# Patient Record
Sex: FEMALE | Race: WHITE | URBAN - METROPOLITAN AREA
[De-identification: names, ages, dates, MRNs, and addresses within clinical notes are randomized per-mention and may not be internally consistent; named-entity substitution may affect disease eponyms.]

---

## 2021-09-08 ENCOUNTER — EMERGENCY (EMERGENCY)
Facility: HOSPITAL | Age: 86
LOS: 0 days | Discharge: HOME | End: 2021-09-08
Admitting: INTERNAL MEDICINE

## 2021-09-08 ENCOUNTER — INPATIENT (INPATIENT)
Facility: HOSPITAL | Age: 86
LOS: 0 days | Discharge: HOME | End: 2021-09-09
Attending: INTERNAL MEDICINE | Admitting: HOSPITALIST
Payer: MEDICARE

## 2021-09-08 VITALS
HEART RATE: 90 BPM | TEMPERATURE: 96 F | DIASTOLIC BLOOD PRESSURE: 86 MMHG | SYSTOLIC BLOOD PRESSURE: 186 MMHG | RESPIRATION RATE: 18 BRPM | OXYGEN SATURATION: 98 % | WEIGHT: 130.95 LBS

## 2021-09-08 DIAGNOSIS — Z98.890 OTHER SPECIFIED POSTPROCEDURAL STATES: Chronic | ICD-10-CM

## 2021-09-08 DIAGNOSIS — Z85.841 PERSONAL HISTORY OF MALIGNANT NEOPLASM OF BRAIN: ICD-10-CM

## 2021-09-08 DIAGNOSIS — R20.2 PARESTHESIA OF SKIN: ICD-10-CM

## 2021-09-08 DIAGNOSIS — M54.6 PAIN IN THORACIC SPINE: ICD-10-CM

## 2021-09-08 DIAGNOSIS — R53.1 WEAKNESS: ICD-10-CM

## 2021-09-08 DIAGNOSIS — R20.0 ANESTHESIA OF SKIN: ICD-10-CM

## 2021-09-08 DIAGNOSIS — G43.909 MIGRAINE, UNSPECIFIED, NOT INTRACTABLE, WITHOUT STATUS MIGRAINOSUS: ICD-10-CM

## 2021-09-08 DIAGNOSIS — Z20.822 CONTACT WITH AND (SUSPECTED) EXPOSURE TO COVID-19: ICD-10-CM

## 2021-09-08 DIAGNOSIS — Z90.13 ACQUIRED ABSENCE OF BILATERAL BREASTS AND NIPPLES: Chronic | ICD-10-CM

## 2021-09-08 LAB
ALBUMIN SERPL ELPH-MCNC: 4.2 G/DL — SIGNIFICANT CHANGE UP (ref 3.5–5.2)
ALP SERPL-CCNC: 56 U/L — SIGNIFICANT CHANGE UP (ref 30–115)
ALT FLD-CCNC: 15 U/L — SIGNIFICANT CHANGE UP (ref 0–41)
ANION GAP SERPL CALC-SCNC: 10 MMOL/L — SIGNIFICANT CHANGE UP (ref 7–14)
APPEARANCE UR: CLEAR — SIGNIFICANT CHANGE UP
APTT BLD: 26.9 SEC — LOW (ref 27–39.2)
AST SERPL-CCNC: 36 U/L — SIGNIFICANT CHANGE UP (ref 0–41)
BACTERIA # UR AUTO: ABNORMAL
BASE EXCESS BLDV CALC-SCNC: 5.2 MMOL/L — HIGH (ref -2–3)
BASOPHILS # BLD AUTO: 0.02 K/UL — SIGNIFICANT CHANGE UP (ref 0–0.2)
BASOPHILS NFR BLD AUTO: 0.4 % — SIGNIFICANT CHANGE UP (ref 0–1)
BILIRUB SERPL-MCNC: 0.4 MG/DL — SIGNIFICANT CHANGE UP (ref 0.2–1.2)
BILIRUB UR-MCNC: NEGATIVE — SIGNIFICANT CHANGE UP
BUN SERPL-MCNC: 19 MG/DL — SIGNIFICANT CHANGE UP (ref 10–20)
CALCIUM SERPL-MCNC: 9.4 MG/DL — SIGNIFICANT CHANGE UP (ref 8.5–10.1)
CHLORIDE SERPL-SCNC: 104 MMOL/L — SIGNIFICANT CHANGE UP (ref 98–110)
CO2 SERPL-SCNC: 25 MMOL/L — SIGNIFICANT CHANGE UP (ref 17–32)
COLOR SPEC: YELLOW — SIGNIFICANT CHANGE UP
CREAT SERPL-MCNC: 0.8 MG/DL — SIGNIFICANT CHANGE UP (ref 0.7–1.5)
DIFF PNL FLD: ABNORMAL
EOSINOPHIL # BLD AUTO: 0.25 K/UL — SIGNIFICANT CHANGE UP (ref 0–0.7)
EOSINOPHIL NFR BLD AUTO: 4.6 % — SIGNIFICANT CHANGE UP (ref 0–8)
EPI CELLS # UR: ABNORMAL /HPF
GLUCOSE SERPL-MCNC: 110 MG/DL — HIGH (ref 70–99)
GLUCOSE UR QL: NEGATIVE MG/DL — SIGNIFICANT CHANGE UP
HCO3 BLDV-SCNC: 32 MMOL/L — HIGH (ref 22–29)
HCT VFR BLD CALC: 37.3 % — SIGNIFICANT CHANGE UP (ref 37–47)
HGB BLD-MCNC: 11.9 G/DL — LOW (ref 12–16)
IMM GRANULOCYTES NFR BLD AUTO: 0.2 % — SIGNIFICANT CHANGE UP (ref 0.1–0.3)
INR BLD: 1.01 RATIO — SIGNIFICANT CHANGE UP (ref 0.65–1.3)
KETONES UR-MCNC: NEGATIVE — SIGNIFICANT CHANGE UP
LACTATE BLDV-MCNC: 1.5 MMOL/L — SIGNIFICANT CHANGE UP (ref 0.5–2)
LEUKOCYTE ESTERASE UR-ACNC: ABNORMAL
LYMPHOCYTES # BLD AUTO: 0.87 K/UL — LOW (ref 1.2–3.4)
LYMPHOCYTES # BLD AUTO: 16.1 % — LOW (ref 20.5–51.1)
MCHC RBC-ENTMCNC: 28.7 PG — SIGNIFICANT CHANGE UP (ref 27–31)
MCHC RBC-ENTMCNC: 31.9 G/DL — LOW (ref 32–37)
MCV RBC AUTO: 89.9 FL — SIGNIFICANT CHANGE UP (ref 81–99)
MONOCYTES # BLD AUTO: 0.42 K/UL — SIGNIFICANT CHANGE UP (ref 0.1–0.6)
MONOCYTES NFR BLD AUTO: 7.7 % — SIGNIFICANT CHANGE UP (ref 1.7–9.3)
NEUTROPHILS # BLD AUTO: 3.85 K/UL — SIGNIFICANT CHANGE UP (ref 1.4–6.5)
NEUTROPHILS NFR BLD AUTO: 71 % — SIGNIFICANT CHANGE UP (ref 42.2–75.2)
NITRITE UR-MCNC: NEGATIVE — SIGNIFICANT CHANGE UP
NRBC # BLD: 0 /100 WBCS — SIGNIFICANT CHANGE UP (ref 0–0)
PCO2 BLDV: 52 MMHG — HIGH (ref 39–42)
PH BLDV: 7.39 — SIGNIFICANT CHANGE UP (ref 7.32–7.43)
PH UR: 6 — SIGNIFICANT CHANGE UP (ref 5–8)
PLATELET # BLD AUTO: 168 K/UL — SIGNIFICANT CHANGE UP (ref 130–400)
PO2 BLDV: 30 MMHG — SIGNIFICANT CHANGE UP
POTASSIUM SERPL-MCNC: 4.8 MMOL/L — SIGNIFICANT CHANGE UP (ref 3.5–5)
POTASSIUM SERPL-SCNC: 4.8 MMOL/L — SIGNIFICANT CHANGE UP (ref 3.5–5)
PROT SERPL-MCNC: 7.2 G/DL — SIGNIFICANT CHANGE UP (ref 6–8)
PROT UR-MCNC: NEGATIVE MG/DL — SIGNIFICANT CHANGE UP
PROTHROM AB SERPL-ACNC: 11.6 SEC — SIGNIFICANT CHANGE UP (ref 9.95–12.87)
RBC # BLD: 4.15 M/UL — LOW (ref 4.2–5.4)
RBC # FLD: 14.4 % — SIGNIFICANT CHANGE UP (ref 11.5–14.5)
RBC CASTS # UR COMP ASSIST: >50 /HPF
SAO2 % BLDV: 56.4 % — SIGNIFICANT CHANGE UP
SARS-COV-2 RNA SPEC QL NAA+PROBE: SIGNIFICANT CHANGE UP
SODIUM SERPL-SCNC: 139 MMOL/L — SIGNIFICANT CHANGE UP (ref 135–146)
SP GR SPEC: 1.01 — SIGNIFICANT CHANGE UP (ref 1.01–1.03)
TROPONIN T SERPL-MCNC: <0.01 NG/ML — SIGNIFICANT CHANGE UP
TROPONIN T SERPL-MCNC: <0.01 NG/ML — SIGNIFICANT CHANGE UP
UROBILINOGEN FLD QL: 0.2 MG/DL — SIGNIFICANT CHANGE UP (ref 0.2–0.2)
WBC # BLD: 5.42 K/UL — SIGNIFICANT CHANGE UP (ref 4.8–10.8)
WBC # FLD AUTO: 5.42 K/UL — SIGNIFICANT CHANGE UP (ref 4.8–10.8)
WBC UR QL: SIGNIFICANT CHANGE UP /HPF

## 2021-09-08 PROCEDURE — 70498 CT ANGIOGRAPHY NECK: CPT | Mod: 26,MA

## 2021-09-08 PROCEDURE — 71045 X-RAY EXAM CHEST 1 VIEW: CPT | Mod: 26

## 2021-09-08 PROCEDURE — 99221 1ST HOSP IP/OBS SF/LOW 40: CPT

## 2021-09-08 PROCEDURE — 99221 1ST HOSP IP/OBS SF/LOW 40: CPT | Mod: GC

## 2021-09-08 PROCEDURE — 0042T: CPT

## 2021-09-08 PROCEDURE — 93010 ELECTROCARDIOGRAM REPORT: CPT

## 2021-09-08 PROCEDURE — 99285 EMERGENCY DEPT VISIT HI MDM: CPT

## 2021-09-08 PROCEDURE — 99222 1ST HOSP IP/OBS MODERATE 55: CPT

## 2021-09-08 PROCEDURE — 70496 CT ANGIOGRAPHY HEAD: CPT | Mod: 26,MA

## 2021-09-08 RX ORDER — CHLORHEXIDINE GLUCONATE 213 G/1000ML
1 SOLUTION TOPICAL
Refills: 0 | Status: DISCONTINUED | OUTPATIENT
Start: 2021-09-08 | End: 2021-09-09

## 2021-09-08 RX ORDER — ATORVASTATIN CALCIUM 80 MG/1
80 TABLET, FILM COATED ORAL AT BEDTIME
Refills: 0 | Status: DISCONTINUED | OUTPATIENT
Start: 2021-09-08 | End: 2021-09-08

## 2021-09-08 RX ORDER — ASPIRIN/CALCIUM CARB/MAGNESIUM 324 MG
325 TABLET ORAL ONCE
Refills: 0 | Status: COMPLETED | OUTPATIENT
Start: 2021-09-08 | End: 2021-09-08

## 2021-09-08 RX ORDER — PANTOPRAZOLE SODIUM 20 MG/1
40 TABLET, DELAYED RELEASE ORAL
Refills: 0 | Status: DISCONTINUED | OUTPATIENT
Start: 2021-09-08 | End: 2021-09-09

## 2021-09-08 RX ORDER — ASPIRIN/CALCIUM CARB/MAGNESIUM 324 MG
81 TABLET ORAL DAILY
Refills: 0 | Status: DISCONTINUED | OUTPATIENT
Start: 2021-09-08 | End: 2021-09-09

## 2021-09-08 RX ORDER — ACETAMINOPHEN 500 MG
650 TABLET ORAL EVERY 6 HOURS
Refills: 0 | Status: DISCONTINUED | OUTPATIENT
Start: 2021-09-08 | End: 2021-09-09

## 2021-09-08 RX ORDER — INFLUENZA VIRUS VACCINE 15; 15; 15; 15 UG/.5ML; UG/.5ML; UG/.5ML; UG/.5ML
0.5 SUSPENSION INTRAMUSCULAR ONCE
Refills: 0 | Status: COMPLETED | OUTPATIENT
Start: 2021-09-08 | End: 2021-09-08

## 2021-09-08 RX ORDER — CLOPIDOGREL BISULFATE 75 MG/1
75 TABLET, FILM COATED ORAL ONCE
Refills: 0 | Status: COMPLETED | OUTPATIENT
Start: 2021-09-08 | End: 2021-09-08

## 2021-09-08 RX ORDER — ENOXAPARIN SODIUM 100 MG/ML
40 INJECTION SUBCUTANEOUS AT BEDTIME
Refills: 0 | Status: DISCONTINUED | OUTPATIENT
Start: 2021-09-08 | End: 2021-09-09

## 2021-09-08 RX ORDER — ATORVASTATIN CALCIUM 80 MG/1
40 TABLET, FILM COATED ORAL AT BEDTIME
Refills: 0 | Status: DISCONTINUED | OUTPATIENT
Start: 2021-09-08 | End: 2021-09-09

## 2021-09-08 RX ADMIN — CLOPIDOGREL BISULFATE 75 MILLIGRAM(S): 75 TABLET, FILM COATED ORAL at 14:09

## 2021-09-08 RX ADMIN — ENOXAPARIN SODIUM 40 MILLIGRAM(S): 100 INJECTION SUBCUTANEOUS at 21:14

## 2021-09-08 RX ADMIN — ATORVASTATIN CALCIUM 40 MILLIGRAM(S): 80 TABLET, FILM COATED ORAL at 21:14

## 2021-09-08 NOTE — ED PROVIDER NOTE - PROGRESS NOTE DETAILS
case discussed with neuro , tele stroke, No TPA, Wants CTA / perfusion scan case discussed with neuro, Dr Rodriguez , tele stroke, No TPA, Wants CTA / perfusion scan SR: spoke with dr. becker recommends 325 aspirin 75 plavix Q4 neuro checks.

## 2021-09-08 NOTE — CONSULT NOTE ADULT - ASSESSMENT
89 yo female found to have severe stenosis L ICA on CAT neck.            Plan:  - will discuss with vascular team 89 yo female found to have severe stenosis L ICA on CAT neck.            Plan:  - rec carotid duplex  - will follow      All above D/W Dr silva and vascular team

## 2021-09-08 NOTE — ED PROVIDER NOTE - ATTENDING CONTRIBUTION TO CARE
89 year old female with a pmh of scoliois & self caths 4 x a day  presenting here with weakness and numbness to left arm / fingers and numbnes/tingling of right fingers. Patient wokeup at 8am with these symptoms. No blurry vision dizziness neck pain cp sob n/v abdominal pain. Patient initially went to an  and was sent here for further evaluation. Stroke code activated upon evaluation.   On exam  CONSTITUTIONAL: WA / WN / NAD  HEAD: NCAT  EYES: PERRL; EOMI; anicteric.  ENT: Normal pharynx; mucous membranes pink/moist, no erythema.  NECK: Supple; no meningeal signs  CARD: RRR; nl S1/S2; no M/R/G.  RESP: Respiratory rate and effort are normal; breath sounds clear and equal bilaterally.  ABD: Soft, NT ND  MSK/EXT: No gross deformities; full range of motion.  SKIN: Warm and dry;   NEURO: AAOx3, Motor 5/5 x 4 extremities, Sensations intact  No dysmetria no dysarthria no pronator drift.   PSYCH: Memory Intact, Normal Affect

## 2021-09-08 NOTE — ED PROVIDER NOTE - OBJECTIVE STATEMENT
Patient c/o waking at 8 am with pain left upper back , radiating to left arm, C/o numb feeling to left arm  and both hands no weakness, no chest pain, no SOB, H/o craniotomy for meningeoma in the past.

## 2021-09-08 NOTE — H&P ADULT - NSHPPHYSICALEXAM_GEN_ALL_CORE
General: NAD  Neuro: alert and oriented, no focal deficits, moves all extremities spontaneously  HEENT: NCAT, EOMI, anicteric, mucosa moist  Respiratory: airway patent, respirations unlabored  CVS: regular rate and rhythm  Abdomen: soft, nontender, nondistended, BS active, no bladder distension  Extremities: no edema, sensation and movement grossly intact  MSK: + thoracic scoliosis   Skin: warm, dry, appropriate color

## 2021-09-08 NOTE — CHART NOTE - NSCHARTNOTEFT_GEN_A_CORE
Stroke Note:    Responded remotely to stroke alert called at [facility]. Telestroke video consultation was deferred as it may delay appropriate care.   Pt is a 89 yo F with PMHx of craniotomy presenting with pain in back and numbness/pain in bilateral hands. LKW 2300 on 9/7, woke up with symptoms at 0700. NIHSS 0 per ED provider. CT head shows left craniotomy/surgical changes, no prior study available for comparison. Pt is not an IV alteplase candidate as LKW >4.5 hrs ago, low suspicion for AIS with b/l symptoms and mild, nondisabling symptoms. CTA head/neck without evidence of ELVO for EVT consideration. Consider admission, MRI brain/cervical spine and ASA/statin.

## 2021-09-08 NOTE — ED PROVIDER NOTE - CLINICAL SUMMARY MEDICAL DECISION MAKING FREE TEXT BOX
89 year old female with a pmh of scoliois & self caths 4 x a day  presenting here with weakness and numbness to left arm / fingers and numbnes/tingling of right fingers. Patient wokeup at 8am with these symptoms. No blurry vision dizziness neck pain cp sob n/v abdominal pain. Patient initially went to an  and was sent here for further evaluation. Stroke code activated upon evaluation.  VS reviewed. LAbs imaging ekg obtained and reviewed. Plavix given. Patient admitted for Q4 neuro checks

## 2021-09-08 NOTE — H&P ADULT - ASSESSMENT
89 yo F with reported PMH of Meningioma s/p craniotomy, Breast Cancer s/p b/l mastectomy and reconstruction, Urinary retention with self cauterization Chronic Migraines, Scoliosis, Hiatal Hernia, Chronic Osteoarthritis presents to the ED with episode of left arm numbness and pain extending to fingers onset around 8 am.    Left arm numbness likely 2/2 radiculopathy, unlikely Ischemic Stroke  - Patient currently hemodynamically stable, symptoms resolved  - Stroke Code activated in ED, NIHSS 0, out of window for tpa  - CT Head, CTP negative for new intracranial abnormality, CTA Head/neck negative for LVO. + short segment of Left TORI 85% atherosclerotic stenosis, likely chronic with collateral circulation  - EKG with no new ischemic changes, Trop neg x1  - CTH + plates s/p meningioma (cannot obtain MRI)  - Obtain CXR, b12, folate  - c/w telemonitoring for 24 hours  - PT/OT evaluation  - c/w enteric coated ASA, Plavix per neuro patient is not allergic)  - neurology consult placed  - Tylenol PRN for osteoarthritis  - supportive care, neurochecks    Chronic Thoracis Scoliosis  suspected Restrictive Lung Disease  - Patient with hx of severe chronic scoliosis  - States she uses supplemental O2 overnight, denies CPAP/BIPAP use  - No smoking hx, denies COPD  - c/w O2 PRN  - f/u CXR  - PT eval    hx of Chronic Migraines  - currently asymptomatic  - has not used inderal and fioroset in a while  - supportive care    Osteoarthritis  - Tylenol PRN  - PT eval    Chronic Urinary Retention  - claims she self cauterizes daily  - No suprapubic tenderness or distention  - was able to void in ED   - monitor urine output    CODE: full  Diet: regular  DVT ppx: lovenox  Dispo: anticipate dc within 24 hours       89 yo F with reported PMH of Meningioma s/p craniotomy, Breast Cancer s/p b/l mastectomy and reconstruction, Urinary retention with self cauterization Chronic Migraines, Scoliosis, Hiatal Hernia, Chronic Osteoarthritis presents to the ED with episode of left arm numbness and pain extending to fingers onset around 8 am.    Left arm numbness likely 2/2 radiculopathy, unlikely Ischemic Stroke  - Patient currently hemodynamically stable, symptoms resolved  - Stroke Code activated in ED, NIHSS 0, out of window for tpa  - CT Head, CTP negative for new intracranial abnormality, CTA Head/neck negative for LVO. + short segment of Left TORI 85% atherosclerotic stenosis, likely chronic with collateral circulation  - EKG with no new ischemic changes, Trop neg x1, trend one more set  - CTH + plates s/p meningioma (cannot obtain MRI)  - Obtain CXR, b12, folate  - c/w telemonitoring for 24 hours  - PT/OT evaluation  - c/w enteric coated ASA, Plavix per neuro (patient is not allergic to aspirin)  - neurology consult placed  - Tylenol PRN for osteoarthritis  - supportive care    Chronic Thoracis Scoliosis  Suspected Restrictive Lung Disease 2/2 Scoliosis  - Patient with hx of severe chronic scoliosis  - States she uses supplemental O2 overnight, denies CPAP/BIPAP use  - No smoking hx, denies COPD  - c/w O2 PRN  - f/u CXR  - PT eval     Chronic Migraines  - currently asymptomatic  - has not used inderal and fioroset in a while  - supportive care    Osteoarthritis  - Tylenol PRN  - PT eval    Asymptomatic Bacteruria  Chronic Urinary Retention  - claims she self cauterizes daily  - No suprapubic tenderness or distention, no need for abx  - was able to void in ED   - monitor urine output    CODE: full  Diet: regular  DVT ppx: lovenox  Dispo: anticipate dc within 24 hours

## 2021-09-08 NOTE — H&P ADULT - CONVERSATION DETAILS
Goals of care discussed with patient and daughter at bedside, chronic conditions and current plan of care discussed in detail, patient stated understanding and would like to remain FULL CODE at this time.

## 2021-09-08 NOTE — H&P ADULT - HISTORY OF PRESENT ILLNESS
89 yo F with reported PMH of Meningioma s/p craniotomy, Breast Cancer s/p b/l mastectomy and reconstruction, Urinary retention with self catherization. Chronic Migraines, Scoliosis, Hiatal Hernia, Chronic Osteoarthritis presents to the ED with episode of left arm numbness and pain extending to fingers onset around 8 am. Patient states that she was in her usual state of health when she woke up this morning and felt an "uneasy" numbing sensation over the left arm and numbness on her distal fingers. Symptoms were constant and resolved upon arrival to the ED this afternoon (Lasted about 20 mins).  She denies any chest pain, recent trauma, falls, or sick contacts, or dizziness. She denies any history of syncope or similar events in the past. Upon examination, patient admits her symptoms resolves and endorses chronic back pain from scoliosis. She otherwise denies any current headaches, dizziness, chest pain, palpitations, sob, or abdominal symptoms.    ED course: /86, HR: 90, T: 96.6, SPO2 92% on room air, Stroke Code activated in ED, CTH negtive for any new intracranial pathology . NIHSS 0 on admission, out of window for tpa. CT perfusion and CTA head negative for LVO, CTA neck + 85% stenosis of short segment proximal ICA 2/2 atherosclerotic calcification. Loaded with ASA, Plavix in ED. Trop neg x1, EKG w/out new ischemic changes, no acute electrolyte abnormalities.  89 yo F with reported PMH of Meningioma s/p craniotomy, Breast Cancer s/p b/l mastectomy and reconstruction, Urinary retention with self catherization. Chronic Migraines, Scoliosis, Hiatal Hernia, Chronic Osteoarthritis presents to the ED with episode of left arm numbness and pain extending to fingers onset around 8 am. Patient states that she was in her usual state of health when she woke up this morning and felt an "uneasy" numbing sensation over the left arm and numbness on her distal fingers. Symptoms were constant and resolved upon arrival to the ED this afternoon (Lasted about 20 mins).  Of note, patient with chronic back pain due to scoliosis and sleeps on her left side on her arm. She denies any chest pain, recent trauma, falls, or sick contacts, or dizziness. She denies any history of syncope or similar events in the past. Upon examination, patient admits her symptoms resolves and endorses chronic back pain from scoliosis. She otherwise denies any current headaches, dizziness, chest pain, palpitations, sob, or abdominal symptoms.    ED course: /86, HR: 90, T: 96.6, SPO2 92% on room air, Stroke Code activated in ED, CTH negative for any new intracranial pathology . NIHSS 0 on admission, out of window for tpa. CT perfusion and CTA head negative for LVO, CTA neck + 85% stenosis of short segment proximal ICA 2/2 atherosclerotic calcification. Loaded with ASA, Plavix in ED. Trop neg x1, EKG w/out new ischemic changes, no acute electrolyte abnormalities.

## 2021-09-08 NOTE — H&P ADULT - NSHPLABSRESULTS_GEN_ALL_CORE
11.9   5.42  )-----------( 168      ( 08 Sep 2021 10:30 )             37.3           139  |  104  |  19  ----------------------------<  110<H>  4.8   |  25  |  0.8    Ca    9.4      08 Sep 2021 10:30    TPro  7.2  /  Alb  4.2  /  TBili  0.4  /  DBili  x   /  AST  36  /  ALT  15  /  AlkPhos  56  09-08              Urinalysis Basic - ( 08 Sep 2021 13:55 )    Color: Yellow / Appearance: Clear / S.010 / pH: x  Gluc: x / Ketone: Negative  / Bili: Negative / Urobili: 0.2 mg/dL   Blood: x / Protein: Negative mg/dL / Nitrite: Negative   Leuk Esterase: Trace / RBC: >50 /HPF / WBC 1-2 /HPF   Sq Epi: x / Non Sq Epi: Few /HPF / Bacteria: Moderate        PT/INR - ( 08 Sep 2021 10:30 )   PT: 11.60 sec;   INR: 1.01 ratio         PTT - ( 08 Sep 2021 10:30 )  PTT:26.9 sec    Lactate Trend      CARDIAC MARKERS ( 08 Sep 2021 10:30 )  x     / <0.01 ng/mL / x     / x     / x            CAPILLARY BLOOD GLUCOSE      POCT Blood Glucose.: 108 mg/dL (08 Sep 2021 09:59)

## 2021-09-08 NOTE — H&P ADULT - ATTENDING COMMENTS
Vital Signs Last 24 Hrs  T(C): 35.8 (08 Sep 2021 10:01), Max: 35.8 (08 Sep 2021 10:01)  T(F): 96.5 (08 Sep 2021 10:01), Max: 96.5 (08 Sep 2021 10:01)  HR: 90 (08 Sep 2021 14:44) (78 - 90)  BP: 137/84 (08 Sep 2021 14:44) (114/63 - 186/86)  BP(mean): --  RR: 18 (08 Sep 2021 14:44) (18 - 18)  SpO2: 98% (08 Sep 2021 14:44) (96% - 98%)  pe  nad  aaox3  dentures, chronic asymetry nasolabial  [id number]  ctabl  sofntnt+bs  nocce  nonfocal 5/5 intact  sensation intact  CN intact      #bilateral hand numbness   doubt TIA - likely related to position from sleeping   scoliosis - radiculopathy   symptoms resolved  no MRI - pt has metal plate in her head sp meningoma surgery  neuro eval checks q4  PT/OT eval    #carotid stenosis  pt was told she needs vascular eval - consult placed  however she would not agree to any procedure anyway  [id number] daily  statin  check LDL    full code  discussed with pt and daughter at bedside  likely dc home on 9/9 after neuro eval

## 2021-09-08 NOTE — H&P ADULT - NSHPREVIEWOFSYSTEMS_GEN_ALL_CORE
REVIEW OF SYSTEMS:    CONSTITUTIONAL: + left arm numbness,  EYES/ENT: No visual changes;  No vertigo or throat pain   NECK: No pain or stiffness  RESPIRATORY: No cough, wheezing, hemoptysis; No shortness of breath  CARDIOVASCULAR: No chest pain or palpitations  GASTROINTESTINAL: No abdominal or epigastric pain. No nausea, vomiting, or hematemesis; No diarrhea or constipation. No melena or hematochezia.  GENITOURINARY: No dysuria, frequency or hematuria  NEUROLOGICAL: Left arm numbness and tingling  MSK: + chronic back pain

## 2021-09-08 NOTE — ED PROVIDER NOTE - NSICDXPASTMEDICALHX_GEN_ALL_CORE_FT
PAST MEDICAL HISTORY:  Breast CA     H/O brain tumor     Migraine     Urinary retention with incomplete bladder emptying pt self catheterizes

## 2021-09-09 ENCOUNTER — TRANSCRIPTION ENCOUNTER (OUTPATIENT)
Age: 86
End: 2021-09-09

## 2021-09-09 VITALS
DIASTOLIC BLOOD PRESSURE: 69 MMHG | OXYGEN SATURATION: 99 % | HEART RATE: 82 BPM | TEMPERATURE: 97 F | SYSTOLIC BLOOD PRESSURE: 145 MMHG | RESPIRATION RATE: 36 BRPM

## 2021-09-09 LAB
A1C WITH ESTIMATED AVERAGE GLUCOSE RESULT: 5.8 % — HIGH (ref 4–5.6)
ALBUMIN SERPL ELPH-MCNC: 3.7 G/DL — SIGNIFICANT CHANGE UP (ref 3.5–5.2)
ALP SERPL-CCNC: 51 U/L — SIGNIFICANT CHANGE UP (ref 30–115)
ALT FLD-CCNC: 12 U/L — SIGNIFICANT CHANGE UP (ref 0–41)
ANION GAP SERPL CALC-SCNC: 11 MMOL/L — SIGNIFICANT CHANGE UP (ref 7–14)
AST SERPL-CCNC: 22 U/L — SIGNIFICANT CHANGE UP (ref 0–41)
BASOPHILS # BLD AUTO: 0.02 K/UL — SIGNIFICANT CHANGE UP (ref 0–0.2)
BASOPHILS NFR BLD AUTO: 0.5 % — SIGNIFICANT CHANGE UP (ref 0–1)
BILIRUB SERPL-MCNC: 0.4 MG/DL — SIGNIFICANT CHANGE UP (ref 0.2–1.2)
BUN SERPL-MCNC: 13 MG/DL — SIGNIFICANT CHANGE UP (ref 10–20)
CALCIUM SERPL-MCNC: 9.3 MG/DL — SIGNIFICANT CHANGE UP (ref 8.5–10.1)
CHLORIDE SERPL-SCNC: 103 MMOL/L — SIGNIFICANT CHANGE UP (ref 98–110)
CHOLEST SERPL-MCNC: 156 MG/DL — SIGNIFICANT CHANGE UP
CO2 SERPL-SCNC: 27 MMOL/L — SIGNIFICANT CHANGE UP (ref 17–32)
CREAT SERPL-MCNC: 0.7 MG/DL — SIGNIFICANT CHANGE UP (ref 0.7–1.5)
EOSINOPHIL # BLD AUTO: 0.27 K/UL — SIGNIFICANT CHANGE UP (ref 0–0.7)
EOSINOPHIL NFR BLD AUTO: 6.3 % — SIGNIFICANT CHANGE UP (ref 0–8)
ESTIMATED AVERAGE GLUCOSE: 120 MG/DL — HIGH (ref 68–114)
GLUCOSE SERPL-MCNC: 106 MG/DL — HIGH (ref 70–99)
HCT VFR BLD CALC: 33.8 % — LOW (ref 37–47)
HDLC SERPL-MCNC: 66 MG/DL — SIGNIFICANT CHANGE UP
HGB BLD-MCNC: 11 G/DL — LOW (ref 12–16)
IMM GRANULOCYTES NFR BLD AUTO: 0.2 % — SIGNIFICANT CHANGE UP (ref 0.1–0.3)
LIPID PNL WITH DIRECT LDL SERPL: 86 MG/DL — SIGNIFICANT CHANGE UP
LYMPHOCYTES # BLD AUTO: 0.95 K/UL — LOW (ref 1.2–3.4)
LYMPHOCYTES # BLD AUTO: 22 % — SIGNIFICANT CHANGE UP (ref 20.5–51.1)
MAGNESIUM SERPL-MCNC: 1.9 MG/DL — SIGNIFICANT CHANGE UP (ref 1.8–2.4)
MCHC RBC-ENTMCNC: 29.2 PG — SIGNIFICANT CHANGE UP (ref 27–31)
MCHC RBC-ENTMCNC: 32.5 G/DL — SIGNIFICANT CHANGE UP (ref 32–37)
MCV RBC AUTO: 89.7 FL — SIGNIFICANT CHANGE UP (ref 81–99)
MONOCYTES # BLD AUTO: 0.46 K/UL — SIGNIFICANT CHANGE UP (ref 0.1–0.6)
MONOCYTES NFR BLD AUTO: 10.6 % — HIGH (ref 1.7–9.3)
NEUTROPHILS # BLD AUTO: 2.61 K/UL — SIGNIFICANT CHANGE UP (ref 1.4–6.5)
NEUTROPHILS NFR BLD AUTO: 60.4 % — SIGNIFICANT CHANGE UP (ref 42.2–75.2)
NON HDL CHOLESTEROL: 90 MG/DL — SIGNIFICANT CHANGE UP
NRBC # BLD: 0 /100 WBCS — SIGNIFICANT CHANGE UP (ref 0–0)
PLATELET # BLD AUTO: 166 K/UL — SIGNIFICANT CHANGE UP (ref 130–400)
POTASSIUM SERPL-MCNC: 3.6 MMOL/L — SIGNIFICANT CHANGE UP (ref 3.5–5)
POTASSIUM SERPL-SCNC: 3.6 MMOL/L — SIGNIFICANT CHANGE UP (ref 3.5–5)
PROT SERPL-MCNC: 6.2 G/DL — SIGNIFICANT CHANGE UP (ref 6–8)
RBC # BLD: 3.77 M/UL — LOW (ref 4.2–5.4)
RBC # FLD: 14.2 % — SIGNIFICANT CHANGE UP (ref 11.5–14.5)
SODIUM SERPL-SCNC: 141 MMOL/L — SIGNIFICANT CHANGE UP (ref 135–146)
TRIGL SERPL-MCNC: 67 MG/DL — SIGNIFICANT CHANGE UP
WBC # BLD: 4.32 K/UL — LOW (ref 4.8–10.8)
WBC # FLD AUTO: 4.32 K/UL — LOW (ref 4.8–10.8)

## 2021-09-09 PROCEDURE — 70450 CT HEAD/BRAIN W/O DYE: CPT | Mod: 26

## 2021-09-09 PROCEDURE — 93880 EXTRACRANIAL BILAT STUDY: CPT | Mod: 26

## 2021-09-09 PROCEDURE — 99239 HOSP IP/OBS DSCHRG MGMT >30: CPT

## 2021-09-09 PROCEDURE — 71045 X-RAY EXAM CHEST 1 VIEW: CPT | Mod: 26

## 2021-09-09 RX ORDER — ATORVASTATIN CALCIUM 80 MG/1
1 TABLET, FILM COATED ORAL
Qty: 30 | Refills: 0
Start: 2021-09-09 | End: 2021-10-08

## 2021-09-09 RX ORDER — ASPIRIN/CALCIUM CARB/MAGNESIUM 324 MG
1 TABLET ORAL
Qty: 30 | Refills: 0
Start: 2021-09-09 | End: 2021-10-08

## 2021-09-09 RX ORDER — CLOPIDOGREL BISULFATE 75 MG/1
1 TABLET, FILM COATED ORAL
Qty: 21 | Refills: 0
Start: 2021-09-09 | End: 2021-09-29

## 2021-09-09 RX ADMIN — Medication 650 MILLIGRAM(S): at 06:51

## 2021-09-09 RX ADMIN — Medication 81 MILLIGRAM(S): at 12:31

## 2021-09-09 RX ADMIN — Medication 650 MILLIGRAM(S): at 07:20

## 2021-09-09 RX ADMIN — PANTOPRAZOLE SODIUM 40 MILLIGRAM(S): 20 TABLET, DELAYED RELEASE ORAL at 05:46

## 2021-09-09 RX ADMIN — CHLORHEXIDINE GLUCONATE 1 APPLICATION(S): 213 SOLUTION TOPICAL at 05:46

## 2021-09-09 NOTE — CONSULT NOTE ADULT - SUBJECTIVE AND OBJECTIVE BOX
Neurology Consult  note    Name  MARISA PEREZ 89 y/o F with pmhx of Meningioma s/p craniotomy (has metal plate), Breast Cancer s/p b/l mastectomy and reconstruction, Urinary retention with self catherization, Chronic Migraines, Scoliosis, Hiatal Hernia, Chronic Osteoarthritis presents to the ED with episode of left arm numbness and pain extending to fingers onset around 8 am. Pt reports when she woke up felt pain on her back, left side below the shoulder scapula then started feeling numbness sensation on her left arm all the way to her fingers. Pt reports symptoms have resolved. Pt states usually sleeps on her left side due to scoliosis. Pt denies similar numbness sensation in the past. Pt reports has hx of migraine , is well controlled. Pt denies hx of syncope. Pt denies recent fall. Pt denies fever, chills, chest pain, shortness of breath, burning with urination, diarrhea.     Vital Signs Last 24 Hrs  T(C): 36.2 (09 Sep 2021 07:25), Max: 36.2 (09 Sep 2021 07:25)  T(F): 97.1 (09 Sep 2021 07:25), Max: 97.1 (09 Sep 2021 07:25)  HR: 82 (09 Sep 2021 07:25) (77 - 90)  BP: 145/69 (09 Sep 2021 07:25) (114/63 - 149/67)  BP(mean): 99 (09 Sep 2021 07:25) (89 - 99)  RR: 36 (09 Sep 2021 07:25) (16 - 36)  SpO2: 99% (09 Sep 2021 07:25) (96% - 99%)      Neurological Exam:   Mental status: Awake, alert and oriented x3.  Recent and remote memory intact.  Naming and comprehension intact.    Attention/concentration intact.  No dysarthria, no aphasia.  Fund of knowledge appropriate.    Cranial nerves:  pupils equally round and reactive to light, visual fields full, no nystagmus, extraocular muscles intact,  face asymmetric right lip side lower ( pt states does not have her dentures) tongue was midline  Motor:  Normal bulk and tone, strength 5/5 in bilateral upper and lower extremities.   strength 5/5.  Rapid alternating movements intact and symmetric.   Sensation: Intact to light touch,  No neglect.   Coordination: No dysmetria on finger-to-nose and heel-to-shin.  No clumsiness.  Reflexes: 2+ in upper and lower extremities, downgoing toes bilaterally  Gait: Narrow and steady. No ataxia.  Romberg negative    Medications  acetaminophen   Tablet .. 650 milliGRAM(s) Oral every 6 hours PRN  aspirin enteric coated 81 milliGRAM(s) Oral daily  atorvastatin 40 milliGRAM(s) Oral at bedtime  chlorhexidine 4% Liquid 1 Application(s) Topical <User Schedule>  enoxaparin Injectable 40 milliGRAM(s) SubCutaneous at bedtime  influenza   Vaccine 0.5 milliLiter(s) IntraMuscular once  pantoprazole    Tablet 40 milliGRAM(s) Oral before breakfast      Lab                        11.0   4.32  )-----------( 166      ( 09 Sep 2021 05:35 )             33.8     09-09    141  |  103  |  13  ----------------------------<  106<H>  3.6   |  27  |  0.7    Ca    9.3      09 Sep 2021 05:35  Mg     1.9     09-09    TPro  6.2  /  Alb  3.7  /  TBili  0.4  /  DBili  x   /  AST  22  /  ALT  12  /  AlkPhos  51  09-09    CAPILLARY BLOOD GLUCOSE        LIVER FUNCTIONS - ( 09 Sep 2021 05:35 )  Alb: 3.7 g/dL / Pro: 6.2 g/dL / ALK PHOS: 51 U/L / ALT: 12 U/L / AST: 22 U/L / GGT: x           PT/INR - ( 08 Sep 2021 10:30 )   PT: 11.60 sec;   INR: 1.01 ratio         PTT - ( 08 Sep 2021 10:30 )  PTT:26.9 sec    Radiology  < from: CT Head No Cont (09.09.21 @ 09:21) >  IMPRESSION:  No significant change since recent head CT of 9/8/2021.    Similar hypodensity noted within the left frontal lobe which is not well evaluated due to streak artifact from metallic hardware involving prior left craniotomy. Finding may represent gliosis or chronic infarct related to prior postsurgical change though an evolving acute infarct cannot be excluded.    No evidence of intracranial hemorrhage, mass effect or midline shift.    --- End of Report ---      < from: CT Angio Neck w/ IV Cont (09.08.21 @ 10:46) >  IMPRESSION:    CT PERFUSION:  Nondiagnostic examination.    CTA HEAD:  No evidence of flow-limiting stenosis, occlusion or aneurysm.    Slightly beaded appearance of the distal cervical internal carotid arteries bilaterally which is nonspecific and possibly may be related to vasculitis or other chronic inflammatory etiology.    CTA NECK:  Severe (approximately 85%) short segment stenosis of the proximal left internal carotid artery due to atherosclerotic calcification.    --- End of Report ---      MONSERRAT MULLER MD; Attending Radiologist  This document has been electronically signed. Sep  8 2021 11:38AM    < end of copied text >              MONSERRAT MULLER MD; Attending Radiologist  This document has been electronically signed. Sep  9 2021 10:11AM    < end of copied text >  
HPI:  89 yo F with reported PMH of Meningioma s/p craniotomy, Breast Cancer s/p b/l mastectomy and reconstruction, Urinary retention with self catherization. Chronic Migraines, thx  Scoliosis, kyphosis  Hiatal Hernia, Chronic Osteoarthritis presents to the ED with episode of left arm numbness and pain extending to fingers onset around 8 am. Patient states that she was in her usual state of health when she woke up this morning and felt an "uneasy" numbing sensation over the left arm and numbness on her distal fingers. Symptoms were constant and resolved upon arrival to the ED this afternoon (Lasted about 20 mins).  Of note, patient with chronic back pain due to scoliosis and sleeps on her left side on her arm. She denies any chest pain, recent trauma, falls, or sick contacts, or dizziness. She denies any history of syncope or similar events in the past. Upon examination, patient admits her symptoms resolves and endorses chronic back pain from scoliosis. She otherwise denies any current headaches, dizziness, chest pain, palpitations, sob, or abdominal symptoms.    ED course: /86, HR: 90, T: 96.6, SPO2 92% on room air, Stroke Code activated in ED, CTH negative for any new intracranial pathology . NIHSS 0 on admission, out of window for tpa. CT perfusion and CTA head negative for LVO, CTA neck + 85% stenosis of short segment proximal ICA 2/2 atherosclerotic calcification. Loaded with ASA, Plavix in ED. Trop neg x1, EKG w/out new ischemic changes, no acute electrolyte abnormalities.  ptn  seen and exam  at  bed  side nad  fu  command aox3      PTN  REFERRED TO ACUTE  REHAB  FOR  EVAL AND  TX   PAST MEDICAL & SURGICAL HISTORY:  Migraine    H/O brain tumor    Breast CA    Urinary retention with incomplete bladder emptying  pt self catheterizes    H/O mastectomy, bilateral    H/O craniotomy        Hospital Course:    TODAY'S SUBJECTIVE & REVIEW OF SYMPTOMS:     Constitutional WNL   Cardio WNL   Resp WNL   GI WNL  Heme WNL  Endo WNL  Skin WNL  MSK WNL  Neuro WNL  Cognitive WNL  Psych WNL      MEDICATIONS  (STANDING):  aspirin enteric coated 81 milliGRAM(s) Oral daily  atorvastatin 40 milliGRAM(s) Oral at bedtime  chlorhexidine 4% Liquid 1 Application(s) Topical <User Schedule>  enoxaparin Injectable 40 milliGRAM(s) SubCutaneous at bedtime  influenza   Vaccine 0.5 milliLiter(s) IntraMuscular once  pantoprazole    Tablet 40 milliGRAM(s) Oral before breakfast    MEDICATIONS  (PRN):  acetaminophen   Tablet .. 650 milliGRAM(s) Oral every 6 hours PRN Temp greater or equal to 38C (100.4F), Mild Pain (1 - 3)      FAMILY HISTORY:      Allergies    No Known Allergies    Intolerances        SOCIAL HISTORY:    [  ] Etoh  [  ] Smoking  [  ] Substance abuse     Home Environment:  [  ] Home Alone  [ x ] Lives with Family  [  ] Home Health Aid    Dwelling:  [  ] Apartment  [ x ] Private House  [  ] Adult Home  [  ] Skilled Nursing Facility      [  ] Short Term  [  ] Long Term  [ x ] Stairs       Elevator [  ]    FUNCTIONAL STATUS PTA: (Check all that apply)  Ambulation: [ x  ]Independent    [  ] Dependent     [  ] Non-Ambulatory  Assistive Device: [ x ] SA Cane  [  ]  Q Cane  [ x ] Walker  [ ]  Wheelchair  ADL : [x  ] Independent  [  ]  Dependent       Vital Signs Last 24 Hrs  T(C): 36.2 (09 Sep 2021 07:25), Max: 36.2 (09 Sep 2021 07:25)  T(F): 97.1 (09 Sep 2021 07:25), Max: 97.1 (09 Sep 2021 07:25)  HR: 82 (09 Sep 2021 07:25) (77 - 90)  BP: 145/69 (09 Sep 2021 07:25) (114/63 - 186/86)  BP(mean): 99 (09 Sep 2021 07:25) (89 - 99)  RR: 36 (09 Sep 2021 07:25) (16 - 36)  SpO2: 99% (09 Sep 2021 07:25) (96% - 99%)      PHYSICAL EXAM: Alert & Oriented X3  GENERAL: NAD, well-groomed, well-developed  HEAD:  Atraumatic, Normocephalic  EYES: EOMI, PERRLA, conjunctiva and sclera clear  NECK: Supple, No JVD, Normal thyroid  CHEST/LUNG: Clear to percussion bilaterally; No rales, rhonchi, wheezing, or rubs  HEART: Regular rate and rhythm; No murmurs, rubs, or gallops  ABDOMEN: Soft, Nontender, Nondistended; Bowel sounds present  EXTREMITIES:  2+ Peripheral Pulses, No clubbing, cyanosis, or edema    NERVOUS SYSTEM:  Cranial Nerves 2-12 intact [ x ] Abnormal  [  ]  ROM: WFL all extremities [ x ]  Abnormal [  ]  Motor Strength: WFL all extremities  [x  ]  Abnormal [  ]  Sensation: intact to light touch [ x ] Abnormal [  ]  Reflexes: Symmetric [  x]  Abnormal [  ]    FUNCTIONAL STATUS:  Bed Mobility: Independent [  ]  Supervision [ x ]  Needs Assistance [  ]  N/A [  ]  Transfers: Independent [  ]  Supervision [ x ]  Needs Assistance [  ]  N/A [  ]   Ambulation: Independent [  ]  Supervision [ x ]  Needs Assistance [  ]  N/A [  ]  ADL: Independent [  ] Requires Assistance [  ] N/A [x  ]  SEE PT/OT IE NOTES    LABS:                        11.0   4.32  )-----------( 166      ( 09 Sep 2021 05:35 )             33.8         141  |  103  |  13  ----------------------------<  106<H>  3.6   |  27  |  0.7    Ca    9.3      09 Sep 2021 05:35  Mg     1.9         TPro  6.2  /  Alb  3.7  /  TBili  0.4  /  DBili  x   /  AST  22  /  ALT  12  /  AlkPhos  51      PT/INR - ( 08 Sep 2021 10:30 )   PT: 11.60 sec;   INR: 1.01 ratio         PTT - ( 08 Sep 2021 10:30 )  PTT:26.9 sec  Urinalysis Basic - ( 08 Sep 2021 13:55 )    Color: Yellow / Appearance: Clear / S.010 / pH: x  Gluc: x / Ketone: Negative  / Bili: Negative / Urobili: 0.2 mg/dL   Blood: x / Protein: Negative mg/dL / Nitrite: Negative   Leuk Esterase: Trace / RBC: >50 /HPF / WBC 1-2 /HPF   Sq Epi: x / Non Sq Epi: Few /HPF / Bacteria: Moderate        RADIOLOGY & ADDITIONAL STUDIES:< from: CT Brain Stroke Protocol (21 @ 10:14) >  IMPRESSION:  Examination is limited due to metallic streak artifact from left craniotomy hardware.    There is hypodensity involving the left frontal lobe which may represent gliosis from prior craniotomy though acute infarct cannot be excluded, not well evaluated due to streak artifact..    No mass effect, midline shift or intracranial hemorrhage.      < end of copied text >      Assesment:
HPI: 89 yo female with PMHx Meningioma s/p craniotomy and Breast CA s/p b/l mastectomy and reconstruction presents to ER with c/o pain in back under L scapula with some numbness and tingling of LUE. Pt does states she sleeps on her L side and had this feeling upon awakening. All symptoms now resolved as per pt. She denies any dizziness, visual changes, slurred speech, HA or CP.     Denies any h/o MI, CVA or TIA. Takes baby ASA for "prevention".         PAST MEDICAL & SURGICAL HISTORY:  Migraine    H/O brain tumor    Breast CA    Urinary retention with incomplete bladder emptying  pt self catheterizes    H/O mastectomy, bilateral    H/O craniotomy        MEDICATIONS  (STANDING):  aspirin enteric coated 81 milliGRAM(s) Oral daily  atorvastatin 40 milliGRAM(s) Oral at bedtime  chlorhexidine 4% Liquid 1 Application(s) Topical <User Schedule>  enoxaparin Injectable 40 milliGRAM(s) SubCutaneous at bedtime  influenza   Vaccine 0.5 milliLiter(s) IntraMuscular once    MEDICATIONS  (PRN):  acetaminophen   Tablet .. 650 milliGRAM(s) Oral every 6 hours PRN Temp greater or equal to 38C (100.4F), Mild Pain (1 - 3)      Allergies    No Known Allergies    Intolerances        SOCIAL HISTORY:    FAMILY HISTORY:          Physical Exam:  General: NAD, resting comfortably  HEENT: NC/AT, EOMI, normal hearing, no oral lesions, no LAD, neck supple  Pulmonary: normal resp effort, CTA-B  Cardiovascular: NSR, no murmurs  Abdominal: soft, ND/NT, no organomegaly  Extremities: WWP, normal strength, no clubbing/cyanosis/edema  Neuro: A/O x 3, CNs II-XII grossly intact, normal sensation, no focal deficits  Pulses: palpable distal pulses    Vital Signs Last 24 Hrs  T(C): 35.7 (08 Sep 2021 16:10), Max: 35.8 (08 Sep 2021 10:01)  T(F): 96.3 (08 Sep 2021 16:10), Max: 96.5 (08 Sep 2021 10:01)  HR: 87 (08 Sep 2021 16:10) (78 - 90)  BP: 149/67 (08 Sep 2021 16:10) (114/63 - 186/86)  BP(mean): 97 (08 Sep 2021 16:10) (97 - 97)  RR: 20 (08 Sep 2021 16:10) (18 - 20)  SpO2: 98% (08 Sep 2021 16:10) (96% - 98%)    I&O's Summary          LABS:                        11.9   5.42  )-----------( 168      ( 08 Sep 2021 10:30 )             37.3         139  |  104  |  19  ----------------------------<  110<H>  4.8   |  25  |  0.8    Ca    9.4      08 Sep 2021 10:30    TPro  7.2  /  Alb  4.2  /  TBili  0.4  /  DBili  x   /  AST  36  /  ALT  15  /  AlkPhos  56  0908    PT/INR - ( 08 Sep 2021 10:30 )   PT: 11.60 sec;   INR: 1.01 ratio         PTT - ( 08 Sep 2021 10:30 )  PTT:26.9 sec  Urinalysis Basic - ( 08 Sep 2021 13:55 )    Color: Yellow / Appearance: Clear / S.010 / pH: x  Gluc: x / Ketone: Negative  / Bili: Negative / Urobili: 0.2 mg/dL   Blood: x / Protein: Negative mg/dL / Nitrite: Negative   Leuk Esterase: Trace / RBC: >50 /HPF / WBC 1-2 /HPF   Sq Epi: x / Non Sq Epi: Few /HPF / Bacteria: Moderate      CAPILLARY BLOOD GLUCOSE      POCT Blood Glucose.: 108 mg/dL (08 Sep 2021 09:59)    LIVER FUNCTIONS - ( 08 Sep 2021 10:30 )  Alb: 4.2 g/dL / Pro: 7.2 g/dL / ALK PHOS: 56 U/L / ALT: 15 U/L / AST: 36 U/L / GGT: x                   RADIOLOGY & ADDITIONAL STUDIES:  < from: CT Angio Neck w/ IV Cont (21 @ 10:46) >  IMPRESSION:    CT PERFUSION:  Nondiagnostic examination.    CTA HEAD:  No evidence of flow-limiting stenosis, occlusion or aneurysm.    Slightly beaded appearance of the distal cervical internal carotid arteries bilaterally which is nonspecific and possibly may be related to vasculitis or other chronic inflammatory etiology.    CTA NECK:  Severe (approximately 85%) short segment stenosis of the proximal left internal carotid artery due to atherosclerotic calcification.    --- End of Report ---              MONSERRAT MULLER MD; Attending Radiologist  This document has been electronically signed. Sep  8 2021 11:38AM    < end of copied text >

## 2021-09-09 NOTE — PHYSICAL THERAPY INITIAL EVALUATION ADULT - ADDITIONAL COMMENTS
Per patient, has recently been living with her daughter ; 2 steps to enter home , 11 steps to bedroom. Ambulates with a straight cane or rolling walker as needed.

## 2021-09-09 NOTE — OCCUPATIONAL THERAPY INITIAL EVALUATION ADULT - GENERAL OBSERVATIONS, REHAB EVAL
12:55-13:15 chart reviewed, ok to treat by Occupational Therapist as confirmed by RN Odalis, Pt received seated in bedside chair +tele +luke RUE with daughter present in NAD. Pt in agreement with OT IE.

## 2021-09-09 NOTE — PROGRESS NOTE ADULT - SUBJECTIVE AND OBJECTIVE BOX
Patient is comfortable in bed, no complaints        T(F): 97.1 (09-09-21 @ 07:25), Max: 97.1 (09-09-21 @ 07:25)  HR: 82 (09-09-21 @ 07:25)  BP: 145/69 (09-09-21 @ 07:25)  RR: 20  SpO2: 99% (09-09-21 @ 07:25) (98% - 99%)    PHYSICAL EXAM:  GENERAL: NAD  HEAD:  Atraumatic, Normocephalic  EYES: EOMI, PERRLA, conjunctiva and sclera clear  NERVOUS SYSTEM:   no focal deficits   CHEST/LUNG: Clear to percussion bilaterally; No rales, rhonchi, wheezing, or rubs  HEART: Regular rate and rhythm; No murmurs, rubs, or gallops  ABDOMEN: Soft, Nontender, Nondistended; Bowel sounds present  EXTREMITIES:  2+ Peripheral Pulses, No clubbing, cyanosis, or edema    LABS  09-09    141  |  103  |  13  ----------------------------<  106<H>  3.6   |  27  |  0.7    Ca    9.3      09 Sep 2021 05:35  Mg     1.9     09-09    TPro  6.2  /  Alb  3.7  /  TBili  0.4  /  DBili  x   /  AST  22  /  ALT  12  /  AlkPhos  51  09-09                          11.0   4.32  )-----------( 166      ( 09 Sep 2021 05:35 )             33.8     PT/INR - ( 08 Sep 2021 10:30 )   PT: 11.60 sec;   INR: 1.01 ratio         PTT - ( 08 Sep 2021 10:30 )  PTT:26.9 sec    CARDIAC ENZYMES      Troponin T, Serum: <0.01 ng/mL (09-08-21 @ 19:35)  Troponin T, Serum: <0.01 ng/mL (09-08-21 @ 10:30)        RADIOLOGY  < from: CT Brain Stroke Protocol (09.08.21 @ 10:14) >  Examination is limited due to metallic streak artifact from left craniotomy hardware.    There is hypodensity involving the left frontal lobe which may represent gliosis from prior craniotomy though acute infarct cannot be excluded, not well evaluated due to streak artifact..    No mass effect, midline shift or intracranial hemorrhage.    < end of copied text >  < from: CT Head No Cont (09.09.21 @ 09:21) >    IMPRESSION:  No significant change since recent head CT of 9/8/2021.    Similar hypodensity noted within the left frontal lobe which is not well evaluated due to streak artifact from metallic hardware involving prior left craniotomy. Finding may represent gliosis or chronic infarct related to prior postsurgical change though an evolving acute infarct cannot be excluded.    No evidence of intracranial hemorrhage, mass effect or midline shift.    < end of copied text >  < from: VA Duplex Carotid, Bilat (09.09.21 @ 09:43) >    IMPRESSION:  40-59% stenosis in bilateral ICA based on velocity criteria. Due to heavy atherosclerotic plaque burden the result of CTA neck will be more accurate.  Please correlate with patient's symptoms.    < end of copied text >    MEDICATIONS  (STANDING):  aspirin enteric coated 81 milliGRAM(s) Oral daily  atorvastatin 40 milliGRAM(s) Oral at bedtime  chlorhexidine 4% Liquid 1 Application(s) Topical <User Schedule>  enoxaparin Injectable 40 milliGRAM(s) SubCutaneous at bedtime  influenza   Vaccine 0.5 milliLiter(s) IntraMuscular once  pantoprazole    Tablet 40 milliGRAM(s) Oral before breakfast    MEDICATIONS  (PRN):  acetaminophen   Tablet .. 650 milliGRAM(s) Oral every 6 hours PRN Temp greater or equal to 38C (100.4F), Mild Pain (1 - 3)

## 2021-09-09 NOTE — CONSULT NOTE ADULT - ATTENDING COMMENTS
Patient seen and examined and agree with above except as noted.  Patients history, notes, labs, imaging, vitals and meds reviewed personally.  Patients paresthesias possibly related to nerve compression and irritation however given high grade stenosis in the left carotid would image brain to ensure no symptomatic lesions      Plan as above
Patient is a 89 years old female with above mentioned comorbidities, and left arm numbness.  Per neurology evaluation, her symptoms are not stroke or TIA.  Her CTA shows severe stenosis of L ICA and her arm numbness is on the same side- which does not confirm a symptomatic correlation with the ICA. The duplex shows moderate stenosis in both ICAs, but in heavy calcium burden, CTA is more accurate in determining the degree of stenosis.    I believe she has asymptomatic carotid disease, which is moderate on the right and severe on the left.  I will see her in the office for follow up and explanation about her carotid disease.

## 2021-09-09 NOTE — PHYSICAL THERAPY INITIAL EVALUATION ADULT - LEVEL OF INDEPENDENCE: STAIR NEGOTIATION, REHAB EVAL
N/A - patent declining stair training at this time ; verbally reviewed safe stair technique including step-to-step pattern , use of railing and cane on stairs for safety

## 2021-09-09 NOTE — PHYSICAL THERAPY INITIAL EVALUATION ADULT - GENERAL OBSERVATIONS, REHAB EVAL
11:10 - 11:33. Chart reviewed. Patient available to be seen for physical therapy, confirmed with nurse. Patient encountered already out of bed in chair, +tele. Denies pain at rest and is agreeable for PT evaluation now.

## 2021-09-09 NOTE — DISCHARGE NOTE PROVIDER - CARE PROVIDERS DIRECT ADDRESSES
,DirectAddress_Unknown,[email or web address]

## 2021-09-09 NOTE — DISCHARGE NOTE NURSING/CASE MANAGEMENT/SOCIAL WORK - PATIENT PORTAL LINK FT
You can access the FollowMyHealth Patient Portal offered by [facility] by registering at the following website: [email or web address]. By joining [employer]’s FollowMyHealth portal, you will also be able to view your health information using other applications (apps) compatible with our system.

## 2021-09-09 NOTE — DISCHARGE NOTE PROVIDER - CARE PROVIDER_API CALL
Nesha Beltre)  Surgery  [address] [place] [address]  Phone: [phone]  Fax: [phone]  Follow Up Time:     Reji Avendano)  EEGEpilepsy; Neurology  [address], [address]  [place] [address]  Phone: [phone]  Fax: [phone]  Follow Up Time:

## 2021-09-09 NOTE — PHYSICAL THERAPY INITIAL EVALUATION ADULT - GAIT DEVIATIONS NOTED, PT EVAL
stooped posture , narrow ANIA , decreased heel strike / push off/decreased roberto carlos/increased time in double stance/decreased step length/decreased weight-shifting ability

## 2021-09-09 NOTE — PHYSICAL THERAPY INITIAL EVALUATION ADULT - PATIENT/FAMILY/SIGNIFICANT OTHER GOALS STATEMENT, PT EVAL
Patient would like to be able to walk with straight cane or walker so that she is able to safely return home

## 2021-09-09 NOTE — DISCHARGE NOTE PROVIDER - NSDCMRMEDTOKEN_GEN_ALL_CORE_FT
aspirin 81 mg oral delayed release tablet: 1 tab(s) orally once a day  atorvastatin 40 mg oral tablet: 1 tab(s) orally once a day (at bedtime)  Fioricet oral tablet:   Inderal 60 mg oral tablet: orally once a day  Plavix 75 mg oral tablet: 1 tab(s) orally once a day

## 2021-09-09 NOTE — DISCHARGE NOTE PROVIDER - HOSPITAL COURSE
89 yo F with reported PMH of Meningioma s/p craniotomy, Breast Cancer s/p b/l mastectomy and reconstruction, Urinary retention with self catherization. Chronic Migraines, Scoliosis, Hiatal Hernia, Chronic Osteoarthritis presents to the ED with episode of left arm numbness and pain extending to fingers onset around 8 am. Patient states that she was in her usual state of health when she woke up this morning and felt an "uneasy" numbing sensation over the left arm and numbness on her distal fingers. Symptoms were constant and resolved upon arrival to the ED this afternoon (Lasted about 20 mins).  Of note, patient with chronic back pain due to scoliosis and sleeps on her left side on her arm. She denies any chest pain, recent trauma, falls, or sick contacts, or dizziness. She denies any history of syncope or similar events in the past. Upon examination, patient admits her symptoms resolves and endorses chronic back pain from scoliosis. She otherwise denies any current headaches, dizziness, chest pain, palpitations, sob, or abdominal symptoms.    ED course: /86, HR: 90, T: 96.6, SPO2 92% on room air, Stroke Code activated in ED, CTH negative for any new intracranial pathology . NIHSS 0 on admission, out of window for tpa. CT perfusion and CTA head negative for LVO, CTA neck + 85% stenosis of short segment proximal ICA 2/2 atherosclerotic calcification. Loaded with ASA, Plavix in ED. Trop neg x1, EKG w/out new ischemic changes, no acute electrolyte abnormalities. She was evaluated on telemetry for 24 hours without any cardiac events or resumption of symptoms. Repeat CT head stable, evaluated by neurology and vascular surgery, patient will be discharged with ASA and plavix for 21 days (for high grade stenosis), with vascular surgery f/u outpatient, patient unsure at this time if she would like to proceed with any interventions if offered.

## 2021-09-09 NOTE — DISCHARGE NOTE PROVIDER - NSDCCPCAREPLAN_GEN_ALL_CORE_FT
PRINCIPAL DISCHARGE DIAGNOSIS  Diagnosis: Numbness and tingling in left arm  Assessment and Plan of Treatment: Your numbness and tingling of left arm was unlike due to an acute stroke. You were evaluated by the neurology team and you did not have evidene of an acute stroke on your head imaging. You likely had a nerve impingement when you slept overnight on your left side causing the numbness. We monitored you on the telemetry unit for 24 hours without any evidence of abnormal cardiac rhythm and your other cardiac workup was negative for any ischemia. You were found to have an 85% stenosis of your left ICA artery (short segment), it is likely chronic and due to atherosclerosis. You were evaluated by vascular surgery team and had an US of the carotid arteries done. Please follow up with the vascular surgery team regarding any interventions reccomended should you decided to go through with them. You were started on Aspirin daily, and plavix (blood thinner) which is to be taken for 21 days per neurology, along with a statin. Please take these medications as directed and f/u with your primary care doctor. Should you have any recurrent similar symptoms, dizziness, weakness, head trauma, please return to the ED.

## 2021-09-09 NOTE — CONSULT NOTE ADULT - ASSESSMENT
A 89 y/o F with pmhx of Meningioma s/p craniotomy (has metal plate), Breast Cancer s/p b/l mastectomy and reconstruction, Urinary retention with self catherization, Chronic Migraines, Scoliosis, Hiatal Hernia, Chronic Osteoarthritis presents to the ED with episode of left arm numbness and pain extending to fingers onset around 8 am. Symptoms resolved. CT head - Slightly beaded appearance of the distal cervical internal carotid arteries bilaterally which is nonspecific and possibly may be related to vasculitis or other chronic inflammatory etiology. CTA NECK: Severe (approximately 85%) short segment stenosis of the proximal left internal carotid artery .     Plan:     -repeat CT head non contrast  -continue asa and plavix for 21 days , afterwards only aspirin  -follow up with vascular for carotid stenosis   -continue statin   -if repeat image shows no changes pt can be discharged from neurology stand point, to follow up as oupt.

## 2021-09-09 NOTE — PROGRESS NOTE ADULT - ASSESSMENT
A 89 y/o F with pmhx of Meningioma s/p craniotomy (has metal plate), Breast Cancer s/p b/l mastectomy and reconstruction, Urinary retention with self cauterization, Chronic Migraines, Scoliosis, Hiatal Hernia, Chronic Osteoarthritis presents to the ED with episode of left arm numbness and pain extending to fingers onset around 8 am. Symptoms resolved.     possible tia / carotid stenosis     - repeat ct head unchanged - may DC home on aspirin, statin  and Plavix   - outpt neuro and vascular f/u   - 33min spent on DC

## 2021-09-09 NOTE — CONSULT NOTE ADULT - ASSESSMENT
IMPRESSION: Rehab of 89y/o f rehab  for  ?TIA r/o  cva    PRECAUTIONS: [  ] Cardiac  [  ] Respiratory  [  ] Seizures [  ] Contact Isolation  [  ] Droplet Isolation  [ FALL ] Other    Weight Bearing Status:     RECOMMENDATION:    Out of Bed to Chair     DVT/Decubiti Prophylaxis    REHAB PLAN:     [ x ] Bedside P/T 3-5 times a week   [  x ]   Bedside O/T  2-3 times a week             [   ] No Rehab Therapy Indicated                   [   ]  Speech Therapy   Conditioning/ROM                                    ADL  Bed Mobility                                               Conditioning/ROM  Transfers                                                     Bed Mobility  Sitting /Standing Balance                         Transfers                                        Gait Training                                               Sitting/Standing Balance  Stair Training [   ]Applicable                    Home equipment Eval                                                                        Splinting  [   ] Only      GOALS:   ADL   [  x ]   Independent                    Transfers  [ x  ] Independent                          Ambulation  [  x ] Independent     [x  ] With device                            [  x ]  CG                                                         [   ]  CG                                                                  [   ] CG                            [    ] Min A                                                   [   ] Min A                                                              [   ] Min  A          DISCHARGE PLAN:   [   ]  Good candidate for Intensive Rehabilitation/Hospital based-[facility]A [facility]                                             Will tolerate 3hrs Intensive Rehab Daily                                       [    ]  Short Term Rehab in Skilled Nursing Facility                                       [  xx  ]  Home with Outpatient or VN services                                         [    ]  Possible Candidate for Intensive Hospital based Rehab

## 2021-09-09 NOTE — DISCHARGE NOTE NURSING/CASE MANAGEMENT/SOCIAL WORK - NSPROEXTENSIONSOFSELF_GEN_A_NUR
cane no suicidal ideation/no insomnia/no depression/no visual hallucinations/no auditory hallucinations

## 2021-09-10 LAB
-  AMIKACIN: SIGNIFICANT CHANGE UP
-  AMOXICILLIN/CLAVULANIC ACID: SIGNIFICANT CHANGE UP
-  AMPICILLIN/SULBACTAM: SIGNIFICANT CHANGE UP
-  AMPICILLIN: SIGNIFICANT CHANGE UP
-  AZTREONAM: SIGNIFICANT CHANGE UP
-  CEFAZOLIN: SIGNIFICANT CHANGE UP
-  CEFEPIME: SIGNIFICANT CHANGE UP
-  CEFOXITIN: SIGNIFICANT CHANGE UP
-  CEFTRIAXONE: SIGNIFICANT CHANGE UP
-  CIPROFLOXACIN: SIGNIFICANT CHANGE UP
-  ERTAPENEM: SIGNIFICANT CHANGE UP
-  GENTAMICIN: SIGNIFICANT CHANGE UP
-  IMIPENEM: SIGNIFICANT CHANGE UP
-  LEVOFLOXACIN: SIGNIFICANT CHANGE UP
-  MEROPENEM: SIGNIFICANT CHANGE UP
-  NITROFURANTOIN: SIGNIFICANT CHANGE UP
-  PIPERACILLIN/TAZOBACTAM: SIGNIFICANT CHANGE UP
-  TIGECYCLINE: SIGNIFICANT CHANGE UP
-  TOBRAMYCIN: SIGNIFICANT CHANGE UP
-  TRIMETHOPRIM/SULFAMETHOXAZOLE: SIGNIFICANT CHANGE UP
COVID-19 SPIKE DOMAIN AB INTERP: POSITIVE
COVID-19 SPIKE DOMAIN ANTIBODY RESULT: >250 U/ML — HIGH
CULTURE RESULTS: SIGNIFICANT CHANGE UP
FOLATE SERPL-MCNC: 11.8 NG/ML — SIGNIFICANT CHANGE UP
METHOD TYPE: SIGNIFICANT CHANGE UP
ORGANISM # SPEC MICROSCOPIC CNT: SIGNIFICANT CHANGE UP
ORGANISM # SPEC MICROSCOPIC CNT: SIGNIFICANT CHANGE UP
SARS-COV-2 IGG+IGM SERPL QL IA: >250 U/ML — HIGH
SARS-COV-2 IGG+IGM SERPL QL IA: POSITIVE
SPECIMEN SOURCE: SIGNIFICANT CHANGE UP
VIT B12 SERPL-MCNC: 462 PG/ML — SIGNIFICANT CHANGE UP (ref 232–1245)

## 2021-09-23 DIAGNOSIS — Z85.3 PERSONAL HISTORY OF MALIGNANT NEOPLASM OF BREAST: ICD-10-CM

## 2021-09-23 DIAGNOSIS — R20.0 ANESTHESIA OF SKIN: ICD-10-CM

## 2021-09-23 DIAGNOSIS — G43.909 MIGRAINE, UNSPECIFIED, NOT INTRACTABLE, WITHOUT STATUS MIGRAINOSUS: ICD-10-CM

## 2021-09-23 DIAGNOSIS — I65.22 OCCLUSION AND STENOSIS OF LEFT CAROTID ARTERY: ICD-10-CM

## 2021-09-23 DIAGNOSIS — G58.9 MONONEUROPATHY, UNSPECIFIED: ICD-10-CM

## 2021-09-23 DIAGNOSIS — Z90.13 ACQUIRED ABSENCE OF BILATERAL BREASTS AND NIPPLES: ICD-10-CM

## 2021-09-23 DIAGNOSIS — R82.71 BACTERIURIA: ICD-10-CM

## 2021-09-23 DIAGNOSIS — M19.90 UNSPECIFIED OSTEOARTHRITIS, UNSPECIFIED SITE: ICD-10-CM

## 2021-09-23 DIAGNOSIS — M41.84 OTHER FORMS OF SCOLIOSIS, THORACIC REGION: ICD-10-CM

## 2021-09-23 DIAGNOSIS — R33.9 RETENTION OF URINE, UNSPECIFIED: ICD-10-CM

## 2022-01-02 ENCOUNTER — TRANSCRIPTION ENCOUNTER (OUTPATIENT)
Age: 87
End: 2022-01-02

## 2022-03-08 PROBLEM — G43.909 MIGRAINE, UNSPECIFIED, NOT INTRACTABLE, WITHOUT STATUS MIGRAINOSUS: Chronic | Status: ACTIVE | Noted: 2021-09-08

## 2022-03-23 ENCOUNTER — APPOINTMENT (OUTPATIENT)
Dept: CARDIOLOGY | Facility: CLINIC | Age: 87
End: 2022-03-23
Payer: MEDICARE

## 2022-03-23 VITALS
BODY MASS INDEX: 26.54 KG/M2 | DIASTOLIC BLOOD PRESSURE: 82 MMHG | SYSTOLIC BLOOD PRESSURE: 124 MMHG | HEART RATE: 86 BPM | WEIGHT: 123 LBS | TEMPERATURE: 98 F | HEIGHT: 57 IN

## 2022-03-23 DIAGNOSIS — I10 ESSENTIAL (PRIMARY) HYPERTENSION: ICD-10-CM

## 2022-03-23 DIAGNOSIS — I07.1 RHEUMATIC TRICUSPID INSUFFICIENCY: ICD-10-CM

## 2022-03-23 DIAGNOSIS — Z00.00 ENCOUNTER FOR GENERAL ADULT MEDICAL EXAMINATION W/OUT ABNORMAL FINDINGS: ICD-10-CM

## 2022-03-23 DIAGNOSIS — I77.9 DISORDER OF ARTERIES AND ARTERIOLES, UNSPECIFIED: ICD-10-CM

## 2022-03-23 DIAGNOSIS — E78.5 HYPERLIPIDEMIA, UNSPECIFIED: ICD-10-CM

## 2022-03-23 PROCEDURE — 99205 OFFICE O/P NEW HI 60 MIN: CPT

## 2022-03-23 PROCEDURE — 93000 ELECTROCARDIOGRAM COMPLETE: CPT

## 2022-03-23 RX ORDER — ASPIRIN 81 MG
81 TABLET,CHEWABLE ORAL DAILY
Refills: 0 | Status: ACTIVE | COMMUNITY

## 2022-03-23 RX ORDER — ATORVASTATIN CALCIUM 10 MG/1
10 TABLET, FILM COATED ORAL DAILY
Refills: 0 | Status: ACTIVE | COMMUNITY

## 2022-03-23 RX ORDER — ALBUTEROL SULFATE 90 UG/1
108 (90 BASE) AEROSOL, METERED RESPIRATORY (INHALATION) EVERY 4 HOURS
Refills: 0 | Status: ACTIVE | COMMUNITY

## 2022-03-23 RX ORDER — DEXLANSOPRAZOLE 60 MG/1
60 CAPSULE, DELAYED RELEASE ORAL DAILY
Refills: 0 | Status: ACTIVE | COMMUNITY

## 2022-03-23 RX ORDER — GABAPENTIN 100 MG/1
CAPSULE ORAL 3 TIMES DAILY
Refills: 0 | Status: ACTIVE | COMMUNITY

## 2022-03-23 RX ORDER — MULTIVIT-MIN/FA/LYCOPEN/LUTEIN .4-300-25
TABLET ORAL DAILY
Refills: 0 | Status: ACTIVE | COMMUNITY

## 2022-03-23 RX ORDER — PROPRANOLOL HCL 60 MG
60 TABLET ORAL 3 TIMES DAILY
Refills: 0 | Status: ACTIVE | COMMUNITY

## 2022-03-23 NOTE — REVIEW OF SYSTEMS
[SOB] : shortness of breath [Dyspnea on exertion] : dyspnea during exertion [Joint Pain] : joint pain [Joint Stiffness] : joint stiffness [Negative] : Neurological [Lower Ext Edema] : no extremity edema [Palpitations] : no palpitations [PND] : no PND [Syncope] : no syncope

## 2022-03-23 NOTE — ASSESSMENT
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

## 2022-03-23 NOTE — PHYSICAL EXAM
[Normal Conjunctiva] : normal conjunctiva [Normal S1, S2] : normal S1, S2 [No Rub] : no rub [No Gallop] : no gallop [Murmur] : murmur [No Respiratory Distress] : no respiratory distress  [Soft] : abdomen soft [Non Tender] : non-tender [Abnormal Gait] : abnormal gait [No Edema] : no edema [No Cyanosis] : no cyanosis [Moves all extremities] : moves all extremities [No Focal Deficits] : no focal deficits [Alert and Oriented] : alert and oriented [de-identified] : Alert and oriented elderly WF hard of hearing [de-identified] : Decreased carotid upstoke Left >right [de-identified] : Grade IV systolic murmur at RSB [de-identified] : Bilateral mastectomies [de-identified] : Severe kyphoscoliosis of the spine

## 2022-03-23 NOTE — REASON FOR VISIT
[FreeTextEntry1] : 89 year old female presents to the office for an initial cardiology evaluation. She has been seen by prior Cardiologists with medical therapy advised, apparently followed by physicians in [place].\par She had an echo doppler performed by Dr. Rodriguez which revealed severe aortic valve stenosis, and moderate to severe Tricuspid valve regurgitation, mild PI and mild MR.\par She was also diagnosed with carotid disease noted to be severe 85% on her Hospital record from an admission in 9/2022 for stroke like symptoms. She was seen by Vascular at the time of her hospitalization but declined surgery. She was placed on dual antiplatelet therapy and atorvastatin but discontinued plavix and her statin. Neurology had seen the patient and opted not to give thrombolytic therapy.

## 2022-03-23 NOTE — HISTORY OF PRESENT ILLNESS
[FreeTextEntry1] : Carotid disease noted to be 85% on CT angiogram. \par Degenerative disease of the spine\par History of a CVA\par Seen by Neurology not given thrombolytic by Neurology in 9/21 for a admission for a stroke.\par Patient is not on lipid lowering therapy.\par Scoliosis.\par Patient denies a prior history of MI, angina, CHF, arrhythmia, syncope, DM.\par PSurgHx: Meningioma s/p craniotomy, breast cancer s/p bilateral mastectomies\par Hiatal hernia

## 2022-03-31 ENCOUNTER — APPOINTMENT (OUTPATIENT)
Dept: CARDIOLOGY | Facility: CLINIC | Age: 87
End: 2022-03-31
Payer: MEDICARE

## 2022-03-31 ENCOUNTER — APPOINTMENT (OUTPATIENT)
Dept: CARDIOTHORACIC SURGERY | Facility: CLINIC | Age: 87
End: 2022-03-31

## 2022-03-31 VITALS
OXYGEN SATURATION: 94 % | WEIGHT: 124 LBS | HEIGHT: 57 IN | BODY MASS INDEX: 26.75 KG/M2 | DIASTOLIC BLOOD PRESSURE: 87 MMHG | RESPIRATION RATE: 18 BRPM | TEMPERATURE: 97.6 F | HEART RATE: 78 BPM | SYSTOLIC BLOOD PRESSURE: 124 MMHG

## 2022-03-31 DIAGNOSIS — Z87.39 PERSONAL HISTORY OF OTHER DISEASES OF THE MUSCULOSKELETAL SYSTEM AND CONNECTIVE TISSUE: ICD-10-CM

## 2022-03-31 DIAGNOSIS — I35.0 NONRHEUMATIC AORTIC (VALVE) STENOSIS: ICD-10-CM

## 2022-03-31 PROCEDURE — 99204 OFFICE O/P NEW MOD 45 MIN: CPT

## 2022-03-31 NOTE — ASSESSMENT
[FreeTextEntry1] : Ms. MARISA DYKES 89 year F arrives  today for evaluation of their Aortic Stenosis.  Echocardiogram 3/2/2022 showed Aortic Stenosis, with a Peak Gradient of 7.6, Mean Gradient of 3.4 and VENKATESH ?- however the aotic valve was poorly visualized. \par \par 5 Meter walk                                                 \par 1.      5.6                                                                      Frailty:   \par 2.       6                                                                      Right: 15.4 16.2 13.8\par 3.       5                                                                       Left:  11.7 11.3 12.4\par \par Plan:\par Repeat Echocardiogram now to evaluate if AS is severe\par \par EDGAR, Trish Perez FNP-BC, am acting as scribe for

## 2022-03-31 NOTE — HISTORY OF PRESENT ILLNESS
[FreeTextEntry1] : Ms. MARISA DYKES 89 year F arrives  today for evaluation of their Aortic Stenosis.  Patient PMH include Breast Cancer S/P double Mastectomy in 1970s, Scoliosis- cannot lay flat, Carotid Stenosis- had CVA 9/2022 Surgery was recommended by Vascular, Meningioma S/P Craniotomy, 1976, Migraines on Inderall, ?Stomach Upside Down,  2010s S/P Abdominal Surgery.  History of urinary retention- self catheterizes.  Echocardiogram 3/2/2022 showed Aortic Stenosis, with a Peak Gradient of 7.6, Mean Gradient of 3.4 and VENKATESH ?- however the aotic valve was poorly visualized.  Symptoms worsening SOB,  and fatigue.  NYHA class II. Vomits a few times per month, whichh she attributes to her hiatal hernia.  Here for discussion of potential TAVR\par \par Uses nightly Oxygen via nasal cannula over the past 2 years- does not know why or how much O2 she uses, sees Dr. Brower in [place] \[facility] NYHA class II\par Never had COVID, Was Vaccinated\par Pt lives home with her daughter, no aide\par Denies personal history of MI/Stents and denies family history of CAD\par Presents with daughter, Miguelina\par Lives in [place]\[facility] \par Their healthcare team includes the following\par PMD: Dr. Rodriguez\par Cardio:Dr. Gonzalez\par Pulmonary:  \[facility] \par

## 2022-03-31 NOTE — END OF VISIT
[FreeTextEntry3] : Seen / examined and above reviewed.\par Presents with daughter.\par \par Comorbidities as above.\par \par ECHO reports stenotic appearing AV without quantitative assessment.\par No clear symptoms of AS.\par Audible S2 on exam.  2/6 mid SM.\par \par Will obtain ECHO to clarify AS severity.\par If severe, consider further evaluation for TAVR.

## 2022-03-31 NOTE — REASON FOR VISIT
[FreeTextEntry1] : Aortic Stenosis [Structural Heart and Valve Disease] : structural heart and valve disease [Consultation] : a consultation visit [Family Member] : family member

## 2022-03-31 NOTE — HISTORY OF PRESENT ILLNESS
[FreeTextEntry1] : Ms. MARISA DYKES 89 year F arrives  today for evaluation of their Aortic Stenosis.  Patient PMH include Breast Cancer S/P double Mastectomy in 1970s, Scoliosis- cannot lay flat, Carotid Stenosis- had CVA 9/2022 Surgery was recommended by Vascular, Meningioma S/P Craniotomy, 1976, Migraines on Inderall, ?Stomach Upside Down,  2010s S/P Abdominal Surgery.  History of urinary retention- self catheterizes.  Echocardiogram 3/2/2022 showed Aortic Stenosis, with a Peak Gradient of 7.6, Mean Gradient of 3.4 and VENKATESH ?- however the aotic valve was poorly visualized.  Symptoms worsening SOB,  and fatigue.  NYHA class II. Vomits a few times per month, whichh she attributes to her hiatal hernia.  Here for discussion of potential TAVR\par \par Uses nightly Oxygen via nasal cannula over the past 2 years- does not know why or how much O2 she uses, sees Dr. Brower in [place] \[facility] NYHA class II\par Never had COVID, Was Vaccinated\par Pt lives home with her daughter, no aide\par Denies personal history of MI/Stents and denies family history of CAD\par Presents with daughter, Miguelina\par Lives in [place]\[facility] \par Their healthcare team includes the following\par PMD: Dr. Rodriguez\par Cardio:Dr. Gonzalez\par Pulmonary:  \[facility] \par

## 2022-03-31 NOTE — REVIEW OF SYSTEMS
[Fever] : no fever [Chills] : no chills [Feeling Tired] : not feeling tired [Feeling Poorly] : not feeling poorly [Heart Rate Is Slow] : the heart rate was not slow [Heart Rate Is Fast] : the heart rate was not fast [Wheezing] : no wheezing [Dysuria] : no dysuria [Cough] : no cough [Incontinence] : no incontinence [Pelvic Pain] : no pelvic pain [Confused] : no confusion [Dizziness] : no dizziness [Convulsions] : no convulsions [Fainting] : no fainting [Proptosis] : no proptosis [Hot Flashes] : no hot flashes [Muscle Weakness] : no muscle weakness [Deepening Of The Voice] : no deepening of the voice

## 2022-03-31 NOTE — PHYSICAL EXAM
[FreeTextEntry1] : Extreme Scoliosis [General Appearance - Alert] : alert [General Appearance - Well Nourished] : well nourished [Sclera] : the sclera and conjunctiva were normal [PERRL With Normal Accommodation] : pupils were equal in size, round, and reactive to light [Extraocular Movements] : extraocular movements were intact [Outer Ear] : the ears and nose were normal in appearance [Neck Appearance] : the appearance of the neck was normal [Normal Rhythm/Effort] : normal respiratory rhythm and effort [Normal Rate] : normal [Rhythm Regular] : regular [Normal S1] : normal S1 [Normal S2] : normal S2 [II] : a grade 2 [Examination Of The Chest] : the chest was normal in appearance [Bowel Sounds] : normal bowel sounds [Abdomen Soft] : soft [Abdomen Tenderness] : non-tender [No CVA Tenderness] : no ~M costovertebral angle tenderness [Abnormal Walk] : normal gait [Skin Color & Pigmentation] : normal skin color and pigmentation [Cranial Nerves] : cranial nerves 2-12 were intact [Deep Tendon Reflexes (DTR)] : deep tendon reflexes were 2+ and symmetric [Sensation] : the sensory exam was normal to light touch and pinprick [Oriented To Time, Place, And Person] : oriented to person, place, and time

## 2022-03-31 NOTE — ASSESSMENT
[FreeTextEntry1] : Ms. MARISA DYKES 89 year F arrives  today for evaluation of their Aortic Stenosis.  Echocardiogram 3/2/2022 showed Aortic Stenosis, with a Peak Gradient of 7.6, Mean Gradient of 3.4 and VENKATESH ?- however the aotic valve was poorly visualized. \par \par 5 Meter walk                                                 \par 1.      5.6                                                                      Frailty:   \par 2.       6                                                                      Right: 15.4 16.2 13.8\par 3.       5                                                                       Left:  11.7 11.3 12.4\par \par Plan:\par Echocardiogram shows a poorly visualized aortic valve, unable to completely assess gradients/VENKATESH\par Repeat Echocardiogram now to evaluate if AS is severe\par F/U after for review of Echocardiogram\par F/U with Cardiologist Dr. Gonzalez for MCOT or Holter to determine if has potential AFib\par F/U with PMD for routine medical care\par \par EDGAR, Trish Perez [facility]-BC, am acting as scribe for

## 2022-03-31 NOTE — REVIEW OF SYSTEMS
[Fever] : no fever [Chills] : no chills [Feeling Poorly] : not feeling poorly [Feeling Tired] : not feeling tired [Heart Rate Is Slow] : the heart rate was not slow [Heart Rate Is Fast] : the heart rate was not fast [Wheezing] : no wheezing [Cough] : no cough [Dysuria] : no dysuria [Incontinence] : no incontinence [Pelvic Pain] : no pelvic pain [Confused] : no confusion [Convulsions] : no convulsions [Dizziness] : no dizziness [Fainting] : no fainting [Proptosis] : no proptosis [Hot Flashes] : no hot flashes [Muscle Weakness] : no muscle weakness [Deepening Of The Voice] : no deepening of the voice [Shortness Of Breath] : shortness of breath [SOB on Exertion] : shortness of breath during exertion [Negative] : Heme/Lymph

## 2022-04-11 ENCOUNTER — APPOINTMENT (OUTPATIENT)
Dept: VASCULAR SURGERY | Facility: CLINIC | Age: 87
End: 2022-04-11
Payer: MEDICARE

## 2022-04-11 VITALS
BODY MASS INDEX: 26.21 KG/M2 | WEIGHT: 130 LBS | SYSTOLIC BLOOD PRESSURE: 130 MMHG | HEIGHT: 59 IN | DIASTOLIC BLOOD PRESSURE: 88 MMHG

## 2022-04-11 PROCEDURE — 99214 OFFICE O/P EST MOD 30 MIN: CPT

## 2022-04-11 PROCEDURE — 93880 EXTRACRANIAL BILAT STUDY: CPT

## 2022-04-11 NOTE — HISTORY OF PRESENT ILLNESS
[FreeTextEntry1] : 89 y/o female with h/o severe aortic stenosis, requiring TAVR, seen in ED in September 2021 for symptoms of TIA with c/o numbness in the left arm, that resolved spontaneously. She was found to have severe left carotid stenosis. She presents for evaluation.

## 2022-04-11 NOTE — DATA REVIEWED
[FreeTextEntry1] : I performed a carotid duplex which was medically necessary to evaluate for carotid stenosis. It showed 50% right carotid stenosis and 70% left carotid stenosis.\par

## 2022-04-11 NOTE — ASSESSMENT
[FreeTextEntry1] : 89 y/o female with severe aortic stenosis with left carotid artery with severe stenosis.\par \par I reviewed the CTA neck from September 2021 that showed severe calcific plaque in the left carotid artery.\par \par I have explained to her that due to the carotid disease she is at high risk for stroke during TAVR and requires revascularization of the left carotid artery prior to TAVR.\par \par I offered left carotid endarterectomy for treatment on 5/10/22. She can continue on Aspirin.

## 2022-04-11 NOTE — END OF VISIT
[FreeTextEntry3] : 89 F, candidate for TAVR, is here for evaluation of carotid arteries. No stroke or TIA.\par In CTA, left ICA has severe >85% short segment circumferentially calcified stenosis; right ICA has mild stenosis. Carotid \par duplex shows PSV compatible with moderate stenosis, but I would rely on CTA in her case due to heavy calcium burden.\par \par Even though is high risk due to her AS, she has intra and rajani-operative risk of stroke (during TAVR). Would recommend\par CEA prior to TAVR. She is not a good candidate for TCAR/ C[place]TON due to circumferential Calcium.

## 2022-04-11 NOTE — CONSULT LETTER
[Dear  ___] : Dear  [unfilled], [Consult Letter:] : I had the pleasure of evaluating your patient, [unfilled]. [Please see my note below.] : Please see my note below. [FreeTextEntry2] : Dear Dr. Jagjit Gonzalez,\par Dear Dr. Juan Antonio Young,

## 2022-04-13 ENCOUNTER — APPOINTMENT (OUTPATIENT)
Dept: VASCULAR SURGERY | Facility: CLINIC | Age: 87
End: 2022-04-13

## 2022-04-13 NOTE — PHYSICAL THERAPY INITIAL EVALUATION ADULT - ASR EQUIP NEEDS DISCH PT EVAL
per patient, has a rolling walker and cane

## 2022-04-19 ENCOUNTER — APPOINTMENT (OUTPATIENT)
Dept: CARDIOLOGY | Facility: CLINIC | Age: 87
End: 2022-04-19
Payer: MEDICARE

## 2022-04-19 PROCEDURE — 93306 TTE W/DOPPLER COMPLETE: CPT

## 2022-04-21 ENCOUNTER — RESULT REVIEW (OUTPATIENT)
Age: 87
End: 2022-04-21

## 2022-04-21 ENCOUNTER — OUTPATIENT (OUTPATIENT)
Dept: OUTPATIENT SERVICES | Facility: HOSPITAL | Age: 87
LOS: 1 days | Discharge: HOME | End: 2022-04-21
Payer: MEDICARE

## 2022-04-21 VITALS
HEART RATE: 88 BPM | DIASTOLIC BLOOD PRESSURE: 78 MMHG | RESPIRATION RATE: 20 BRPM | SYSTOLIC BLOOD PRESSURE: 138 MMHG | OXYGEN SATURATION: 98 % | HEIGHT: 57 IN | WEIGHT: 125 LBS | TEMPERATURE: 98 F

## 2022-04-21 DIAGNOSIS — Z98.890 OTHER SPECIFIED POSTPROCEDURAL STATES: Chronic | ICD-10-CM

## 2022-04-21 DIAGNOSIS — Z01.818 ENCOUNTER FOR OTHER PREPROCEDURAL EXAMINATION: ICD-10-CM

## 2022-04-21 DIAGNOSIS — Z90.13 ACQUIRED ABSENCE OF BILATERAL BREASTS AND NIPPLES: Chronic | ICD-10-CM

## 2022-04-21 DIAGNOSIS — I65.23 OCCLUSION AND STENOSIS OF BILATERAL CAROTID ARTERIES: ICD-10-CM

## 2022-04-21 LAB
ALBUMIN SERPL ELPH-MCNC: 4.4 G/DL — SIGNIFICANT CHANGE UP (ref 3.5–5.2)
ALP SERPL-CCNC: 80 U/L — SIGNIFICANT CHANGE UP (ref 30–115)
ALT FLD-CCNC: 26 U/L — SIGNIFICANT CHANGE UP (ref 0–41)
ANION GAP SERPL CALC-SCNC: 14 MMOL/L — SIGNIFICANT CHANGE UP (ref 7–14)
APTT BLD: 30 SEC — SIGNIFICANT CHANGE UP (ref 27–39.2)
AST SERPL-CCNC: 42 U/L — HIGH (ref 0–41)
BASOPHILS # BLD AUTO: 0.03 K/UL — SIGNIFICANT CHANGE UP (ref 0–0.2)
BASOPHILS NFR BLD AUTO: 0.6 % — SIGNIFICANT CHANGE UP (ref 0–1)
BILIRUB SERPL-MCNC: 0.4 MG/DL — SIGNIFICANT CHANGE UP (ref 0.2–1.2)
BLD GP AB SCN SERPL QL: SIGNIFICANT CHANGE UP
BUN SERPL-MCNC: 19 MG/DL — SIGNIFICANT CHANGE UP (ref 10–20)
CALCIUM SERPL-MCNC: 10.2 MG/DL — HIGH (ref 8.5–10.1)
CHLORIDE SERPL-SCNC: 102 MMOL/L — SIGNIFICANT CHANGE UP (ref 98–110)
CO2 SERPL-SCNC: 25 MMOL/L — SIGNIFICANT CHANGE UP (ref 17–32)
CREAT SERPL-MCNC: 1 MG/DL — SIGNIFICANT CHANGE UP (ref 0.7–1.5)
EGFR: 53 ML/MIN/1.73M2 — LOW
EOSINOPHIL # BLD AUTO: 0.53 K/UL — SIGNIFICANT CHANGE UP (ref 0–0.7)
EOSINOPHIL NFR BLD AUTO: 11.2 % — HIGH (ref 0–8)
GLUCOSE SERPL-MCNC: 87 MG/DL — SIGNIFICANT CHANGE UP (ref 70–99)
HCT VFR BLD CALC: 38.2 % — SIGNIFICANT CHANGE UP (ref 37–47)
HGB BLD-MCNC: 12.5 G/DL — SIGNIFICANT CHANGE UP (ref 12–16)
IMM GRANULOCYTES NFR BLD AUTO: 0.4 % — HIGH (ref 0.1–0.3)
INR BLD: 1.02 RATIO — SIGNIFICANT CHANGE UP (ref 0.65–1.3)
LYMPHOCYTES # BLD AUTO: 0.89 K/UL — LOW (ref 1.2–3.4)
LYMPHOCYTES # BLD AUTO: 18.8 % — LOW (ref 20.5–51.1)
MCHC RBC-ENTMCNC: 29.1 PG — SIGNIFICANT CHANGE UP (ref 27–31)
MCHC RBC-ENTMCNC: 32.7 G/DL — SIGNIFICANT CHANGE UP (ref 32–37)
MCV RBC AUTO: 89 FL — SIGNIFICANT CHANGE UP (ref 81–99)
MONOCYTES # BLD AUTO: 0.39 K/UL — SIGNIFICANT CHANGE UP (ref 0.1–0.6)
MONOCYTES NFR BLD AUTO: 8.2 % — SIGNIFICANT CHANGE UP (ref 1.7–9.3)
NEUTROPHILS # BLD AUTO: 2.87 K/UL — SIGNIFICANT CHANGE UP (ref 1.4–6.5)
NEUTROPHILS NFR BLD AUTO: 60.8 % — SIGNIFICANT CHANGE UP (ref 42.2–75.2)
NRBC # BLD: 0 /100 WBCS — SIGNIFICANT CHANGE UP (ref 0–0)
PLATELET # BLD AUTO: 180 K/UL — SIGNIFICANT CHANGE UP (ref 130–400)
POTASSIUM SERPL-MCNC: 4.6 MMOL/L — SIGNIFICANT CHANGE UP (ref 3.5–5)
POTASSIUM SERPL-SCNC: 4.6 MMOL/L — SIGNIFICANT CHANGE UP (ref 3.5–5)
PROT SERPL-MCNC: 7.8 G/DL — SIGNIFICANT CHANGE UP (ref 6–8)
PROTHROM AB SERPL-ACNC: 11.7 SEC — SIGNIFICANT CHANGE UP (ref 9.95–12.87)
RBC # BLD: 4.29 M/UL — SIGNIFICANT CHANGE UP (ref 4.2–5.4)
RBC # FLD: 14.7 % — HIGH (ref 11.5–14.5)
SODIUM SERPL-SCNC: 141 MMOL/L — SIGNIFICANT CHANGE UP (ref 135–146)
WBC # BLD: 4.73 K/UL — LOW (ref 4.8–10.8)
WBC # FLD AUTO: 4.73 K/UL — LOW (ref 4.8–10.8)

## 2022-04-21 PROCEDURE — 93010 ELECTROCARDIOGRAM REPORT: CPT

## 2022-04-21 PROCEDURE — 71046 X-RAY EXAM CHEST 2 VIEWS: CPT | Mod: 26

## 2022-04-21 NOTE — H&P PST ADULT - FUNCTIONAL ASSESSMENT - BASIC MOBILITY ASSESSMENT TYPE
Dr. Medina  [phone]      GENERAL SURGERY  [facility]  [address] [place] [address]  Office: [phone]     Admission

## 2022-04-21 NOTE — H&P PST ADULT - HISTORY OF PRESENT ILLNESS
89 Y/O FEMALE PRESENTS TO PAST WITH HX CAROTID STENOSIS. PT C/O BLOCKAGE CAROTID DISCOVERED S/P CVA 9/21             PT NOW FOR SCHEDULED PROCEDURE ( LEFT CEA) . PT DENIES ANY CP SOB PALP COUGH DYSURIA FEVER URI. PT ABLE TO JACOBO 1 FOS W/O SOB  pt denies any covid s/s, or tested positive in the past  pt advised self quarantine till day of procedure  Anesthesia Alert  NO--Difficult Airway  NO--History of neck surgery or radiation  NO--Limited ROM of neck  NO--History of Malignant hyperthermia  NO--Personal or family history of Pseudocholinesterase deficiency.  NO--Prior Anesthesia Complication  NO--Latex Allergy  NO--Loose teeth  NO--History of Rheumatoid Arthritis  NO--JENNIFER  NO--Bleeding risk  NO--Other_____     89 Y/O FEMALE PRESENTS TO PAST WITH HX CAROTID STENOSIS. PT C/O BLOCKAGE CAROTID DISCOVERED APPROX 3 WEEKS  S/P CVA 9/21 PT NOW FOR SCHEDULED PROCEDURE ( LEFT CEA) . PT DENIES ANY CP SOB PALP COUGH DYSURIA FEVER URI. PT ABLE TO JACOBO 1 FOS, 1 block  W/O SOB  pt denies any covid s/s, or tested positive in the past  pt advised self quarantine till day of procedure  Anesthesia Alert  NO--Difficult Airway  NO--History of neck surgery or radiation  NO--Limited ROM of neck  NO--History of Malignant hyperthermia  NO--Personal or family history of Pseudocholinesterase deficiency.  NO--Prior Anesthesia Complication  NO--Latex Allergy  NO--Loose teeth  NO--History of Rheumatoid Arthritis  NO--JENNIFER  NO--Bleeding risk  NO--Other_____

## 2022-04-21 NOTE — H&P PST ADULT - MURMUR TIMING
[facility] encounter  HEMATOLOGY/ONCOLOGY OFFICE VISIT    CHIEF COMPLAINT:   Breast Cancer (Malignant neoplasm of upper-outer quadrant of right breast in female, estrogen receptor positive )      HISTORY OF PRESENT ILLNESS:  Tania Claudio is a 61 year old female who presents for evaluation of breast cancer.    Since starting the medication, she has had some moderate hot flashes.  These are getting a little better with time.  No associated arthralgias.    PAST HISTORIES:  Past medical history, surgical history, family history, and social history were reviewed and updated.  Problem List           ICD-10-CM Noted       Oncology Problems    Malignant neoplasm of upper-outer quadrant of right breast in female, estrogen receptor positive (CMS/HCC) C50.411, Z17.0 5/8/2019    Overview Addendum 11/4/2019  1:58 PM by Srinivasa West MD     4/17/19 bilateral screening mammogram showing a clustering of calcifications right upper-outer quadrant.  4/22/19 right diagnostic mammogram. 2 grouping of calcifications in the right upper outer quadrant,  by approximately 3 cm.  5/6/19 Right stereotactic biopsy. Invasive ductal carcinoma, grade 2, 0.9 cm, Oncotype RS 20  6/4/19: R partial mastectomy, IDC, G2, 0.4cm, 0/3 SLN  8/20/2019 - 9/16/2019: 4005cGy/15fx Rt breast+ 800cGy/4fx tumor bed boost  9/19: started anastrozole                  Cancer Staging Summary for Tania Claudio     Malignant neoplasm of upper-outer quadrant of right breast in female, estrogen receptor positive (CMS/HCC)     Stage Date Classification Stage Status    5/8/19 Clinical Stage IB (cT2, cN0, cM0, G2, ER+, MT+, HER2-) Signed by Rob Puga MD on 5/8/19 6/6/19 Pathologic Stage IA (pT1a, pN0(sn), cM0, G2, ER+, MT+, HER2-) Signed by Rob Puga MD on 6/6/19                MEDICATIONS / ALLERGIES:  Medications and allergies were reviewed and updated.    Review of systems:  Constitutional: Negative for fever, chills, change in appetite or  fatigue.  Skin: Negative for rash or wounds.   HEENT: Negative for eye drainage, rhinorrhea, ear pain, sore throat or neck pain.  Respiratory: Negative for cough, wheezing or shortness of breath.    Cardiovascular: Negative for chest pain, chest pressure, palpitations or diaphoresis.   Gastrointestinal: Negative for nausea, vomiting, diarrhea, abdominal pain, black or tarry stools.  Genitourinary: Negative for dysuria, urgency, frequency, hematuria or flank pain.  Extremities: Negative for joint swelling or joint pain.  Neurologic: Negative for change in sensory or motor function.  Negative for headache, change in gait, vertigo, vision or speech.  Endocrine: Negative for heat or cold intolerance, weight loss or gain.  Hematological: Negative for bleeding, bruising or lymphadenopathy.  Psychiatric: Negative for change in affect, anxiety, depression, insomnia, mentation or sleep disturbance.    PHYSICAL EXAM:  Vital Signs:   Oncology Encounter Vitals [11/04/19 1350]   ONC OP Encounter Vitals Group      /90      Pulse 53      Resp 16      Temp 98.4 °F (36.9 °C)      Temp src Oral      SpO2 96 %      Weight 237 lb 6.4 oz (107.7 kg)      Height       Pain Score 5-6      Pain Location       Pain Education?       BSA (Calculated - m2) - [employer]       BMI (Calculated)      ECOG Performance Status: 0 - Fully active, able to carry on all predisease activities without restrictions.  General: The patient is alert, well-developed, well-nourished, no distress.  Skin: Warm, normal color, normal texture, normal turgor and without rash.  Head: Normocephalic, atraumatic.  Neck: Supple with no significant adenopathy.  Eyes: Normal conjunctivae and sclerae. Pupils equal, round, reactive to light and accommodation. Extraocular movements intact.  ENT: Mucous membranes are moist. Normal nose,mouth and throat.  Cardiovascular: Regular rate and rhythm, no murmurs, gallops or rubs.  Respiratory: Normal respiratory effort. Clear  to auscultation. No wheezes, rales, or rhonchi.  Abdomen: Abdomen is soft and non-tender with active bowel sounds. There is no detected hepatosplenomegaly, masses, or ascites.  Extremities: No clubbing, no cyanosis, no edema. Normal muscle tone and development bilaterally.  Lymph: No cervical, supraclavicular, axillary, inguinal lymphadenopathy.  Neurologic: Motor strength normal. Coordination normal. No tremor noted.  Psychiatric: Cooperative. Appropriate mood and affect. Normal judgment.    DATA REVIEWED:  Laboratory Data:  Recent Labs   Lab 11/04/19  1347   WBC 7.1   RBC 4.35   HGB 13.2   HCT 38.9   MCV 89.4      Absolute Neutrophil 3.4   Absolute Lymph 2.5   Absolute Mono 1.0*   Absolute Eos 0.2   Absolute Baso 0.0     Recent Labs   Lab 05/21/19  1014   Glucose 88   Sodium 142   Potassium 4.3   Chloride 108*   Carbon Dioxide 29   BUN 21*   Creatinine 0.81   CALCIUM 9.2   TOTAL PROTEIN 6.6   Albumin 3.6   AST/SGOT 11   ALK PHOSPHATASE 58   ALT/SGPT 29     Recent Labs   Lab 05/21/19  1014   Anion Gap 9*   GLOBULIN 3.0   TOTAL BILIRUBIN 0.3       Imaging Studies:  DEXA T-0.7    ASSESSMENT AND PLAN:  1. Breast CA: She is doing well on anastrozole.  She should continue her Ca/Vit D supplementation. We will continue to observe for toxicities.  Due for bilateral mammogram in April.  2. Ortho:  She has rotator cuff surgery planned for 12/5/19.  She should hold her anastrozole for 1 week prior and 2 weeks afterwards.    F/u 6 mos    The patient, family members indicated understanding of the diagnosis and agreed with the plan of care. The patient is encouraged to call between now and next visit with any problems, questions or concerns that arise.     systolic

## 2022-04-21 NOTE — H&P PST ADULT - NSICDXPASTMEDICALHX_GEN_ALL_CORE_FT
PAST MEDICAL HISTORY:  Breast CA     H/O brain tumor     Migraine     Urinary retention with incomplete bladder emptying pt self catheterizes     PAST MEDICAL HISTORY:  Breast CA benign    H/O brain tumor     Migraine     Urinary retention with incomplete bladder emptying pt self catheterizes  3-4 x day     PAST MEDICAL HISTORY:  Breast CA     H/O brain tumor benign 1977    Migraine     Urinary retention with incomplete bladder emptying pt self catheterizes  3-4 x day

## 2022-05-11 DIAGNOSIS — I35.0 NONRHEUMATIC AORTIC (VALVE) STENOSIS: ICD-10-CM

## 2022-05-13 PROBLEM — C50.919 MALIGNANT NEOPLASM OF UNSPECIFIED SITE OF UNSPECIFIED FEMALE BREAST: Chronic | Status: ACTIVE | Noted: 2021-09-08

## 2022-05-13 PROBLEM — R33.9 RETENTION OF URINE, UNSPECIFIED: Chronic | Status: ACTIVE | Noted: 2021-09-08

## 2022-05-13 PROBLEM — Z87.898 PERSONAL HISTORY OF OTHER SPECIFIED CONDITIONS: Chronic | Status: ACTIVE | Noted: 2021-09-08

## 2022-05-18 LAB
ALBUMIN SERPL ELPH-MCNC: 4.4 G/DL
ALP BLD-CCNC: 90 U/L
ALT SERPL-CCNC: 23 U/L
ANION GAP SERPL CALC-SCNC: 11 MMOL/L
AST SERPL-CCNC: 40 U/L
BASOPHILS # BLD AUTO: 0.02 K/UL
BASOPHILS NFR BLD AUTO: 0.4 %
BILIRUB SERPL-MCNC: 0.3 MG/DL
BUN SERPL-MCNC: 28 MG/DL
CALCIUM SERPL-MCNC: 9.8 MG/DL
CHLORIDE SERPL-SCNC: 100 MMOL/L
CO2 SERPL-SCNC: 28 MMOL/L
CREAT SERPL-MCNC: 1.1 MG/DL
EGFR: 47 ML/MIN/1.73M2
EOSINOPHIL # BLD AUTO: 0.43 K/UL
EOSINOPHIL NFR BLD AUTO: 8 %
GLUCOSE SERPL-MCNC: 123 MG/DL
HCT VFR BLD CALC: 39.7 %
HGB BLD-MCNC: 12.5 G/DL
IMM GRANULOCYTES NFR BLD AUTO: 0.2 %
INR PPP: 1.04 RATIO
LYMPHOCYTES # BLD AUTO: 1.05 K/UL
LYMPHOCYTES NFR BLD AUTO: 19.5 %
MAN DIFF?: NORMAL
MCHC RBC-ENTMCNC: 29.2 PG
MCHC RBC-ENTMCNC: 31.5 G/DL
MCV RBC AUTO: 92.8 FL
MONOCYTES # BLD AUTO: 0.54 K/UL
MONOCYTES NFR BLD AUTO: 10 %
NEUTROPHILS # BLD AUTO: 3.34 K/UL
NEUTROPHILS NFR BLD AUTO: 61.9 %
PLATELET # BLD AUTO: 190 K/UL
POTASSIUM SERPL-SCNC: 4.7 MMOL/L
PROT SERPL-MCNC: 7.5 G/DL
PT BLD: 11.9 SEC
RBC # BLD: 4.28 M/UL
RBC # FLD: 14.6 %
SODIUM SERPL-SCNC: 139 MMOL/L
WBC # FLD AUTO: 5.39 K/UL

## 2022-05-21 ENCOUNTER — LABORATORY RESULT (OUTPATIENT)
Age: 87
End: 2022-05-21

## 2022-05-24 ENCOUNTER — OUTPATIENT (OUTPATIENT)
Dept: OUTPATIENT SERVICES | Facility: HOSPITAL | Age: 87
LOS: 1 days | Discharge: HOME | End: 2022-05-24
Payer: MEDICARE

## 2022-05-24 VITALS — SYSTOLIC BLOOD PRESSURE: 151 MMHG | OXYGEN SATURATION: 98 % | DIASTOLIC BLOOD PRESSURE: 74 MMHG | HEART RATE: 73 BPM

## 2022-05-24 DIAGNOSIS — Z98.890 OTHER SPECIFIED POSTPROCEDURAL STATES: Chronic | ICD-10-CM

## 2022-05-24 DIAGNOSIS — Z90.13 ACQUIRED ABSENCE OF BILATERAL BREASTS AND NIPPLES: Chronic | ICD-10-CM

## 2022-05-24 LAB
ANION GAP SERPL CALC-SCNC: 13 MMOL/L — SIGNIFICANT CHANGE UP (ref 7–14)
BUN SERPL-MCNC: 28 MG/DL — HIGH (ref 10–20)
CALCIUM SERPL-MCNC: 10.4 MG/DL — HIGH (ref 8.5–10.1)
CHLORIDE SERPL-SCNC: 99 MMOL/L — SIGNIFICANT CHANGE UP (ref 98–110)
CO2 SERPL-SCNC: 27 MMOL/L — SIGNIFICANT CHANGE UP (ref 17–32)
CREAT SERPL-MCNC: 1.1 MG/DL — SIGNIFICANT CHANGE UP (ref 0.7–1.5)
EGFR: 47 ML/MIN/1.73M2 — LOW
GLUCOSE SERPL-MCNC: 118 MG/DL — HIGH (ref 70–99)
HCT VFR BLD CALC: 41.2 % — SIGNIFICANT CHANGE UP (ref 37–47)
HGB BLD-MCNC: 13.4 G/DL — SIGNIFICANT CHANGE UP (ref 12–16)
MCHC RBC-ENTMCNC: 29.3 PG — SIGNIFICANT CHANGE UP (ref 27–31)
MCHC RBC-ENTMCNC: 32.5 G/DL — SIGNIFICANT CHANGE UP (ref 32–37)
MCV RBC AUTO: 90.2 FL — SIGNIFICANT CHANGE UP (ref 81–99)
NRBC # BLD: 0 /100 WBCS — SIGNIFICANT CHANGE UP (ref 0–0)
PLATELET # BLD AUTO: 192 K/UL — SIGNIFICANT CHANGE UP (ref 130–400)
POTASSIUM SERPL-MCNC: 4.5 MMOL/L — SIGNIFICANT CHANGE UP (ref 3.5–5)
POTASSIUM SERPL-SCNC: 4.5 MMOL/L — SIGNIFICANT CHANGE UP (ref 3.5–5)
RBC # BLD: 4.57 M/UL — SIGNIFICANT CHANGE UP (ref 4.2–5.4)
RBC # FLD: 14.5 % — SIGNIFICANT CHANGE UP (ref 11.5–14.5)
SODIUM SERPL-SCNC: 139 MMOL/L — SIGNIFICANT CHANGE UP (ref 135–146)
WBC # BLD: 5.69 K/UL — SIGNIFICANT CHANGE UP (ref 4.8–10.8)
WBC # FLD AUTO: 5.69 K/UL — SIGNIFICANT CHANGE UP (ref 4.8–10.8)

## 2022-05-24 PROCEDURE — 93460 R&L HRT ART/VENTRICLE ANGIO: CPT | Mod: 26

## 2022-05-24 NOTE — ASU PATIENT PROFILE, ADULT - FALL HARM RISK - HARM RISK INTERVENTIONS

## 2022-05-24 NOTE — ASU PATIENT PROFILE, ADULT - NSICDXPASTMEDICALHX_GEN_ALL_CORE_FT
PAST MEDICAL HISTORY:  Breast CA     H/O brain tumor benign 1977    Migraine     Urinary retention with incomplete bladder emptying pt self catheterizes  3-4 x day

## 2022-05-24 NOTE — CHART NOTE - NSCHARTNOTEFT_GEN_A_CORE
PRE-OP DIAGNOSIS:    Severe AS    PROCEDURE:   [x] Coronary Angiogram   [x] LHC   [x] RHC   [] Intervention (see below)        PHYSICIAN: Dr. Young  INTERVENTIONAL FELLOW:    CARDIOLOGY FELLOW: Dr. Linn      PROCEDURE DESCRIPTION:     Consent:  [x] Patient   [] Family Member   []  Used     Anesthesia:   [] General   [x] Sedation   [x] Local     Access & Closure:   [] 6Fr right Radial Artery   [x] 5Fr right Femoral Artery   [x] 7Fr right Femoral Vein   [x] 4Fr left femoral Artery       IV Contrast:       30 mL        Intervention: none    Implants: none     FINDINGS:     Coronary Dominance: Right    LM: Minor disease  LAD: Minor disease  Diag: Minor disease  LCx: Minor disease  OM: Minor disease  RCA: Minor disease  rPDA: Minor disease    LVEDP:    Normal      PCWP: 10  CO/CI: 3.3/2.2  VENKATESH: 0.8cm2    ESTIMATED BLOOD LOSS: < 10 mL      CONDITION:   [x] Good   [] Fair   [] Critical     SPECIMEN REMOVED: N/A     POST-OP DIAGNOSIS:    [] Normal Coronary Angiogram   [x] Minor luminal irregularities  [] Mild Non-obstructive Coronary Artery Disease (< 50% stenosis)   [] Significant -Vessel Coronary Artery Disease     PLAN OF CARE:   [x] D/C Home Today   [] Return to In-patient bed   [] Admit for observation   [] Return for Staged Procedure   [] CT Surgery Consult   [x] Medications: continue same medical therapy  [x] IV Fluids: 75cc/hr for 5 hours  [x] Follow-up with Dr. Young as outpatient PRE-OP DIAGNOSIS:    Aortic Stenosis    PROCEDURE:   [x] Coronary Angiogram   [x] LHC   [x] RHC   [] Intervention (see below)      PHYSICIAN: Juan Antonio Young  ASSISTANT: Fellow    PROCEDURE DESCRIPTION:   Consent:  [x] Patient   [] Family Member   []  Used     Anesthesia:   [] General   [x] Sedation   [x] Local     Access & Closure:   [] 6Fr right Radial Artery   [x] 5Fr right Femoral Artery   [x] 7Fr right Femoral Vein   [x] 4Fr left femoral Artery     IV Contrast:       30 mL     Intervention: none    Implants: none     FINDINGS:   Coronary Dominance: Right    No significant CAD  Likely moderate AS    ESTIMATED BLOOD LOSS: < 10 mL      CONDITION:   [x] Good   [] Fair   [] Critical     SPECIMEN REMOVED: N/A     POST-OP DIAGNOSIS:    [] Normal Coronary Angiogram   [x] Minor luminal irregularities  [] Mild Non-obstructive Coronary Artery Disease (< 50% stenosis)   [] Significant -Vessel Coronary Artery Disease     PLAN OF CARE:   [x] D/C Home Today   [] Return to In-patient bed   [] Admit for observation   [] Return for Staged Procedure   [] CT Surgery Consult   [x] IV Fluids: 75cc/hr for 5 hours  [x] Follow-up with Dr. Young as outpatient

## 2022-05-24 NOTE — H&P CARDIOLOGY - HISTORY OF PRESENT ILLNESS
89 y/o female PMHx: carotid stenosis, severe AS, CVA 9/21 (no deficits), urinary retention (self caths), breast cancer s/p BL mastectomy and reconstruction, HLD, HTN, brain tumor s/p craniotomy (1976); PMHx: BL mastectomy 1984, craniotomy 1976 presents today for C/C d/t AS evaluation.    Pre cath note:    indication:  [ ] STEMI                [ ] NSTEMI                 [ ] Acute coronary syndrome                     [ ]Unstable Angina   [ ] high risk  [ ] intermediate risk  [ ] low risk                     [ ] Stable Angina     non-invasive testing:                          Date:                     result: [ ] high risk  [ ] intermediate risk  [ ] low risk    Anti- Anginal medications:                    [x ] not used                       [ ] used                   [ ] not used but strong indication not to use    Ejection Fraction                   [ ] <29            [ ] 30-39%   [ ] 40-49%     [x ]>50%    CHF                   [ ] active (within last 14 days on meds   [ ] Chronic (on meds but no exacerbation)    COPD                   [ ] mild (on chronic bronchodilators)  [ ] moderate (on chronic steroid therapy)      [ ] severe (indication for home O2 or PACO2 >50)    Other risk factors:                       [ ] Previous MI                     [x ] CVA/ stroke                    [ ] carotid stent/ CEA                    [ ] PVD/PAD- (arterial aneurysm, non-palpable pulses, tortuous vessel with inability to insert catheter, infra-renal dissection, renal or subclavian artery stenosis)                    [ ] diabetic                    [ ] previous CABG                    [ ] Renal Failure       Cath Bleeding Risk: 3.7%                          89 y/o female PMHx: carotid stenosis, severe AS, CVA 9/21 (no deficits), urinary retention (self caths), breast cancer s/p BL mastectomy and reconstruction, HLD, HTN, brain tumor s/p craniotomy (1976); PMHx: BL mastectomy 1984, craniotomy 1976 presents today for C/C d/t AS evaluation.    Pre cath note:    indication:  [ ] STEMI                [ ] NSTEMI                 [ ] Acute coronary syndrome                     [ ]Unstable Angina   [ ] high risk  [ ] intermediate risk  [ ] low risk                     [ ] Stable Angina     non-invasive testing:                          Date:                     result: [ ] high risk  [ ] intermediate risk  [ ] low risk    Anti- Anginal medications:                    [x ] not used                       [ ] used                   [ ] not used but strong indication not to use    Ejection Fraction                   [ ] <29            [ ] 30-39%   [ ] 40-49%     [x ]>50%    CHF                   [ ] active (within last 14 days on meds   [ ] Chronic (on meds but no exacerbation)    COPD                   [ ] mild (on chronic bronchodilators)  [ ] moderate (on chronic steroid therapy)      [ ] severe (indication for home O2 or PACO2 >50)    Other risk factors:                       [ ] Previous MI                     [x ] CVA/ stroke                    [ ] carotid stent/ CEA                    [ ] PVD/PAD- (arterial aneurysm, non-palpable pulses, tortuous vessel with inability to insert catheter, infra-renal dissection, renal or subclavian artery stenosis)                    [ ] diabetic                    [ ] previous CABG                    [ ] Renal Failure       Cath Bleeding Risk: 3.7%        NO prehydration d/t severe AS

## 2022-05-31 DIAGNOSIS — Z85.3 PERSONAL HISTORY OF MALIGNANT NEOPLASM OF BREAST: ICD-10-CM

## 2022-05-31 DIAGNOSIS — I35.0 NONRHEUMATIC AORTIC (VALVE) STENOSIS: ICD-10-CM

## 2022-05-31 DIAGNOSIS — Z79.899 OTHER LONG TERM (CURRENT) DRUG THERAPY: ICD-10-CM

## 2022-05-31 DIAGNOSIS — Z86.011 PERSONAL HISTORY OF BENIGN NEOPLASM OF THE BRAIN: ICD-10-CM

## 2022-05-31 DIAGNOSIS — R33.9 RETENTION OF URINE, UNSPECIFIED: ICD-10-CM

## 2022-05-31 DIAGNOSIS — I10 ESSENTIAL (PRIMARY) HYPERTENSION: ICD-10-CM

## 2022-05-31 DIAGNOSIS — Z90.13 ACQUIRED ABSENCE OF BILATERAL BREASTS AND NIPPLES: ICD-10-CM

## 2022-05-31 DIAGNOSIS — Z79.02 LONG TERM (CURRENT) USE OF ANTITHROMBOTICS/ANTIPLATELETS: ICD-10-CM

## 2022-05-31 DIAGNOSIS — E78.5 HYPERLIPIDEMIA, UNSPECIFIED: ICD-10-CM

## 2022-07-25 ENCOUNTER — APPOINTMENT (OUTPATIENT)
Dept: VASCULAR SURGERY | Facility: CLINIC | Age: 87
End: 2022-07-25

## 2022-07-25 VITALS
DIASTOLIC BLOOD PRESSURE: 80 MMHG | BODY MASS INDEX: 27.21 KG/M2 | HEIGHT: 59 IN | WEIGHT: 135 LBS | SYSTOLIC BLOOD PRESSURE: 130 MMHG

## 2022-07-25 DIAGNOSIS — I65.23 OCCLUSION AND STENOSIS OF BILATERAL CAROTID ARTERIES: ICD-10-CM

## 2022-07-25 PROCEDURE — 93880 EXTRACRANIAL BILAT STUDY: CPT

## 2022-07-25 PROCEDURE — 99213 OFFICE O/P EST LOW 20 MIN: CPT

## 2022-07-27 NOTE — HISTORY OF PRESENT ILLNESS
[FreeTextEntry1] : 89 y/o female with h/o severe aortic stenosis, S/p evaluation for a TAVR. She was seen in ED in September 2021 for symptoms of TIA with c/o numbness in the left arm, that resolved spontaneously. She was found to have severe left carotid stenosis. She presents for follow up

## 2022-07-27 NOTE — ASSESSMENT
[FreeTextEntry1] : 89 y/o female with aortic stenosis with left carotid artery with severe stenosis.\par \par I reviewed the CTA neck from September 2021 that showed severe calcific plaque in the left carotid artery. the patient is asymptomatic from her left ICA stenosis.\par \par \par I have discussed the risk benefit of surgery with the patient . She can continue on Aspirin. She will continue with conservative management

## 2022-07-27 NOTE — END OF VISIT
[FreeTextEntry3] : 89 F, did not require TAVR as LHC showed AS is not severe.\par In CTA, left ICA has severe >85% short segment circumferentially calcified stenosis; \par Due to her age and asymptomatic status, would recommend BMT.\par FU on PRN basis.

## 2022-07-27 NOTE — DATA REVIEWED
[FreeTextEntry1] : I performed a carotid duplex which was medically necessary to evaluate for carotid stenosis. It showed 50% right carotid stenosis and 50-69 % left carotid stenosis.\par

## 2022-09-07 ENCOUNTER — RX RENEWAL (OUTPATIENT)
Age: 87
End: 2022-09-07

## 2023-01-30 ENCOUNTER — APPOINTMENT (OUTPATIENT)
Dept: CARDIOLOGY | Facility: CLINIC | Age: 88
End: 2023-01-30
Payer: MEDICARE

## 2023-01-30 PROCEDURE — 93306 TTE W/DOPPLER COMPLETE: CPT

## 2023-03-22 ENCOUNTER — RX RENEWAL (OUTPATIENT)
Age: 88
End: 2023-03-22

## 2023-03-22 RX ORDER — ATORVASTATIN CALCIUM 10 MG/1
10 TABLET, FILM COATED ORAL
Qty: 30 | Refills: 5 | Status: ACTIVE | COMMUNITY
Start: 2022-03-23 | End: 1900-01-01

## 2023-05-04 NOTE — H&P PST ADULT - HEIGHT IN FEET
4 Skin Substitute Text: The defect edges were debeveled with a #15 scalpel blade.  Given the location of the defect, shape of the defect and the proximity to free margins a skin substitute graft was deemed most appropriate.  The graft material was trimmed to fit the size of the defect. The graft was then placed in the primary defect and oriented appropriately.

## 2023-06-29 NOTE — PHYSICAL THERAPY INITIAL EVALUATION ADULT - GAIT PATTERN USED, PT EVAL
[FreeTextEntry1] : F/u [de-identified] : 66 yo F who presents for a follow up, accompanied by daughter ( doc) and  . Has been taking amlodipine 5 mg qd, lisinopril 5 mg qd, and metoprolol 25 mg qd for her bp. States a higher dose of lisinopril causes her to cough. This regimen has been controlling her BP better. She still gets some elevated BP readings when she has symptoms of palpitations, shakes, and anxiety. She was given xanax to take PRN when these episodes happen which she reports has helped but symptoms may return after a few hours. She has an appt for a stress test next week.  3-point gait

## 2023-12-29 ENCOUNTER — INPATIENT (INPATIENT)
Facility: HOSPITAL | Age: 88
LOS: 3 days | Discharge: HOME | DRG: 177 | End: 2024-01-02
Attending: INTERNAL MEDICINE | Admitting: HOSPITALIST
Payer: MEDICARE

## 2023-12-29 VITALS
TEMPERATURE: 98 F | SYSTOLIC BLOOD PRESSURE: 120 MMHG | HEART RATE: 107 BPM | OXYGEN SATURATION: 98 % | DIASTOLIC BLOOD PRESSURE: 65 MMHG

## 2023-12-29 DIAGNOSIS — K21.9 GASTRO-ESOPHAGEAL REFLUX DISEASE WITHOUT ESOPHAGITIS: ICD-10-CM

## 2023-12-29 DIAGNOSIS — G43.909 MIGRAINE, UNSPECIFIED, NOT INTRACTABLE, WITHOUT STATUS MIGRAINOSUS: ICD-10-CM

## 2023-12-29 DIAGNOSIS — J12.82 PNEUMONIA DUE TO CORONAVIRUS DISEASE 2019: ICD-10-CM

## 2023-12-29 DIAGNOSIS — Z90.13 ACQUIRED ABSENCE OF BILATERAL BREASTS AND NIPPLES: ICD-10-CM

## 2023-12-29 DIAGNOSIS — Z66 DO NOT RESUSCITATE: ICD-10-CM

## 2023-12-29 DIAGNOSIS — G47.33 OBSTRUCTIVE SLEEP APNEA (ADULT) (PEDIATRIC): ICD-10-CM

## 2023-12-29 DIAGNOSIS — Z85.3 PERSONAL HISTORY OF MALIGNANT NEOPLASM OF BREAST: ICD-10-CM

## 2023-12-29 DIAGNOSIS — R33.9 RETENTION OF URINE, UNSPECIFIED: ICD-10-CM

## 2023-12-29 DIAGNOSIS — I35.0 NONRHEUMATIC AORTIC (VALVE) STENOSIS: ICD-10-CM

## 2023-12-29 DIAGNOSIS — I10 ESSENTIAL (PRIMARY) HYPERTENSION: ICD-10-CM

## 2023-12-29 DIAGNOSIS — R06.02 SHORTNESS OF BREATH: ICD-10-CM

## 2023-12-29 DIAGNOSIS — Z98.890 OTHER SPECIFIED POSTPROCEDURAL STATES: Chronic | ICD-10-CM

## 2023-12-29 DIAGNOSIS — J18.9 PNEUMONIA, UNSPECIFIED ORGANISM: ICD-10-CM

## 2023-12-29 DIAGNOSIS — Z86.73 PERSONAL HISTORY OF TRANSIENT ISCHEMIC ATTACK (TIA), AND CEREBRAL INFARCTION WITHOUT RESIDUAL DEFICITS: ICD-10-CM

## 2023-12-29 DIAGNOSIS — U07.1 COVID-19: ICD-10-CM

## 2023-12-29 DIAGNOSIS — Z79.82 LONG TERM (CURRENT) USE OF ASPIRIN: ICD-10-CM

## 2023-12-29 DIAGNOSIS — R94.31 ABNORMAL ELECTROCARDIOGRAM [ECG] [EKG]: ICD-10-CM

## 2023-12-29 DIAGNOSIS — E78.5 HYPERLIPIDEMIA, UNSPECIFIED: ICD-10-CM

## 2023-12-29 DIAGNOSIS — Z90.13 ACQUIRED ABSENCE OF BILATERAL BREASTS AND NIPPLES: Chronic | ICD-10-CM

## 2023-12-29 LAB
ALBUMIN SERPL ELPH-MCNC: 3.9 G/DL — SIGNIFICANT CHANGE UP (ref 3.5–5.2)
ALBUMIN SERPL ELPH-MCNC: 3.9 G/DL — SIGNIFICANT CHANGE UP (ref 3.5–5.2)
ALP SERPL-CCNC: 77 U/L — SIGNIFICANT CHANGE UP (ref 30–115)
ALP SERPL-CCNC: 77 U/L — SIGNIFICANT CHANGE UP (ref 30–115)
ALT FLD-CCNC: 16 U/L — SIGNIFICANT CHANGE UP (ref 0–41)
ALT FLD-CCNC: 16 U/L — SIGNIFICANT CHANGE UP (ref 0–41)
ANION GAP SERPL CALC-SCNC: 8 MMOL/L — SIGNIFICANT CHANGE UP (ref 7–14)
ANION GAP SERPL CALC-SCNC: 8 MMOL/L — SIGNIFICANT CHANGE UP (ref 7–14)
AST SERPL-CCNC: 27 U/L — SIGNIFICANT CHANGE UP (ref 0–41)
AST SERPL-CCNC: 27 U/L — SIGNIFICANT CHANGE UP (ref 0–41)
BASE EXCESS BLDV CALC-SCNC: 5.1 MMOL/L — HIGH (ref -2–3)
BASE EXCESS BLDV CALC-SCNC: 5.1 MMOL/L — HIGH (ref -2–3)
BASOPHILS # BLD AUTO: 0.01 K/UL — SIGNIFICANT CHANGE UP (ref 0–0.2)
BASOPHILS # BLD AUTO: 0.01 K/UL — SIGNIFICANT CHANGE UP (ref 0–0.2)
BASOPHILS NFR BLD AUTO: 0.1 % — SIGNIFICANT CHANGE UP (ref 0–1)
BASOPHILS NFR BLD AUTO: 0.1 % — SIGNIFICANT CHANGE UP (ref 0–1)
BILIRUB SERPL-MCNC: 0.4 MG/DL — SIGNIFICANT CHANGE UP (ref 0.2–1.2)
BILIRUB SERPL-MCNC: 0.4 MG/DL — SIGNIFICANT CHANGE UP (ref 0.2–1.2)
BUN SERPL-MCNC: 23 MG/DL — HIGH (ref 10–20)
BUN SERPL-MCNC: 23 MG/DL — HIGH (ref 10–20)
CALCIUM SERPL-MCNC: 9.3 MG/DL — SIGNIFICANT CHANGE UP (ref 8.4–10.4)
CALCIUM SERPL-MCNC: 9.3 MG/DL — SIGNIFICANT CHANGE UP (ref 8.4–10.4)
CHLORIDE SERPL-SCNC: 103 MMOL/L — SIGNIFICANT CHANGE UP (ref 98–110)
CHLORIDE SERPL-SCNC: 103 MMOL/L — SIGNIFICANT CHANGE UP (ref 98–110)
CO2 SERPL-SCNC: 30 MMOL/L — SIGNIFICANT CHANGE UP (ref 17–32)
CO2 SERPL-SCNC: 30 MMOL/L — SIGNIFICANT CHANGE UP (ref 17–32)
CREAT SERPL-MCNC: 0.9 MG/DL — SIGNIFICANT CHANGE UP (ref 0.7–1.5)
CREAT SERPL-MCNC: 0.9 MG/DL — SIGNIFICANT CHANGE UP (ref 0.7–1.5)
D DIMER BLD IA.RAPID-MCNC: 279 NG/ML DDU — HIGH
D DIMER BLD IA.RAPID-MCNC: 279 NG/ML DDU — HIGH
EGFR: 60 ML/MIN/1.73M2 — SIGNIFICANT CHANGE UP
EGFR: 60 ML/MIN/1.73M2 — SIGNIFICANT CHANGE UP
EOSINOPHIL # BLD AUTO: 0.05 K/UL — SIGNIFICANT CHANGE UP (ref 0–0.7)
EOSINOPHIL # BLD AUTO: 0.05 K/UL — SIGNIFICANT CHANGE UP (ref 0–0.7)
EOSINOPHIL NFR BLD AUTO: 0.7 % — SIGNIFICANT CHANGE UP (ref 0–8)
EOSINOPHIL NFR BLD AUTO: 0.7 % — SIGNIFICANT CHANGE UP (ref 0–8)
GAS PNL BLDV: SIGNIFICANT CHANGE UP
GAS PNL BLDV: SIGNIFICANT CHANGE UP
GLUCOSE SERPL-MCNC: 125 MG/DL — HIGH (ref 70–99)
GLUCOSE SERPL-MCNC: 125 MG/DL — HIGH (ref 70–99)
HCO3 BLDV-SCNC: 32 MMOL/L — HIGH (ref 22–29)
HCO3 BLDV-SCNC: 32 MMOL/L — HIGH (ref 22–29)
HCT VFR BLD CALC: 36.3 % — LOW (ref 37–47)
HCT VFR BLD CALC: 36.3 % — LOW (ref 37–47)
HGB BLD-MCNC: 11.8 G/DL — LOW (ref 12–16)
HGB BLD-MCNC: 11.8 G/DL — LOW (ref 12–16)
IMM GRANULOCYTES NFR BLD AUTO: 0.4 % — HIGH (ref 0.1–0.3)
IMM GRANULOCYTES NFR BLD AUTO: 0.4 % — HIGH (ref 0.1–0.3)
LYMPHOCYTES # BLD AUTO: 0.53 K/UL — LOW (ref 1.2–3.4)
LYMPHOCYTES # BLD AUTO: 0.53 K/UL — LOW (ref 1.2–3.4)
LYMPHOCYTES # BLD AUTO: 7.5 % — LOW (ref 20.5–51.1)
LYMPHOCYTES # BLD AUTO: 7.5 % — LOW (ref 20.5–51.1)
MCHC RBC-ENTMCNC: 29.5 PG — SIGNIFICANT CHANGE UP (ref 27–31)
MCHC RBC-ENTMCNC: 29.5 PG — SIGNIFICANT CHANGE UP (ref 27–31)
MCHC RBC-ENTMCNC: 32.5 G/DL — SIGNIFICANT CHANGE UP (ref 32–37)
MCHC RBC-ENTMCNC: 32.5 G/DL — SIGNIFICANT CHANGE UP (ref 32–37)
MCV RBC AUTO: 90.8 FL — SIGNIFICANT CHANGE UP (ref 81–99)
MCV RBC AUTO: 90.8 FL — SIGNIFICANT CHANGE UP (ref 81–99)
MONOCYTES # BLD AUTO: 0.4 K/UL — SIGNIFICANT CHANGE UP (ref 0.1–0.6)
MONOCYTES # BLD AUTO: 0.4 K/UL — SIGNIFICANT CHANGE UP (ref 0.1–0.6)
MONOCYTES NFR BLD AUTO: 5.7 % — SIGNIFICANT CHANGE UP (ref 1.7–9.3)
MONOCYTES NFR BLD AUTO: 5.7 % — SIGNIFICANT CHANGE UP (ref 1.7–9.3)
NEUTROPHILS # BLD AUTO: 6.02 K/UL — SIGNIFICANT CHANGE UP (ref 1.4–6.5)
NEUTROPHILS # BLD AUTO: 6.02 K/UL — SIGNIFICANT CHANGE UP (ref 1.4–6.5)
NEUTROPHILS NFR BLD AUTO: 85.6 % — HIGH (ref 42.2–75.2)
NEUTROPHILS NFR BLD AUTO: 85.6 % — HIGH (ref 42.2–75.2)
NRBC # BLD: 0 /100 WBCS — SIGNIFICANT CHANGE UP (ref 0–0)
NRBC # BLD: 0 /100 WBCS — SIGNIFICANT CHANGE UP (ref 0–0)
NT-PROBNP SERPL-SCNC: 1033 PG/ML — HIGH (ref 0–300)
NT-PROBNP SERPL-SCNC: 1033 PG/ML — HIGH (ref 0–300)
PCO2 BLDV: 57 MMHG — HIGH (ref 39–42)
PCO2 BLDV: 57 MMHG — HIGH (ref 39–42)
PH BLDV: 7.36 — SIGNIFICANT CHANGE UP (ref 7.32–7.43)
PH BLDV: 7.36 — SIGNIFICANT CHANGE UP (ref 7.32–7.43)
PLATELET # BLD AUTO: 157 K/UL — SIGNIFICANT CHANGE UP (ref 130–400)
PLATELET # BLD AUTO: 157 K/UL — SIGNIFICANT CHANGE UP (ref 130–400)
PMV BLD: 9.3 FL — SIGNIFICANT CHANGE UP (ref 7.4–10.4)
PMV BLD: 9.3 FL — SIGNIFICANT CHANGE UP (ref 7.4–10.4)
PO2 BLDV: 33 MMHG — SIGNIFICANT CHANGE UP (ref 25–45)
PO2 BLDV: 33 MMHG — SIGNIFICANT CHANGE UP (ref 25–45)
POTASSIUM SERPL-MCNC: 4.7 MMOL/L — SIGNIFICANT CHANGE UP (ref 3.5–5)
POTASSIUM SERPL-MCNC: 4.7 MMOL/L — SIGNIFICANT CHANGE UP (ref 3.5–5)
POTASSIUM SERPL-SCNC: 4.7 MMOL/L — SIGNIFICANT CHANGE UP (ref 3.5–5)
POTASSIUM SERPL-SCNC: 4.7 MMOL/L — SIGNIFICANT CHANGE UP (ref 3.5–5)
PROT SERPL-MCNC: 7.2 G/DL — SIGNIFICANT CHANGE UP (ref 6–8)
PROT SERPL-MCNC: 7.2 G/DL — SIGNIFICANT CHANGE UP (ref 6–8)
RBC # BLD: 4 M/UL — LOW (ref 4.2–5.4)
RBC # BLD: 4 M/UL — LOW (ref 4.2–5.4)
RBC # FLD: 14.1 % — SIGNIFICANT CHANGE UP (ref 11.5–14.5)
RBC # FLD: 14.1 % — SIGNIFICANT CHANGE UP (ref 11.5–14.5)
SAO2 % BLDV: 51.8 % — LOW (ref 67–88)
SAO2 % BLDV: 51.8 % — LOW (ref 67–88)
SODIUM SERPL-SCNC: 141 MMOL/L — SIGNIFICANT CHANGE UP (ref 135–146)
SODIUM SERPL-SCNC: 141 MMOL/L — SIGNIFICANT CHANGE UP (ref 135–146)
TROPONIN T, HIGH SENSITIVITY RESULT: 36 NG/L — HIGH (ref 6–13)
TROPONIN T, HIGH SENSITIVITY RESULT: 36 NG/L — HIGH (ref 6–13)
WBC # BLD: 7.04 K/UL — SIGNIFICANT CHANGE UP (ref 4.8–10.8)
WBC # BLD: 7.04 K/UL — SIGNIFICANT CHANGE UP (ref 4.8–10.8)
WBC # FLD AUTO: 7.04 K/UL — SIGNIFICANT CHANGE UP (ref 4.8–10.8)
WBC # FLD AUTO: 7.04 K/UL — SIGNIFICANT CHANGE UP (ref 4.8–10.8)

## 2023-12-29 PROCEDURE — 87449 NOS EACH ORGANISM AG IA: CPT

## 2023-12-29 PROCEDURE — 36415 COLL VENOUS BLD VENIPUNCTURE: CPT

## 2023-12-29 PROCEDURE — 80053 COMPREHEN METABOLIC PANEL: CPT

## 2023-12-29 PROCEDURE — 85025 COMPLETE CBC W/AUTO DIFF WBC: CPT

## 2023-12-29 PROCEDURE — 99223 1ST HOSP IP/OBS HIGH 75: CPT

## 2023-12-29 PROCEDURE — 0225U NFCT DS DNA&RNA 21 SARSCOV2: CPT

## 2023-12-29 PROCEDURE — 71046 X-RAY EXAM CHEST 2 VIEWS: CPT | Mod: 26

## 2023-12-29 PROCEDURE — 97161 PT EVAL LOW COMPLEX 20 MIN: CPT | Mod: GP

## 2023-12-29 PROCEDURE — 97116 GAIT TRAINING THERAPY: CPT | Mod: GP

## 2023-12-29 PROCEDURE — 84145 PROCALCITONIN (PCT): CPT

## 2023-12-29 PROCEDURE — 99285 EMERGENCY DEPT VISIT HI MDM: CPT

## 2023-12-29 PROCEDURE — 93005 ELECTROCARDIOGRAM TRACING: CPT

## 2023-12-29 PROCEDURE — 85027 COMPLETE CBC AUTOMATED: CPT

## 2023-12-29 PROCEDURE — 87899 AGENT NOS ASSAY W/OPTIC: CPT

## 2023-12-29 PROCEDURE — 71275 CT ANGIOGRAPHY CHEST: CPT | Mod: 26,MA

## 2023-12-29 PROCEDURE — 83735 ASSAY OF MAGNESIUM: CPT

## 2023-12-29 RX ORDER — DEXLANSOPRAZOLE 30 MG/1
1 CAPSULE, DELAYED RELEASE ORAL
Qty: 0 | Refills: 0 | DISCHARGE

## 2023-12-29 RX ORDER — ASPIRIN/CALCIUM CARB/MAGNESIUM 324 MG
81 TABLET ORAL DAILY
Refills: 0 | Status: DISCONTINUED | OUTPATIENT
Start: 2023-12-29 | End: 2024-01-02

## 2023-12-29 RX ORDER — ATORVASTATIN CALCIUM 80 MG/1
10 TABLET, FILM COATED ORAL AT BEDTIME
Refills: 0 | Status: DISCONTINUED | OUTPATIENT
Start: 2023-12-29 | End: 2024-01-02

## 2023-12-29 RX ORDER — CEFTRIAXONE 500 MG/1
1000 INJECTION, POWDER, FOR SOLUTION INTRAMUSCULAR; INTRAVENOUS EVERY 24 HOURS
Refills: 0 | Status: DISCONTINUED | OUTPATIENT
Start: 2023-12-30 | End: 2023-12-31

## 2023-12-29 RX ORDER — PANTOPRAZOLE SODIUM 20 MG/1
40 TABLET, DELAYED RELEASE ORAL
Refills: 0 | Status: DISCONTINUED | OUTPATIENT
Start: 2023-12-29 | End: 2024-01-02

## 2023-12-29 RX ORDER — ENOXAPARIN SODIUM 100 MG/ML
40 INJECTION SUBCUTANEOUS EVERY 24 HOURS
Refills: 0 | Status: DISCONTINUED | OUTPATIENT
Start: 2023-12-29 | End: 2024-01-02

## 2023-12-29 RX ORDER — PROPRANOLOL HCL 160 MG
0 CAPSULE, EXTENDED RELEASE 24HR ORAL
Qty: 0 | Refills: 0 | DISCHARGE

## 2023-12-29 RX ADMIN — ATORVASTATIN CALCIUM 10 MILLIGRAM(S): 80 TABLET, FILM COATED ORAL at 22:03

## 2023-12-29 RX ADMIN — Medication 100 MILLIGRAM(S): at 17:36

## 2023-12-29 NOTE — H&P ADULT - HISTORY OF PRESENT ILLNESS
Patient is a 89-year-old female with past medical history of severe aortic stenosis, L carotid stenosis, JENNIFER, GERD, urinary retention with self-catheterization, breast cancer status post bilateral mastectomy and reconstruction, hyperlipidemia, hypertension, brain tumor s/p craniotomy 1976, presenting to the ED complaining of shortness of breath.  Patient states this morning she was getting dressed and getting ready for breakfast when she started to feel short of breath. Patient states she never experienced episodes like this before. Pt symptoms were relieved with home 3L O2. Denies chest pain, syncope, pre-syncope, lightheadedness, dizziness, confusion, n/v/d, sick contacts    T(C): 36.6 (12-29-23 @ 11:45), Max: 36.6 (12-29-23 @ 11:45)  T(F): 97.9 (12-29-23 @ 11:45), Max: 97.9 (12-29-23 @ 11:45)  HR: 85 (12-29-23 @ 11:45) (85 - 107)  BP: 111/57 (12-29-23 @ 11:45) (111/57 - 120/65)  RR: 16 (12-29-23 @ 11:45) (16 - 16)  SpO2: 96% (12-29-23 @ 11:45) (96% - 98%)  Wt(kg): --    CT chest showing bilateral basilar and lingular patchy consolidations. Pt ordered for levaquin in the ED.

## 2023-12-29 NOTE — H&P ADULT - ATTENDING COMMENTS
89-year-old female with past medical history of severe aortic stenosis, L carotid stenosis, JENNIFER, GERD, urinary retention with self-catheterization, breast cancer status post bilateral mastectomy and reconstruction, hyperlipidemia, hypertension, brain tumor s/p craniotomy 1976, presenting to the ED complaining of shortness of breath.      #SOB 2/2 Multifocal pneumonia, possibly viral   - CT angio negative for PE but shows multifocal PNA   - Rocephin and doxycycline (QTc prolonged, avoid flouroquinolones and macrolides)   - check RVP   - check procal     #Severe AS   - outpatient TTE     #Prolonged QTc  - repeat EKG daily   - optimize electrolytes   - avoid QTc prolonging meds     #L carotid stenosis  #CVA   #Hyperlipidemia  -c/w ASA, statin     #Breast cancer s/p BL mastectomy and reconstruction    #Urinary retention   - c/w CIC     #DVT ppx  -Lovenox     DNR/DNI

## 2023-12-29 NOTE — ED ADULT NURSE NOTE - NSFALLHARMRISKINTERV_ED_ALL_ED
Assistance OOB with selected safe patient handling equipment if applicable/Assistance with ambulation/Communicate risk of Fall with Harm to all staff, patient, and family/Monitor gait and stability/Monitor for mental status changes and reorient to person, place, and time, as needed/Provide visual cue: red socks, yellow wristband, yellow gown, etc/Reinforce activity limits and safety measures with patient and family/Review medications for side effects contributing to fall risk/Use of alarms - bed, stretcher, chair and/or video monitoring/Bed in lowest position, wheels locked, appropriate side rails in place/Call bell, personal items and telephone in reach/Instruct patient to call for assistance before getting out of bed/chair/stretcher/Non-slip footwear applied when patient is off stretcher/[place] to call system/Physically safe environment - no spills, clutter or unnecessary equipment/Purposeful Proactive Rounding/Room/bathroom lighting operational, light cord in reach Assistance OOB with selected safe patient handling equipment if applicable/Assistance with ambulation/Communicate risk of Fall with Harm to all staff, patient, and family/Monitor gait and stability/Monitor for mental status changes and reorient to person, place, and time, as needed/Provide visual cue: red socks, yellow wristband, yellow gown, etc/Reinforce activity limits and safety measures with patient and family/Review medications for side effects contributing to fall risk/Use of alarms - bed, stretcher, chair and/or video monitoring/Bed in lowest position, wheels locked, appropriate side rails in place/Call bell, personal items and telephone in reach/Instruct patient to call for assistance before getting out of bed/chair/stretcher/Non-slip footwear applied when patient is off stretcher/[place] to call system/Physically safe environment - no spills, clutter or unnecessary equipment/Purposeful Proactive Rounding/Room/bathroom lighting operational, light cord in reach

## 2023-12-29 NOTE — H&P ADULT - CONVERSATION DETAILS
Spoke with pt regarding healthcare proxy information in chart. Pt states she would like to be DNR/DNI, "let me go if its my time". Pt prefers her older daughter make decisions for her if she is unable (Malou Polanco)

## 2023-12-29 NOTE — H&P ADULT - NSHPLABSRESULTS_GEN_ALL_CORE
CBC Full  -  ( 29 Dec 2023 07:45 )  WBC Count : 7.04 K/uL  RBC Count : 4.00 M/uL  Hemoglobin : 11.8 g/dL  Hematocrit : 36.3 %  Platelet Count - Automated : 157 K/uL  Mean Cell Volume : 90.8 fL  Mean Cell Hemoglobin : 29.5 pg  Mean Cell Hemoglobin Concentration : 32.5 g/dL  Auto Neutrophil # : 6.02 K/uL  Auto Lymphocyte # : 0.53 K/uL  Auto Monocyte # : 0.40 K/uL  Auto Eosinophil # : 0.05 K/uL  Auto Basophil # : 0.01 K/uL  Auto Neutrophil % : 85.6 %  Auto Lymphocyte % : 7.5 %  Auto Monocyte % : 5.7 %  Auto Eosinophil % : 0.7 %  Auto Basophil % : 0.1 %    12-29    141  |  103  |  23<H>  ----------------------------<  125<H>  4.7   |  30  |  0.9    Ca    9.3      29 Dec 2023 07:45    TPro  7.2  /  Alb  3.9  /  TBili  0.4  /  DBili  x   /  AST  27  /  ALT  16  /  AlkPhos  77  12-29    LIVER FUNCTIONS - ( 29 Dec 2023 07:45 )  Alb: 3.9 g/dL / Pro: 7.2 g/dL / ALK PHOS: 77 U/L / ALT: 16 U/L / AST: 27 U/L / GGT: x           Urinalysis Basic - ( 29 Dec 2023 07:45 )    Color: x / Appearance: x / SG: x / pH: x  Gluc: 125 mg/dL / Ketone: x  / Bili: x / Urobili: x   Blood: x / Protein: x / Nitrite: x   Leuk Esterase: x / RBC: x / WBC x   Sq Epi: x / Non Sq Epi: x / Bacteria: x

## 2023-12-29 NOTE — ED PROVIDER NOTE - PHYSICAL EXAMINATION
CONSTITUTIONAL: well-appearing, in NAD  SKIN: Warm dry, normal skin turgor  HEAD: NCAT  EYES: EOMI, PERRLA, no scleral icterus, conjunctiva pink  ENT: normal pharynx with no erythema or exudates  NECK: Supple; non tender. Full ROM.  CARD: RRR, AS heard  RESP: Wheezing on left lung fields   ABD: soft, non-tender, non-distended, no rebound or guarding.  EXT: Full ROM, no bony tenderness, no pedal edema, no calf tenderness  NEURO: normal motor. normal sensory. Normal gait.  PSYCH: Cooperative, appropriate.

## 2023-12-29 NOTE — H&P ADULT - ASSESSMENT
Patient is a 89-year-old female with past medical history of severe aortic stenosis, L carotid stenosis, JENNIFER, GERD, urinary retention with self-catheterization, breast cancer status post bilateral mastectomy and reconstruction, hyperlipidemia, hypertension, brain tumor s/p craniotomy 1976, presenting to the ED complaining of shortness of breath.    #Multifocal bacterial pneumonia  -one day of symptoms, pt currently not requiring oxygen  -levaquin  -f/u procal, legionella/strep  -AM CXR    #L carotid stenosis  #Hyperlipidemia  -c/w ASA, atorva    #?JENNIFER  -pt uses 3L NC at night  -ABG in AM if altered, consider BIPAP if needed    #DVT ppx  -Lovenox    #GI ppx  -dexlansoprazole-->protonix in house    #Diet  -dash/tlc    #Activity  -IAT    #Dispo  -24hrs Patient is a 89-year-old female with past medical history of severe aortic stenosis, L carotid stenosis, JENNIFER, GERD, urinary retention with self-catheterization, breast cancer status post bilateral mastectomy and reconstruction, hyperlipidemia, hypertension, brain tumor s/p craniotomy 1976, presenting to the ED complaining of shortness of breath.    #Multifocal bacterial pneumonia  -one day of symptoms, pt currently not requiring oxygen  -rocephin  -f/u procal, legionella/strep  -AM CXR    #L carotid stenosis  #Hyperlipidemia  -c/w ASA, atorva    #?JENNIFER  -pt uses 3L NC at night  -ABG in AM if altered, consider BIPAP if needed    #DVT ppx  -Lovenox    #GI ppx  -dexlansoprazole-->protonix in house    #Diet  -dash/tlc    #Activity  -IAT    #Dispo  -24hrs

## 2023-12-29 NOTE — ED PROVIDER NOTE - CLINICAL SUMMARY MEDICAL DECISION MAKING FREE TEXT BOX
Patient presented with worsening shortness of breath as documented. On arrival to ED, patient otherwise afebrile, hemodynamically stable, mildly hypoxic on room air but this improved with O2 nasal cannula.  Patient was also tachypneic upon standing on mild exertion, but this resolved with rest.  EKG obtained and nonspecific, but no evidence of STEMI.  Obtained labs which were grossly unremarkable including no significant leukocytosis, anemia, signs of dehydration/ALCIDES, transaminitis or significant electrolyte abnormalities.  Troponin noted to be 36 with a proBNP of around 2000.  Chest x-ray showed bilateral opacities, but no evidence of pneumothorax or definitive focal consolidations.  CTA of the chest negative for evidence of PE, but showed likely multifocal pneumonia.  Patient was treated with IV antibiotics remained stable during ED course, however given the above, patient will require admission for further management.  Patient agreeable with plan.  Hemodynamically stable at time of admission.

## 2023-12-29 NOTE — ED PROVIDER NOTE - STUDIES
EKG - see results section for interpretation/Xray Image(s) - see wet read section for interpretation

## 2023-12-29 NOTE — ED PROVIDER NOTE - DIFFERENTIAL DIAGNOSIS
Pneumonia  PE Differential Diagnosis Dyspnea, consider ACS vs PE vs dissection vs tamponade vs esophageal rupture vs pneumothorax vs pneumonia vs fluid overload

## 2023-12-29 NOTE — H&P ADULT - NSHPPHYSICALEXAM_GEN_ALL_CORE
GENERAL: AAOx3. Well-nourished, sitting up in bed asking to be discharged  HEENT: No FNDs, atraumatic, normocephalic, moist mucus membranes  LUNGS: Fine crackles at bilateral bases, posteriorly  HEART: S1/S2. No heaves or thrills  ABD: Soft, non-tender, non-distended. Bowel sounds present in all quadrants.  EXT/NEURO: 5/5 strength in all extremity joints. Sensation and ROM grossly intact.  SKIN: 1+ non-pitting edema of lower legs up to knees

## 2023-12-29 NOTE — ED PROVIDER NOTE - OBJECTIVE STATEMENT
Patient is a 89-year-old female with past medical history of severe aortic stenosis, CVA 9/21 no deficits, urinary retention with self-catheterization, breast cancer status post bilateral mastectomy and reconstruction, hyperlipidemia, hypertension, brain tumor s/p craniotomy 1976, presenting to the ED complaining of shortness of breath.  Patient states this morning she was getting dressed and getting ready for breakfast when she started to feel short of breath.  There is no associated chest pain.  Patient states she never experienced episodes like this before.  Patient states she used a breathing machine at home that helped her feel better.  Patient normally uses 3 L of oxygen at home.  Patient states she woke up with nasal congestion that could have been contributing to shortness of breath.    Denies nausea, vomiting, diarrhea, fevers, cough, chest pain, leg swelling

## 2023-12-30 LAB
HCT VFR BLD CALC: 37.9 % — SIGNIFICANT CHANGE UP (ref 37–47)
HCT VFR BLD CALC: 37.9 % — SIGNIFICANT CHANGE UP (ref 37–47)
HGB BLD-MCNC: 12.1 G/DL — SIGNIFICANT CHANGE UP (ref 12–16)
HGB BLD-MCNC: 12.1 G/DL — SIGNIFICANT CHANGE UP (ref 12–16)
MAGNESIUM SERPL-MCNC: 1.9 MG/DL — SIGNIFICANT CHANGE UP (ref 1.8–2.4)
MAGNESIUM SERPL-MCNC: 1.9 MG/DL — SIGNIFICANT CHANGE UP (ref 1.8–2.4)
MCHC RBC-ENTMCNC: 28.9 PG — SIGNIFICANT CHANGE UP (ref 27–31)
MCHC RBC-ENTMCNC: 28.9 PG — SIGNIFICANT CHANGE UP (ref 27–31)
MCHC RBC-ENTMCNC: 31.9 G/DL — LOW (ref 32–37)
MCHC RBC-ENTMCNC: 31.9 G/DL — LOW (ref 32–37)
MCV RBC AUTO: 90.5 FL — SIGNIFICANT CHANGE UP (ref 81–99)
MCV RBC AUTO: 90.5 FL — SIGNIFICANT CHANGE UP (ref 81–99)
NRBC # BLD: 0 /100 WBCS — SIGNIFICANT CHANGE UP (ref 0–0)
NRBC # BLD: 0 /100 WBCS — SIGNIFICANT CHANGE UP (ref 0–0)
PLATELET # BLD AUTO: 166 K/UL — SIGNIFICANT CHANGE UP (ref 130–400)
PLATELET # BLD AUTO: 166 K/UL — SIGNIFICANT CHANGE UP (ref 130–400)
PMV BLD: 10.1 FL — SIGNIFICANT CHANGE UP (ref 7.4–10.4)
PMV BLD: 10.1 FL — SIGNIFICANT CHANGE UP (ref 7.4–10.4)
PROCALCITONIN SERPL-MCNC: 0.02 NG/ML — SIGNIFICANT CHANGE UP (ref 0.02–0.1)
PROCALCITONIN SERPL-MCNC: 0.02 NG/ML — SIGNIFICANT CHANGE UP (ref 0.02–0.1)
PROCALCITONIN SERPL-MCNC: 0.04 NG/ML — SIGNIFICANT CHANGE UP (ref 0.02–0.1)
PROCALCITONIN SERPL-MCNC: 0.04 NG/ML — SIGNIFICANT CHANGE UP (ref 0.02–0.1)
RAPID RVP RESULT: DETECTED
RAPID RVP RESULT: DETECTED
RBC # BLD: 4.19 M/UL — LOW (ref 4.2–5.4)
RBC # BLD: 4.19 M/UL — LOW (ref 4.2–5.4)
RBC # FLD: 14.1 % — SIGNIFICANT CHANGE UP (ref 11.5–14.5)
RBC # FLD: 14.1 % — SIGNIFICANT CHANGE UP (ref 11.5–14.5)
SARS-COV-2 RNA SPEC QL NAA+PROBE: DETECTED
SARS-COV-2 RNA SPEC QL NAA+PROBE: DETECTED
WBC # BLD: 4.6 K/UL — LOW (ref 4.8–10.8)
WBC # BLD: 4.6 K/UL — LOW (ref 4.8–10.8)
WBC # FLD AUTO: 4.6 K/UL — LOW (ref 4.8–10.8)
WBC # FLD AUTO: 4.6 K/UL — LOW (ref 4.8–10.8)

## 2023-12-30 PROCEDURE — 99232 SBSQ HOSP IP/OBS MODERATE 35: CPT

## 2023-12-30 RX ORDER — INFLUENZA VIRUS VACCINE 15; 15; 15; 15 UG/.5ML; UG/.5ML; UG/.5ML; UG/.5ML
0.7 SUSPENSION INTRAMUSCULAR ONCE
Refills: 0 | Status: DISCONTINUED | OUTPATIENT
Start: 2023-12-30 | End: 2024-01-02

## 2023-12-30 RX ADMIN — ENOXAPARIN SODIUM 40 MILLIGRAM(S): 100 INJECTION SUBCUTANEOUS at 06:17

## 2023-12-30 RX ADMIN — Medication 100 MILLIGRAM(S): at 17:35

## 2023-12-30 RX ADMIN — Medication 81 MILLIGRAM(S): at 11:20

## 2023-12-30 RX ADMIN — PANTOPRAZOLE SODIUM 40 MILLIGRAM(S): 20 TABLET, DELAYED RELEASE ORAL at 06:17

## 2023-12-30 RX ADMIN — CEFTRIAXONE 100 MILLIGRAM(S): 500 INJECTION, POWDER, FOR SOLUTION INTRAMUSCULAR; INTRAVENOUS at 11:20

## 2023-12-30 RX ADMIN — Medication 100 MILLIGRAM(S): at 06:16

## 2023-12-30 RX ADMIN — Medication 1 TABLET(S): at 11:20

## 2023-12-30 RX ADMIN — ATORVASTATIN CALCIUM 10 MILLIGRAM(S): 80 TABLET, FILM COATED ORAL at 21:02

## 2023-12-30 NOTE — PATIENT PROFILE ADULT - FALL HARM RISK - HARM RISK INTERVENTIONS
Assistance with ambulation/Assistance OOB with selected safe patient handling equipment/Communicate Risk of Fall with Harm to all staff/Discuss with provider need for PT consult/Monitor gait and stability/Provide patient with walking aids - walker, cane, crutches/Reinforce activity limits and safety measures with patient and family/Tailored Fall Risk Interventions/Visual Cue: Yellow wristband and red socks/Bed in lowest position, wheels locked, appropriate side rails in place/Call bell, personal items and telephone in reach/Instruct patient to call for assistance before getting out of bed or chair/Non-slip footwear when patient is out of bed/[place] to call system/Physically safe environment - no spills, clutter or unnecessary equipment/Purposeful Proactive Rounding/Room/bathroom lighting operational, light cord in reach Assistance with ambulation/Assistance OOB with selected safe patient handling equipment/Communicate Risk of Fall with Harm to all staff/Discuss with provider need for PT consult/Monitor gait and stability/Provide patient with walking aids - walker, cane, crutches/Reinforce activity limits and safety measures with patient and family/Tailored Fall Risk Interventions/Visual Cue: Yellow wristband and red socks/Bed in lowest position, wheels locked, appropriate side rails in place/Call bell, personal items and telephone in reach/Instruct patient to call for assistance before getting out of bed or chair/Non-slip footwear when patient is out of bed/[place] to call system/Physically safe environment - no spills, clutter or unnecessary equipment/Purposeful Proactive Rounding/Room/bathroom lighting operational, light cord in reach

## 2023-12-30 NOTE — PROGRESS NOTE ADULT - ASSESSMENT
89 y/o woman with PMH of severe aortic stenosis, Left carotid stenosis, JENNIFER, GERD, urinary retention with self-catheterization, breast cancer s/p bilateral mastectomy and reconstruction, hyperlipidemia, hypertension and brain tumor s/p craniotomy 1976 presented to the ED complaining of shortness of breath.    1. Dyspnea due to Multifocal pneumonia, possibly viral   CT scan report reviewed - negative for PE but shows multifocal PNA   pt is afebrile, on RA, no leukocytosis  feeling better today  continue Rocephin and doxycycline (QTc prolonged, avoid flouroquinolones and macrolides)   procal 0.04 (more in favor of viral pneumonia)  f/u RVP, urine for Strep/legionella    2. Severe AS   - outpatient TTE     3. Prolonged QTc  - repeat EKG daily shows QTc 494  - avoid QTc prolonging meds     4. Left carotid stenosis  h/o CVA   Hyperlipidemia  -c/w ASA, statin     5. Urinary retention   - c/w CIC (pt self caths at home)    6. DVT ppx  Lovenox  PT consulted - pt from [facility] and ambulates with a cane - needs to walk at least 100ft in order to return    DNR/DNI       PROGRESS NOTE HANDOFF    Pending: f/u RVP, continued improvement in dyspnea, PT eval  pt informed of the plan of care  Disposition: [facility] 89 y/o woman with PMH of severe aortic stenosis, Left carotid stenosis, JENNIFER, GERD, urinary retention with self-catheterization, breast cancer s/p bilateral mastectomy and reconstruction, hyperlipidemia, hypertension and brain tumor s/p craniotomy 1976 presented to the ED complaining of shortness of breath.    1. Dyspnea due to Multifocal pneumonia, possibly viral   CT scan report reviewed - negative for PE but shows multifocal PNA   pt is afebrile, on RA, no leukocytosis  feeling better today  continue Rocephin and doxycycline (QTc prolonged, avoid flouroquinolones and macrolides)   procal 0.04 (more in favor of viral pneumonia)  f/u RVP, urine for Strep/legionella    2. Severe AS   - outpatient TTE     3. Prolonged QTc  - repeat EKG daily shows QTc 494  - avoid QTc prolonging meds     4. Left carotid stenosis  h/o CVA   Hyperlipidemia  -c/w ASA, statin     5. Urinary retention   - c/w CIC (pt self caths at home)    6. DVT ppx  Lovenox  PT consulted - pt from [facility] and ambulates with a cane - needs to walk at least 100ft in order to return    DNR/DNI       PROGRESS NOTE HANDOFF    Pending: f/u RVP, continued improvement in dyspnea, PT eval  pt informed of the plan of care  Disposition: [facility]

## 2023-12-30 NOTE — PROGRESS NOTE ADULT - SUBJECTIVE AND OBJECTIVE BOX
MARISA DYKES  89y Female    INTERVAL HPI/OVERNIGHT EVENTS:    pt feels better today  less SOB  still with cough with some sputum  no fever, pain, N/V  ate breakfast  walks with cane at [facility]    T(F): 97.9 (12-30-23 @ 04:27), Max: 97.9 (12-30-23 @ 04:27)  HR: 86 (12-30-23 @ 04:27) (84 - 88)  BP: 111/55 (12-30-23 @ 04:27) (111/55 - 124/59)  RR: 20 (12-30-23 @ 04:27) (18 - 20)  SpO2: 100% (12-30-23 @ 00:52) (99% - 100%) on RA      PHYSICAL EXAM:  GENERAL: NAD  HEAD:  Normocephalic  EYES:  conjunctiva and sclera clear  ENMT: Moist mucous membranes  NERVOUS SYSTEM:  Alert, awake, Good concentration  CHEST/LUNG: crackles at right base  HEART: Regular rate and rhythm;3/6 WILLIAM  ABDOMEN: Soft, Nontender, Nondistended  EXTREMITIES:   No edema  SKIN: warm, dry    Consultant(s) Notes Reviewed:  [x ] YES  [ ] NO  Care Discussed with Consultants/Other Providers [ x] YES  [ ] NO    MEDICATIONS  (STANDING):  aspirin enteric coated 81 milliGRAM(s) Oral daily  atorvastatin 10 milliGRAM(s) Oral at bedtime  cefTRIAXone   IVPB 1000 milliGRAM(s) IV Intermittent every 24 hours  doxycycline IVPB      doxycycline IVPB 100 milliGRAM(s) IV Intermittent every 12 hours  enoxaparin Injectable 40 milliGRAM(s) SubCutaneous every 24 hours  influenza  Vaccine (HIGH DOSE) 0.7 milliLiter(s) IntraMuscular once  multivitamin 1 Tablet(s) Oral daily  pantoprazole    Tablet 40 milliGRAM(s) Oral before breakfast    MEDICATIONS  (PRN):      LABS:                        12.1   4.60  )-----------( 166      ( 30 Dec 2023 07:07 )             37.9     12-29    141  |  103  |  23<H>  ----------------------------<  125<H>  4.7   |  30  |  0.9    Ca    9.3      29 Dec 2023 07:45  Mg     1.9     12-30    TPro  7.2  /  Alb  3.9  /  TBili  0.4  /  DBili  x   /  AST  27  /  ALT  16  /  AlkPhos  77  12-29          RADIOLOGY & ADDITIONAL TESTS:    Imaging or report Personally Reviewed:  [x ] YES  [ ] NO    < from: CT Angio Chest PE Protocol w/ IV Cont (12.29.23 @ 11:02) >  IMPRESSION:    No pulmonary embolus.    Bilateral lower lobe and lingular peribronchial thickening with patchy   consolidations, most consistent with multifocal pneumonia in appropriate   clinical setting.    < end of copied text >      Case discussed with RN today    Care discussed with pt

## 2023-12-30 NOTE — PATIENT PROFILE ADULT - FUNCTIONAL ASSESSMENT - BASIC MOBILITY 6.
3-calculated by average/Not able to assess (calculate score using [facility] averaging method)  3-calculated by average/Not able to assess (calculate score using [facility] averaging method)

## 2023-12-31 LAB
LEGIONELLA AG UR QL: NEGATIVE — SIGNIFICANT CHANGE UP
LEGIONELLA AG UR QL: NEGATIVE — SIGNIFICANT CHANGE UP
S PNEUM AG UR QL: NEGATIVE — SIGNIFICANT CHANGE UP
S PNEUM AG UR QL: NEGATIVE — SIGNIFICANT CHANGE UP

## 2023-12-31 PROCEDURE — 99232 SBSQ HOSP IP/OBS MODERATE 35: CPT

## 2023-12-31 RX ORDER — ACETAMINOPHEN 500 MG
650 TABLET ORAL EVERY 6 HOURS
Refills: 0 | Status: DISCONTINUED | OUTPATIENT
Start: 2023-12-31 | End: 2024-01-02

## 2023-12-31 RX ADMIN — Medication 650 MILLIGRAM(S): at 08:08

## 2023-12-31 RX ADMIN — Medication 81 MILLIGRAM(S): at 11:21

## 2023-12-31 RX ADMIN — Medication 30 MILLILITER(S): at 04:08

## 2023-12-31 RX ADMIN — Medication 1 TABLET(S): at 11:21

## 2023-12-31 RX ADMIN — Medication 100 MILLIGRAM(S): at 05:38

## 2023-12-31 RX ADMIN — ENOXAPARIN SODIUM 40 MILLIGRAM(S): 100 INJECTION SUBCUTANEOUS at 05:38

## 2023-12-31 RX ADMIN — PANTOPRAZOLE SODIUM 40 MILLIGRAM(S): 20 TABLET, DELAYED RELEASE ORAL at 05:37

## 2023-12-31 RX ADMIN — ATORVASTATIN CALCIUM 10 MILLIGRAM(S): 80 TABLET, FILM COATED ORAL at 22:26

## 2023-12-31 NOTE — PROGRESS NOTE ADULT - ASSESSMENT
89 y/o woman with PMH of severe aortic stenosis, Left carotid stenosis, JENNIFER, GERD, urinary retention with self-catheterization, breast cancer s/p bilateral mastectomy and reconstruction, hyperlipidemia, hypertension and brain tumor s/p craniotomy 1976 presented to the ED complaining of shortness of breath.    1. Dyspnea due to Multifocal pneumonia  RVP + for COVID  CT scan report reviewed - negative for PE but shows multifocal PNA   pt is afebrile, on RA, no leukocytosis  feeling better today  airborne and contact isolation  procal 0.04 - abx stopped  dimer 274  ID consulted  - consider Remdesivir (symptoms started on day of admission - family wants ID opinion first)    2. Severe AS   - outpatient TTE     3. Prolonged QTc  - repeat EKG daily shows QTc 494  - avoid QTc prolonging meds     4. Left carotid stenosis  h/o CVA   Hyperlipidemia  -c/w ASA, statin     5. Urinary retention   - c/w CIC (pt self caths at home)    6. DVT ppx  Lovenox  continue PT - pt from [facility] and ambulates with a cane - needs to walk at least 100ft in order to return    DNR/DNI       PROGRESS NOTE HANDOFF    Pending: continued improvement in dyspnea and pulse ox monitoring, ID consult, continue PT  pt and daughter Malou informed of the plan of care  Disposition: [facility] 89 y/o woman with PMH of severe aortic stenosis, Left carotid stenosis, JENNIFER, GERD, urinary retention with self-catheterization, breast cancer s/p bilateral mastectomy and reconstruction, hyperlipidemia, hypertension and brain tumor s/p craniotomy 1976 presented to the ED complaining of shortness of breath.    1. Dyspnea due to Multifocal pneumonia  RVP + for COVID  CT scan report reviewed - negative for PE but shows multifocal PNA   pt is afebrile, on RA, no leukocytosis  feeling better today  airborne and contact isolation  procal 0.04 - abx stopped  dimer 274  ID consulted  - consider Remdesivir (symptoms started on day of admission - family wants ID opinion first)    2. Severe AS   - outpatient TTE     3. Prolonged QTc  - repeat EKG daily shows QTc 494  - avoid QTc prolonging meds     4. Left carotid stenosis  h/o CVA   Hyperlipidemia  -c/w ASA, statin     5. Urinary retention   - c/w CIC (pt self caths at home)    6. DVT ppx  Lovenox  continue PT - pt from [facility] and ambulates with a cane - needs to walk at least 100ft in order to return    DNR/DNI       PROGRESS NOTE HANDOFF    Pending: continued improvement in dyspnea and pulse ox monitoring, ID consult, continue PT  pt and daughter Malou informed of the plan of care  Disposition: [facility]

## 2023-12-31 NOTE — PHYSICAL THERAPY INITIAL EVALUATION ADULT - PERTINENT HX OF CURRENT PROBLEM, REHAB EVAL
Patient is a 89-year-old female with past medical history of severe aortic stenosis, L carotid stenosis, JENNIFER, GERD, urinary retention with self-catheterization, breast cancer status post bilateral mastectomy and reconstruction, hyperlipidemia, hypertension, brain tumor s/p craniotomy 1976, presenting to the ED complaining of shortness of breath.  Patient states this morning she was getting dressed and getting ready for breakfast when she started to feel short of breath. Patient states she never experienced episodes like this before. Pt symptoms were relieved with home 3L O2.

## 2023-12-31 NOTE — PROGRESS NOTE ADULT - SUBJECTIVE AND OBJECTIVE BOX
24H events:    Patient is a 89y old Female who presents with a chief complaint of pneumonia (30 Dec 2023 12:40)  Primary diagnosis of Pneumonia      Today is 2d of hospitalization. This morning patient was seen and examined at bedside, resting comfortably in bed.    No acute or major events overnight.      Code Status:    Family communication:  Contact date:  Name of person contacted:  Relationship to patient:  Communication details:  What matters most:    PAST MEDICAL & SURGICAL HISTORY  Migraine    H/O brain tumor  benign 1977    Breast CA    Urinary retention with incomplete bladder emptying  pt self catheterizes  3-4 x day    H/O mastectomy, bilateral  1984    H/O craniotomy  1976      SOCIAL HISTORY:  Social History:      ALLERGIES:  No Known Allergies    MEDICATIONS:  STANDING MEDICATIONS  aspirin enteric coated 81 milliGRAM(s) Oral daily  atorvastatin 10 milliGRAM(s) Oral at bedtime  cefTRIAXone   IVPB 1000 milliGRAM(s) IV Intermittent every 24 hours  doxycycline IVPB      doxycycline IVPB 100 milliGRAM(s) IV Intermittent every 12 hours  enoxaparin Injectable 40 milliGRAM(s) SubCutaneous every 24 hours  influenza  Vaccine (HIGH DOSE) 0.7 milliLiter(s) IntraMuscular once  multivitamin 1 Tablet(s) Oral daily  pantoprazole    Tablet 40 milliGRAM(s) Oral before breakfast    PRN MEDICATIONS  acetaminophen     Tablet .. 650 milliGRAM(s) Oral every 6 hours PRN  aluminum hydroxide/magnesium hydroxide/simethicone Suspension 30 milliLiter(s) Oral every 4 hours PRN    VITALS:   T(F): 97.3  HR: 96  BP: 111/61  RR: 18  SpO2: --        PHYSICAL EXAM:  GENERAL:   ( x) NAD, lying in bed comfortably     (  ) obtunded     (  ) lethargic     (  ) somnolent    HEART:  Rate -->     (x) normal rate     (  ) bradycardic     (  ) tachycardic  Rhythm -->     (x) regular     (  ) regularly irregular     (  ) irregularly irregular  Murmurs -->     (x) normal s1s2     (  ) systolic murmur     (  ) diastolic murmur     (  ) continuous murmur      (  ) S3 present     (  ) S4 present    LUNGS:   ( x)Unlabored respirations     (  ) tachypnea  ( x) B/L air entry     (  ) decreased breath sounds in:  (location     )    ( x) no adventitious sound     (  ) crackles     (  ) wheezing      (  ) rhonchi      (specify location:       )  (  ) chest wall tenderness (specify location:       )    ABDOMEN:   ( x) Soft     (  ) tense   |   (  ) nondistended     (  ) distended   |   (  ) +BS     (  ) hypoactive bowel sounds     (  ) hyperactive bowel sounds  ( x) nontender     (  ) RUQ tenderness     (  ) RLQ tenderness     (  ) LLQ tenderness     (  ) epigastric tenderness     (  ) diffuse tenderness  (  ) Splenomegaly      (  ) Hepatomegaly      (  ) Jaundice     (  ) ecchymosis     EXTREMITIES:  ( x) Normal     (  ) Rash     (  ) ecchymosis     (  ) varicose veins      (  ) pitting edema     (  ) non-pitting edema   (  ) ulceration     (  ) gangrene:     (location:     )    NERVOUS SYSTEM:    ( x) A&Ox3     (  ) confused     (  ) lethargic             LABS:                        12.1   4.60  )-----------( 166      ( 30 Dec 2023 07:07 )             37.9       Mg     1.9     12-30                    RADIOLOGY:  CT Angio Chest PE Protocol w/ IV Cont (12.29.23 @ 11:02)   IMPRESSION: No pulmonary embolus.  Bilateral lower lobe and lingular peribronchial thickening with patchy consolidations, most consistent with multifocal pneumonia in appropriate clinical setting.      ASSESSMENT AND PLAN  89 y/o woman with PMH of severe aortic stenosis, Left carotid stenosis, JENNIFER, GERD, urinary retention with self-catheterization, breast cancer s/p bilateral mastectomy and reconstruction, hyperlipidemia, hypertension and brain tumor s/p craniotomy 1976 presented to the ED complaining of shortness of breath.    #Dyspnea due to Multifocal pneumonia, possibly viral   - CTA Chest 12/29: negative for PE, multifocal PNA   - Patient is afebrile, on RA, no leukocytosis  - On Ceftriaxone and Doxycycline (12/29 - )   - QTc prolonged, avoid flouroquinolones and macrolides  - Procal 0.04 - 0.02  f/u RVP, urine for Strep/legionella    2. Severe AS   - outpatient TTE     3. Prolonged QTc  - repeat EKG daily shows QTc 494  - avoid QTc prolonging meds     4. Left carotid stenosis  h/o CVA   Hyperlipidemia  -c/w ASA, statin     5. Urinary retention   - c/w CIC (pt self caths at home)    6. DVT ppx  Lovenox  PT consulted - pt from [facility] and ambulates with a cane - needs to walk at least 100ft in order to return    DNR/DNI       PROGRESS NOTE HANDOFF    Pending: f/u RVP, continued improvement in dyspnea, PT eval  pt informed of the plan of care  Disposition: [facility]      #DVT PPX:    #GI PPX:    #Diet    #Code Status:    #Activity Order    #Dispo/Needs    #Handoff             24H events:    Patient is a 89y old Female who presents with a chief complaint of pneumonia (30 Dec 2023 12:40)  Primary diagnosis of Pneumonia      Today is 2d of hospitalization. This morning patient was seen and examined at bedside, resting comfortably in bed.    No acute or major events overnight.      Code Status:    Family communication:  Contact date:  Name of person contacted:  Relationship to patient:  Communication details:  What matters most:    PAST MEDICAL & SURGICAL HISTORY  Migraine    H/O brain tumor  benign 1977    Breast CA    Urinary retention with incomplete bladder emptying  pt self catheterizes  3-4 x day    H/O mastectomy, bilateral  1984    H/O craniotomy  1976      SOCIAL HISTORY:  Social History:      ALLERGIES:  No Known Allergies    MEDICATIONS:  STANDING MEDICATIONS  aspirin enteric coated 81 milliGRAM(s) Oral daily  atorvastatin 10 milliGRAM(s) Oral at bedtime  cefTRIAXone   IVPB 1000 milliGRAM(s) IV Intermittent every 24 hours  doxycycline IVPB      doxycycline IVPB 100 milliGRAM(s) IV Intermittent every 12 hours  enoxaparin Injectable 40 milliGRAM(s) SubCutaneous every 24 hours  influenza  Vaccine (HIGH DOSE) 0.7 milliLiter(s) IntraMuscular once  multivitamin 1 Tablet(s) Oral daily  pantoprazole    Tablet 40 milliGRAM(s) Oral before breakfast    PRN MEDICATIONS  acetaminophen     Tablet .. 650 milliGRAM(s) Oral every 6 hours PRN  aluminum hydroxide/magnesium hydroxide/simethicone Suspension 30 milliLiter(s) Oral every 4 hours PRN    VITALS:   T(F): 97.3  HR: 96  BP: 111/61  RR: 18  SpO2: --        PHYSICAL EXAM:  GENERAL:   ( x) NAD, lying in bed comfortably     (  ) obtunded     (  ) lethargic     (  ) somnolent    HEART:  Rate -->     (x) normal rate     (  ) bradycardic     (  ) tachycardic  Rhythm -->     (x) regular     (  ) regularly irregular     (  ) irregularly irregular  Murmurs -->     (x) normal s1s2     (  ) systolic murmur     (  ) diastolic murmur     (  ) continuous murmur      (  ) S3 present     (  ) S4 present    LUNGS:   ( x)Unlabored respirations     (  ) tachypnea  ( x) B/L air entry     (  ) decreased breath sounds in:  (location     )    ( x) no adventitious sound     (  ) crackles     (  ) wheezing      (  ) rhonchi      (specify location:       )  (  ) chest wall tenderness (specify location:       )    ABDOMEN:   ( x) Soft     (  ) tense   |   (  ) nondistended     (  ) distended   |   (  ) +BS     (  ) hypoactive bowel sounds     (  ) hyperactive bowel sounds  ( x) nontender     (  ) RUQ tenderness     (  ) RLQ tenderness     (  ) LLQ tenderness     (  ) epigastric tenderness     (  ) diffuse tenderness  (  ) Splenomegaly      (  ) Hepatomegaly      (  ) Jaundice     (  ) ecchymosis     EXTREMITIES:  ( x) Normal     (  ) Rash     (  ) ecchymosis     (  ) varicose veins      (  ) pitting edema     (  ) non-pitting edema   (  ) ulceration     (  ) gangrene:     (location:     )    NERVOUS SYSTEM:    ( x) A&Ox3     (  ) confused     (  ) lethargic             LABS:                        12.1   4.60  )-----------( 166      ( 30 Dec 2023 07:07 )             37.9       Mg     1.9     12-30                    RADIOLOGY:  CT Angio Chest PE Protocol w/ IV Cont (12.29.23 @ 11:02)   IMPRESSION: No pulmonary embolus.  Bilateral lower lobe and lingular peribronchial thickening with patchy consolidations, most consistent with multifocal pneumonia in appropriate clinical setting.      ASSESSMENT AND PLAN  89 y/o woman with PMH of severe aortic stenosis, Left carotid stenosis, JENNIFER, GERD, urinary retention with self-catheterization, breast cancer s/p bilateral mastectomy and reconstruction, hyperlipidemia, hypertension and brain tumor s/p craniotomy 1976 presented to the ED complaining of shortness of breath.    #Dyspnea due to Multifocal pneumonia, possibly viral   - CTA Chest 12/29: negative for PE, multifocal PNA   - Patient is afebrile, on RA, no leukocytosis  - On Ceftriaxone and Doxycycline (12/29 - )   - QTc prolonged, avoid flouroquinolones and macrolides  - Procal 0.04 - 0.02  f/u RVP, urine for Strep/legionella    2. Severe AS   - outpatient TTE     3. Prolonged QTc  - repeat EKG daily shows QTc 494  - avoid QTc prolonging meds     4. Left carotid stenosis  h/o CVA   Hyperlipidemia  -c/w ASA, statin     5. Urinary retention   - c/w CIC (pt self caths at home)    6. DVT ppx  Lovenox  PT consulted - pt from [facility] and ambulates with a cane - needs to walk at least 100ft in order to return    DNR/DNI       PROGRESS NOTE HANDOFF    Pending: f/u RVP, continued improvement in dyspnea, PT eval  pt informed of the plan of care  Disposition: [facility]      #DVT PPX:    #GI PPX:    #Diet    #Code Status:    #Activity Order    #Dispo/Needs    #Handoff             24H events:    Patient is a 89y old Female who presents with a chief complaint of pneumonia (30 Dec 2023 12:40)  Primary diagnosis of Pneumonia      Today is 2d of hospitalization. This morning patient was seen and examined at bedside, resting comfortably in bed.    No acute or major events overnight.      Code Status:    Family communication:  Contact date:  Name of person contacted:  Relationship to patient:  Communication details:  What matters most:    PAST MEDICAL & SURGICAL HISTORY  Migraine    H/O brain tumor  benign 1977    Breast CA    Urinary retention with incomplete bladder emptying  pt self catheterizes  3-4 x day    H/O mastectomy, bilateral  1984    H/O craniotomy  1976      SOCIAL HISTORY:  Social History:      ALLERGIES:  No Known Allergies    MEDICATIONS:  STANDING MEDICATIONS  aspirin enteric coated 81 milliGRAM(s) Oral daily  atorvastatin 10 milliGRAM(s) Oral at bedtime  cefTRIAXone   IVPB 1000 milliGRAM(s) IV Intermittent every 24 hours  doxycycline IVPB      doxycycline IVPB 100 milliGRAM(s) IV Intermittent every 12 hours  enoxaparin Injectable 40 milliGRAM(s) SubCutaneous every 24 hours  influenza  Vaccine (HIGH DOSE) 0.7 milliLiter(s) IntraMuscular once  multivitamin 1 Tablet(s) Oral daily  pantoprazole    Tablet 40 milliGRAM(s) Oral before breakfast    PRN MEDICATIONS  acetaminophen     Tablet .. 650 milliGRAM(s) Oral every 6 hours PRN  aluminum hydroxide/magnesium hydroxide/simethicone Suspension 30 milliLiter(s) Oral every 4 hours PRN    VITALS:   T(F): 97.3  HR: 96  BP: 111/61  RR: 18  SpO2: --        PHYSICAL EXAM:  GENERAL:   ( x) NAD, lying in bed comfortably     (  ) obtunded     (  ) lethargic     (  ) somnolent    HEART:  Rate -->     (x) normal rate     (  ) bradycardic     (  ) tachycardic  Rhythm -->     (x) regular     (  ) regularly irregular     (  ) irregularly irregular  Murmurs -->     (x) normal s1s2     (  ) systolic murmur     (  ) diastolic murmur     (  ) continuous murmur      (  ) S3 present     (  ) S4 present    LUNGS:   ( x)Unlabored respirations     (  ) tachypnea  ( x) B/L air entry     (  ) decreased breath sounds in:  (location     )    ( x) no adventitious sound     (  ) crackles     (  ) wheezing      (  ) rhonchi      (specify location:       )  (  ) chest wall tenderness (specify location:       )    ABDOMEN:   ( x) Soft     (  ) tense   |   (  ) nondistended     (  ) distended   |   (  ) +BS     (  ) hypoactive bowel sounds     (  ) hyperactive bowel sounds  ( x) nontender     (  ) RUQ tenderness     (  ) RLQ tenderness     (  ) LLQ tenderness     (  ) epigastric tenderness     (  ) diffuse tenderness  (  ) Splenomegaly      (  ) Hepatomegaly      (  ) Jaundice     (  ) ecchymosis     EXTREMITIES:  ( x) Normal     (  ) Rash     (  ) ecchymosis     (  ) varicose veins      (  ) pitting edema     (  ) non-pitting edema   (  ) ulceration     (  ) gangrene:     (location:     )    NERVOUS SYSTEM:    ( x) A&Ox3     (  ) confused     (  ) lethargic             LABS:                        12.1   4.60  )-----------( 166      ( 30 Dec 2023 07:07 )             37.9       Mg     1.9     12-30                    RADIOLOGY:  CT Angio Chest PE Protocol w/ IV Cont (12.29.23 @ 11:02)   IMPRESSION: No pulmonary embolus.  Bilateral lower lobe and lingular peribronchial thickening with patchy consolidations, most consistent with multifocal pneumonia in appropriate clinical setting.      ASSESSMENT AND PLAN  89 y/o woman with PMH of severe aortic stenosis, Left carotid stenosis, JENNIFER, GERD, urinary retention with self-catheterization, breast cancer s/p bilateral mastectomy and reconstruction, hyperlipidemia, hypertension and brain tumor s/p craniotomy 1976 presented to the ED complaining of shortness of breath.    #Dyspnea due to Multifocal pneumonia, due to COVID-19  - CTA Chest 12/29: negative for PE, multifocal PNA   - Patient is afebrile, on RA, no leukocytosis  - s/p Ceftriaxone and Doxycycline (12/29 - 12/31)   - QTc prolonged, avoid flouroquinolones and macrolides  - Procal 0.04 - 0.02  - COVID-19 positive (12/29)  - No need for dexamethasone as patient on room air and not hypoxic  - Consult ID for Remdesivir    #QTc prolongation  - QTc 494  - QTc prolonged, avoid flouroquinolones and macrolides  - Repeat EKG  - avoid QTc prolonging meds     #Severe AS   #Left carotid stenosis  #h/o CVA   #Hyperlipidemia  - Continue ASA, statin     #Urinary retention   - c/w CIC (pt self caths at home)              #DVT PPX: Lovenox    #GI PPX: Pantoprazole    #Diet: DASH    #Code Status: DNR/DNI     #Activity Order: As tolerated, PT consult    #Dispo/Needs: Patient from [facility] and ambulates with a cane - needs to walk at least 100ft in order to return    #Handoff  - Follow up ID consult   - Monitor QTc             24H events:    Patient is a 89y old Female who presents with a chief complaint of pneumonia (30 Dec 2023 12:40)  Primary diagnosis of Pneumonia      Today is 2d of hospitalization. This morning patient was seen and examined at bedside, resting comfortably in bed.    No acute or major events overnight.      Code Status:    Family communication:  Contact date:  Name of person contacted:  Relationship to patient:  Communication details:  What matters most:    PAST MEDICAL & SURGICAL HISTORY  Migraine    H/O brain tumor  benign 1977    Breast CA    Urinary retention with incomplete bladder emptying  pt self catheterizes  3-4 x day    H/O mastectomy, bilateral  1984    H/O craniotomy  1976      SOCIAL HISTORY:  Social History:      ALLERGIES:  No Known Allergies    MEDICATIONS:  STANDING MEDICATIONS  aspirin enteric coated 81 milliGRAM(s) Oral daily  atorvastatin 10 milliGRAM(s) Oral at bedtime  cefTRIAXone   IVPB 1000 milliGRAM(s) IV Intermittent every 24 hours  doxycycline IVPB      doxycycline IVPB 100 milliGRAM(s) IV Intermittent every 12 hours  enoxaparin Injectable 40 milliGRAM(s) SubCutaneous every 24 hours  influenza  Vaccine (HIGH DOSE) 0.7 milliLiter(s) IntraMuscular once  multivitamin 1 Tablet(s) Oral daily  pantoprazole    Tablet 40 milliGRAM(s) Oral before breakfast    PRN MEDICATIONS  acetaminophen     Tablet .. 650 milliGRAM(s) Oral every 6 hours PRN  aluminum hydroxide/magnesium hydroxide/simethicone Suspension 30 milliLiter(s) Oral every 4 hours PRN    VITALS:   T(F): 97.3  HR: 96  BP: 111/61  RR: 18  SpO2: --        PHYSICAL EXAM:  GENERAL:   ( x) NAD, lying in bed comfortably     (  ) obtunded     (  ) lethargic     (  ) somnolent    HEART:  Rate -->     (x) normal rate     (  ) bradycardic     (  ) tachycardic  Rhythm -->     (x) regular     (  ) regularly irregular     (  ) irregularly irregular  Murmurs -->     (x) normal s1s2     (  ) systolic murmur     (  ) diastolic murmur     (  ) continuous murmur      (  ) S3 present     (  ) S4 present    LUNGS:   ( x)Unlabored respirations     (  ) tachypnea  ( x) B/L air entry     (  ) decreased breath sounds in:  (location     )    ( x) no adventitious sound     (  ) crackles     (  ) wheezing      (  ) rhonchi      (specify location:       )  (  ) chest wall tenderness (specify location:       )    ABDOMEN:   ( x) Soft     (  ) tense   |   (  ) nondistended     (  ) distended   |   (  ) +BS     (  ) hypoactive bowel sounds     (  ) hyperactive bowel sounds  ( x) nontender     (  ) RUQ tenderness     (  ) RLQ tenderness     (  ) LLQ tenderness     (  ) epigastric tenderness     (  ) diffuse tenderness  (  ) Splenomegaly      (  ) Hepatomegaly      (  ) Jaundice     (  ) ecchymosis     EXTREMITIES:  ( x) Normal     (  ) Rash     (  ) ecchymosis     (  ) varicose veins      (  ) pitting edema     (  ) non-pitting edema   (  ) ulceration     (  ) gangrene:     (location:     )    NERVOUS SYSTEM:    ( x) A&Ox3     (  ) confused     (  ) lethargic             LABS:                        12.1   4.60  )-----------( 166      ( 30 Dec 2023 07:07 )             37.9       Mg     1.9     12-30                    RADIOLOGY:  CT Angio Chest PE Protocol w/ IV Cont (12.29.23 @ 11:02)   IMPRESSION: No pulmonary embolus.  Bilateral lower lobe and lingular peribronchial thickening with patchy consolidations, most consistent with multifocal pneumonia in appropriate clinical setting.      ASSESSMENT AND PLAN  89 y/o woman with PMH of severe aortic stenosis, Left carotid stenosis, JENNIFER, GERD, urinary retention with self-catheterization, breast cancer s/p bilateral mastectomy and reconstruction, hyperlipidemia, hypertension and brain tumor s/p craniotomy 1976 presented to the ED complaining of shortness of breath.    #Dyspnea due to Multifocal pneumonia, due to COVID-19  - CTA Chest 12/29: negative for PE, multifocal PNA   - Patient is afebrile, on RA, no leukocytosis  - s/p Ceftriaxone and Doxycycline (12/29 - 12/31)   - QTc prolonged, avoid flouroquinolones and macrolides  - Procal 0.04 - 0.02  - COVID-19 positive (12/29)  - No need for dexamethasone as patient on room air and not hypoxic  - Consult ID for Remdesivir    #QTc prolongation  - QTc 494  - QTc prolonged, avoid flouroquinolones and macrolides  - Repeat EKG  - avoid QTc prolonging meds     #Severe AS   #Left carotid stenosis  #h/o CVA   #Hyperlipidemia  - Continue ASA, statin     #Urinary retention   - c/w CIC (pt self caths at home)              #DVT PPX: Lovenox    #GI PPX: Pantoprazole    #Diet: DASH    #Code Status: DNR/DNI     #Activity Order: As tolerated, PT consult    #Dispo/Needs: Patient from [facility] and ambulates with a cane - needs to walk at least 100ft in order to return    #Handoff  - Follow up ID consult   - Monitor QTc

## 2023-12-31 NOTE — PHYSICAL THERAPY INITIAL EVALUATION ADULT - GENERAL OBSERVATIONS, REHAB EVAL
Pt seen for PT IE. Pt encountered semi-man in bed in NAD and agreeable to PT IE. Pt is AA x3, demonstrates some confusion during IE. Pt tolerates bed mobility, sit to stand transfers, and ambulation with RW with supervision. Pt left as found in NAD, +Alarms.

## 2023-12-31 NOTE — PROGRESS NOTE ADULT - SUBJECTIVE AND OBJECTIVE BOX
MARISA DYKES  89y Female    INTERVAL HPI/OVERNIGHT EVENTS:    pt c/o not receiving breakfast this morning - staff informed  no fever  feels better  less cough  no SOB, pain, N/V  ambulated 50ft with PT today    T(F): 97 (12-31-23 @ 13:38), Max: 98.2 (12-30-23 @ 20:38)  HR: 100 (12-31-23 @ 13:38) (91 - 100)  BP: 117/69 (12-31-23 @ 13:38) (111/61 - 128/73)  RR: 18 (12-31-23 @ 13:38) (16 - 18)    PHYSICAL EXAM:  GENERAL: NAD  HEAD:  Normocephalic  EYES:  conjunctiva and sclera clear  ENMT: Moist mucous membranes  NERVOUS SYSTEM:  Alert, awake, Good concentration  CHEST/LUNG: crackles right lower lung field >left lower lung field  HEART: Regular rate and rhythm; 3/6 WILLIAM  ABDOMEN: Soft, Nontender, Nondistended  EXTREMITIES:   No edema  SKIN: warm, dry    Consultant(s) Notes Reviewed:  [x ] YES  [ ] NO  Care Discussed with Consultants/Other Providers [ x] YES  [ ] NO    MEDICATIONS  (STANDING):  aspirin enteric coated 81 milliGRAM(s) Oral daily  atorvastatin 10 milliGRAM(s) Oral at bedtime  enoxaparin Injectable 40 milliGRAM(s) SubCutaneous every 24 hours  influenza  Vaccine (HIGH DOSE) 0.7 milliLiter(s) IntraMuscular once  multivitamin 1 Tablet(s) Oral daily  pantoprazole    Tablet 40 milliGRAM(s) Oral before breakfast    MEDICATIONS  (PRN):  acetaminophen     Tablet .. 650 milliGRAM(s) Oral every 6 hours PRN Mild Pain (1 - 3)  aluminum hydroxide/magnesium hydroxide/simethicone Suspension 30 milliLiter(s) Oral every 4 hours PRN Dyspepsia      LABS:                        12.1   4.60  )-----------( 166      ( 30 Dec 2023 07:07 )             37.9       Mg     1.9     12-30            RADIOLOGY & ADDITIONAL TESTS:    Imaging or report Personally Reviewed:  [ x] YES  [ ] NO    < from: CT Angio Chest PE Protocol w/ IV Cont (12.29.23 @ 11:02) >    IMPRESSION:    No pulmonary embolus.    Bilateral lower lobe and lingular peribronchial thickening with patchy   consolidations, most consistent with multifocal pneumonia in appropriate   clinical setting.    < end of copied text >      Case discussed with resident and RN on rounds today    Care discussed with pt and family

## 2024-01-01 LAB
ALBUMIN SERPL ELPH-MCNC: 4 G/DL — SIGNIFICANT CHANGE UP (ref 3.5–5.2)
ALBUMIN SERPL ELPH-MCNC: 4 G/DL — SIGNIFICANT CHANGE UP (ref 3.5–5.2)
ALP SERPL-CCNC: 81 U/L — SIGNIFICANT CHANGE UP (ref 30–115)
ALP SERPL-CCNC: 81 U/L — SIGNIFICANT CHANGE UP (ref 30–115)
ALT FLD-CCNC: 18 U/L — SIGNIFICANT CHANGE UP (ref 0–41)
ALT FLD-CCNC: 18 U/L — SIGNIFICANT CHANGE UP (ref 0–41)
ANION GAP SERPL CALC-SCNC: 12 MMOL/L — SIGNIFICANT CHANGE UP (ref 7–14)
ANION GAP SERPL CALC-SCNC: 12 MMOL/L — SIGNIFICANT CHANGE UP (ref 7–14)
AST SERPL-CCNC: 34 U/L — SIGNIFICANT CHANGE UP (ref 0–41)
AST SERPL-CCNC: 34 U/L — SIGNIFICANT CHANGE UP (ref 0–41)
BASOPHILS # BLD AUTO: 0.01 K/UL — SIGNIFICANT CHANGE UP (ref 0–0.2)
BASOPHILS # BLD AUTO: 0.01 K/UL — SIGNIFICANT CHANGE UP (ref 0–0.2)
BASOPHILS NFR BLD AUTO: 0.2 % — SIGNIFICANT CHANGE UP (ref 0–1)
BASOPHILS NFR BLD AUTO: 0.2 % — SIGNIFICANT CHANGE UP (ref 0–1)
BILIRUB SERPL-MCNC: 0.9 MG/DL — SIGNIFICANT CHANGE UP (ref 0.2–1.2)
BILIRUB SERPL-MCNC: 0.9 MG/DL — SIGNIFICANT CHANGE UP (ref 0.2–1.2)
BUN SERPL-MCNC: 35 MG/DL — HIGH (ref 10–20)
BUN SERPL-MCNC: 35 MG/DL — HIGH (ref 10–20)
CALCIUM SERPL-MCNC: 9.7 MG/DL — SIGNIFICANT CHANGE UP (ref 8.4–10.5)
CALCIUM SERPL-MCNC: 9.7 MG/DL — SIGNIFICANT CHANGE UP (ref 8.4–10.5)
CHLORIDE SERPL-SCNC: 102 MMOL/L — SIGNIFICANT CHANGE UP (ref 98–110)
CHLORIDE SERPL-SCNC: 102 MMOL/L — SIGNIFICANT CHANGE UP (ref 98–110)
CO2 SERPL-SCNC: 23 MMOL/L — SIGNIFICANT CHANGE UP (ref 17–32)
CO2 SERPL-SCNC: 23 MMOL/L — SIGNIFICANT CHANGE UP (ref 17–32)
CREAT SERPL-MCNC: 1 MG/DL — SIGNIFICANT CHANGE UP (ref 0.7–1.5)
CREAT SERPL-MCNC: 1 MG/DL — SIGNIFICANT CHANGE UP (ref 0.7–1.5)
EGFR: 53 ML/MIN/1.73M2 — LOW
EGFR: 53 ML/MIN/1.73M2 — LOW
EOSINOPHIL # BLD AUTO: 0.19 K/UL — SIGNIFICANT CHANGE UP (ref 0–0.7)
EOSINOPHIL # BLD AUTO: 0.19 K/UL — SIGNIFICANT CHANGE UP (ref 0–0.7)
EOSINOPHIL NFR BLD AUTO: 3.1 % — SIGNIFICANT CHANGE UP (ref 0–8)
EOSINOPHIL NFR BLD AUTO: 3.1 % — SIGNIFICANT CHANGE UP (ref 0–8)
GLUCOSE SERPL-MCNC: 120 MG/DL — HIGH (ref 70–99)
GLUCOSE SERPL-MCNC: 120 MG/DL — HIGH (ref 70–99)
HCT VFR BLD CALC: 41 % — SIGNIFICANT CHANGE UP (ref 37–47)
HCT VFR BLD CALC: 41 % — SIGNIFICANT CHANGE UP (ref 37–47)
HGB BLD-MCNC: 13.1 G/DL — SIGNIFICANT CHANGE UP (ref 12–16)
HGB BLD-MCNC: 13.1 G/DL — SIGNIFICANT CHANGE UP (ref 12–16)
IMM GRANULOCYTES NFR BLD AUTO: 0.5 % — HIGH (ref 0.1–0.3)
IMM GRANULOCYTES NFR BLD AUTO: 0.5 % — HIGH (ref 0.1–0.3)
LYMPHOCYTES # BLD AUTO: 0.97 K/UL — LOW (ref 1.2–3.4)
LYMPHOCYTES # BLD AUTO: 0.97 K/UL — LOW (ref 1.2–3.4)
LYMPHOCYTES # BLD AUTO: 15.6 % — LOW (ref 20.5–51.1)
LYMPHOCYTES # BLD AUTO: 15.6 % — LOW (ref 20.5–51.1)
MAGNESIUM SERPL-MCNC: 2.2 MG/DL — SIGNIFICANT CHANGE UP (ref 1.8–2.4)
MAGNESIUM SERPL-MCNC: 2.2 MG/DL — SIGNIFICANT CHANGE UP (ref 1.8–2.4)
MCHC RBC-ENTMCNC: 28.7 PG — SIGNIFICANT CHANGE UP (ref 27–31)
MCHC RBC-ENTMCNC: 28.7 PG — SIGNIFICANT CHANGE UP (ref 27–31)
MCHC RBC-ENTMCNC: 32 G/DL — SIGNIFICANT CHANGE UP (ref 32–37)
MCHC RBC-ENTMCNC: 32 G/DL — SIGNIFICANT CHANGE UP (ref 32–37)
MCV RBC AUTO: 89.7 FL — SIGNIFICANT CHANGE UP (ref 81–99)
MCV RBC AUTO: 89.7 FL — SIGNIFICANT CHANGE UP (ref 81–99)
MONOCYTES # BLD AUTO: 0.64 K/UL — HIGH (ref 0.1–0.6)
MONOCYTES # BLD AUTO: 0.64 K/UL — HIGH (ref 0.1–0.6)
MONOCYTES NFR BLD AUTO: 10.3 % — HIGH (ref 1.7–9.3)
MONOCYTES NFR BLD AUTO: 10.3 % — HIGH (ref 1.7–9.3)
NEUTROPHILS # BLD AUTO: 4.38 K/UL — SIGNIFICANT CHANGE UP (ref 1.4–6.5)
NEUTROPHILS # BLD AUTO: 4.38 K/UL — SIGNIFICANT CHANGE UP (ref 1.4–6.5)
NEUTROPHILS NFR BLD AUTO: 70.3 % — SIGNIFICANT CHANGE UP (ref 42.2–75.2)
NEUTROPHILS NFR BLD AUTO: 70.3 % — SIGNIFICANT CHANGE UP (ref 42.2–75.2)
NRBC # BLD: 0 /100 WBCS — SIGNIFICANT CHANGE UP (ref 0–0)
NRBC # BLD: 0 /100 WBCS — SIGNIFICANT CHANGE UP (ref 0–0)
PLATELET # BLD AUTO: 202 K/UL — SIGNIFICANT CHANGE UP (ref 130–400)
PLATELET # BLD AUTO: 202 K/UL — SIGNIFICANT CHANGE UP (ref 130–400)
PMV BLD: 9.3 FL — SIGNIFICANT CHANGE UP (ref 7.4–10.4)
PMV BLD: 9.3 FL — SIGNIFICANT CHANGE UP (ref 7.4–10.4)
POTASSIUM SERPL-MCNC: 4.8 MMOL/L — SIGNIFICANT CHANGE UP (ref 3.5–5)
POTASSIUM SERPL-MCNC: 4.8 MMOL/L — SIGNIFICANT CHANGE UP (ref 3.5–5)
POTASSIUM SERPL-SCNC: 4.8 MMOL/L — SIGNIFICANT CHANGE UP (ref 3.5–5)
POTASSIUM SERPL-SCNC: 4.8 MMOL/L — SIGNIFICANT CHANGE UP (ref 3.5–5)
PROT SERPL-MCNC: 7.5 G/DL — SIGNIFICANT CHANGE UP (ref 6–8)
PROT SERPL-MCNC: 7.5 G/DL — SIGNIFICANT CHANGE UP (ref 6–8)
RBC # BLD: 4.57 M/UL — SIGNIFICANT CHANGE UP (ref 4.2–5.4)
RBC # BLD: 4.57 M/UL — SIGNIFICANT CHANGE UP (ref 4.2–5.4)
RBC # FLD: 14.2 % — SIGNIFICANT CHANGE UP (ref 11.5–14.5)
RBC # FLD: 14.2 % — SIGNIFICANT CHANGE UP (ref 11.5–14.5)
SODIUM SERPL-SCNC: 137 MMOL/L — SIGNIFICANT CHANGE UP (ref 135–146)
SODIUM SERPL-SCNC: 137 MMOL/L — SIGNIFICANT CHANGE UP (ref 135–146)
WBC # BLD: 6.22 K/UL — SIGNIFICANT CHANGE UP (ref 4.8–10.8)
WBC # BLD: 6.22 K/UL — SIGNIFICANT CHANGE UP (ref 4.8–10.8)
WBC # FLD AUTO: 6.22 K/UL — SIGNIFICANT CHANGE UP (ref 4.8–10.8)
WBC # FLD AUTO: 6.22 K/UL — SIGNIFICANT CHANGE UP (ref 4.8–10.8)

## 2024-01-01 PROCEDURE — 93010 ELECTROCARDIOGRAM REPORT: CPT

## 2024-01-01 PROCEDURE — 99232 SBSQ HOSP IP/OBS MODERATE 35: CPT

## 2024-01-01 RX ADMIN — ATORVASTATIN CALCIUM 10 MILLIGRAM(S): 80 TABLET, FILM COATED ORAL at 21:59

## 2024-01-01 RX ADMIN — ENOXAPARIN SODIUM 40 MILLIGRAM(S): 100 INJECTION SUBCUTANEOUS at 05:45

## 2024-01-01 RX ADMIN — Medication 1 TABLET(S): at 11:09

## 2024-01-01 RX ADMIN — PANTOPRAZOLE SODIUM 40 MILLIGRAM(S): 20 TABLET, DELAYED RELEASE ORAL at 05:44

## 2024-01-01 RX ADMIN — Medication 81 MILLIGRAM(S): at 11:08

## 2024-01-01 NOTE — PROGRESS NOTE ADULT - SUBJECTIVE AND OBJECTIVE BOX
MARISA DYKES  89y Female    INTERVAL HPI/OVERNIGHT EVENTS:    pt feels better every day  no fever, pain  less cough  no SOB    T(F): 96.6 (01-01-24 @ 04:17), Max: 98 (12-31-23 @ 19:41)  HR: 84 (01-01-24 @ 04:17) (84 - 100)  BP: 121/63 (01-01-24 @ 04:17) (117/69 - 121/63)  RR: 18 (01-01-24 @ 04:17) (16 - 18)  98% on RA    PHYSICAL EXAM:  GENERAL: NAD  HEAD:  Normocephalic  EYES:  conjunctiva and sclera clear  ENMT: Moist mucous membranes  NERVOUS SYSTEM:  Alert, awake, Good concentration  CHEST/LUNG: less crackles b/l lower lung fields  HEART: Regular rate and rhythm; 3/6 WILLIAM  ABDOMEN: Soft, Nontender, Nondistended  EXTREMITIES:   No edema  SKIN: warm, dry  severe kyphoscoliosis     Consultant(s) Notes Reviewed:  [x ] YES  [ ] NO  Care Discussed with Consultants/Other Providers [ x] YES  [ ] NO    MEDICATIONS  (STANDING):  aspirin enteric coated 81 milliGRAM(s) Oral daily  atorvastatin 10 milliGRAM(s) Oral at bedtime  enoxaparin Injectable 40 milliGRAM(s) SubCutaneous every 24 hours  influenza  Vaccine (HIGH DOSE) 0.7 milliLiter(s) IntraMuscular once  multivitamin 1 Tablet(s) Oral daily  pantoprazole    Tablet 40 milliGRAM(s) Oral before breakfast    MEDICATIONS  (PRN):  acetaminophen     Tablet .. 650 milliGRAM(s) Oral every 6 hours PRN Mild Pain (1 - 3)  aluminum hydroxide/magnesium hydroxide/simethicone Suspension 30 milliLiter(s) Oral every 4 hours PRN Dyspepsia      LABS:                        13.1   6.22  )-----------( 202      ( 01 Jan 2024 07:00 )             41.0     01-01    137  |  102  |  35<H>  ----------------------------<  120<H>  4.8   |  23  |  1.0    Ca    9.7      01 Jan 2024 07:00  Mg     2.2     01-01    TPro  7.5  /  Alb  4.0  /  TBili  0.9  /  DBili  x   /  AST  34  /  ALT  18  /  AlkPhos  81  01-01          SARS-CoV-2: Detected (29 Dec 2023 22:10)        D-Dimer Assay, Quantitative: 279 ng/mL DDU (12-29-23 @ 07:45)    Procalcitonin, Serum: 0.02 ng/mL (12-30-23 @ 07:07)  Procalcitonin, Serum: 0.04 ng/mL (12-29-23 @ 15:56)          Care discussed with pt and family

## 2024-01-01 NOTE — CONSULT NOTE ADULT - SUBJECTIVE AND OBJECTIVE BOX
MARISA DYKES  89y, Female  Allergy: No Known Allergies      CHIEF COMPLAINT: pneumonia (31 Dec 2023 13:52)      LOS  2d    HPI:  Patient is a 89-year-old female with past medical history of severe aortic stenosis, L carotid stenosis, JENNIFER, GERD, urinary retention with self-catheterization, breast cancer status post bilateral mastectomy and reconstruction, hyperlipidemia, hypertension, brain tumor s/p craniotomy 1976, presenting to the ED complaining of shortness of breath.  Patient states this morning she was getting dressed and getting ready for breakfast when she started to feel short of breath. Patient states she never experienced episodes like this before. Pt symptoms were relieved with home 3L O2. Denies chest pain, syncope, pre-syncope, lightheadedness, dizziness, confusion, n/v/d, sick contacts    T(C): 36.6 (12-29-23 @ 11:45), Max: 36.6 (12-29-23 @ 11:45)  T(F): 97.9 (12-29-23 @ 11:45), Max: 97.9 (12-29-23 @ 11:45)  HR: 85 (12-29-23 @ 11:45) (85 - 107)  BP: 111/57 (12-29-23 @ 11:45) (111/57 - 120/65)  RR: 16 (12-29-23 @ 11:45) (16 - 16)  SpO2: 96% (12-29-23 @ 11:45) (96% - 98%)  Wt(kg): --    CT chest showing bilateral basilar and lingular patchy consolidations. Pt ordered for levaquin in the ED. (29 Dec 2023 13:57)      INFECTIOUS DISEASE HISTORY:  History as above.    PAST MEDICAL & SURGICAL HISTORY:  Migraine      H/O brain tumor  benign 1977      Breast CA      Urinary retention with incomplete bladder emptying  pt self catheterizes  3-4 x day      H/O mastectomy, bilateral  1984      H/O craniotomy  1976          FAMILY HISTORY  Family history reviewed and non-contributory      SOCIAL HISTORY  Social History:  Marital Status:  (   )    (   ) Single    (   )    (  )   Lives with: (  ) alone  (  ) children   (  ) spouse   (  ) parents  (  ) other  Recent Travel: No recent travel  Occupation:    Substance Use (street drugs): ( x ) never used  (  ) other:  Tobacco Usage:  ( x  ) never smoked   (   ) former smoker   (   ) current smoker  (     ) pack year  Alcohol Usage: None  Baseline mobility: (08 Sep 2021 15:09)        ROS  General: Denies rigors, nightsweats  HEENT: Denies headache, rhinorrhea, sore throat, eye pain  CV: Denies CP, palpitations  PULM: Denies wheezing, hemoptysis  GI: Denies hematemesis, hematochezia, melena  : Denies discharge, hematuria  MSK: Denies arthralgias, myalgias  SKIN: Denies rash, lesions  NEURO: Denies paresthesias, weakness  PSYCH: Denies depression, anxiety    VITALS:  T(F): 98, Max: 98 (12-31-23 @ 19:41)  HR: 86  BP: 119/70  RR: 16Vital Signs Last 24 Hrs  T(C): 36.7 (31 Dec 2023 19:41), Max: 36.7 (31 Dec 2023 19:41)  T(F): 98 (31 Dec 2023 19:41), Max: 98 (31 Dec 2023 19:41)  HR: 86 (31 Dec 2023 19:41) (86 - 100)  BP: 119/70 (31 Dec 2023 19:41) (111/61 - 119/70)  BP(mean): --  RR: 16 (31 Dec 2023 19:41) (16 - 18)  SpO2: --        PHYSICAL EXAM:  Gen: NAD, resting in bed  HEENT: Normocephalic, atraumatic  Neck: supple, no lymphadenopathy  CV: Regular rate & regular rhythm  Lungs: decreased BS at bases, no fremitus  Abdomen: Soft, BS present  Ext: Warm, well perfused  Neuro: non focal, awake  Skin: no rash, no erythema  Lines: no phlebitis    TESTS & MEASUREMENTS:                        12.1  4.60  )-----------( 166      ( 30 Dec 2023 07:07 )             37.9      Mg     1.9     12-30                Blood Gas Venous - Lactate: 1.0 mmol/L (12-29-23 @ 07:29)      INFECTIOUS DISEASES TESTING  Legionella Antigen, Urine: Negative (12-30-23 @ 09:20)  Procalcitonin, Serum: 0.02 ng/mL (12-30-23 @ 07:07)  Procalcitonin, Serum: 0.04 ng/mL (12-29-23 @ 15:56)      RADIOLOGY & ADDITIONAL TESTS:  I have personally reviewed the last Chest xray  CXR  Xray Chest 2 Views PA/Lat:  ACC: [id number] EXAM:  XR CHEST PA LAT 2V   ORDERED BY: ADALGISA MCINTOSH    PROCEDURE DATE:  12/29/2023          INTERPRETATION:  Clinical History / Reason for exam: Shortness of breath.    Comparison : Chest radiograph 4/21/2022.    Technique/Positioning: Frontal and lateral radiographs of the chest.  Suboptimal positioning secondary to underlying scoliosis.    Findings:    Support devices: None.    Cardiac/mediastinum/hilum: Suboptimally evaluated due to positioning,  underlying scoliosis.    Lung parenchyma/Pleura: Bilateral opacities, increased. No pneumothorax.    Skeleton/soft tissues: Marked scoliosis. Degenerative changes.    Impression:    Suboptimal positioning secondary to underlying scoliosis. Bibasilar  opacities better characterized on subsequently performed CT.    --- End of Report ---          LIANNE BYNUM MD; Resident Radiologist  This document has been electronically signed.  PAPITO MOSQUERA MD; Attending Radiologist  This document has been electronically signed. Dec 29 2023 11:52AM (12-29-23 @ 08:26)      CT  CT Angio Chest PE Protocol w/ IV Cont:  ACC: [id number] EXAM:  CT ANGIO CHEST PULM ART [facility]   ORDERED BY: KIMBERLY NOVAK    PROCEDURE DATE:  12/29/2023          INTERPRETATION:  CLINICAL STATEMENT: Sudden onset shortness of breath    TECHNIQUE: Multislice helical sections were obtained fromthe thoracic  inlet to the lung bases during rapid administration of intravenous  contrast using a CTA protocol. 65 cc administered of Omnipaque 350 (35 cc  discarded). Thin sections were reconstructed through the pulmonary  vasculature. [place] coronal reformatted images were acquired, as  well as MIP reconstructed images.    COMPARISON CT: None.    OTHER STUDIES USED FOR CORRELATION: None.      FINDINGS:    PULMONARY EMBOLUS: No pulmonary embolus.    LUNGS: Trachea and central airwaysare patent. No effusion or  pneumothorax. Bilateral lower lobe and lingular peribronchial thickening  with patchy consolidations. No honeycombing.    HEART/GREAT VESSELS: No pericardial effusion. Thoracic aorta and main  pulmonary artery are normal in caliber..    MEDIASTINUM/THORACIC NODES: No enlarged mediastinal, hilar or axillary  lymph nodes..    VISUALIZED UPPER ABDOMEN: Left upper pole renal cyst. Moderate-sized  hiatal hernia.    BONES/SOFT TISSUES: Bilateral breast implants. Marked scoliosis of the  spine. Generalized osteopenia. Likely chronic deformity of the sternum.      IMPRESSION:    No pulmonary embolus.    Bilateral lower lobe and lingular peribronchial thickening with patchy  consolidations, most consistent with multifocal pneumonia in appropriate  clinical setting.    --- End of Report ---            ELLIOT LANDAU MD; Attending Radiologist  This document has been electronically signed. Dec 29 2023 11:09AM (12-29-23 @ 11:02)      CARDIOLOGY TESTING  12 Lead ECG:  Ventricular Rate 91 BPM    Atrial Rate 91 BPM    P-R Interval 134 ms    QRS Duration 108 ms    Q-T Interval 402 ms    QTC Calculation(Bazett) 494 ms    P Axis 35 degrees    R Axis 100 degrees    T Axis 49 degrees    Diagnosis Line Normal sinus rhythm  Incomplete right bundle branch block  Possible Right ventricular hypertrophy    Confirmed by ELSA HARMON MD [id number]) on 12/29/2023 10:01:01 AM (12-29-23 @ 07:56)  12 Lead ECG:  Ventricular Rate 90 BPM    Atrial Rate 90 BPM    P-R Interval 118 ms    QRS Duration 112 ms    Q-T Interval 410 ms    QTC Calculation(Bazett) 501 ms    P Axis 72 degrees    R Axis 96 degrees    T Axis 49 degrees    Diagnosis Line Normal sinus rhythm  Incomplete right bundle branch block  Possible Right ventricular hypertrophy  Abnormal ECG    Confirmed by Avel Heredia ([id number]) on 12/29/2023 9:27:54 AM (12-29-23 @ 07:52)      MEDICATIONS  aspirin enteric coated 81 Oral daily  atorvastatin 10 Oral at bedtime  enoxaparin Injectable 40 SubCutaneous every 24 hours  influenza  Vaccine (HIGH DOSE) 0.7 IntraMuscular once  multivitamin 1 Oral daily  pantoprazole    Tablet 40 Oral before breakfast      Weight  Weight (kg): 69.4 (12-29-23 @ 15:41)    ANTIBIOTICS:      ALLERGIES:  No Known Allergies      ASSESSMENT  Patient is a 89-year-old female with past medical history of severe aortic stenosis, L carotid stenosis, JENNIFER, GERD, urinary retention with self-catheterization, breast cancer status post bilateral mastectomy and reconstruction, hyperlipidemia, hypertension, brain tumor s/p craniotomy 1976, presenting to the ED complaining of shortness of breath.    IMPRESSION  #COVID-19, Mild  - CT Angio Chest PE Protocol w/ IV Cont (12.29.23 @ 11:02):No pulmonary embolus. Bilateral lower lobe and lingular peribronchial thickening with patchy consolidations, most consistent with multifocal pneumonia in appropriate  clinical setting.    #Severe AS  #Left carotid stenosis  #Urinary retention with self catherization  #Breast Cancer s/p bilateral mastectomy + recontrsuction  #Brain tumor s/p craniotomy 1976  #Abx allergy: No Known Allergies    RECOMMENDATIONS  This is a preliminary incomplete pended note, all final recommendations to follow after interview and examination of the patient.    Please call or message on [employer] Teams if with any questions.  Spectra [phone]     MARISA DYKES  89y, Female  Allergy: No Known Allergies      CHIEF COMPLAINT: pneumonia (31 Dec 2023 13:52)      LOS  2d    HPI:  Patient is a 89-year-old female with past medical history of severe aortic stenosis, L carotid stenosis, JENNIFER, GERD, urinary retention with self-catheterization, breast cancer status post bilateral mastectomy and reconstruction, hyperlipidemia, hypertension, brain tumor s/p craniotomy 1976, presenting to the ED complaining of shortness of breath.  Patient states this morning she was getting dressed and getting ready for breakfast when she started to feel short of breath. Patient states she never experienced episodes like this before. Pt symptoms were relieved with home 3L O2. Denies chest pain, syncope, pre-syncope, lightheadedness, dizziness, confusion, n/v/d, sick contacts    T(C): 36.6 (12-29-23 @ 11:45), Max: 36.6 (12-29-23 @ 11:45)  T(F): 97.9 (12-29-23 @ 11:45), Max: 97.9 (12-29-23 @ 11:45)  HR: 85 (12-29-23 @ 11:45) (85 - 107)  BP: 111/57 (12-29-23 @ 11:45) (111/57 - 120/65)  RR: 16 (12-29-23 @ 11:45) (16 - 16)  SpO2: 96% (12-29-23 @ 11:45) (96% - 98%)  Wt(kg): --    CT chest showing bilateral basilar and lingular patchy consolidations. Pt ordered for levaquin in the ED. (29 Dec 2023 13:57)      INFECTIOUS DISEASE HISTORY:  History as above.    PAST MEDICAL & SURGICAL HISTORY:  Migraine      H/O brain tumor  benign 1977      Breast CA      Urinary retention with incomplete bladder emptying  pt self catheterizes  3-4 x day      H/O mastectomy, bilateral  1984      H/O craniotomy  1976          FAMILY HISTORY  Family history reviewed and non-contributory      SOCIAL HISTORY  Social History:  Marital Status:  (   )    (   ) Single    (   )    (  )   Lives with: (  ) alone  (  ) children   (  ) spouse   (  ) parents  (  ) other  Recent Travel: No recent travel  Occupation:    Substance Use (street drugs): ( x ) never used  (  ) other:  Tobacco Usage:  ( x  ) never smoked   (   ) former smoker   (   ) current smoker  (     ) pack year  Alcohol Usage: None  Baseline mobility: (08 Sep 2021 15:09)        ROS  General: Denies rigors, nightsweats  HEENT: Denies headache, rhinorrhea, sore throat, eye pain  CV: Denies CP, palpitations  PULM: Denies wheezing, hemoptysis  GI: Denies hematemesis, hematochezia, melena  : Denies discharge, hematuria  MSK: Denies arthralgias, myalgias  SKIN: Denies rash, lesions  NEURO: Denies paresthesias, weakness  PSYCH: Denies depression, anxiety    VITALS:  T(F): 98, Max: 98 (12-31-23 @ 19:41)  HR: 86  BP: 119/70  RR: 16Vital Signs Last 24 Hrs  T(C): 36.7 (31 Dec 2023 19:41), Max: 36.7 (31 Dec 2023 19:41)  T(F): 98 (31 Dec 2023 19:41), Max: 98 (31 Dec 2023 19:41)  HR: 86 (31 Dec 2023 19:41) (86 - 100)  BP: 119/70 (31 Dec 2023 19:41) (111/61 - 119/70)  BP(mean): --  RR: 16 (31 Dec 2023 19:41) (16 - 18)  SpO2: --        PHYSICAL EXAM:  Gen: NAD, resting in bed  HEENT: Normocephalic, atraumatic  Neck: supple, no lymphadenopathy  CV: Regular rate & regular rhythm  Lungs: decreased BS at bases, no fremitus  Abdomen: Soft, BS present  Ext: Warm, well perfused  Neuro: non focal, awake  Skin: no rash, no erythema  Lines: no phlebitis    TESTS & MEASUREMENTS:                        12.1  4.60  )-----------( 166      ( 30 Dec 2023 07:07 )             37.9      Mg     1.9     12-30                Blood Gas Venous - Lactate: 1.0 mmol/L (12-29-23 @ 07:29)      INFECTIOUS DISEASES TESTING  Legionella Antigen, Urine: Negative (12-30-23 @ 09:20)  Procalcitonin, Serum: 0.02 ng/mL (12-30-23 @ 07:07)  Procalcitonin, Serum: 0.04 ng/mL (12-29-23 @ 15:56)      RADIOLOGY & ADDITIONAL TESTS:  I have personally reviewed the last Chest xray  CXR  Xray Chest 2 Views PA/Lat:  ACC: [id number] EXAM:  XR CHEST PA LAT 2V   ORDERED BY: ADALGISA MCINTOSH    PROCEDURE DATE:  12/29/2023          INTERPRETATION:  Clinical History / Reason for exam: Shortness of breath.    Comparison : Chest radiograph 4/21/2022.    Technique/Positioning: Frontal and lateral radiographs of the chest.  Suboptimal positioning secondary to underlying scoliosis.    Findings:    Support devices: None.    Cardiac/mediastinum/hilum: Suboptimally evaluated due to positioning,  underlying scoliosis.    Lung parenchyma/Pleura: Bilateral opacities, increased. No pneumothorax.    Skeleton/soft tissues: Marked scoliosis. Degenerative changes.    Impression:    Suboptimal positioning secondary to underlying scoliosis. Bibasilar  opacities better characterized on subsequently performed CT.    --- End of Report ---          LIANNE BYNUM MD; Resident Radiologist  This document has been electronically signed.  PAPITO MOSQUERA MD; Attending Radiologist  This document has been electronically signed. Dec 29 2023 11:52AM (12-29-23 @ 08:26)      CT  CT Angio Chest PE Protocol w/ IV Cont:  ACC: [id number] EXAM:  CT ANGIO CHEST PULM ART [facility]   ORDERED BY: KIMBERLY NOVAK    PROCEDURE DATE:  12/29/2023          INTERPRETATION:  CLINICAL STATEMENT: Sudden onset shortness of breath    TECHNIQUE: Multislice helical sections were obtained fromthe thoracic  inlet to the lung bases during rapid administration of intravenous  contrast using a CTA protocol. 65 cc administered of Omnipaque 350 (35 cc  discarded). Thin sections were reconstructed through the pulmonary  vasculature. [place] coronal reformatted images were acquired, as  well as MIP reconstructed images.    COMPARISON CT: None.    OTHER STUDIES USED FOR CORRELATION: None.      FINDINGS:    PULMONARY EMBOLUS: No pulmonary embolus.    LUNGS: Trachea and central airwaysare patent. No effusion or  pneumothorax. Bilateral lower lobe and lingular peribronchial thickening  with patchy consolidations. No honeycombing.    HEART/GREAT VESSELS: No pericardial effusion. Thoracic aorta and main  pulmonary artery are normal in caliber..    MEDIASTINUM/THORACIC NODES: No enlarged mediastinal, hilar or axillary  lymph nodes..    VISUALIZED UPPER ABDOMEN: Left upper pole renal cyst. Moderate-sized  hiatal hernia.    BONES/SOFT TISSUES: Bilateral breast implants. Marked scoliosis of the  spine. Generalized osteopenia. Likely chronic deformity of the sternum.      IMPRESSION:    No pulmonary embolus.    Bilateral lower lobe and lingular peribronchial thickening with patchy  consolidations, most consistent with multifocal pneumonia in appropriate  clinical setting.    --- End of Report ---            ELLIOT LANDAU MD; Attending Radiologist  This document has been electronically signed. Dec 29 2023 11:09AM (12-29-23 @ 11:02)      CARDIOLOGY TESTING  12 Lead ECG:  Ventricular Rate 91 BPM    Atrial Rate 91 BPM    P-R Interval 134 ms    QRS Duration 108 ms    Q-T Interval 402 ms    QTC Calculation(Bazett) 494 ms    P Axis 35 degrees    R Axis 100 degrees    T Axis 49 degrees    Diagnosis Line Normal sinus rhythm  Incomplete right bundle branch block  Possible Right ventricular hypertrophy    Confirmed by ELSA HARMON MD ([id number]) on 12/29/2023 10:01:01 AM (12-29-23 @ 07:56)  12 Lead ECG:  Ventricular Rate 90 BPM    Atrial Rate 90 BPM    P-R Interval 118 ms    QRS Duration 112 ms    Q-T Interval 410 ms    QTC Calculation(Bazett) 501 ms    P Axis 72 degrees    R Axis 96 degrees    T Axis 49 degrees    Diagnosis Line Normal sinus rhythm  Incomplete right bundle branch block  Possible Right ventricular hypertrophy  Abnormal ECG    Confirmed by Avel Heredia ([id number]) on 12/29/2023 9:27:54 AM (12-29-23 @ 07:52)      MEDICATIONS  aspirin enteric coated 81 Oral daily  atorvastatin 10 Oral at bedtime  enoxaparin Injectable 40 SubCutaneous every 24 hours  influenza  Vaccine (HIGH DOSE) 0.7 IntraMuscular once  multivitamin 1 Oral daily  pantoprazole    Tablet 40 Oral before breakfast      Weight  Weight (kg): 69.4 (12-29-23 @ 15:41)    ANTIBIOTICS:      ALLERGIES:  No Known Allergies      ASSESSMENT  Patient is a 89-year-old female with past medical history of severe aortic stenosis, L carotid stenosis, JENNIFER, GERD, urinary retention with self-catheterization, breast cancer status post bilateral mastectomy and reconstruction, hyperlipidemia, hypertension, brain tumor s/p craniotomy 1976, presenting to the ED complaining of shortness of breath.    IMPRESSION  #COVID-19, Mild  - CT Angio Chest PE Protocol w/ IV Cont (12.29.23 @ 11:02):No pulmonary embolus. Bilateral lower lobe and lingular peribronchial thickening with patchy consolidations, most consistent with multifocal pneumonia in appropriate  clinical setting.    #Severe AS  #Left carotid stenosis  #Urinary retention with self catherization  #Breast Cancer s/p bilateral mastectomy + recontrsuction  #Brain tumor s/p craniotomy 1976  #Abx allergy: No Known Allergies    RECOMMENDATIONS  This is a preliminary incomplete pended note, all final recommendations to follow after interview and examination of the patient.    Please call or message on [employer] Teams if with any questions.  Spectra [phone]     MARISA DYKES  89y, Female  Allergy: No Known Allergies      CHIEF COMPLAINT: pneumonia (31 Dec 2023 13:52)      LOS  2d    HPI:  Patient is a 89-year-old female with past medical history of severe aortic stenosis, L carotid stenosis, JENNIFER, GERD, urinary retention with self-catheterization, breast cancer status post bilateral mastectomy and reconstruction, hyperlipidemia, hypertension, brain tumor s/p craniotomy 1976, presenting to the ED complaining of shortness of breath.  Patient states this morning she was getting dressed and getting ready for breakfast when she started to feel short of breath. Patient states she never experienced episodes like this before. Pt symptoms were relieved with home 3L O2. Denies chest pain, syncope, pre-syncope, lightheadedness, dizziness, confusion, n/v/d, sick contacts    T(C): 36.6 (12-29-23 @ 11:45), Max: 36.6 (12-29-23 @ 11:45)  T(F): 97.9 (12-29-23 @ 11:45), Max: 97.9 (12-29-23 @ 11:45)  HR: 85 (12-29-23 @ 11:45) (85 - 107)  BP: 111/57 (12-29-23 @ 11:45) (111/57 - 120/65)  RR: 16 (12-29-23 @ 11:45) (16 - 16)  SpO2: 96% (12-29-23 @ 11:45) (96% - 98%)  Wt(kg): --    CT chest showing bilateral basilar and lingular patchy consolidations. Pt ordered for levaquin in the ED. (29 Dec 2023 13:57)      INFECTIOUS DISEASE HISTORY:  History as above.  Presents with worsening shortness of breath.   Was placed on O2, but now on room air.   She reports feeling better today.     PAST MEDICAL & SURGICAL HISTORY:  Migraine      H/O brain tumor  benign 1977      Breast CA      Urinary retention with incomplete bladder emptying  pt self catheterizes  3-4 x day      H/O mastectomy, bilateral  1984      H/O craniotomy  1976          FAMILY HISTORY  Family history reviewed and non-contributory      SOCIAL HISTORY  Social History:  Marital Status:  (   )    (   ) Single    (   )    (  )   Lives with: (  ) alone  (  ) children   (  ) spouse   (  ) parents  (  ) other  Recent Travel: No recent travel  Occupation:    Substance Use (street drugs): ( x ) never used  (  ) other:  Tobacco Usage:  ( x  ) never smoked   (   ) former smoker   (   ) current smoker  (     ) pack year  Alcohol Usage: None  Baseline mobility: (08 Sep 2021 15:09)        ROS  General: Denies rigors, nightsweats  HEENT: Denies headache, rhinorrhea, sore throat, eye pain  CV: Denies CP, palpitations  PULM: Denies wheezing, hemoptysis  GI: Denies hematemesis, hematochezia, melena  : Denies discharge, hematuria  MSK: Denies arthralgias, myalgias  SKIN: Denies rash, lesions  NEURO: Denies paresthesias, weakness  PSYCH: Denies depression, anxiety    VITALS:  T(F): 98, Max: 98 (12-31-23 @ 19:41)  HR: 86  BP: 119/70  RR: 16Vital Signs Last 24 Hrs  T(C): 36.7 (31 Dec 2023 19:41), Max: 36.7 (31 Dec 2023 19:41)  T(F): 98 (31 Dec 2023 19:41), Max: 98 (31 Dec 2023 19:41)  HR: 86 (31 Dec 2023 19:41) (86 - 100)  BP: 119/70 (31 Dec 2023 19:41) (111/61 - 119/70)  BP(mean): --  RR: 16 (31 Dec 2023 19:41) (16 - 18)  SpO2: --        PHYSICAL EXAM:  Gen: NAD, resting in bed  HEENT: Normocephalic, atraumatic  Neck: supple, no lymphadenopathy  CV: Regular rate & regular rhythm  Lungs: decreased BS at bases, no fremitus  Abdomen: Soft, BS present  Ext: Warm, well perfused  Neuro: non focal, awake  Skin: no rash, no erythema  Lines: no phlebitis    TESTS & MEASUREMENTS:                        12.1  4.60  )-----------( 166      ( 30 Dec 2023 07:07 )             37.9      Mg     1.9     12-30                Blood Gas Venous - Lactate: 1.0 mmol/L (12-29-23 @ 07:29)      INFECTIOUS DISEASES TESTING  Legionella Antigen, Urine: Negative (12-30-23 @ 09:20)  Procalcitonin, Serum: 0.02 ng/mL (12-30-23 @ 07:07)  Procalcitonin, Serum: 0.04 ng/mL (12-29-23 @ 15:56)      RADIOLOGY & ADDITIONAL TESTS:  I have personally reviewed the last Chest xray  CXR  Xray Chest 2 Views PA/Lat:  ACC: [id number] EXAM:  XR CHEST PA LAT 2V   ORDERED BY: ADALGISA MCINTOSH    PROCEDURE DATE:  12/29/2023          INTERPRETATION:  Clinical History / Reason for exam: Shortness of breath.    Comparison : Chest radiograph 4/21/2022.    Technique/Positioning: Frontal and lateral radiographs of the chest.  Suboptimal positioning secondary to underlying scoliosis.    Findings:    Support devices: None.    Cardiac/mediastinum/hilum: Suboptimally evaluated due to positioning,  underlying scoliosis.    Lung parenchyma/Pleura: Bilateral opacities, increased. No pneumothorax.    Skeleton/soft tissues: Marked scoliosis. Degenerative changes.    Impression:    Suboptimal positioning secondary to underlying scoliosis. Bibasilar  opacities better characterized on subsequently performed CT.    --- End of Report ---          LIANNE BYNUM MD; Resident Radiologist  This document has been electronically signed.  PAPITO MOSQUERA MD; Attending Radiologist  This document has been electronically signed. Dec 29 2023 11:52AM (12-29-23 @ 08:26)      CT  CT Angio Chest PE Protocol w/ IV Cont:  ACC: [id number] EXAM:  CT ANGIO CHEST PULM [facility]   ORDERED BY: KIMBERLY NOVAK    PROCEDURE DATE:  12/29/2023          INTERPRETATION:  CLINICAL STATEMENT: Sudden onset shortness of breath    TECHNIQUE: Multislice helical sections were obtained fromthe thoracic  inlet to the lung bases during rapid administration of intravenous  contrast using a CTA protocol. 65 cc administered of Omnipaque 350 (35 cc  discarded). Thin sections were reconstructed through the pulmonary  vasculature. [place] coronal reformatted images were acquired, as  well as MIP reconstructed images.    COMPARISON CT: None.    OTHER STUDIES USED FOR CORRELATION: None.      FINDINGS:    PULMONARY EMBOLUS: No pulmonary embolus.    LUNGS: Trachea and central airwaysare patent. No effusion or  pneumothorax. Bilateral lower lobe and lingular peribronchial thickening  with patchy consolidations. No honeycombing.    HEART/GREAT VESSELS: No pericardial effusion. Thoracic aorta and main  pulmonary artery are normal in caliber..    MEDIASTINUM/THORACIC NODES: No enlarged mediastinal, hilar or axillary  lymph nodes..    VISUALIZED UPPER ABDOMEN: Left upper pole renal cyst. Moderate-sized  hiatal hernia.    BONES/SOFT TISSUES: Bilateral breast implants. Marked scoliosis of the  spine. Generalized osteopenia. Likely chronic deformity of the sternum.      IMPRESSION:    No pulmonary embolus.    Bilateral lower lobe and lingular peribronchial thickening with patchy  consolidations, most consistent with multifocal pneumonia in appropriate  clinical setting.    --- End of Report ---            ELLIOT LANDAU MD; Attending Radiologist  This document has been electronically signed. Dec 29 2023 11:09AM (12-29-23 @ 11:02)      CARDIOLOGY TESTING  12 Lead ECG:  Ventricular Rate 91 BPM    Atrial Rate 91 BPM    P-R Interval 134 ms    QRS Duration 108 ms    Q-T Interval 402 ms    QTC Calculation(Bazett) 494 ms    P Axis 35 degrees    R Axis 100 degrees    T Axis 49 degrees    Diagnosis Line Normal sinus rhythm  Incomplete right bundle branch block  Possible Right ventricular hypertrophy    Confirmed by ELSA HARMON MD ([id number]) on 12/29/2023 10:01:01 AM (12-29-23 @ 07:56)  12 Lead ECG:  Ventricular Rate 90 BPM    Atrial Rate 90 BPM    P-R Interval 118 ms    QRS Duration 112 ms    Q-T Interval 410 ms    QTC Calculation(Bazett) 501 ms    P Axis 72 degrees    R Axis 96 degrees    T Axis 49 degrees    Diagnosis Line Normal sinus rhythm  Incomplete right bundle branch block  Possible Right ventricular hypertrophy  Abnormal ECG    Confirmed by Avel Heredia ([id number]) on 12/29/2023 9:27:54 AM (12-29-23 @ 07:52)      MEDICATIONS  aspirin enteric coated 81 Oral daily  atorvastatin 10 Oral at bedtime  enoxaparin Injectable 40 SubCutaneous every 24 hours  influenza  Vaccine (HIGH DOSE) 0.7 IntraMuscular once  multivitamin 1 Oral daily  pantoprazole    Tablet 40 Oral before breakfast      Weight  Weight (kg): 69.4 (12-29-23 @ 15:41)    ANTIBIOTICS:      ALLERGIES:  No Known Allergies         MARISA DYKES  89y, Female  Allergy: No Known Allergies      CHIEF COMPLAINT: pneumonia (31 Dec 2023 13:52)      LOS  2d    HPI:  Patient is a 89-year-old female with past medical history of severe aortic stenosis, L carotid stenosis, JENNIFER, GERD, urinary retention with self-catheterization, breast cancer status post bilateral mastectomy and reconstruction, hyperlipidemia, hypertension, brain tumor s/p craniotomy 1976, presenting to the ED complaining of shortness of breath.  Patient states this morning she was getting dressed and getting ready for breakfast when she started to feel short of breath. Patient states she never experienced episodes like this before. Pt symptoms were relieved with home 3L O2. Denies chest pain, syncope, pre-syncope, lightheadedness, dizziness, confusion, n/v/d, sick contacts    T(C): 36.6 (12-29-23 @ 11:45), Max: 36.6 (12-29-23 @ 11:45)  T(F): 97.9 (12-29-23 @ 11:45), Max: 97.9 (12-29-23 @ 11:45)  HR: 85 (12-29-23 @ 11:45) (85 - 107)  BP: 111/57 (12-29-23 @ 11:45) (111/57 - 120/65)  RR: 16 (12-29-23 @ 11:45) (16 - 16)  SpO2: 96% (12-29-23 @ 11:45) (96% - 98%)  Wt(kg): --    CT chest showing bilateral basilar and lingular patchy consolidations. Pt ordered for levaquin in the ED. (29 Dec 2023 13:57)      INFECTIOUS DISEASE HISTORY:  History as above.  Presents with worsening shortness of breath.   Was placed on O2, but now on room air.   She reports feeling better today.     PAST MEDICAL & SURGICAL HISTORY:  Migraine      H/O brain tumor  benign 1977      Breast CA      Urinary retention with incomplete bladder emptying  pt self catheterizes  3-4 x day      H/O mastectomy, bilateral  1984      H/O craniotomy  1976          FAMILY HISTORY  Family history reviewed and non-contributory      SOCIAL HISTORY  Social History:  Marital Status:  (   )    (   ) Single    (   )    (  )   Lives with: (  ) alone  (  ) children   (  ) spouse   (  ) parents  (  ) other  Recent Travel: No recent travel  Occupation:    Substance Use (street drugs): ( x ) never used  (  ) other:  Tobacco Usage:  ( x  ) never smoked   (   ) former smoker   (   ) current smoker  (     ) pack year  Alcohol Usage: None  Baseline mobility: (08 Sep 2021 15:09)        ROS  General: Denies rigors, nightsweats  HEENT: Denies headache, rhinorrhea, sore throat, eye pain  CV: Denies CP, palpitations  PULM: Denies wheezing, hemoptysis  GI: Denies hematemesis, hematochezia, melena  : Denies discharge, hematuria  MSK: Denies arthralgias, myalgias  SKIN: Denies rash, lesions  NEURO: Denies paresthesias, weakness  PSYCH: Denies depression, anxiety    VITALS:  T(F): 98, Max: 98 (12-31-23 @ 19:41)  HR: 86  BP: 119/70  RR: 16Vital Signs Last 24 Hrs  T(C): 36.7 (31 Dec 2023 19:41), Max: 36.7 (31 Dec 2023 19:41)  T(F): 98 (31 Dec 2023 19:41), Max: 98 (31 Dec 2023 19:41)  HR: 86 (31 Dec 2023 19:41) (86 - 100)  BP: 119/70 (31 Dec 2023 19:41) (111/61 - 119/70)  BP(mean): --  RR: 16 (31 Dec 2023 19:41) (16 - 18)  SpO2: --        PHYSICAL EXAM:  Gen: NAD, resting in bed  HEENT: Normocephalic, atraumatic  Neck: supple, no lymphadenopathy  CV: Regular rate & regular rhythm  Lungs: decreased BS at bases, no fremitus  Abdomen: Soft, BS present  Ext: Warm, well perfused  Neuro: non focal, awake  Skin: no rash, no erythema  Lines: no phlebitis    TESTS & MEASUREMENTS:                        12.1  4.60  )-----------( 166      ( 30 Dec 2023 07:07 )             37.9      Mg     1.9     12-30                Blood Gas Venous - Lactate: 1.0 mmol/L (12-29-23 @ 07:29)      INFECTIOUS DISEASES TESTING  Legionella Antigen, Urine: Negative (12-30-23 @ 09:20)  Procalcitonin, Serum: 0.02 ng/mL (12-30-23 @ 07:07)  Procalcitonin, Serum: 0.04 ng/mL (12-29-23 @ 15:56)      RADIOLOGY & ADDITIONAL TESTS:  I have personally reviewed the last Chest xray  CXR  Xray Chest 2 Views PA/Lat:  ACC: [id number] EXAM:  XR CHEST PA LAT 2V   ORDERED BY: ADALGISA MCINTOSH    PROCEDURE DATE:  12/29/2023          INTERPRETATION:  Clinical History / Reason for exam: Shortness of breath.    Comparison : Chest radiograph 4/21/2022.    Technique/Positioning: Frontal and lateral radiographs of the chest.  Suboptimal positioning secondary to underlying scoliosis.    Findings:    Support devices: None.    Cardiac/mediastinum/hilum: Suboptimally evaluated due to positioning,  underlying scoliosis.    Lung parenchyma/Pleura: Bilateral opacities, increased. No pneumothorax.    Skeleton/soft tissues: Marked scoliosis. Degenerative changes.    Impression:    Suboptimal positioning secondary to underlying scoliosis. Bibasilar  opacities better characterized on subsequently performed CT.    --- End of Report ---          LIANNE BYNUM MD; Resident Radiologist  This document has been electronically signed.  PAPITO MOSQUERA MD; Attending Radiologist  This document has been electronically signed. Dec 29 2023 11:52AM (12-29-23 @ 08:26)      CT  CT Angio Chest PE Protocol w/ IV Cont:  ACC: [id number] EXAM:  CT ANGIO CHEST PULM [facility]   ORDERED BY: KIMBERLY NOVAK    PROCEDURE DATE:  12/29/2023          INTERPRETATION:  CLINICAL STATEMENT: Sudden onset shortness of breath    TECHNIQUE: Multislice helical sections were obtained fromthe thoracic  inlet to the lung bases during rapid administration of intravenous  contrast using a CTA protocol. 65 cc administered of Omnipaque 350 (35 cc  discarded). Thin sections were reconstructed through the pulmonary  vasculature. [place] coronal reformatted images were acquired, as  well as MIP reconstructed images.    COMPARISON CT: None.    OTHER STUDIES USED FOR CORRELATION: None.      FINDINGS:    PULMONARY EMBOLUS: No pulmonary embolus.    LUNGS: Trachea and central airwaysare patent. No effusion or  pneumothorax. Bilateral lower lobe and lingular peribronchial thickening  with patchy consolidations. No honeycombing.    HEART/GREAT VESSELS: No pericardial effusion. Thoracic aorta and main  pulmonary artery are normal in caliber..    MEDIASTINUM/THORACIC NODES: No enlarged mediastinal, hilar or axillary  lymph nodes..    VISUALIZED UPPER ABDOMEN: Left upper pole renal cyst. Moderate-sized  hiatal hernia.    BONES/SOFT TISSUES: Bilateral breast implants. Marked scoliosis of the  spine. Generalized osteopenia. Likely chronic deformity of the sternum.      IMPRESSION:    No pulmonary embolus.    Bilateral lower lobe and lingular peribronchial thickening with patchy  consolidations, most consistent with multifocal pneumonia in appropriate  clinical setting.    --- End of Report ---            ELLIOT LANDAU MD; Attending Radiologist  This document has been electronically signed. Dec 29 2023 11:09AM (12-29-23 @ 11:02)      CARDIOLOGY TESTING  12 Lead ECG:  Ventricular Rate 91 BPM    Atrial Rate 91 BPM    P-R Interval 134 ms    QRS Duration 108 ms    Q-T Interval 402 ms    QTC Calculation(Bazett) 494 ms    P Axis 35 degrees    R Axis 100 degrees    T Axis 49 degrees    Diagnosis Line Normal sinus rhythm  Incomplete right bundle branch block  Possible Right ventricular hypertrophy    Confirmed by ELSA HARMON MD ([id number]) on 12/29/2023 10:01:01 AM (12-29-23 @ 07:56)  12 Lead ECG:  Ventricular Rate 90 BPM    Atrial Rate 90 BPM    P-R Interval 118 ms    QRS Duration 112 ms    Q-T Interval 410 ms    QTC Calculation(Bazett) 501 ms    P Axis 72 degrees    R Axis 96 degrees    T Axis 49 degrees    Diagnosis Line Normal sinus rhythm  Incomplete right bundle branch block  Possible Right ventricular hypertrophy  Abnormal ECG    Confirmed by Avel Heredia ([id number]) on 12/29/2023 9:27:54 AM (12-29-23 @ 07:52)      MEDICATIONS  aspirin enteric coated 81 Oral daily  atorvastatin 10 Oral at bedtime  enoxaparin Injectable 40 SubCutaneous every 24 hours  influenza  Vaccine (HIGH DOSE) 0.7 IntraMuscular once  multivitamin 1 Oral daily  pantoprazole    Tablet 40 Oral before breakfast      Weight  Weight (kg): 69.4 (12-29-23 @ 15:41)    ANTIBIOTICS:      ALLERGIES:  No Known Allergies

## 2024-01-01 NOTE — CONSULT NOTE ADULT - ASSESSMENT
ASSESSMENT  Patient is a 89-year-old female with past medical history of severe aortic stenosis, L carotid stenosis, JENNIFER, GERD, urinary retention with self-catheterization, breast cancer status post bilateral mastectomy and reconstruction, hyperlipidemia, hypertension, brain tumor s/p craniotomy 1976, presenting to the ED complaining of shortness of breath.    IMPRESSION  #COVID-19, Mild  - CT Angio Chest PE Protocol w/ IV Cont (12.29.23 @ 11:02):No pulmonary embolus. Bilateral lower lobe and lingular peribronchial thickening with patchy consolidations, most consistent with multifocal pneumonia in appropriate  clinical setting.    #Severe AS  #Left carotid stenosis  #Urinary retention with self catherization  #Breast Cancer s/p bilateral mastectomy + recontrsuction  #Brain tumor s/p craniotomy 1976  #Abx allergy: No Known Allergies    RECOMMENDATIONS  - seems to be symptomatically improving already -- as she is feeling better, wants to hold off on RDV which is fine   - supportive care   - monitor O2     Please call or message on Microsoft Teams if with any questions.  Spectra [phone]   ASSESSMENT  Patient is a 89-year-old female with past medical history of severe aortic stenosis, L carotid stenosis, JENNIFER, GERD, urinary retention with self-catheterization, breast cancer status post bilateral mastectomy and reconstruction, hyperlipidemia, hypertension, brain tumor s/p craniotomy 1976, presenting to the ED complaining of shortness of breath.    IMPRESSION  #COVID-19, Mild  - CT Angio Chest PE Protocol w/ IV Cont (12.29.23 @ 11:02):No pulmonary embolus. Bilateral lower lobe and lingular peribronchial thickening with patchy consolidations, most consistent with multifocal pneumonia in appropriate  clinical setting.    #Severe AS  #Left carotid stenosis  #Urinary retention with self catherization  #Breast Cancer s/p bilateral mastectomy + recontrsuction  #Brain tumor s/p craniotomy 1976  #Abx allergy: No Known Allergies    RECOMMENDATIONS  - seems to be symptomatically improving already -- as she is feeling better, wants to hold off on RDV which is fine   - supportive care   - monitor O2     Please call or message on Microsoft Teams if with any questions.  Spectra [phone]

## 2024-01-01 NOTE — PROGRESS NOTE ADULT - ASSESSMENT
89 y/o woman with PMH of severe aortic stenosis, Left carotid stenosis, JENNIFER, GERD, urinary retention with self-catheterization, breast cancer s/p bilateral mastectomy and reconstruction, hyperlipidemia, hypertension and brain tumor s/p craniotomy 1976 presented to the ED complaining of shortness of breath.    1. Dyspnea due to Multifocal pneumonia  RVP + for COVID  CT scan report reviewed - negative for PE but shows multifocal PNA   pt is afebrile, on RA, no leukocytosis  feeling better every day  airborne and contact isolation  procal 0.04 - abx stopped  dimer 274  ID consulted  - hold off on Remdesivir since pt is improving    2. Severe AS   - outpatient TTE     3. Prolonged QTc  - repeat EKG daily shows QTc 494  - avoid QTc prolonging meds     4. Left carotid stenosis  h/o CVA   Hyperlipidemia  -c/w ASA, statin     5. Urinary retention   - c/w CIC (pt self caths at home)    6. DVT ppx  Lovenox  continue PT - pt from [facility] and ambulates with a cane - needs to walk at least 100ft in order to return    DNR/DNI       PROGRESS NOTE HANDOFF    Pending: continue PT  pt and daughter Malou informed of the plan of care  Disposition: [facility] 89 y/o woman with PMH of severe aortic stenosis, Left carotid stenosis, JENNIFER, GERD, urinary retention with self-catheterization, breast cancer s/p bilateral mastectomy and reconstruction, hyperlipidemia, hypertension and brain tumor s/p craniotomy 1976 presented to the ED complaining of shortness of breath.    1. Dyspnea due to Multifocal pneumonia  RVP + for COVID  CT scan report reviewed - negative for PE but shows multifocal PNA   pt is afebrile, on RA, no leukocytosis  feeling better every day  airborne and contact isolation  procal 0.04 - abx stopped  dimer 274  ID consulted  - hold off on Remdesivir since pt is improving    2. Severe AS   - outpatient TTE     3. Prolonged QTc  - repeat EKG daily shows QTc 494  - avoid QTc prolonging meds     4. Left carotid stenosis  h/o CVA   Hyperlipidemia  -c/w ASA, statin     5. Urinary retention   - c/w CIC (pt self caths at home)    6. DVT ppx  Lovenox  continue PT - pt from [facility] and ambulates with a cane - needs to walk at least 100ft in order to return    DNR/DNI       PROGRESS NOTE HANDOFF    Pending: continue PT  pt and daughter Malou informed of the plan of care  Disposition: [facility]

## 2024-01-02 ENCOUNTER — TRANSCRIPTION ENCOUNTER (OUTPATIENT)
Age: 89
End: 2024-01-02

## 2024-01-02 VITALS
DIASTOLIC BLOOD PRESSURE: 59 MMHG | SYSTOLIC BLOOD PRESSURE: 103 MMHG | HEART RATE: 85 BPM | OXYGEN SATURATION: 100 % | RESPIRATION RATE: 18 BRPM | TEMPERATURE: 97 F

## 2024-01-02 LAB
ALBUMIN SERPL ELPH-MCNC: 3.7 G/DL — SIGNIFICANT CHANGE UP (ref 3.5–5.2)
ALBUMIN SERPL ELPH-MCNC: 3.7 G/DL — SIGNIFICANT CHANGE UP (ref 3.5–5.2)
ALP SERPL-CCNC: 74 U/L — SIGNIFICANT CHANGE UP (ref 30–115)
ALP SERPL-CCNC: 74 U/L — SIGNIFICANT CHANGE UP (ref 30–115)
ALT FLD-CCNC: 19 U/L — SIGNIFICANT CHANGE UP (ref 0–41)
ALT FLD-CCNC: 19 U/L — SIGNIFICANT CHANGE UP (ref 0–41)
ANION GAP SERPL CALC-SCNC: 9 MMOL/L — SIGNIFICANT CHANGE UP (ref 7–14)
ANION GAP SERPL CALC-SCNC: 9 MMOL/L — SIGNIFICANT CHANGE UP (ref 7–14)
AST SERPL-CCNC: 32 U/L — SIGNIFICANT CHANGE UP (ref 0–41)
AST SERPL-CCNC: 32 U/L — SIGNIFICANT CHANGE UP (ref 0–41)
BASOPHILS # BLD AUTO: 0.02 K/UL — SIGNIFICANT CHANGE UP (ref 0–0.2)
BASOPHILS # BLD AUTO: 0.02 K/UL — SIGNIFICANT CHANGE UP (ref 0–0.2)
BASOPHILS NFR BLD AUTO: 0.4 % — SIGNIFICANT CHANGE UP (ref 0–1)
BASOPHILS NFR BLD AUTO: 0.4 % — SIGNIFICANT CHANGE UP (ref 0–1)
BILIRUB SERPL-MCNC: 0.4 MG/DL — SIGNIFICANT CHANGE UP (ref 0.2–1.2)
BILIRUB SERPL-MCNC: 0.4 MG/DL — SIGNIFICANT CHANGE UP (ref 0.2–1.2)
BUN SERPL-MCNC: 26 MG/DL — HIGH (ref 10–20)
BUN SERPL-MCNC: 26 MG/DL — HIGH (ref 10–20)
CALCIUM SERPL-MCNC: 9.4 MG/DL — SIGNIFICANT CHANGE UP (ref 8.4–10.4)
CALCIUM SERPL-MCNC: 9.4 MG/DL — SIGNIFICANT CHANGE UP (ref 8.4–10.4)
CHLORIDE SERPL-SCNC: 103 MMOL/L — SIGNIFICANT CHANGE UP (ref 98–110)
CHLORIDE SERPL-SCNC: 103 MMOL/L — SIGNIFICANT CHANGE UP (ref 98–110)
CO2 SERPL-SCNC: 27 MMOL/L — SIGNIFICANT CHANGE UP (ref 17–32)
CO2 SERPL-SCNC: 27 MMOL/L — SIGNIFICANT CHANGE UP (ref 17–32)
CREAT SERPL-MCNC: 0.9 MG/DL — SIGNIFICANT CHANGE UP (ref 0.7–1.5)
CREAT SERPL-MCNC: 0.9 MG/DL — SIGNIFICANT CHANGE UP (ref 0.7–1.5)
EGFR: 60 ML/MIN/1.73M2 — SIGNIFICANT CHANGE UP
EGFR: 60 ML/MIN/1.73M2 — SIGNIFICANT CHANGE UP
EOSINOPHIL # BLD AUTO: 0.22 K/UL — SIGNIFICANT CHANGE UP (ref 0–0.7)
EOSINOPHIL # BLD AUTO: 0.22 K/UL — SIGNIFICANT CHANGE UP (ref 0–0.7)
EOSINOPHIL NFR BLD AUTO: 4.5 % — SIGNIFICANT CHANGE UP (ref 0–8)
EOSINOPHIL NFR BLD AUTO: 4.5 % — SIGNIFICANT CHANGE UP (ref 0–8)
GLUCOSE SERPL-MCNC: 122 MG/DL — HIGH (ref 70–99)
GLUCOSE SERPL-MCNC: 122 MG/DL — HIGH (ref 70–99)
HCT VFR BLD CALC: 37.1 % — SIGNIFICANT CHANGE UP (ref 37–47)
HCT VFR BLD CALC: 37.1 % — SIGNIFICANT CHANGE UP (ref 37–47)
HGB BLD-MCNC: 11.9 G/DL — LOW (ref 12–16)
HGB BLD-MCNC: 11.9 G/DL — LOW (ref 12–16)
IMM GRANULOCYTES NFR BLD AUTO: 0.2 % — SIGNIFICANT CHANGE UP (ref 0.1–0.3)
IMM GRANULOCYTES NFR BLD AUTO: 0.2 % — SIGNIFICANT CHANGE UP (ref 0.1–0.3)
LYMPHOCYTES # BLD AUTO: 0.96 K/UL — LOW (ref 1.2–3.4)
LYMPHOCYTES # BLD AUTO: 0.96 K/UL — LOW (ref 1.2–3.4)
LYMPHOCYTES # BLD AUTO: 19.7 % — LOW (ref 20.5–51.1)
LYMPHOCYTES # BLD AUTO: 19.7 % — LOW (ref 20.5–51.1)
MCHC RBC-ENTMCNC: 28.5 PG — SIGNIFICANT CHANGE UP (ref 27–31)
MCHC RBC-ENTMCNC: 28.5 PG — SIGNIFICANT CHANGE UP (ref 27–31)
MCHC RBC-ENTMCNC: 32.1 G/DL — SIGNIFICANT CHANGE UP (ref 32–37)
MCHC RBC-ENTMCNC: 32.1 G/DL — SIGNIFICANT CHANGE UP (ref 32–37)
MCV RBC AUTO: 89 FL — SIGNIFICANT CHANGE UP (ref 81–99)
MCV RBC AUTO: 89 FL — SIGNIFICANT CHANGE UP (ref 81–99)
MONOCYTES # BLD AUTO: 0.53 K/UL — SIGNIFICANT CHANGE UP (ref 0.1–0.6)
MONOCYTES # BLD AUTO: 0.53 K/UL — SIGNIFICANT CHANGE UP (ref 0.1–0.6)
MONOCYTES NFR BLD AUTO: 10.9 % — HIGH (ref 1.7–9.3)
MONOCYTES NFR BLD AUTO: 10.9 % — HIGH (ref 1.7–9.3)
NEUTROPHILS # BLD AUTO: 3.13 K/UL — SIGNIFICANT CHANGE UP (ref 1.4–6.5)
NEUTROPHILS # BLD AUTO: 3.13 K/UL — SIGNIFICANT CHANGE UP (ref 1.4–6.5)
NEUTROPHILS NFR BLD AUTO: 64.3 % — SIGNIFICANT CHANGE UP (ref 42.2–75.2)
NEUTROPHILS NFR BLD AUTO: 64.3 % — SIGNIFICANT CHANGE UP (ref 42.2–75.2)
NRBC # BLD: 0 /100 WBCS — SIGNIFICANT CHANGE UP (ref 0–0)
NRBC # BLD: 0 /100 WBCS — SIGNIFICANT CHANGE UP (ref 0–0)
PLATELET # BLD AUTO: 192 K/UL — SIGNIFICANT CHANGE UP (ref 130–400)
PLATELET # BLD AUTO: 192 K/UL — SIGNIFICANT CHANGE UP (ref 130–400)
PMV BLD: 9.4 FL — SIGNIFICANT CHANGE UP (ref 7.4–10.4)
PMV BLD: 9.4 FL — SIGNIFICANT CHANGE UP (ref 7.4–10.4)
POTASSIUM SERPL-MCNC: 4.5 MMOL/L — SIGNIFICANT CHANGE UP (ref 3.5–5)
POTASSIUM SERPL-MCNC: 4.5 MMOL/L — SIGNIFICANT CHANGE UP (ref 3.5–5)
POTASSIUM SERPL-SCNC: 4.5 MMOL/L — SIGNIFICANT CHANGE UP (ref 3.5–5)
POTASSIUM SERPL-SCNC: 4.5 MMOL/L — SIGNIFICANT CHANGE UP (ref 3.5–5)
PROT SERPL-MCNC: 7 G/DL — SIGNIFICANT CHANGE UP (ref 6–8)
PROT SERPL-MCNC: 7 G/DL — SIGNIFICANT CHANGE UP (ref 6–8)
RBC # BLD: 4.17 M/UL — LOW (ref 4.2–5.4)
RBC # BLD: 4.17 M/UL — LOW (ref 4.2–5.4)
RBC # FLD: 14.1 % — SIGNIFICANT CHANGE UP (ref 11.5–14.5)
RBC # FLD: 14.1 % — SIGNIFICANT CHANGE UP (ref 11.5–14.5)
SODIUM SERPL-SCNC: 139 MMOL/L — SIGNIFICANT CHANGE UP (ref 135–146)
SODIUM SERPL-SCNC: 139 MMOL/L — SIGNIFICANT CHANGE UP (ref 135–146)
WBC # BLD: 4.87 K/UL — SIGNIFICANT CHANGE UP (ref 4.8–10.8)
WBC # BLD: 4.87 K/UL — SIGNIFICANT CHANGE UP (ref 4.8–10.8)
WBC # FLD AUTO: 4.87 K/UL — SIGNIFICANT CHANGE UP (ref 4.8–10.8)
WBC # FLD AUTO: 4.87 K/UL — SIGNIFICANT CHANGE UP (ref 4.8–10.8)

## 2024-01-02 PROCEDURE — 93010 ELECTROCARDIOGRAM REPORT: CPT

## 2024-01-02 PROCEDURE — 99238 HOSP IP/OBS DSCHRG MGMT 30/<: CPT

## 2024-01-02 RX ORDER — POLYETHYLENE GLYCOL 3350 17 G/17G
17 POWDER, FOR SOLUTION ORAL
Qty: 0 | Refills: 0 | DISCHARGE
Start: 2024-01-02

## 2024-01-02 RX ORDER — ASPIRIN/CALCIUM CARB/MAGNESIUM 324 MG
325 TABLET ORAL THREE TIMES A DAY
Refills: 0 | Status: DISCONTINUED | OUTPATIENT
Start: 2024-01-02 | End: 2024-01-02

## 2024-01-02 RX ORDER — CHLORHEXIDINE GLUCONATE 213 G/1000ML
1 SOLUTION TOPICAL
Refills: 0 | Status: DISCONTINUED | OUTPATIENT
Start: 2024-01-02 | End: 2024-01-02

## 2024-01-02 RX ORDER — POLYETHYLENE GLYCOL 3350 17 G/17G
17 POWDER, FOR SOLUTION ORAL AT BEDTIME
Refills: 0 | Status: DISCONTINUED | OUTPATIENT
Start: 2024-01-02 | End: 2024-01-02

## 2024-01-02 RX ADMIN — PANTOPRAZOLE SODIUM 40 MILLIGRAM(S): 20 TABLET, DELAYED RELEASE ORAL at 08:41

## 2024-01-02 RX ADMIN — Medication 1 TABLET(S): at 12:28

## 2024-01-02 RX ADMIN — Medication 81 MILLIGRAM(S): at 12:28

## 2024-01-02 RX ADMIN — ENOXAPARIN SODIUM 40 MILLIGRAM(S): 100 INJECTION SUBCUTANEOUS at 08:41

## 2024-01-02 NOTE — DISCHARGE NOTE NURSING/CASE MANAGEMENT/SOCIAL WORK - PATIENT PORTAL LINK FT
You can access the FollowMyHealth Patient Portal offered by [facility] by registering at the following website: [email or web address]. By joining [facility]’s FollowMyHealth portal, you will also be able to view your health information using other applications (apps) compatible with our system. You can access the FollowMyHealth Patient Portal offered by [facility] by registering at the following website: [email or web address]. By joining [employer] [facility]’s FollowMyHealth portal, you will also be able to view your health information using other applications (apps) compatible with our system.

## 2024-01-02 NOTE — DISCHARGE NOTE PROVIDER - NSDCCPCAREPLAN_GEN_ALL_CORE_FT
PRINCIPAL DISCHARGE DIAGNOSIS  Diagnosis: Pneumonia  Assessment and Plan of Treatment: Coronavirus disease 2019 (COVID-19) is a respiratory illness  that can spread from person to person. The virus that causes  COVID-19 is a novel coronavirus that was first identified during  an investigation into an outbreak in [place], [place].  The virus that causes COVID-19 probably emerged from an  animal source, but is now spreading from person to person.  The virus is thought to spread mainly between people who  are in close contact with one another (within about 6 feet)  through respiratory droplets produced when an infected  person coughs or sneezes. It also may be possible that a person  can get COVID-19 by touching a surface or object that has  the virus on it and then touching their own mouth, nose, or  possibly their eyes, but this is not thought to be the main  way the virus spreads.  Please stay home and avoid contact with others for at least a week after symptoms resolve and follow government guidelines.   Patients with COVID-19 have had mild to severe respiratory  illness with symptoms of  • fever  • cough  • shortness of breath  People can help protect themselves from respiratory illness with  everyday preventive actions.    • Avoid close contact with people who are sick.  • Avoid touching your eyes, nose, and mouth with  unwashed hands.  • Wash your hands often with soap and water for at least 20   seconds. Use an alcohol-based hand  that contains at  least 60% alcohol if soap and water are not available.   Stay home when you are sick.  • Cover your cough or sneeze with a tissue, then throw the  tissue in the trash.  • Clean and disinfect frequently touched objects  and surfaces.  Call 911 and inform them you are covid positive before you decide to go to the emergency room if you have chest pain, difficulty breathing, high fevers, worsening of your symptoms, feel unwell, or have nausea and vomiting.

## 2024-01-02 NOTE — PROGRESS NOTE ADULT - ASSESSMENT
89 y/o woman with PMH of severe aortic stenosis, Left carotid stenosis, JENNIFER, GERD, urinary retention with self-catheterization, breast cancer s/p bilateral mastectomy and reconstruction, hyperlipidemia, hypertension and brain tumor s/p craniotomy 1976 presented to the ED complaining of shortness of breath.    1. Dyspnea due to Multifocal pneumonia  RVP + for COVID  CT scan report reviewed - negative for PE but shows multifocal PNA   pt is afebrile, on RA, no leukocytosis  feeling better every day  airborne and contact isolation  procal 0.04 - abx stopped  dimer 274  ID consulted  - hold off on Remdesivir since pt is improving    2. Severe AS   - outpatient TTE     3. Prolonged QTc  - repeat EKG daily shows QTc 494  - avoid QTc prolonging meds     4. Left carotid stenosis  h/o CVA   Hyperlipidemia  -c/w ASA, statin     5. Urinary retention   - c/w CIC (pt self caths at home)    6. DVT ppx  Lovenox  continue PT - pt from [facility] and ambulates with a cane - needs to walk at least 100ft in order to return    DNR/DNI       PROGRESS NOTE HANDOFF    Pending: continue PT  pt and daughter Malou informed of the plan of care  Disposition: [facility] 89 y/o woman with PMH of severe aortic stenosis, Left carotid stenosis, JENNIFER, GERD, urinary retention with self-catheterization, breast cancer s/p bilateral mastectomy and reconstruction, hyperlipidemia, hypertension and brain tumor s/p craniotomy 1976 presented to the ED complaining of shortness of breath.    1. Dyspnea due to Multifocal pneumonia  RVP + for COVID  CT scan report reviewed - negative for PE but shows multifocal PNA   pt is afebrile, on RA, no leukocytosis  feeling better every day  airborne and contact isolation  procal 0.04 - abx stopped  dimer 274  ID consulted  - hold off on Remdesivir since pt is improving    2. Severe AS   - outpatient TTE     3. Prolonged QTc  - repeat EKG daily shows QTc 494  - avoid QTc prolonging meds     4. Left carotid stenosis  h/o CVA   Hyperlipidemia  -c/w ASA, statin     5. Urinary retention   - c/w CIC (pt self caths at home)    6. DVT ppx  Lovenox  continue PT - pt from [facility] and ambulates with a cane - needs to walk at least 100ft in order to return    DNR/DNI       PROGRESS NOTE HANDOFF    Pending: continue PT  pt and daughter Malou informed of the plan of care  Disposition: [facility]

## 2024-01-02 NOTE — DISCHARGE NOTE PROVIDER - CARE PROVIDER_API CALL
Angel Luis Rodriguez  Internal Medicine  [address] [place] [address]  Phone: [phone]  Fax: [phone]  Established Patient  Follow Up Time: 1 week   Angel Luis Rodriguez  Internal Medicine  [address] [place] [address]  Phone: [phone]  Fax: [phone]  Established Patient  Follow Up Time: 1 week

## 2024-01-02 NOTE — PROGRESS NOTE ADULT - SUBJECTIVE AND OBJECTIVE BOX
MARISA DYKES  89y Female    INTERVAL HPI/OVERNIGHT EVENTS:    pt continues to feel well  no SOB  less cough  no pain, fever  ambulating with staff to the restroom    T(F): 96.5 (01-02-24 @ 04:15), Max: 96.5 (01-02-24 @ 04:15)  HR: 86 (01-02-24 @ 04:15) (86 - 92)  BP: 110/57 (01-02-24 @ 04:15) (110/57 - 120/58)  RR: 18 (01-02-24 @ 04:15) (16 - 18)    PHYSICAL EXAM:  GENERAL: NAD  HEAD:  Normocephalic  EYES:  conjunctiva and sclera clear  ENMT: Moist mucous membranes  NERVOUS SYSTEM:  Alert, awake, Good concentration  CHEST/LUNG: crackles right base  HEART: Regular rate and rhythm; 3/6 WILLIAM  ABDOMEN: Soft, Nontender, Nondistended  EXTREMITIES:   No edema  SKIN: warm, dry  severe kyphoscoliosis    Consultant(s) Notes Reviewed:  [x ] YES  [ ] NO  Care Discussed with Consultants/Other Providers [ x] YES  [ ] NO    MEDICATIONS  (STANDING):  aspirin enteric coated 81 milliGRAM(s) Oral daily  atorvastatin 10 milliGRAM(s) Oral at bedtime  enoxaparin Injectable 40 milliGRAM(s) SubCutaneous every 24 hours  influenza  Vaccine (HIGH DOSE) 0.7 milliLiter(s) IntraMuscular once  multivitamin 1 Tablet(s) Oral daily  pantoprazole    Tablet 40 milliGRAM(s) Oral before breakfast    MEDICATIONS  (PRN):  acetaminophen     Tablet .. 650 milliGRAM(s) Oral every 6 hours PRN Mild Pain (1 - 3)  aluminum hydroxide/magnesium hydroxide/simethicone Suspension 30 milliLiter(s) Oral every 4 hours PRN Dyspepsia  aspirin 325 milliGRAM(s) Oral three times a day PRN moderate pain 3-6      LABS:                        11.9   4.87  )-----------( 192      ( 02 Jan 2024 07:55 )             37.1     01-02    139  |  103  |  26<H>  ----------------------------<  122<H>  4.5   |  27  |  0.9    Ca    9.4      02 Jan 2024 07:55  Mg     2.2     01-01    TPro  7.0  /  Alb  3.7  /  TBili  0.4  /  DBili  x   /  AST  32  /  ALT  19  /  AlkPhos  74  01-02          SARS-CoV-2: Detected (29 Dec 2023 22:10)        D-Dimer Assay, Quantitative: 279 ng/mL DDU (12-29-23 @ 07:45)    Procalcitonin, Serum: 0.02 ng/mL (12-30-23 @ 07:07)  Procalcitonin, Serum: 0.04 ng/mL (12-29-23 @ 15:56)            Case discussed with residents and RN on rounds today    Care discussed with pt

## 2024-01-02 NOTE — DISCHARGE NOTE PROVIDER - NSDCMRMEDTOKEN_GEN_ALL_CORE_FT
ALPRAZolam 0.25 mg oral tablet: 1 tab(s) orally once a day (at bedtime) as needed for  insomnia  aspirin 81 mg oral delayed release tablet: 1 tab(s) orally once a day  atorvastatin 10 mg oral tablet: 1 tab(s) orally once a day (at bedtime)  dexlansoprazole 60 mg oral delayed release capsule: 1 cap(s) orally once a day  Multiple Vitamins oral tablet: 1 tab(s) orally once a day   ALPRAZolam 0.25 mg oral tablet: 1 tab(s) orally once a day (at bedtime) as needed for  insomnia  aspirin 81 mg oral delayed release tablet: 1 tab(s) orally once a day  atorvastatin 10 mg oral tablet: 1 tab(s) orally once a day (at bedtime)  dexlansoprazole 60 mg oral delayed release capsule: 1 cap(s) orally once a day  Multiple Vitamins oral tablet: 1 tab(s) orally once a day  polyethylene glycol 3350 oral powder for reconstitution: 17 gram(s) orally once a day (at bedtime)

## 2024-01-02 NOTE — DISCHARGE NOTE NURSING/CASE MANAGEMENT/SOCIAL WORK - NSDCPEFALRISK_GEN_ALL_CORE
For information on Fall & Injury Prevention, visit: [email or web address] OR  [email or web address] OR  https://www.cdc.gov/steadi/patient.html For information on Fall & Injury Prevention, visit: [email or web address] OR  [email or web address]-injury OR  https://www.cdc.gov/steadi/patient.html

## 2024-01-02 NOTE — PHARMACOTHERAPY INTERVENTION NOTE - COMMENTS
Pt on low dose aspirin 81 mg daily    wants to add aspirin 325 mg q8h for moderate pain   tylenol 325 mg is also on file for mild pain

## 2024-01-02 NOTE — DISCHARGE NOTE PROVIDER - PROVIDER TOKENS
PROVIDER:[TOKEN:[[id number]],FOLLOWUP:[1 week],ESTABLISHEDPATIENT:[T]] PROVIDER:[TOKEN:[[id number]],FOLLOWUP:[1 week],ESTABLISHEDPATIENT:[T]]

## 2024-01-02 NOTE — DISCHARGE NOTE PROVIDER - HOSPITAL COURSE
Patient is a 89-year-old female with past medical history of severe aortic stenosis, L carotid stenosis, JENNIFER, GERD, urinary retention with self-catheterization, breast cancer status post bilateral mastectomy and reconstruction, hyperlipidemia, hypertension, brain tumor s/p craniotomy 1976, presenting to the ED complaining of shortness of breath.  Patient states this morning she was getting dressed and getting ready for breakfast when she started to feel short of breath. Patient states she never experienced episodes like this before. Pt symptoms were relieved with home 3L O2. Denies chest pain, syncope, pre-syncope, lightheadedness, dizziness, confusion, n/v/d, sick contacts    T(C): 36.6 (12-29-23 @ 11:45), Max: 36.6 (12-29-23 @ 11:45)  T(F): 97.9 (12-29-23 @ 11:45), Max: 97.9 (12-29-23 @ 11:45)  HR: 85 (12-29-23 @ 11:45) (85 - 107)  BP: 111/57 (12-29-23 @ 11:45) (111/57 - 120/65)  RR: 16 (12-29-23 @ 11:45) (16 - 16)  SpO2: 96% (12-29-23 @ 11:45) (96% - 98%)  Wt(kg): --    CT chest showing bilateral basilar and lingular patchy consolidations. Pt ordered for levaquin in the ED.    Patient was found to have Covid-19 + on RVP. Antibiotics were discontinued. Patient was evaluated by ID. No remdesivir was administered as patient was clinically improving.   Patient is to follow up with her cardiologist outpatient regarding severe AS. Patient was also found to have elevated QTC during this admission. QTC was 494. Patient is advised to avoid QTc prolonging medications. She is to continue with her home medications. Patient is to continue with [facility] for urinary retention.        Patient is clinically stable to be discharged at this time. She is to follow up with her PCP and Cardiologist upon discharge.

## 2024-01-03 ENCOUNTER — TRANSCRIPTION ENCOUNTER (OUTPATIENT)
Age: 89
End: 2024-01-03

## 2024-01-05 ENCOUNTER — TRANSCRIPTION ENCOUNTER (OUTPATIENT)
Age: 89
End: 2024-01-05

## 2024-03-24 ENCOUNTER — INPATIENT (INPATIENT)
Facility: HOSPITAL | Age: 89
LOS: 0 days | Discharge: LEFT BEFORE DISCHARGE | DRG: 206 | End: 2024-03-25
Attending: STUDENT IN AN ORGANIZED HEALTH CARE EDUCATION/TRAINING PROGRAM | Admitting: STUDENT IN AN ORGANIZED HEALTH CARE EDUCATION/TRAINING PROGRAM
Payer: MEDICARE

## 2024-03-24 VITALS
TEMPERATURE: 98 F | DIASTOLIC BLOOD PRESSURE: 54 MMHG | WEIGHT: 110.01 LBS | HEART RATE: 79 BPM | RESPIRATION RATE: 24 BRPM | OXYGEN SATURATION: 100 % | SYSTOLIC BLOOD PRESSURE: 113 MMHG

## 2024-03-24 VITALS
RESPIRATION RATE: 23 BRPM | TEMPERATURE: 97 F | OXYGEN SATURATION: 100 % | HEART RATE: 74 BPM | SYSTOLIC BLOOD PRESSURE: 115 MMHG

## 2024-03-24 DIAGNOSIS — Z90.13 ACQUIRED ABSENCE OF BILATERAL BREASTS AND NIPPLES: Chronic | ICD-10-CM

## 2024-03-24 DIAGNOSIS — Z98.890 OTHER SPECIFIED POSTPROCEDURAL STATES: Chronic | ICD-10-CM

## 2024-03-24 LAB
ALBUMIN SERPL ELPH-MCNC: 3.8 G/DL — SIGNIFICANT CHANGE UP (ref 3.5–5.2)
ALP SERPL-CCNC: 74 U/L — SIGNIFICANT CHANGE UP (ref 30–115)
ALT FLD-CCNC: 14 U/L — SIGNIFICANT CHANGE UP (ref 0–41)
ANION GAP SERPL CALC-SCNC: 8 MMOL/L — SIGNIFICANT CHANGE UP (ref 7–14)
AST SERPL-CCNC: 24 U/L — SIGNIFICANT CHANGE UP (ref 0–41)
BASE EXCESS BLDV CALC-SCNC: 5.3 MMOL/L — HIGH (ref -2–3)
BASOPHILS # BLD AUTO: 0.02 K/UL — SIGNIFICANT CHANGE UP (ref 0–0.2)
BASOPHILS NFR BLD AUTO: 0.3 % — SIGNIFICANT CHANGE UP (ref 0–1)
BILIRUB SERPL-MCNC: 0.2 MG/DL — SIGNIFICANT CHANGE UP (ref 0.2–1.2)
BUN SERPL-MCNC: 17 MG/DL — SIGNIFICANT CHANGE UP (ref 10–20)
CA-I SERPL-SCNC: 1.24 MMOL/L — SIGNIFICANT CHANGE UP (ref 1.15–1.33)
CALCIUM SERPL-MCNC: 9.4 MG/DL — SIGNIFICANT CHANGE UP (ref 8.4–10.5)
CHLORIDE SERPL-SCNC: 103 MMOL/L — SIGNIFICANT CHANGE UP (ref 98–110)
CO2 SERPL-SCNC: 30 MMOL/L — SIGNIFICANT CHANGE UP (ref 17–32)
CREAT SERPL-MCNC: 0.9 MG/DL — SIGNIFICANT CHANGE UP (ref 0.7–1.5)
D DIMER BLD IA.RAPID-MCNC: 577 NG/ML DDU — HIGH
EGFR: 60 ML/MIN/1.73M2 — SIGNIFICANT CHANGE UP
EOSINOPHIL # BLD AUTO: 0.26 K/UL — SIGNIFICANT CHANGE UP (ref 0–0.7)
EOSINOPHIL NFR BLD AUTO: 4.1 % — SIGNIFICANT CHANGE UP (ref 0–8)
GAS PNL BLDV: 134 MMOL/L — LOW (ref 136–145)
GAS PNL BLDV: SIGNIFICANT CHANGE UP
GAS PNL BLDV: SIGNIFICANT CHANGE UP
GLUCOSE SERPL-MCNC: 89 MG/DL — SIGNIFICANT CHANGE UP (ref 70–99)
HCO3 BLDV-SCNC: 32 MMOL/L — HIGH (ref 22–29)
HCT VFR BLD CALC: 34 % — LOW (ref 37–47)
HCT VFR BLDA CALC: 33 % — LOW (ref 34.5–46.5)
HGB BLD CALC-MCNC: 11.1 G/DL — LOW (ref 11.7–16.1)
HGB BLD-MCNC: 10.8 G/DL — LOW (ref 12–16)
IMM GRANULOCYTES NFR BLD AUTO: 0.3 % — SIGNIFICANT CHANGE UP (ref 0.1–0.3)
LACTATE BLDV-MCNC: 1.4 MMOL/L — SIGNIFICANT CHANGE UP (ref 0.5–2)
LYMPHOCYTES # BLD AUTO: 0.9 K/UL — LOW (ref 1.2–3.4)
LYMPHOCYTES # BLD AUTO: 14.3 % — LOW (ref 20.5–51.1)
MCHC RBC-ENTMCNC: 29 PG — SIGNIFICANT CHANGE UP (ref 27–31)
MCHC RBC-ENTMCNC: 31.8 G/DL — LOW (ref 32–37)
MCV RBC AUTO: 91.4 FL — SIGNIFICANT CHANGE UP (ref 81–99)
MONOCYTES # BLD AUTO: 0.52 K/UL — SIGNIFICANT CHANGE UP (ref 0.1–0.6)
MONOCYTES NFR BLD AUTO: 8.2 % — SIGNIFICANT CHANGE UP (ref 1.7–9.3)
NEUTROPHILS # BLD AUTO: 4.59 K/UL — SIGNIFICANT CHANGE UP (ref 1.4–6.5)
NEUTROPHILS NFR BLD AUTO: 72.8 % — SIGNIFICANT CHANGE UP (ref 42.2–75.2)
NRBC # BLD: 0 /100 WBCS — SIGNIFICANT CHANGE UP (ref 0–0)
NT-PROBNP SERPL-SCNC: 943 PG/ML — HIGH (ref 0–300)
PCO2 BLDV: 55 MMHG — HIGH (ref 39–42)
PH BLDV: 7.37 — SIGNIFICANT CHANGE UP (ref 7.32–7.43)
PLATELET # BLD AUTO: 249 K/UL — SIGNIFICANT CHANGE UP (ref 130–400)
PMV BLD: 8.9 FL — SIGNIFICANT CHANGE UP (ref 7.4–10.4)
PO2 BLDV: 33 MMHG — SIGNIFICANT CHANGE UP (ref 25–45)
POTASSIUM BLDV-SCNC: 5.2 MMOL/L — HIGH (ref 3.5–5.1)
POTASSIUM SERPL-MCNC: 4.4 MMOL/L — SIGNIFICANT CHANGE UP (ref 3.5–5)
POTASSIUM SERPL-SCNC: 4.4 MMOL/L — SIGNIFICANT CHANGE UP (ref 3.5–5)
PROT SERPL-MCNC: 6.9 G/DL — SIGNIFICANT CHANGE UP (ref 6–8)
RBC # BLD: 3.72 M/UL — LOW (ref 4.2–5.4)
RBC # FLD: 14.2 % — SIGNIFICANT CHANGE UP (ref 11.5–14.5)
SAO2 % BLDV: 51.8 % — LOW (ref 67–88)
SODIUM SERPL-SCNC: 141 MMOL/L — SIGNIFICANT CHANGE UP (ref 135–146)
TROPONIN T, HIGH SENSITIVITY RESULT: 29 NG/L — HIGH (ref 6–13)
WBC # BLD: 6.31 K/UL — SIGNIFICANT CHANGE UP (ref 4.8–10.8)
WBC # FLD AUTO: 6.31 K/UL — SIGNIFICANT CHANGE UP (ref 4.8–10.8)

## 2024-03-24 PROCEDURE — 71275 CT ANGIOGRAPHY CHEST: CPT | Mod: 26,MC

## 2024-03-24 PROCEDURE — 99285 EMERGENCY DEPT VISIT HI MDM: CPT | Mod: FS

## 2024-03-24 PROCEDURE — 93010 ELECTROCARDIOGRAM REPORT: CPT

## 2024-03-24 PROCEDURE — 71045 X-RAY EXAM CHEST 1 VIEW: CPT | Mod: 26

## 2024-03-24 NOTE — ED ADULT NURSE NOTE - NSFALLRISKINTERV_ED_ALL_ED

## 2024-03-25 ENCOUNTER — TRANSCRIPTION ENCOUNTER (OUTPATIENT)
Age: 89
End: 2024-03-25

## 2024-03-25 DIAGNOSIS — R06.02 SHORTNESS OF BREATH: ICD-10-CM

## 2024-03-25 LAB
FLUAV AG NPH QL: SIGNIFICANT CHANGE UP
FLUBV AG NPH QL: SIGNIFICANT CHANGE UP
RSV RNA NPH QL NAA+NON-PROBE: SIGNIFICANT CHANGE UP
SARS-COV-2 RNA SPEC QL NAA+PROBE: SIGNIFICANT CHANGE UP

## 2024-03-25 PROCEDURE — 0241U: CPT

## 2024-03-25 RX ORDER — LOPERAMIDE HCL 2 MG
1 TABLET ORAL
Refills: 0 | DISCHARGE

## 2024-03-25 RX ORDER — ONDANSETRON 8 MG/1
4 TABLET, FILM COATED ORAL EVERY 8 HOURS
Refills: 0 | Status: DISCONTINUED | OUTPATIENT
Start: 2024-03-25 | End: 2024-03-25

## 2024-03-25 RX ORDER — LANOLIN ALCOHOL/MO/W.PET/CERES
3 CREAM (GRAM) TOPICAL AT BEDTIME
Refills: 0 | Status: DISCONTINUED | OUTPATIENT
Start: 2024-03-25 | End: 2024-03-25

## 2024-03-25 RX ORDER — ALPRAZOLAM 0.25 MG
1 TABLET ORAL
Refills: 0 | DISCHARGE

## 2024-03-25 RX ORDER — ATORVASTATIN CALCIUM 80 MG/1
10 TABLET, FILM COATED ORAL AT BEDTIME
Refills: 0 | Status: DISCONTINUED | OUTPATIENT
Start: 2024-03-25 | End: 2024-03-25

## 2024-03-25 RX ORDER — PANTOPRAZOLE SODIUM 20 MG/1
40 TABLET, DELAYED RELEASE ORAL
Refills: 0 | Status: DISCONTINUED | OUTPATIENT
Start: 2024-03-25 | End: 2024-03-25

## 2024-03-25 RX ORDER — DEXLANSOPRAZOLE 30 MG/1
1 CAPSULE, DELAYED RELEASE ORAL
Refills: 0 | DISCHARGE

## 2024-03-25 RX ORDER — ACETAMINOPHEN 500 MG
650 TABLET ORAL EVERY 6 HOURS
Refills: 0 | Status: DISCONTINUED | OUTPATIENT
Start: 2024-03-25 | End: 2024-03-25

## 2024-03-25 RX ORDER — ALPRAZOLAM 0.25 MG
0.25 TABLET ORAL AT BEDTIME
Refills: 0 | Status: DISCONTINUED | OUTPATIENT
Start: 2024-03-25 | End: 2024-03-25

## 2024-03-25 RX ORDER — HEPARIN SODIUM 5000 [USP'U]/ML
5000 INJECTION INTRAVENOUS; SUBCUTANEOUS EVERY 12 HOURS
Refills: 0 | Status: DISCONTINUED | OUTPATIENT
Start: 2024-03-25 | End: 2024-03-25

## 2024-03-25 RX ORDER — ASPIRIN/CALCIUM CARB/MAGNESIUM 324 MG
81 TABLET ORAL DAILY
Refills: 0 | Status: DISCONTINUED | OUTPATIENT
Start: 2024-03-25 | End: 2024-03-25

## 2024-03-25 RX ORDER — POLYETHYLENE GLYCOL 3350 17 G/17G
17 POWDER, FOR SOLUTION ORAL AT BEDTIME
Refills: 0 | Status: DISCONTINUED | OUTPATIENT
Start: 2024-03-25 | End: 2024-03-25

## 2024-03-25 RX ORDER — ATORVASTATIN CALCIUM 80 MG/1
1 TABLET, FILM COATED ORAL
Refills: 0 | DISCHARGE

## 2024-03-25 NOTE — CHART NOTE - NSCHARTNOTEFT_GEN_A_CORE
The patient wants to AMA, explained to her that she needs further work up for her SOB but she feels that she is fine. ED nurse manager spoke with the NH and they have a bed for her, ED will arrange for transport. The patient is aware about the consequences of leaving AMA.

## 2024-03-25 NOTE — ED PROVIDER NOTE - ATTENDING APP SHARED VISIT CONTRIBUTION OF CARE
Do You Have A Family History Of Psoriasis?: yes
How Severe Is Your Psoriasis?: moderate
Is This A New Presentation, Or A Follow-Up?: Psoriasis
Additional History: Diagnosed and treated by Dr. Turner several years ago per patient history; patient improved with triamcinolone
Patient is sent from NH for evaluation of sob. Denies f/v/abd pain.  Vitals reviewed   Lungs: CTA, no wheezing, no crackles.  A/P: dyspnea,   Labs, EKG,   CXR, reevaluation.

## 2024-03-25 NOTE — ED PROVIDER NOTE - DIFFERENTIAL DIAGNOSIS
Differential Diagnosis cardiac ischemia, cardiac arrhythmia, acute coronary syndromes, symptomatic anemia, electrolyte abnormalities including hyponatremia and hypokalemia, and pneumothorax.

## 2024-03-25 NOTE — DISCHARGE NOTE PROVIDER - CARE PROVIDER_API CALL
Angel Luis Rodriguez  Internal Medicine  [address] [place] [place] [address]  Phone: [phone]  Fax: [phone]  Follow Up Time: 1 week

## 2024-03-25 NOTE — DISCHARGE NOTE PROVIDER - NSDCCPCAREPLAN_GEN_ALL_CORE_FT
PRINCIPAL DISCHARGE DIAGNOSIS  Diagnosis: SOB (shortness of breath)  Assessment and Plan of Treatment: Admitted for SOB, CT angio negative, [facility] but wants to AMA because she feels she is okay and doesn't want to be admitted

## 2024-03-25 NOTE — DISCHARGE NOTE PROVIDER - HOSPITAL COURSE
89-year-old female history of aortic stenosis, HLD, HTN, left carotid stenosis presenting to ED for evaluation of SOB.  Patient presenting from [facility] states that she was feeling short of breath earlier today while talking to the staff at . The patient is not cooperative during hx taking and isn't answering my question, she wants to be discharged at this time, will need transport to the NH. The patient mentioned that she is doing fin now, no SOB and on RA, work up was negative to r/o PE. She mentioned that she uses nebulizer at home which was prescribed to her by PCP in [place]. As per patient she doesn't take any medication and is AAO x 3.      # CABRERA likely due to Atelectasis (related to age)?  # SIRS not preset on admission  - f/u RVP panel negative  - CT angio: Limited evaluation based on technique. No acute inflammatory changes. Hiatal hernia. Lungs, Pleura, and Airways: Central airways are patent. No mass. Mild left basal consolidative opacities likely atelectatic. No pneumothorax or pleural effusion.  - D-Dimer 577, trops 29, f/u repeat trop's  - nebs Q6  - albuterol PRN  - monitor off antibiotics   - incentive spirometer for now  - RVP negative    # hx of aortic stenosis  # Elevated BNP   - BNP of 943  - mild b/l LE edema  - recommend ECHO OP and a cardio f/u    # Anemia  - Hg of 10, baseline 11  - monitor for now    # Anxiety/insomnia  - Alpra .25 mg at bedtime

## 2024-03-25 NOTE — H&P ADULT - ASSESSMENT
89-year-old female history of aortic stenosis, HLD, HTN, left carotid stenosis presenting to ED for evaluation of SOB.  Patient presenting from [facility] states that she was feeling short of breath earlier today while talking to the staff at .  Denies fevers, chills, N, V, CP,, cough, abdominal pain, back pain      # Acute hypoxic respiratory failure  # CABRERA likely due to Atelectasis?  # SIRS not preset on admission  - f/u RVP panel  - CT angio: Limited evaluation based on technique. No acute inflammatory changes. Hiatal hernia. Lungs, Pleura, and Airways: Central airways are patent. No mass. Mild left basal consolidative opacities likely atelectatic. No pneumothorax or pleural effusion.  - D-Dimer 577, trops 29  - nebs Q4   - incentive spirimeter      #DVT PPx- heparin  #GI PPx- Protonix  #Diet- DASH  #CHG  #Activity- Ambulate with assistance  #Dispo- Med  # Code: DNR/DNI prior [facility] 89-year-old female history of aortic stenosis, HLD, HTN, left carotid stenosis presenting to ED for evaluation of SOB.  Patient presenting from [facility] states that she was feeling short of breath earlier today while talking to the staff at . The patient is not cooperative during hx taking and isn't answering my question, she wants to be discharged at this time, will need transport to the NH. The patient mentioned that she is doing fin now, no SOB and on RA, work up was negative to r/o PE.   89-year-old female history of aortic stenosis, HLD, HTN, left carotid stenosis presenting to ED for evaluation of SOB.  Patient presenting from [facility] states that she was feeling short of breath earlier today while talking to the staff at . The patient is not cooperative during hx taking and isn't answering my question, she wants to be discharged at this time, will need transport to the NH. The patient mentioned that she is doing fin now, no SOB and on RA, work up was negative to r/o PE. She mentioned that she uses nebulizer at home which was prescribed to her by PCP in [place]. As per patient she doesn't take any medication and is AAO x 3.      # CABRERA likely due to Atelectasis (related to age)?  # SIRS not preset on admission  - f/u RVP panel  - CT angio: Limited evaluation based on technique. No acute inflammatory changes. Hiatal hernia. Lungs, Pleura, and Airways: Central airways are patent. No mass. Mild left basal consolidative opacities likely atelectatic. No pneumothorax or pleural effusion.  - D-Dimer 577, trops 29, f/u repeat trops  - nebs Q6  - albuterol PRN  - monitor off antibiotics   - incentive spirometer for now  - RVP negative    # hx of aortic stenosis  # Elevated BNP   - BNP of 943  - mild b/l LE edema  - recommend ECHO OP and a cardio f/u    # Anemia  - Hg of 10, baseline 11  - monitor for now      #DVT PPx- heparin  #GI PPx- Protonix  #Diet- DASH  #CHG  #Activity- Ambulate with assistance  #Dispo- Anticipate for tomorrow  # Code: DNR/DNI prior [facility] 89-year-old female history of aortic stenosis, HLD, HTN, left carotid stenosis presenting to ED for evaluation of SOB.  Patient presenting from [facility] states that she was feeling short of breath earlier today while talking to the staff at . The patient is not cooperative during hx taking and isn't answering my question, she wants to be discharged at this time, will need transport to the NH. The patient mentioned that she is doing fin now, no SOB and on RA, work up was negative to r/o PE. She mentioned that she uses nebulizer at home which was prescribed to her by PCP in [place]. As per patient she doesn't take any medication and is AAO x 3.      # CABRERA likely due to Atelectasis (related to age)?  # SIRS not preset on admission  - f/u RVP panel negative  - CT angio: Limited evaluation based on technique. No acute inflammatory changes. Hiatal hernia. Lungs, Pleura, and Airways: Central airways are patent. No mass. Mild left basal consolidative opacities likely atelectatic. No pneumothorax or pleural effusion.  - D-Dimer 577, trops 29, f/u repeat trop's  - nebs Q6  - albuterol PRN  - monitor off antibiotics   - incentive spirometer for now  - RVP negative    # hx of aortic stenosis  # Elevated BNP   - BNP of 943  - mild b/l LE edema  - recommend ECHO OP and a cardio f/u    # Anemia  - Hg of 10, baseline 11  - monitor for now    # Anxiety/insomnia  - Alpra .25 mg at bedtime    #DVT PPx- heparin  #GI PPx- Protonix  #Diet- DASH  #CHG  #Activity- Ambulate with assistance  #Dispo- Anticipate for today  # Code: DNR/DNI prior [facility]

## 2024-03-25 NOTE — ED PROVIDER NOTE - OBJECTIVE STATEMENT
89-year-old female history of aortic stenosis, HLD, HTN, left carotid stenosis presenting to ED for evaluation of SOB.  Patient presenting from [facility] states that she was feeling short of breath earlier today while talking to the staff at .  Denies fevers, chills, N, V, CP,, cough, abdominal pain, back pain

## 2024-03-25 NOTE — ED ADULT NURSE REASSESSMENT NOTE - NS ED NURSE REASSESS COMMENT FT1
Spoke with the patient who absolutely doesn't want to stay in the hospital, wants to sign AMA because she is feeling better. Covering MD is ok for pt to go back to [facility]. I spoke with charge RN Cori from [facility] and they will accept the patient. I also called patient's daughter HCP Malou who is ok for her mother to go back to NH.

## 2024-03-25 NOTE — ED ADULT NURSE REASSESSMENT NOTE - NS ED NURSE REASSESS COMMENT FT1
Patient requested to leave hospital. Patient wants to leave AMA. MD and charge nurse made aware. Charge Nurse Silvia and MD made aware. Covering MD is ok for pt to go back to [facility]. Charge nurse spoke with charge HARPREET Persaud from [facility] and they will accept the patient. Charge also called patient's daughter HCP Malou who is ok for her mother to go back to NH Patient requested to leave hospital. Patient wants to leave AMA. MD and charge nurse made aware. Charge Nurse Silvia and MD Bustamante made aware. Covering MD is ok for pt to go back to [facility]. Charge nurse spoke with charge HARPREET Persaud from [facility] and they will accept the patient. Charge also called patient's daughter HCP Malou who is ok for her mother to go back to NH

## 2024-03-25 NOTE — H&P ADULT - HISTORY OF PRESENT ILLNESS
89-year-old female history of aortic stenosis, HLD, HTN, left carotid stenosis presenting to ED for evaluation of SOB.  Patient presenting from [facility] states that she was feeling short of breath earlier today while talking to the staff at .  Denies fevers, chills, N, V, CP,, cough, abdominal pain, back pain        Vital Signs Last 24 Hrs  T(C): 35.9 (24 Mar 2024 23:34), Max: 36.4 (24 Mar 2024 19:23)  T(F): 96.7 (24 Mar 2024 23:34), Max: 97.5 (24 Mar 2024 19:23)  HR: 74 (24 Mar 2024 23:34) (74 - 79)  BP: 115/- (24 Mar 2024 23:34) (113/54 - 115/-)  BP(mean): 84 (24 Mar 2024 23:34) (84 - 84)  RR: 23 (24 Mar 2024 23:34) (23 - 24)  SpO2: 100% (24 Mar 2024 23:34) (100% - 100%)  O2 Parameters below as of 24 Mar 2024 23:34  Patient On (Oxygen Delivery Method): nasal cannula  O2 Flow (L/min): 3       89-year-old female history of aortic stenosis, HLD, HTN, left carotid stenosis presenting to ED for evaluation of SOB.  Patient presenting from [facility] states that she was feeling short of breath earlier today while talking to the staff at . The patient is not cooperative during hx taking and isn't answering my question, she wants to be discharged at this time, will need transport to the NH. The patient mentioned that she is doing fin now, no SOB and on RA, work up was negative to r/o PE. The patient mentioned that she is doing fin now, no SOB and on RA, work up was negative to r/o PE. She mentioned that she uses nebulizer at home which was prescribed to her by PCP in [place]. As per patient she doesn't take any medication and is AAO x 3.   Denies fevers, chills, N, V, CP,, cough, abdominal pain, back pain        Vital Signs Last 24 Hrs  T(C): 35.9 (24 Mar 2024 23:34), Max: 36.4 (24 Mar 2024 19:23)  T(F): 96.7 (24 Mar 2024 23:34), Max: 97.5 (24 Mar 2024 19:23)  HR: 74 (24 Mar 2024 23:34) (74 - 79)  BP: 115/- (24 Mar 2024 23:34) (113/54 - 115/-)  BP(mean): 84 (24 Mar 2024 23:34) (84 - 84)  RR: 23 (24 Mar 2024 23:34) (23 - 24)  SpO2: 100% (24 Mar 2024 23:34) (100% - 100%)  O2 Parameters below as of 24 Mar 2024 23:34  Patient On (Oxygen Delivery Method): nasal cannula  O2 Flow (L/min): 3       89-year-old female history of aortic stenosis, HLD, HTN, left carotid stenosis presenting to ED for evaluation of SOB.  Patient presenting from [facility] states that she was feeling short of breath earlier today while talking to the staff at . The patient is not cooperative during hx taking and isn't answering my question, she wants to be discharged at this time, will need transport to the NH. The patient mentioned that she is doing fin now, no SOB and on RA, work up was negative to r/o PE. The patient mentioned that she is doing fine now, no SOB and on RA, work up was negative to r/o PE. She mentioned that she uses nebulizer at home which was prescribed to her by PCP in [place]. As per patient she doesn't take any medication and is AAO x 3.   Denies fevers, chills, N, V, CP,, cough, abdominal pain, back pain        Vital Signs Last 24 Hrs  T(C): 35.9 (24 Mar 2024 23:34), Max: 36.4 (24 Mar 2024 19:23)  T(F): 96.7 (24 Mar 2024 23:34), Max: 97.5 (24 Mar 2024 19:23)  HR: 74 (24 Mar 2024 23:34) (74 - 79)  BP: 115/- (24 Mar 2024 23:34) (113/54 - 115/-)  BP(mean): 84 (24 Mar 2024 23:34) (84 - 84)  RR: 23 (24 Mar 2024 23:34) (23 - 24)  SpO2: 100% (24 Mar 2024 23:34) (100% - 100%)  O2 Parameters below as of 24 Mar 2024 23:34  Patient On (Oxygen Delivery Method): nasal cannula  O2 Flow (L/min): 3

## 2024-03-25 NOTE — DISCHARGE NOTE PROVIDER - NSDCMRMEDTOKEN_GEN_ALL_CORE_FT
ALPRAZolam 0.25 mg oral tablet: 1 tab(s) orally once a day (at bedtime) as needed for  insomnia  aspirin 81 mg oral delayed release tablet: 1 tab(s) orally once a day  atorvastatin 10 mg oral tablet: 1 tab(s) orally once a day (at bedtime)  dexlansoprazole 60 mg oral delayed release capsule: 1 cap(s) orally once a day  loperamide 2 mg oral capsule: 1 cap(s) orally 4 times a day as needed for  diarrhea  Migraine relief tablet:   Multiple Vitamins oral tablet: 1 tab(s) orally once a day  polyethylene glycol 3350 oral powder for reconstitution: 17 gram(s) orally once a day (at bedtime)

## 2024-03-25 NOTE — ED PROVIDER NOTE - PHYSICAL EXAMINATION
VITAL SIGNS: I have reviewed nursing notes and confirm.  CONSTITUTIONAL: in no acute distress.  SKIN: Skin exam is warm and dry, no acute rash.  HEAD: Normocephalic; atraumatic.  EYES: PERRL, EOM intact; conjunctiva and sclera clear.  ENT: No nasal discharge; airway clear.   NECK: Supple; non tender.  CARD: S1, S2 normal; no murmurs, gallops, or rubs. Regular rate and rhythm.  RESP: No wheezes, rales or rhonchi. Speaking in full sentences.   ABD: soft; non-distended; non-tender; No rebound or guarding.   EXT: Normal ROM. No clubbing, cyanosis or edema.  NEURO: Alert, oriented. Grossly unremarkable. No focal deficits.   PSYCH: Cooperative, appropriate.

## 2024-03-25 NOTE — DISCHARGE NOTE PROVIDER - CARE PROVIDERS DIRECT ADDRESSES
[email or web address]

## 2024-03-25 NOTE — ED PROVIDER NOTE - EKG/XRAY ADDITIONAL INFORMATION
EKG shows sinus rhythm, bifascicular block, no STEMI.   Chest X-rays reviewed and interpreted by me Dr. Laws and shows no actue findings. No Pneumothorax, no free air, no effusions, and these findings discussed with patient.

## 2024-03-25 NOTE — H&P ADULT - NSHPLABSRESULTS_GEN_ALL_CORE
10.8   6.31  )-----------( 249      ( 24 Mar 2024 20:19 )             34.0     03-24    141  |  103  |  17  ----------------------------<  89  4.4   |  30  |  0.9    Ca    9.4      24 Mar 2024 20:19    TPro  6.9  /  Alb  3.8  /  TBili  0.2  /  DBili  x   /  AST  24  /  ALT  14  /  AlkPhos  74  03-24          Urinalysis Basic - ( 24 Mar 2024 20:19 )    Color: x / Appearance: x / SG: x / pH: x  Gluc: 89 mg/dL / Ketone: x  / Bili: x / Urobili: x   Blood: x / Protein: x / Nitrite: x   Leuk Esterase: x / RBC: x / WBC x   Sq Epi: x / Non Sq Epi: x / Bacteria: x        Lactate Trend        CAPILLARY BLOOD GLUCOSE

## 2024-04-08 DIAGNOSIS — Z79.82 LONG TERM (CURRENT) USE OF ASPIRIN: ICD-10-CM

## 2024-04-08 DIAGNOSIS — Z53.29 PROCEDURE AND TREATMENT NOT CARRIED OUT BECAUSE OF PATIENT'S DECISION FOR OTHER REASONS: ICD-10-CM

## 2024-04-08 DIAGNOSIS — J98.11 ATELECTASIS: ICD-10-CM

## 2024-04-08 DIAGNOSIS — Z85.3 PERSONAL HISTORY OF MALIGNANT NEOPLASM OF BREAST: ICD-10-CM

## 2024-04-08 DIAGNOSIS — Z90.13 ACQUIRED ABSENCE OF BILATERAL BREASTS AND NIPPLES: ICD-10-CM

## 2024-04-08 DIAGNOSIS — R65.10 SYSTEMIC INFLAMMATORY RESPONSE SYNDROME (SIRS) OF NON-INFECTIOUS ORIGIN WITHOUT ACUTE ORGAN DYSFUNCTION: ICD-10-CM

## 2024-04-08 DIAGNOSIS — Z66 DO NOT RESUSCITATE: ICD-10-CM

## 2024-04-08 DIAGNOSIS — Z86.011 PERSONAL HISTORY OF BENIGN NEOPLASM OF THE BRAIN: ICD-10-CM

## 2024-04-08 DIAGNOSIS — E78.5 HYPERLIPIDEMIA, UNSPECIFIED: ICD-10-CM

## 2024-04-08 DIAGNOSIS — I35.0 NONRHEUMATIC AORTIC (VALVE) STENOSIS: ICD-10-CM

## 2024-04-08 DIAGNOSIS — F41.9 ANXIETY DISORDER, UNSPECIFIED: ICD-10-CM

## 2024-04-08 DIAGNOSIS — Z20.822 CONTACT WITH AND (SUSPECTED) EXPOSURE TO COVID-19: ICD-10-CM

## 2024-04-08 DIAGNOSIS — D64.9 ANEMIA, UNSPECIFIED: ICD-10-CM

## 2024-04-08 DIAGNOSIS — G47.00 INSOMNIA, UNSPECIFIED: ICD-10-CM

## 2024-04-08 DIAGNOSIS — I10 ESSENTIAL (PRIMARY) HYPERTENSION: ICD-10-CM

## 2024-04-23 ENCOUNTER — EMERGENCY (EMERGENCY)
Facility: HOSPITAL | Age: 89
LOS: 0 days | Discharge: HOME | End: 2024-04-24
Attending: EMERGENCY MEDICINE
Payer: MEDICARE

## 2024-04-23 VITALS
WEIGHT: 123.9 LBS | SYSTOLIC BLOOD PRESSURE: 134 MMHG | RESPIRATION RATE: 17 BRPM | TEMPERATURE: 98 F | DIASTOLIC BLOOD PRESSURE: 86 MMHG | OXYGEN SATURATION: 98 % | HEART RATE: 87 BPM

## 2024-04-23 DIAGNOSIS — Z90.13 ACQUIRED ABSENCE OF BILATERAL BREASTS AND NIPPLES: Chronic | ICD-10-CM

## 2024-04-23 DIAGNOSIS — F41.9 ANXIETY DISORDER, UNSPECIFIED: ICD-10-CM

## 2024-04-23 DIAGNOSIS — N39.0 URINARY TRACT INFECTION, SITE NOT SPECIFIED: ICD-10-CM

## 2024-04-23 DIAGNOSIS — G47.33 OBSTRUCTIVE SLEEP APNEA (ADULT) (PEDIATRIC): ICD-10-CM

## 2024-04-23 DIAGNOSIS — Z98.890 OTHER SPECIFIED POSTPROCEDURAL STATES: Chronic | ICD-10-CM

## 2024-04-23 DIAGNOSIS — R10.30 LOWER ABDOMINAL PAIN, UNSPECIFIED: ICD-10-CM

## 2024-04-23 DIAGNOSIS — R91.8 OTHER NONSPECIFIC ABNORMAL FINDING OF LUNG FIELD: ICD-10-CM

## 2024-04-23 DIAGNOSIS — K21.9 GASTRO-ESOPHAGEAL REFLUX DISEASE WITHOUT ESOPHAGITIS: ICD-10-CM

## 2024-04-23 DIAGNOSIS — E78.5 HYPERLIPIDEMIA, UNSPECIFIED: ICD-10-CM

## 2024-04-23 DIAGNOSIS — R09.89 OTHER SPECIFIED SYMPTOMS AND SIGNS INVOLVING THE CIRCULATORY AND RESPIRATORY SYSTEMS: ICD-10-CM

## 2024-04-23 LAB
ALBUMIN SERPL ELPH-MCNC: 3.9 G/DL — SIGNIFICANT CHANGE UP (ref 3.5–5.2)
ALP SERPL-CCNC: 110 U/L — SIGNIFICANT CHANGE UP (ref 30–115)
ALT FLD-CCNC: 13 U/L — SIGNIFICANT CHANGE UP (ref 0–41)
ANION GAP SERPL CALC-SCNC: 12 MMOL/L — SIGNIFICANT CHANGE UP (ref 7–14)
APPEARANCE UR: ABNORMAL
AST SERPL-CCNC: 25 U/L — SIGNIFICANT CHANGE UP (ref 0–41)
BACTERIA # UR AUTO: ABNORMAL /HPF
BASOPHILS # BLD AUTO: 0.04 K/UL — SIGNIFICANT CHANGE UP (ref 0–0.2)
BASOPHILS NFR BLD AUTO: 0.7 % — SIGNIFICANT CHANGE UP (ref 0–1)
BILIRUB DIRECT SERPL-MCNC: 0.2 MG/DL — SIGNIFICANT CHANGE UP (ref 0–0.3)
BILIRUB INDIRECT FLD-MCNC: 0.2 MG/DL — SIGNIFICANT CHANGE UP (ref 0.2–1.2)
BILIRUB SERPL-MCNC: 0.4 MG/DL — SIGNIFICANT CHANGE UP (ref 0.2–1.2)
BILIRUB UR-MCNC: NEGATIVE — SIGNIFICANT CHANGE UP
BUN SERPL-MCNC: 20 MG/DL — SIGNIFICANT CHANGE UP (ref 10–20)
CALCIUM SERPL-MCNC: 9.6 MG/DL — SIGNIFICANT CHANGE UP (ref 8.4–10.4)
CAST: 1 /LPF — SIGNIFICANT CHANGE UP (ref 0–4)
CHLORIDE SERPL-SCNC: 102 MMOL/L — SIGNIFICANT CHANGE UP (ref 98–110)
CO2 SERPL-SCNC: 25 MMOL/L — SIGNIFICANT CHANGE UP (ref 17–32)
COLOR SPEC: YELLOW — SIGNIFICANT CHANGE UP
CREAT SERPL-MCNC: 0.8 MG/DL — SIGNIFICANT CHANGE UP (ref 0.7–1.5)
DIFF PNL FLD: NEGATIVE — SIGNIFICANT CHANGE UP
EGFR: 69 ML/MIN/1.73M2 — SIGNIFICANT CHANGE UP
EOSINOPHIL # BLD AUTO: 0.35 K/UL — SIGNIFICANT CHANGE UP (ref 0–0.7)
EOSINOPHIL NFR BLD AUTO: 6.1 % — SIGNIFICANT CHANGE UP (ref 0–8)
GLUCOSE SERPL-MCNC: 111 MG/DL — HIGH (ref 70–99)
GLUCOSE UR QL: NEGATIVE MG/DL — SIGNIFICANT CHANGE UP
HCT VFR BLD CALC: 34.8 % — LOW (ref 37–47)
HGB BLD-MCNC: 11.5 G/DL — LOW (ref 12–16)
IMM GRANULOCYTES NFR BLD AUTO: 0.3 % — SIGNIFICANT CHANGE UP (ref 0.1–0.3)
KETONES UR-MCNC: NEGATIVE MG/DL — SIGNIFICANT CHANGE UP
LACTATE SERPL-SCNC: 0.8 MMOL/L — SIGNIFICANT CHANGE UP (ref 0.7–2)
LEUKOCYTE ESTERASE UR-ACNC: ABNORMAL
LIDOCAIN IGE QN: 20 U/L — SIGNIFICANT CHANGE UP (ref 7–60)
LYMPHOCYTES # BLD AUTO: 1 K/UL — LOW (ref 1.2–3.4)
LYMPHOCYTES # BLD AUTO: 17.4 % — LOW (ref 20.5–51.1)
MCHC RBC-ENTMCNC: 29.6 PG — SIGNIFICANT CHANGE UP (ref 27–31)
MCHC RBC-ENTMCNC: 33 G/DL — SIGNIFICANT CHANGE UP (ref 32–37)
MCV RBC AUTO: 89.7 FL — SIGNIFICANT CHANGE UP (ref 81–99)
MONOCYTES # BLD AUTO: 0.48 K/UL — SIGNIFICANT CHANGE UP (ref 0.1–0.6)
MONOCYTES NFR BLD AUTO: 8.3 % — SIGNIFICANT CHANGE UP (ref 1.7–9.3)
NEUTROPHILS # BLD AUTO: 3.86 K/UL — SIGNIFICANT CHANGE UP (ref 1.4–6.5)
NEUTROPHILS NFR BLD AUTO: 67.2 % — SIGNIFICANT CHANGE UP (ref 42.2–75.2)
NITRITE UR-MCNC: NEGATIVE — SIGNIFICANT CHANGE UP
NRBC # BLD: 0 /100 WBCS — SIGNIFICANT CHANGE UP (ref 0–0)
PH UR: 6.5 — SIGNIFICANT CHANGE UP (ref 5–8)
PLATELET # BLD AUTO: 169 K/UL — SIGNIFICANT CHANGE UP (ref 130–400)
PMV BLD: 9.1 FL — SIGNIFICANT CHANGE UP (ref 7.4–10.4)
POTASSIUM SERPL-MCNC: 4 MMOL/L — SIGNIFICANT CHANGE UP (ref 3.5–5)
POTASSIUM SERPL-SCNC: 4 MMOL/L — SIGNIFICANT CHANGE UP (ref 3.5–5)
PROT SERPL-MCNC: 7 G/DL — SIGNIFICANT CHANGE UP (ref 6–8)
PROT UR-MCNC: SIGNIFICANT CHANGE UP MG/DL
RBC # BLD: 3.88 M/UL — LOW (ref 4.2–5.4)
RBC # FLD: 15.5 % — HIGH (ref 11.5–14.5)
RBC CASTS # UR COMP ASSIST: 1 /HPF — SIGNIFICANT CHANGE UP (ref 0–4)
SODIUM SERPL-SCNC: 139 MMOL/L — SIGNIFICANT CHANGE UP (ref 135–146)
SP GR SPEC: 1.01 — SIGNIFICANT CHANGE UP (ref 1–1.03)
SQUAMOUS # UR AUTO: 1 /HPF — SIGNIFICANT CHANGE UP (ref 0–5)
TROPONIN T, HIGH SENSITIVITY RESULT: 34 NG/L — HIGH (ref 6–13)
UROBILINOGEN FLD QL: 1 MG/DL — SIGNIFICANT CHANGE UP (ref 0.2–1)
WBC # BLD: 5.75 K/UL — SIGNIFICANT CHANGE UP (ref 4.8–10.8)
WBC # FLD AUTO: 5.75 K/UL — SIGNIFICANT CHANGE UP (ref 4.8–10.8)
WBC UR QL: 55 /HPF — HIGH (ref 0–5)

## 2024-04-23 PROCEDURE — 84484 ASSAY OF TROPONIN QUANT: CPT

## 2024-04-23 PROCEDURE — 87086 URINE CULTURE/COLONY COUNT: CPT

## 2024-04-23 PROCEDURE — 93010 ELECTROCARDIOGRAM REPORT: CPT

## 2024-04-23 PROCEDURE — 81001 URINALYSIS AUTO W/SCOPE: CPT

## 2024-04-23 PROCEDURE — 80076 HEPATIC FUNCTION PANEL: CPT

## 2024-04-23 PROCEDURE — 36415 COLL VENOUS BLD VENIPUNCTURE: CPT

## 2024-04-23 PROCEDURE — 99285 EMERGENCY DEPT VISIT HI MDM: CPT | Mod: 25

## 2024-04-23 PROCEDURE — 87186 SC STD MICRODIL/AGAR DIL: CPT

## 2024-04-23 PROCEDURE — 80048 BASIC METABOLIC PNL TOTAL CA: CPT

## 2024-04-23 PROCEDURE — 74177 CT ABD & PELVIS W/CONTRAST: CPT | Mod: 26,MC

## 2024-04-23 PROCEDURE — 93005 ELECTROCARDIOGRAM TRACING: CPT

## 2024-04-23 PROCEDURE — 71045 X-RAY EXAM CHEST 1 VIEW: CPT

## 2024-04-23 PROCEDURE — 83690 ASSAY OF LIPASE: CPT

## 2024-04-23 PROCEDURE — 74177 CT ABD & PELVIS W/CONTRAST: CPT | Mod: MC

## 2024-04-23 PROCEDURE — 83605 ASSAY OF LACTIC ACID: CPT

## 2024-04-23 PROCEDURE — 99285 EMERGENCY DEPT VISIT HI MDM: CPT | Mod: FS

## 2024-04-23 PROCEDURE — 85025 COMPLETE CBC W/AUTO DIFF WBC: CPT

## 2024-04-23 PROCEDURE — 96374 THER/PROPH/DIAG INJ IV PUSH: CPT | Mod: XU

## 2024-04-23 RX ORDER — SODIUM CHLORIDE 9 MG/ML
1000 INJECTION INTRAMUSCULAR; INTRAVENOUS; SUBCUTANEOUS ONCE
Refills: 0 | Status: COMPLETED | OUTPATIENT
Start: 2024-04-23 | End: 2024-04-23

## 2024-04-23 RX ORDER — CEFTRIAXONE 500 MG/1
1000 INJECTION, POWDER, FOR SOLUTION INTRAMUSCULAR; INTRAVENOUS ONCE
Refills: 0 | Status: DISCONTINUED | OUTPATIENT
Start: 2024-04-23 | End: 2024-04-24

## 2024-04-23 RX ORDER — FAMOTIDINE 10 MG/ML
20 INJECTION INTRAVENOUS ONCE
Refills: 0 | Status: COMPLETED | OUTPATIENT
Start: 2024-04-23 | End: 2024-04-23

## 2024-04-23 RX ADMIN — SODIUM CHLORIDE 1000 MILLILITER(S): 9 INJECTION INTRAMUSCULAR; INTRAVENOUS; SUBCUTANEOUS at 22:31

## 2024-04-23 RX ADMIN — FAMOTIDINE 20 MILLIGRAM(S): 10 INJECTION INTRAVENOUS at 22:31

## 2024-04-23 RX ADMIN — Medication 30 MILLILITER(S): at 22:30

## 2024-04-23 NOTE — ED ADULT NURSE NOTE - OBJECTIVE STATEMENT
BIBA from [facility] for abdominal pain that started 2am this morning

## 2024-04-23 NOTE — ED ADULT NURSE NOTE - NSFALLHARMRISKINTERV_ED_ALL_ED

## 2024-04-24 PROCEDURE — 71045 X-RAY EXAM CHEST 1 VIEW: CPT | Mod: 26

## 2024-04-24 RX ORDER — CEFPODOXIME PROXETIL 100 MG
1 TABLET ORAL
Qty: 20 | Refills: 0
Start: 2024-04-24 | End: 2024-05-03

## 2024-04-24 NOTE — ED PROVIDER NOTE - PATIENT PORTAL LINK FT
You can access the FollowMyHealth Patient Portal offered by [facility] by registering at the following website: [email or web address]. By joining [employer]’s FollowMyHealth portal, you will also be able to view your health information using other applications (apps) compatible with our system.

## 2024-04-24 NOTE — ED PROVIDER NOTE - NS ED ATTENDING STATEMENT MOD
I have seen and examined this patient and fully participated in the care of this patient as the teaching attending.  The service was shared with the JELANI.  I reviewed and verified the documentation.

## 2024-04-24 NOTE — ED PROVIDER NOTE - OBJECTIVE STATEMENT
89-year-old female with past medical history of HLD, JENNIFER, GERD, left carotid stenosis, aortic stenosis, anemia, and anxiety presents to the ED for evaluation of lower abdominal pain that started this morning.  Pain is cramp-like, constant, nonradiating.  She has not taken any medication to improve her symptoms.  She denies other complaints. Pt denies fever, chills, nausea, vomiting, diarrhea, headache, dizziness, weakness, chest pain, SOB, back pain, LOC, trauma, urinary symptoms, cough, calf pain/swelling, recent travel, recent surgery.

## 2024-04-24 NOTE — ED PROVIDER NOTE - CARE PROVIDERS DIRECT ADDRESSES
[email or web address]

## 2024-04-24 NOTE — ED PROVIDER NOTE - SHIFT CHANGE DETAILS
89-year-old female presents to the ED for abdominal pain.  Poor historian.  Obtain labs along with UA and CT imaging.  UA revealed possible UTI.  Case signed out pending CT imaging and final disposition.

## 2024-04-24 NOTE — ED PROVIDER NOTE - CARE PROVIDER_API CALL
Angel Luis Rdoriguez  Internal Medicine  [address] [place] [address]  Phone: [phone]  Fax: [phone]  Follow Up Time: 4-6 Days

## 2024-07-31 NOTE — ED ADULT NURSE NOTE - NS PRO PASSIVE SMOKE EXP
Discontinue Regimen: Minocycline 50mg
Modify Regimen: Spironolactone 50mg to 100mg
Continue Regimen: Veltin 1.2 %-0.025 % topical gel
Otc Regimen: Sunscreen, Moisturizer
Initiate Treatment: Winlevi
Detail Level: Zone
No
Plan: Discussed pt is having 2 periods in one month as a side effect. Discussed using birth control with the spironolactone to control periods

## 2024-08-02 NOTE — DISCUSSION/SUMMARY
[FreeTextEntry1] : Start atorvastatin 10 mg QHS. Patient was placed on 40 mg but did not take it either due to compliance issues or intolerance. Will start with lowest dosage.\par Vascular evaluation with Kim Portillo who the patient apparently had seen in the Hospital.\par Structural heart disease evaluation with Dr. Archie Young due to her valve disease\par Start a low fat, low cholesterol diet\par Continue inderal and ASA which the patient has been taking.\par Patient was instructed to target her T. Cholesterol to less than 200 mg/dl and LDL cholesterol to less than 70 mg/dl.\par Patient was advised to repeat a BMP, CBC , fasting lipid profile and hepatic panel.\par Her echo doppler was obtained from Dr. Rodriguez and will try to obtain her CTA from [facility].\par RV in 3 weeks\par  None

## 2024-08-28 NOTE — ED PROVIDER NOTE - NSICDXPASTSURGICALHX_GEN_ALL_CORE_FT
Time-based billing (NON-critical care) PAST SURGICAL HISTORY:  H/O craniotomy     H/O mastectomy, bilateral

## 2024-09-06 ENCOUNTER — EMERGENCY (EMERGENCY)
Facility: HOSPITAL | Age: 89
LOS: 0 days | Discharge: HOME | End: 2024-09-06
Attending: EMERGENCY MEDICINE
Payer: MEDICARE

## 2024-09-06 VITALS
OXYGEN SATURATION: 98 % | SYSTOLIC BLOOD PRESSURE: 125 MMHG | WEIGHT: 125 LBS | RESPIRATION RATE: 18 BRPM | HEART RATE: 84 BPM | TEMPERATURE: 98 F | DIASTOLIC BLOOD PRESSURE: 68 MMHG

## 2024-09-06 DIAGNOSIS — N39.0 URINARY TRACT INFECTION, SITE NOT SPECIFIED: ICD-10-CM

## 2024-09-06 DIAGNOSIS — G47.33 OBSTRUCTIVE SLEEP APNEA (ADULT) (PEDIATRIC): ICD-10-CM

## 2024-09-06 DIAGNOSIS — K21.9 GASTRO-ESOPHAGEAL REFLUX DISEASE WITHOUT ESOPHAGITIS: ICD-10-CM

## 2024-09-06 DIAGNOSIS — Z98.890 OTHER SPECIFIED POSTPROCEDURAL STATES: Chronic | ICD-10-CM

## 2024-09-06 DIAGNOSIS — Z90.13 ACQUIRED ABSENCE OF BILATERAL BREASTS AND NIPPLES: Chronic | ICD-10-CM

## 2024-09-06 DIAGNOSIS — I65.22 OCCLUSION AND STENOSIS OF LEFT CAROTID ARTERY: ICD-10-CM

## 2024-09-06 DIAGNOSIS — E78.5 HYPERLIPIDEMIA, UNSPECIFIED: ICD-10-CM

## 2024-09-06 DIAGNOSIS — R33.9 RETENTION OF URINE, UNSPECIFIED: ICD-10-CM

## 2024-09-06 DIAGNOSIS — F41.9 ANXIETY DISORDER, UNSPECIFIED: ICD-10-CM

## 2024-09-06 LAB
ALBUMIN SERPL ELPH-MCNC: 4.1 G/DL — SIGNIFICANT CHANGE UP (ref 3.5–5.2)
ALP SERPL-CCNC: 73 U/L — SIGNIFICANT CHANGE UP (ref 30–115)
ALT FLD-CCNC: 17 U/L — SIGNIFICANT CHANGE UP (ref 0–41)
ANION GAP SERPL CALC-SCNC: 10 MMOL/L — SIGNIFICANT CHANGE UP (ref 7–14)
APPEARANCE UR: CLEAR — SIGNIFICANT CHANGE UP
AST SERPL-CCNC: 30 U/L — SIGNIFICANT CHANGE UP (ref 0–41)
BASOPHILS # BLD AUTO: 0.02 K/UL — SIGNIFICANT CHANGE UP (ref 0–0.2)
BASOPHILS NFR BLD AUTO: 0.3 % — SIGNIFICANT CHANGE UP (ref 0–1)
BILIRUB SERPL-MCNC: 0.3 MG/DL — SIGNIFICANT CHANGE UP (ref 0.2–1.2)
BILIRUB UR-MCNC: NEGATIVE — SIGNIFICANT CHANGE UP
BUN SERPL-MCNC: 20 MG/DL — SIGNIFICANT CHANGE UP (ref 10–20)
CALCIUM SERPL-MCNC: 9.4 MG/DL — SIGNIFICANT CHANGE UP (ref 8.4–10.5)
CHLORIDE SERPL-SCNC: 100 MMOL/L — SIGNIFICANT CHANGE UP (ref 98–110)
CO2 SERPL-SCNC: 27 MMOL/L — SIGNIFICANT CHANGE UP (ref 17–32)
COLOR SPEC: YELLOW — SIGNIFICANT CHANGE UP
CREAT SERPL-MCNC: 0.7 MG/DL — SIGNIFICANT CHANGE UP (ref 0.7–1.5)
DIFF PNL FLD: ABNORMAL
EGFR: 81 ML/MIN/1.73M2 — SIGNIFICANT CHANGE UP
EOSINOPHIL # BLD AUTO: 0.09 K/UL — SIGNIFICANT CHANGE UP (ref 0–0.7)
EOSINOPHIL NFR BLD AUTO: 1.4 % — SIGNIFICANT CHANGE UP (ref 0–8)
GLUCOSE SERPL-MCNC: 62 MG/DL — LOW (ref 70–99)
GLUCOSE UR QL: NEGATIVE MG/DL — SIGNIFICANT CHANGE UP
HCT VFR BLD CALC: 31.4 % — LOW (ref 37–47)
HGB BLD-MCNC: 9.8 G/DL — LOW (ref 12–16)
IMM GRANULOCYTES NFR BLD AUTO: 0.2 % — SIGNIFICANT CHANGE UP (ref 0.1–0.3)
KETONES UR-MCNC: NEGATIVE MG/DL — SIGNIFICANT CHANGE UP
LEUKOCYTE ESTERASE UR-ACNC: ABNORMAL
LYMPHOCYTES # BLD AUTO: 0.69 K/UL — LOW (ref 1.2–3.4)
LYMPHOCYTES # BLD AUTO: 11 % — LOW (ref 20.5–51.1)
MCHC RBC-ENTMCNC: 29.3 PG — SIGNIFICANT CHANGE UP (ref 27–31)
MCHC RBC-ENTMCNC: 31.2 G/DL — LOW (ref 32–37)
MCV RBC AUTO: 94 FL — SIGNIFICANT CHANGE UP (ref 81–99)
MONOCYTES # BLD AUTO: 0.52 K/UL — SIGNIFICANT CHANGE UP (ref 0.1–0.6)
MONOCYTES NFR BLD AUTO: 8.3 % — SIGNIFICANT CHANGE UP (ref 1.7–9.3)
NEUTROPHILS # BLD AUTO: 4.94 K/UL — SIGNIFICANT CHANGE UP (ref 1.4–6.5)
NEUTROPHILS NFR BLD AUTO: 78.8 % — HIGH (ref 42.2–75.2)
NITRITE UR-MCNC: NEGATIVE — SIGNIFICANT CHANGE UP
NRBC # BLD: 0 /100 WBCS — SIGNIFICANT CHANGE UP (ref 0–0)
PH UR: 6.5 — SIGNIFICANT CHANGE UP (ref 5–8)
PLATELET # BLD AUTO: 217 K/UL — SIGNIFICANT CHANGE UP (ref 130–400)
PMV BLD: 9.7 FL — SIGNIFICANT CHANGE UP (ref 7.4–10.4)
POTASSIUM SERPL-MCNC: 4.9 MMOL/L — SIGNIFICANT CHANGE UP (ref 3.5–5)
POTASSIUM SERPL-SCNC: 4.9 MMOL/L — SIGNIFICANT CHANGE UP (ref 3.5–5)
PROT SERPL-MCNC: 6.9 G/DL — SIGNIFICANT CHANGE UP (ref 6–8)
PROT UR-MCNC: 30 MG/DL
RBC # BLD: 3.34 M/UL — LOW (ref 4.2–5.4)
RBC # FLD: 15.2 % — HIGH (ref 11.5–14.5)
SODIUM SERPL-SCNC: 137 MMOL/L — SIGNIFICANT CHANGE UP (ref 135–146)
SP GR SPEC: 1 — SIGNIFICANT CHANGE UP (ref 1–1.03)
UROBILINOGEN FLD QL: 1 MG/DL — SIGNIFICANT CHANGE UP (ref 0.2–1)
WBC # BLD: 6.27 K/UL — SIGNIFICANT CHANGE UP (ref 4.8–10.8)
WBC # FLD AUTO: 6.27 K/UL — SIGNIFICANT CHANGE UP (ref 4.8–10.8)

## 2024-09-06 PROCEDURE — 99283 EMERGENCY DEPT VISIT LOW MDM: CPT | Mod: 25

## 2024-09-06 PROCEDURE — 99284 EMERGENCY DEPT VISIT MOD MDM: CPT | Mod: FS

## 2024-09-06 PROCEDURE — 36000 PLACE NEEDLE IN VEIN: CPT

## 2024-09-06 PROCEDURE — 36415 COLL VENOUS BLD VENIPUNCTURE: CPT

## 2024-09-06 PROCEDURE — 87086 URINE CULTURE/COLONY COUNT: CPT

## 2024-09-06 PROCEDURE — 87186 SC STD MICRODIL/AGAR DIL: CPT

## 2024-09-06 PROCEDURE — 87077 CULTURE AEROBIC IDENTIFY: CPT

## 2024-09-06 PROCEDURE — 85025 COMPLETE CBC W/AUTO DIFF WBC: CPT

## 2024-09-06 PROCEDURE — 80053 COMPREHEN METABOLIC PANEL: CPT

## 2024-09-06 PROCEDURE — 81001 URINALYSIS AUTO W/SCOPE: CPT

## 2024-09-06 RX ORDER — CEFPODOXIME PROXETIL 100 MG/5ML
1 GRANULE, FOR SUSPENSION ORAL
Qty: 14 | Refills: 0
Start: 2024-09-06 | End: 2024-09-12

## 2024-09-06 NOTE — ED PROVIDER NOTE - PATIENT PORTAL LINK FT
You can access the FollowMyHealth Patient Portal offered by [facility] by registering at the following website: [email or web address]. By joining [employer]’s FollowMyHealth portal, you will also be able to view your health information using other applications (apps) compatible with our system.

## 2024-09-06 NOTE — ED ADULT NURSE NOTE - OBJECTIVE STATEMENT
Self caths from [facility]. Hx dementia. Lately pt has been unsuccessful with her own caths and has had limited urine output. Sent to r/o possible UTI.

## 2024-09-06 NOTE — ED PROVIDER NOTE - PHYSICAL EXAMINATION
CONST: Well appearing in NAD  EYES: EOMI, Sclera and conjunctiva clear.   ENT: No nasal discharge  NECK: Non-tender  CARD: Normal S1 S2; Normal rate and rhythm  RESP: Equal BS B/L, No wheezes, rhonchi or rales. No distress  GI: Soft, non-distended, mild TTP suprapubic area  MS: Normal ROM in all extremities.   SKIN: Warm, dry, Good turgor  NEURO: A&Ox3, No focal deficits. Strength 5/5

## 2024-09-06 NOTE — ED PROVIDER NOTE - ATTENDING APP SHARED VISIT CONTRIBUTION OF CARE
89-year-old female, past medical history of hyperlipidemia, JENNIFER, GERD, left carotid stenosis, aortic stenosis, anemia, anxiety, urinary retention–patient self catheterizes herself, comes in from [facility] for constantly feeling the urge to urinate but when she catheterizes herself minimal urine comes out.  No fever/chills, chest pain/shortness of breath, abdominal pain, back pain, vaginal discharge.  On exam, pt in NAD, AAOx3, head NC/AT, CN II-XII intact, lungs CTA B/L, CV S1S2 regular, abdomen soft/NT/ND/(+)BS, ext (-) edema, motor 5/5x4, sensation intact.  Labs, UA done and reviewed.  Findings are consistent with UTI.  Will DC patient with antibiotics and PMD follow-up.

## 2024-09-06 NOTE — ED ADULT TRIAGE NOTE - CHIEF COMPLAINT QUOTE
Self caths from [facility]. Hx dementia. Lately pt has been unsuccessful with her own caths and has had limited urine output. Sent to r/o possible UTI.

## 2024-09-06 NOTE — ED PROVIDER NOTE - NSFOLLOWUPINSTRUCTIONS_ED_ALL_ED_FT
FOLLOW UP WITH YOUR PRIMARY DOCTOR  RETURN TO ED FOR NEW OR WORSENING SYMPTOMS    Urinary Tract Infection    A urinary tract infection (UTI) is an infection of any part of the urinary tract, which includes the kidneys, ureters, bladder, and urethra. Risk factors include ignoring your need to urinate, wiping back to front if female, being an uncircumcised male, and having diabetes or a weak immune system. Symptoms include frequent urination, pain or burning with urination, foul smelling urine, cloudy urine, pain in the lower abdomen, blood in the urine, and fever. If you were prescribed an antibiotic medicine, take it as told by your health care provider. Do not stop taking the antibiotic even if you start to feel better.    SEEK IMMEDIATE MEDICAL CARE IF YOU HAVE ANY OF THE FOLLOWING SYMPTOMS: severe back or abdominal pain, fever, inability to keep fluids or medicine down, dizziness/lightheadedness, or a change in mental status.

## 2024-09-06 NOTE — ED PROVIDER NOTE - CLINICAL SUMMARY MEDICAL DECISION MAKING FREE TEXT BOX
89-year-old female, past medical history of hyperlipidemia, JENNIFER, GERD, left carotid stenosis, aortic stenosis, anemia, anxiety, urinary retention–patient self catheterizes herself, comes in from [facility] for constantly feeling the urge to urinate but when she catheterizes herself minimal urine comes out.  No fever/chills, chest pain/shortness of breath, abdominal pain, back pain, vaginal discharge.  On exam, pt in NAD, AAOx3, head NC/AT, CN II-XII intact, lungs CTA B/L, CV S1S2 regular, abdomen soft/NT/ND/(+)BS, ext (-) edema, motor 5/5x4, sensation intact.  Labs, UA done and reviewed.  Findings are consistent with UTI.  Will DC patient with antibiotics and PMD follow-up.

## 2024-09-06 NOTE — ED PROVIDER NOTE - OBJECTIVE STATEMENT
patient is a 89yo female PMH hld, JENNIFER, GERD, left carotid stenosis, aortic stenosis, anemia, anxiety, urinary retention coming to ED from [facility] for decreased urine output with catheterization. pt typically self catheterizes, nursing home staff has noted decreased urine output, pt reports seeing red-tinged urine today. has had increased urinary urge/ frequency. denies abdominal pain, fever, dysuria, n,v,d,c,chest pain, SOB, fall/ trauma

## 2024-09-06 NOTE — ED ADULT NURSE NOTE - CHIEF COMPLAINT QUOTE
Self caths from [facility]. Hx dementia. Lately pt has been unsuccessful with her own caths and has had limited urine output. Sent to r/o possible UTI.

## 2024-09-11 ENCOUNTER — INPATIENT (INPATIENT)
Facility: HOSPITAL | Age: 89
LOS: 18 days | Discharge: HOME | DRG: 306 | End: 2024-09-30
Attending: INTERNAL MEDICINE | Admitting: STUDENT IN AN ORGANIZED HEALTH CARE EDUCATION/TRAINING PROGRAM
Payer: MEDICARE

## 2024-09-11 VITALS
TEMPERATURE: 98 F | HEART RATE: 88 BPM | WEIGHT: 145.06 LBS | OXYGEN SATURATION: 99 % | DIASTOLIC BLOOD PRESSURE: 68 MMHG | SYSTOLIC BLOOD PRESSURE: 100 MMHG | RESPIRATION RATE: 17 BRPM

## 2024-09-11 DIAGNOSIS — Z98.890 OTHER SPECIFIED POSTPROCEDURAL STATES: Chronic | ICD-10-CM

## 2024-09-11 DIAGNOSIS — Z90.13 ACQUIRED ABSENCE OF BILATERAL BREASTS AND NIPPLES: Chronic | ICD-10-CM

## 2024-09-11 LAB
ALBUMIN SERPL ELPH-MCNC: 4 G/DL — SIGNIFICANT CHANGE UP (ref 3.5–5.2)
ALP SERPL-CCNC: 73 U/L — SIGNIFICANT CHANGE UP (ref 30–115)
ALT FLD-CCNC: 17 U/L — SIGNIFICANT CHANGE UP (ref 0–41)
ANION GAP SERPL CALC-SCNC: 10 MMOL/L — SIGNIFICANT CHANGE UP (ref 7–14)
AST SERPL-CCNC: 26 U/L — SIGNIFICANT CHANGE UP (ref 0–41)
BASOPHILS # BLD AUTO: 0.02 K/UL — SIGNIFICANT CHANGE UP (ref 0–0.2)
BASOPHILS NFR BLD AUTO: 0.4 % — SIGNIFICANT CHANGE UP (ref 0–1)
BILIRUB SERPL-MCNC: 0.3 MG/DL — SIGNIFICANT CHANGE UP (ref 0.2–1.2)
BUN SERPL-MCNC: 27 MG/DL — HIGH (ref 10–20)
CALCIUM SERPL-MCNC: 9.6 MG/DL — SIGNIFICANT CHANGE UP (ref 8.4–10.4)
CHLORIDE SERPL-SCNC: 101 MMOL/L — SIGNIFICANT CHANGE UP (ref 98–110)
CO2 SERPL-SCNC: 28 MMOL/L — SIGNIFICANT CHANGE UP (ref 17–32)
CREAT SERPL-MCNC: 0.9 MG/DL — SIGNIFICANT CHANGE UP (ref 0.7–1.5)
EGFR: 60 ML/MIN/1.73M2 — SIGNIFICANT CHANGE UP
EOSINOPHIL # BLD AUTO: 0.11 K/UL — SIGNIFICANT CHANGE UP (ref 0–0.7)
EOSINOPHIL NFR BLD AUTO: 2.1 % — SIGNIFICANT CHANGE UP (ref 0–8)
FLUAV AG NPH QL: SIGNIFICANT CHANGE UP
FLUBV AG NPH QL: SIGNIFICANT CHANGE UP
GAS PNL BLDV: SIGNIFICANT CHANGE UP
GLUCOSE SERPL-MCNC: 101 MG/DL — HIGH (ref 70–99)
HCT VFR BLD CALC: 29 % — LOW (ref 37–47)
HGB BLD-MCNC: 9.1 G/DL — LOW (ref 12–16)
IMM GRANULOCYTES NFR BLD AUTO: 0.2 % — SIGNIFICANT CHANGE UP (ref 0.1–0.3)
LACTATE SERPL-SCNC: 2.1 MMOL/L — HIGH (ref 0.7–2)
LYMPHOCYTES # BLD AUTO: 0.76 K/UL — LOW (ref 1.2–3.4)
LYMPHOCYTES # BLD AUTO: 14.8 % — LOW (ref 20.5–51.1)
MCHC RBC-ENTMCNC: 30 PG — SIGNIFICANT CHANGE UP (ref 27–31)
MCHC RBC-ENTMCNC: 31.4 G/DL — LOW (ref 32–37)
MCV RBC AUTO: 95.7 FL — SIGNIFICANT CHANGE UP (ref 81–99)
MONOCYTES # BLD AUTO: 0.42 K/UL — SIGNIFICANT CHANGE UP (ref 0.1–0.6)
MONOCYTES NFR BLD AUTO: 8.2 % — SIGNIFICANT CHANGE UP (ref 1.7–9.3)
NEUTROPHILS # BLD AUTO: 3.83 K/UL — SIGNIFICANT CHANGE UP (ref 1.4–6.5)
NEUTROPHILS NFR BLD AUTO: 74.3 % — SIGNIFICANT CHANGE UP (ref 42.2–75.2)
NRBC # BLD: 0 /100 WBCS — SIGNIFICANT CHANGE UP (ref 0–0)
NT-PROBNP SERPL-SCNC: 1541 PG/ML — HIGH (ref 0–300)
PLATELET # BLD AUTO: 225 K/UL — SIGNIFICANT CHANGE UP (ref 130–400)
PMV BLD: 10 FL — SIGNIFICANT CHANGE UP (ref 7.4–10.4)
POTASSIUM SERPL-MCNC: 4.6 MMOL/L — SIGNIFICANT CHANGE UP (ref 3.5–5)
POTASSIUM SERPL-SCNC: 4.6 MMOL/L — SIGNIFICANT CHANGE UP (ref 3.5–5)
PROT SERPL-MCNC: 6.9 G/DL — SIGNIFICANT CHANGE UP (ref 6–8)
RBC # BLD: 3.03 M/UL — LOW (ref 4.2–5.4)
RBC # FLD: 15.9 % — HIGH (ref 11.5–14.5)
RSV RNA NPH QL NAA+NON-PROBE: SIGNIFICANT CHANGE UP
SARS-COV-2 RNA SPEC QL NAA+PROBE: SIGNIFICANT CHANGE UP
SODIUM SERPL-SCNC: 139 MMOL/L — SIGNIFICANT CHANGE UP (ref 135–146)
TROPONIN T, HIGH SENSITIVITY RESULT: 42 NG/L — HIGH (ref 6–13)
WBC # BLD: 5.15 K/UL — SIGNIFICANT CHANGE UP (ref 4.8–10.8)
WBC # FLD AUTO: 5.15 K/UL — SIGNIFICANT CHANGE UP (ref 4.8–10.8)

## 2024-09-11 PROCEDURE — 99285 EMERGENCY DEPT VISIT HI MDM: CPT | Mod: GC

## 2024-09-11 PROCEDURE — 71275 CT ANGIOGRAPHY CHEST: CPT | Mod: 26,MC

## 2024-09-11 PROCEDURE — 71045 X-RAY EXAM CHEST 1 VIEW: CPT | Mod: 26

## 2024-09-11 NOTE — ED ADULT TRIAGE NOTE - CHIEF COMPLAINT QUOTE
Pt BIBA from assisted living. complaining of SOB x 1 day. hx of COPD. on 02 as needed. denies chest pain

## 2024-09-11 NOTE — ED PROVIDER NOTE - ATTENDING APP SHARED VISIT CONTRIBUTION OF CARE
89 yr old f w/ a pmh significant for hld, tata, gerd L carotid stenosis, breast cancer s/p b/l mastectomy and reconstruction, urinary retention with self catherization who presents with sob. Pt states that this evening she had a brief episode where she felt sob. Pt denies any chest pain, nausea, vomiting, fevers, chills or any other medical complaints.     VITAL SIGNS: I have reviewed nursing notes and confirm.  CONSTITUTIONAL: non-toxic, well appearing  SKIN: no rash, no petechiae.  EYES: EOMI, pink conjunctiva, anicteric  ENT: tongue midline, no exudates, MMM  NECK: Supple; no meningismus, no JVD  CARD: RRR, no murmurs, equal radial pulses bilaterally 2+  RESP: CTAB, no respiratory distress  ABD: Soft, non-tender, non-distended, no peritoneal signs, no HSM, no CVA tenderness    89 yr old f that presents with sob. labs, ekg, imaging. consider admission.
Detail Level: Detailed
Size Of Lesion: 2mm

## 2024-09-11 NOTE — ED PROVIDER NOTE - PHYSICAL EXAMINATION
CONSTITUTIONAL: well-appearing, well nourished, non-toxic, NAD  HEAD: NCAT  EYES: EOMI, no scleral icterus  ENT: Moist mucous membranes, normal pharynx with no erythema or exudates  NECK: Full ROM.   CARD: RRR  RESP: posterior rhonchi b/l  ABD: soft, non-tender  EXT: Full ROM, no bony tenderness, b/l non-pitting pedal edema, no calf tenderness  NEURO: normal motor. normal sensory.   PSYCH: Cooperative, appropriate.

## 2024-09-11 NOTE — ED PROVIDER NOTE - OBJECTIVE STATEMENT
89-year-old female with history of JENNIFER on 3L NC O2 in the mornings and evenings, GERD, aortic stenosis, left carotid stenosis, anemia, anxiety, urinary retention (straight catheterizes herself for urination), breast cancer s/p mastectomy and reconstruction and brain cancer s/p craniotomy presents from [facility] for shortness of breath x 1 day.  Patient was sitting down watching TV when she started feeling short of breath.  Patient is normally not on O2 during the day and was started on NC after being evaluated at the facility.  Patient initially felt better, then felt worse and was asked if she wanted to come in to the ED for evaluation and she said yes.  Patient has been comfortable on 3L on presentation and has no complaints.  Patient denies any recent fevers, chills, sweats, chest pain, new cough, congestion, sore throat, runny nose, or calf pain.

## 2024-09-11 NOTE — ED PROVIDER NOTE - CARE PLAN
Principal Discharge DX:	Shortness of breath  Secondary Diagnosis:	Acute CHF  Secondary Diagnosis:	Elevated troponin   1

## 2024-09-11 NOTE — ED PROVIDER NOTE - ATTENDING CONTRIBUTION TO CARE
89 yr old f w/ a pmh significant for hld, tata, gerd L carotid stenosis, breast cancer s/p b/l mastectomy and reconstruction, urinary retention with self catherization who presents with sob. Pt states that this evening she had a brief episode where she felt sob. Pt denies any chest pain, nausea, vomiting, fevers, chills or any other medical complaints.     VITAL SIGNS: I have reviewed nursing notes and confirm.  CONSTITUTIONAL: non-toxic, well appearing  SKIN: no rash, no petechiae.  EYES: EOMI, pink conjunctiva, anicteric  ENT: tongue midline, no exudates, MMM  NECK: Supple; no meningismus, no JVD  CARD: RRR, no murmurs, equal radial pulses bilaterally 2+  RESP: CTAB, no respiratory distress  ABD: Soft, non-tender, non-distended, no peritoneal signs, no HSM, no CVA tenderness    89 yr old f that presents with sob. labs, ekg, imaging. consider admission.

## 2024-09-12 DIAGNOSIS — R06.02 SHORTNESS OF BREATH: ICD-10-CM

## 2024-09-12 LAB
ALBUMIN SERPL ELPH-MCNC: 3.5 G/DL — SIGNIFICANT CHANGE UP (ref 3.5–5.2)
ALP SERPL-CCNC: 67 U/L — SIGNIFICANT CHANGE UP (ref 30–115)
ALT FLD-CCNC: 16 U/L — SIGNIFICANT CHANGE UP (ref 0–41)
ANION GAP SERPL CALC-SCNC: 10 MMOL/L — SIGNIFICANT CHANGE UP (ref 7–14)
AST SERPL-CCNC: 26 U/L — SIGNIFICANT CHANGE UP (ref 0–41)
BASOPHILS # BLD AUTO: 0.02 K/UL — SIGNIFICANT CHANGE UP (ref 0–0.2)
BASOPHILS NFR BLD AUTO: 0.4 % — SIGNIFICANT CHANGE UP (ref 0–1)
BILIRUB SERPL-MCNC: 0.3 MG/DL — SIGNIFICANT CHANGE UP (ref 0.2–1.2)
BUN SERPL-MCNC: 22 MG/DL — HIGH (ref 10–20)
CALCIUM SERPL-MCNC: 9.4 MG/DL — SIGNIFICANT CHANGE UP (ref 8.4–10.5)
CHLORIDE SERPL-SCNC: 102 MMOL/L — SIGNIFICANT CHANGE UP (ref 98–110)
CO2 SERPL-SCNC: 28 MMOL/L — SIGNIFICANT CHANGE UP (ref 17–32)
CREAT SERPL-MCNC: 0.9 MG/DL — SIGNIFICANT CHANGE UP (ref 0.7–1.5)
EGFR: 60 ML/MIN/1.73M2 — SIGNIFICANT CHANGE UP
EOSINOPHIL # BLD AUTO: 0.1 K/UL — SIGNIFICANT CHANGE UP (ref 0–0.7)
EOSINOPHIL NFR BLD AUTO: 1.9 % — SIGNIFICANT CHANGE UP (ref 0–8)
GLUCOSE SERPL-MCNC: 105 MG/DL — HIGH (ref 70–99)
HCT VFR BLD CALC: 27.5 % — LOW (ref 37–47)
HGB BLD-MCNC: 8.9 G/DL — LOW (ref 12–16)
IMM GRANULOCYTES NFR BLD AUTO: 0.4 % — HIGH (ref 0.1–0.3)
LACTATE SERPL-SCNC: 0.9 MMOL/L — SIGNIFICANT CHANGE UP (ref 0.7–2)
LACTATE SERPL-SCNC: 1.6 MMOL/L — SIGNIFICANT CHANGE UP (ref 0.7–2)
LYMPHOCYTES # BLD AUTO: 0.63 K/UL — LOW (ref 1.2–3.4)
LYMPHOCYTES # BLD AUTO: 11.7 % — LOW (ref 20.5–51.1)
MAGNESIUM SERPL-MCNC: 1.8 MG/DL — SIGNIFICANT CHANGE UP (ref 1.8–2.4)
MCHC RBC-ENTMCNC: 30.1 PG — SIGNIFICANT CHANGE UP (ref 27–31)
MCHC RBC-ENTMCNC: 32.4 G/DL — SIGNIFICANT CHANGE UP (ref 32–37)
MCV RBC AUTO: 92.9 FL — SIGNIFICANT CHANGE UP (ref 81–99)
MONOCYTES # BLD AUTO: 0.39 K/UL — SIGNIFICANT CHANGE UP (ref 0.1–0.6)
MONOCYTES NFR BLD AUTO: 7.3 % — SIGNIFICANT CHANGE UP (ref 1.7–9.3)
NEUTROPHILS # BLD AUTO: 4.21 K/UL — SIGNIFICANT CHANGE UP (ref 1.4–6.5)
NEUTROPHILS NFR BLD AUTO: 78.3 % — HIGH (ref 42.2–75.2)
NRBC # BLD: 0 /100 WBCS — SIGNIFICANT CHANGE UP (ref 0–0)
PLATELET # BLD AUTO: 208 K/UL — SIGNIFICANT CHANGE UP (ref 130–400)
PMV BLD: 9.3 FL — SIGNIFICANT CHANGE UP (ref 7.4–10.4)
POTASSIUM SERPL-MCNC: 4.4 MMOL/L — SIGNIFICANT CHANGE UP (ref 3.5–5)
POTASSIUM SERPL-SCNC: 4.4 MMOL/L — SIGNIFICANT CHANGE UP (ref 3.5–5)
PROT SERPL-MCNC: 6.2 G/DL — SIGNIFICANT CHANGE UP (ref 6–8)
RBC # BLD: 2.96 M/UL — LOW (ref 4.2–5.4)
RBC # FLD: 15.4 % — HIGH (ref 11.5–14.5)
SODIUM SERPL-SCNC: 140 MMOL/L — SIGNIFICANT CHANGE UP (ref 135–146)
TROPONIN T, HIGH SENSITIVITY RESULT: 37 NG/L — HIGH (ref 6–13)
TROPONIN T, HIGH SENSITIVITY RESULT: 38 NG/L — HIGH (ref 6–13)
WBC # BLD: 5.37 K/UL — SIGNIFICANT CHANGE UP (ref 4.8–10.8)
WBC # FLD AUTO: 5.37 K/UL — SIGNIFICANT CHANGE UP (ref 4.8–10.8)

## 2024-09-12 PROCEDURE — 94640 AIRWAY INHALATION TREATMENT: CPT

## 2024-09-12 PROCEDURE — 81001 URINALYSIS AUTO W/SCOPE: CPT

## 2024-09-12 PROCEDURE — 83735 ASSAY OF MAGNESIUM: CPT

## 2024-09-12 PROCEDURE — 97116 GAIT TRAINING THERAPY: CPT | Mod: GP

## 2024-09-12 PROCEDURE — 86901 BLOOD TYPING SEROLOGIC RH(D): CPT

## 2024-09-12 PROCEDURE — 80053 COMPREHEN METABOLIC PANEL: CPT

## 2024-09-12 PROCEDURE — 93306 TTE W/DOPPLER COMPLETE: CPT

## 2024-09-12 PROCEDURE — 86480 TB TEST CELL IMMUN MEASURE: CPT

## 2024-09-12 PROCEDURE — 71045 X-RAY EXAM CHEST 1 VIEW: CPT | Mod: 26

## 2024-09-12 PROCEDURE — 85027 COMPLETE CBC AUTOMATED: CPT

## 2024-09-12 PROCEDURE — 83605 ASSAY OF LACTIC ACID: CPT

## 2024-09-12 PROCEDURE — 86900 BLOOD TYPING SEROLOGIC ABO: CPT

## 2024-09-12 PROCEDURE — 93005 ELECTROCARDIOGRAM TRACING: CPT

## 2024-09-12 PROCEDURE — 36415 COLL VENOUS BLD VENIPUNCTURE: CPT

## 2024-09-12 PROCEDURE — 86850 RBC ANTIBODY SCREEN: CPT

## 2024-09-12 PROCEDURE — 99222 1ST HOSP IP/OBS MODERATE 55: CPT

## 2024-09-12 PROCEDURE — 82962 GLUCOSE BLOOD TEST: CPT

## 2024-09-12 PROCEDURE — 97162 PT EVAL MOD COMPLEX 30 MIN: CPT | Mod: GP

## 2024-09-12 PROCEDURE — 71045 X-RAY EXAM CHEST 1 VIEW: CPT

## 2024-09-12 PROCEDURE — 85025 COMPLETE CBC W/AUTO DIFF WBC: CPT

## 2024-09-12 PROCEDURE — 80048 BASIC METABOLIC PNL TOTAL CA: CPT

## 2024-09-12 PROCEDURE — 84100 ASSAY OF PHOSPHORUS: CPT

## 2024-09-12 PROCEDURE — 84484 ASSAY OF TROPONIN QUANT: CPT

## 2024-09-12 PROCEDURE — 84145 PROCALCITONIN (PCT): CPT

## 2024-09-12 PROCEDURE — 97110 THERAPEUTIC EXERCISES: CPT | Mod: GP

## 2024-09-12 PROCEDURE — 83880 ASSAY OF NATRIURETIC PEPTIDE: CPT

## 2024-09-12 PROCEDURE — 82550 ASSAY OF CK (CPK): CPT

## 2024-09-12 PROCEDURE — 82553 CREATINE MB FRACTION: CPT

## 2024-09-12 RX ORDER — ATORVASTATIN CALCIUM 10 MG/1
10 TABLET, FILM COATED ORAL AT BEDTIME
Refills: 0 | Status: DISCONTINUED | OUTPATIENT
Start: 2024-09-12 | End: 2024-09-30

## 2024-09-12 RX ORDER — IPRATROPIUM BROMIDE AND ALBUTEROL SULFATE .5; 3 MG/3ML; MG/3ML
3 SOLUTION RESPIRATORY (INHALATION) EVERY 6 HOURS
Refills: 0 | Status: DISCONTINUED | OUTPATIENT
Start: 2024-09-12 | End: 2024-09-30

## 2024-09-12 RX ORDER — CHLORHEXIDINE GLUCONATE ORAL RINSE 1.2 MG/ML
1 SOLUTION DENTAL DAILY
Refills: 0 | Status: DISCONTINUED | OUTPATIENT
Start: 2024-09-12 | End: 2024-09-23

## 2024-09-12 RX ORDER — ALPRAZOLAM 0.5 MG/1
0.25 TABLET ORAL AT BEDTIME
Refills: 0 | Status: DISCONTINUED | OUTPATIENT
Start: 2024-09-12 | End: 2024-09-19

## 2024-09-12 RX ORDER — ASPIRIN 325 MG
81 TABLET ORAL DAILY
Refills: 0 | Status: DISCONTINUED | OUTPATIENT
Start: 2024-09-12 | End: 2024-09-30

## 2024-09-12 RX ORDER — INFLUENZA VIRUS VACCINE 15; 15; 15; 15 UG/.5ML; UG/.5ML; UG/.5ML; UG/.5ML
0.5 SUSPENSION INTRAMUSCULAR ONCE
Refills: 0 | Status: DISCONTINUED | OUTPATIENT
Start: 2024-09-12 | End: 2024-09-30

## 2024-09-12 RX ORDER — PANTOPRAZOLE SODIUM 40 MG/1
40 TABLET, DELAYED RELEASE ORAL
Refills: 0 | Status: DISCONTINUED | OUTPATIENT
Start: 2024-09-12 | End: 2024-09-30

## 2024-09-12 RX ADMIN — Medication 5000 UNIT(S): at 06:58

## 2024-09-12 RX ADMIN — PANTOPRAZOLE SODIUM 40 MILLIGRAM(S): 40 TABLET, DELAYED RELEASE ORAL at 06:58

## 2024-09-12 RX ADMIN — IPRATROPIUM BROMIDE AND ALBUTEROL SULFATE 3 MILLILITER(S): .5; 3 SOLUTION RESPIRATORY (INHALATION) at 08:42

## 2024-09-12 RX ADMIN — Medication 81 MILLIGRAM(S): at 18:47

## 2024-09-12 RX ADMIN — ATORVASTATIN CALCIUM 10 MILLIGRAM(S): 10 TABLET, FILM COATED ORAL at 21:21

## 2024-09-12 RX ADMIN — IPRATROPIUM BROMIDE AND ALBUTEROL SULFATE 3 MILLILITER(S): .5; 3 SOLUTION RESPIRATORY (INHALATION) at 20:15

## 2024-09-12 RX ADMIN — CHLORHEXIDINE GLUCONATE ORAL RINSE 1 APPLICATION(S): 1.2 SOLUTION DENTAL at 13:21

## 2024-09-12 RX ADMIN — Medication 5000 UNIT(S): at 18:47

## 2024-09-12 NOTE — H&P ADULT - HISTORY OF PRESENT ILLNESS
89-year-old female with history of JENNIFER on 3L NC O2 in the mornings and evenings, GERD, aortic stenosis, left carotid stenosis, anemia, anxiety, urinary retention (straight catheterizes herself for urination), breast cancer s/p mastectomy and reconstruction and brain cancer s/p craniotomy presents from [facility] for shortness of breath x 1 day.  Patient was sitting down watching TV when she started feeling short of breath.  Patient is normally not on O2 during the day and was started on NC after being evaluated at the facility sent to Ed .   Denies any recent fevers, chills, sweats, chest pain, new cough, congestion, sore throat, runny nose, or calf pain.    In Ed afebrile , sating well on 3 L of NC   on labs Hb 9.1 , Trop 42 trended down to 38   lactate 2.1 , proBNP 1541 RVP negative      CTA chest r/o PE       admitted for further workup

## 2024-09-12 NOTE — PHYSICAL THERAPY INITIAL EVALUATION ADULT - PHYSICAL ASSIST/NONPHYSICAL ASSIST, REHAB EVAL
Inpatient Rehabilitation Plan of Care Note    Plan of Care  Care Plan Reviewed - No updates at this time.    Psychosocial    Performed Intervention(s)  Support/peer groups.  Verbalizes needs and concerns.  Medication.      Sphincter Control    Performed Intervention(s)  Offer restroom during hourly rounding.  Encourage fluid intake.  Monitor Is and Os.  Timed voids.  Encourage appropriate diet.      Safety    Performed Intervention(s)  Items w/i reach.  Safety rounds.  Bed and chair alarm. Blue armband  Falls precautions.    Signed by: Annalise Hu RN     verbal cues/1 person assist

## 2024-09-12 NOTE — PHYSICAL THERAPY INITIAL EVALUATION ADULT - PERTINENT HX OF CURRENT PROBLEM, REHAB EVAL
89-year-old female with history of JENNIFER on 3L NC O2 in the mornings and evenings, GERD, aortic stenosis, left carotid stenosis, anemia, anxiety, urinary retention (straight catheterizes herself for urination), breast cancer s/p mastectomy and reconstruction and brain cancer s/p craniotomy presents from [facility] for shortness of breath x 1 day

## 2024-09-12 NOTE — PATIENT PROFILE ADULT - FUNCTIONAL ASSESSMENT - BASIC MOBILITY 6.
3-calculated by average/Not able to assess (calculate score using [facility] averaging method)

## 2024-09-12 NOTE — H&P ADULT - ATTENDING COMMENTS
89-year-old female with history of JENNIFER on 3L NC O2 in the mornings and evenings, GERD, aortic stenosis, left carotid stenosis, anemia, anxiety, urinary retention (straight catheterizes herself for urination), breast cancer s/p mastectomy and reconstruction and brain cancer s/p craniotomy presents from [facility] for shortness of breath x 1 day.     #Dyspnea, suspect anxiety related vs due to Aortic stenosis, ro CHF  Uses oxygen intermittently at NH  CTA chest PE protocol 09/11 reviewed, no evidence of PE, extensive scoliotic/kyphotic changes  Currently endorses dyspnea resolved  On 3L NC saturating 100%  Pro BNP elevated  - fu Echocardiogram  - Monitor off antibiotics  - Montior respiratory status 24h, also w ambulation  - DC planning 24 back to [facility] if stable    # Anemia  - Hg of 9.1 , baseline 11  - monitor for now  -active TnS    # Anxiety/insomnia  - Alpra .25 mg at bedtime    DVT PPX, heparin    #Progress Note Handoff  Pending (specify): Echo  Family discussion: estee pt regarding plan of care  Disposition: GMF, from [facility]

## 2024-09-12 NOTE — H&P ADULT - ASSESSMENT
89-year-old female with history of JENNIFER on 3L NC O2 in the mornings and evenings, GERD, aortic stenosis, left carotid stenosis, anemia, anxiety, urinary retention (straight catheterizes herself for urination), breast cancer s/p mastectomy and reconstruction and brain cancer s/p craniotomy presents from [facility] for shortness of breath x 1 day.  Patient was sitting down watching TV when she started feeling short of breath.  Patient is normally not on O2 during the day and was started on NC after being evaluated at the facility sent to Ed .   Denies any recent fevers, chills, sweats, chest pain, new cough, congestion, sore throat, runny nose, or calf pain.        # AHRF   -unclear if itys from new hear failure ?  -In Ed afebrile , sating well on 3 L of NC   -lactate 2.1  -RVP negative    - CT angio: no PE , limited study   - Trop 42 trended down to 38   -proBNP 1541  PLAN  -f/u Echo   -f/u EKG   -f/u Cxray   - nebs Q6  - albuterol PRN  - monitor off antibiotics   - incentive spirometer for now      # hx of aortic stenosis  # Elevated BNP   -proBNP 1541  - mild b/l LE edema  - recommend ECHO OP and a cardio f/u    # Anemia  - Hg of 9.1 , baseline 11  - monitor for now  -active TnS    # Anxiety/insomnia  - Alpra .25 mg at bedtime    #DVT PPx- heparin  #GI PPx- Protonix  #Diet- DASH  #CHG  #Activity- Ambulate with assistance  # Code: DNR/DNI prior [facility]         89-year-old female with history of JENNIFER on 3L NC O2 in the mornings and evenings, GERD, aortic stenosis, left carotid stenosis, anemia, anxiety, urinary retention (straight catheterizes herself for urination), breast cancer s/p mastectomy and reconstruction and brain cancer s/p craniotomy presents from [facility] for shortness of breath x 1 day.  Patient was sitting down watching TV when she started feeling short of breath.  Patient is normally not on O2 during the day and was started on NC after being evaluated at the facility sent to Ed .   Denies any recent fevers, chills, sweats, chest pain, new cough, congestion, sore throat, runny nose, or calf pain.        # AHRF   -unclear if itys from new hear failure ? vs atelectasis   -In Ed afebrile , sating well on 3 L of NC   -lactate 2.1  -RVP negative    - CT angio: no PE , limited study   - Trop 42 trended down to 38   -proBNP 1541  PLAN  -f/u Echo   -f/u EKG   -f/u Cxray   - nebs Q6  - albuterol PRN  - monitor off antibiotics   - incentive spirometer for now      # hx of aortic stenosis  - f/u echo      # Anemia  - Hg of 9.1 , baseline 11  - monitor for now  -active TnS    # Anxiety/insomnia  - Alpra .25 mg at bedtime    #DVT PPx- heparin  #GI PPx- Protonix  #Diet- DASH  #CHG  #Activity- Ambulate with assistance  # Code: DNR/DNI prior [facility]         89-year-old female with history of JENNIFER on 3L NC O2 in the mornings and evenings, GERD, aortic stenosis, left carotid stenosis, anemia, anxiety, urinary retention (straight catheterizes herself for urination), breast cancer s/p mastectomy and reconstruction and brain cancer s/p craniotomy presents from [facility] for shortness of breath x 1 day.  Patient was sitting down watching TV when she started feeling short of breath.  Patient is normally not on O2 during the day and was started on NC after being evaluated at the facility sent to Ed .   Denies any recent fevers, chills, sweats, chest pain, new cough, congestion, sore throat, runny nose, or calf pain.        # AHRF   -unclear if its from new hear failure ? vs atelectasis   -b/l LE edema , non pitting and chronic   -In Ed afebrile , sating well on 3 L of NC   -lactate 2.1  -RVP negative    - CT angio: no PE , limited study   - Trop 42 trended down to 38   -proBNP 1541  PLAN  -f/u Echo   -f/u EKG   -f/u Cxray   - nebs Q6  - albuterol PRN  - monitor off antibiotics   - incentive spirometer for now      # hx of aortic stenosis  - f/u echo      # Anemia  - Hg of 9.1 , baseline 11  - monitor for now  -active TnS    # Anxiety/insomnia  - Alpra .25 mg at bedtime    #DVT PPx- heparin  #GI PPx- Protonix  #Diet- DASH  #CHG  #Activity- Ambulate with assistance             89-year-old female with history of JENNIEFR on 3L NC O2 in the mornings and evenings, GERD, aortic stenosis, left carotid stenosis, anemia, anxiety, urinary retention (straight catheterizes herself for urination), breast cancer s/p mastectomy and reconstruction and brain cancer s/p craniotomy presents from [facility] for shortness of breath x 1 day.  Patient was sitting down watching TV when she started feeling short of breath.  Patient is normally not on O2 during the day and was started on NC after being evaluated at the facility sent to Ed .   Denies any recent fevers, chills, sweats, chest pain, new cough, congestion, sore throat, runny nose, or calf pain.        # AHRF   -unclear if its from new hear failure ? vs atelectasis   -b/l LE edema , non pitting and chronic   -In Ed afebrile , sating well on 3 L of NC   -lactate 2.1  -RVP negative    - CT angio: no PE , limited study   - Trop 42 trended down to 38   -proBNP 1541  PLAN  -f/u Echo   -f/u EKG   -f/u Cxray   - nebs Q6  - albuterol PRN  - monitor off antibiotics   - incentive spirometer for now      # hx of aortic stenosis  - f/u echo      # Anemia  - Hg of 9.1 , baseline 11  - monitor for now  -active TnS    # Anxiety/insomnia  - Alpra .25 mg at bedtime    #DVT PPx- heparin  #GI PPx- Protonix  #Diet- DASH  #CHG  #Activity- Ambulate with assistance      awaiting med rec from nursing home to confirm med rec

## 2024-09-12 NOTE — H&P ADULT - NSHPPHYSICALEXAM_GEN_ALL_CORE
CONSTITUTIONAL: well-appearing, well nourished, non-toxic, NAD  	HEAD: NCAT  	EYES: EOMI, no scleral icterus  	ENT: Moist mucous membranes, normal pharynx with no erythema or exudates  	NECK: Full ROM.   	CARD: RRR  	RESP: posterior rhonchi b/l  	ABD: soft, non-tender  	EXT: Full ROM, no bony tenderness, b/l non-pitting pedal edema, no calf tenderness  	NEURO: normal motor. normal sensory.   PSYCH: Cooperative, appropriate. CONSTITUTIONAL:very chorionically ill appearing on NC   CARD: RRR  RESP:  rhonchi b/l  ABD: soft, non-tender  EXT:  b/l non-pitting pedal dante

## 2024-09-12 NOTE — PHYSICAL THERAPY INITIAL EVALUATION ADULT - GAIT DEVIATIONS NOTED, PT EVAL
Spo2 94% on 3lpm pre ambulation and 92% post ambulation on 3 lpm/decreased step length/decreased stride length

## 2024-09-12 NOTE — PHYSICAL THERAPY INITIAL EVALUATION ADULT - GENERAL OBSERVATIONS, REHAB EVAL
PT IE [id number]. Chart reviewed. Pt encountered semi-reclined in bed. In NAD. + IV lock, + O2 via n/c 3 lpm

## 2024-09-12 NOTE — PATIENT PROFILE ADULT - FALL HARM RISK - HARM RISK INTERVENTIONS

## 2024-09-13 LAB
ALBUMIN SERPL ELPH-MCNC: 3.8 G/DL — SIGNIFICANT CHANGE UP (ref 3.5–5.2)
ALP SERPL-CCNC: 70 U/L — SIGNIFICANT CHANGE UP (ref 30–115)
ALT FLD-CCNC: 17 U/L — SIGNIFICANT CHANGE UP (ref 0–41)
ANION GAP SERPL CALC-SCNC: 10 MMOL/L — SIGNIFICANT CHANGE UP (ref 7–14)
AST SERPL-CCNC: 26 U/L — SIGNIFICANT CHANGE UP (ref 0–41)
BASOPHILS # BLD AUTO: 0.02 K/UL — SIGNIFICANT CHANGE UP (ref 0–0.2)
BASOPHILS NFR BLD AUTO: 0.3 % — SIGNIFICANT CHANGE UP (ref 0–1)
BILIRUB SERPL-MCNC: 0.5 MG/DL — SIGNIFICANT CHANGE UP (ref 0.2–1.2)
BUN SERPL-MCNC: 19 MG/DL — SIGNIFICANT CHANGE UP (ref 10–20)
CALCIUM SERPL-MCNC: 9.4 MG/DL — SIGNIFICANT CHANGE UP (ref 8.4–10.4)
CHLORIDE SERPL-SCNC: 100 MMOL/L — SIGNIFICANT CHANGE UP (ref 98–110)
CO2 SERPL-SCNC: 31 MMOL/L — SIGNIFICANT CHANGE UP (ref 17–32)
CREAT SERPL-MCNC: 0.8 MG/DL — SIGNIFICANT CHANGE UP (ref 0.7–1.5)
EGFR: 69 ML/MIN/1.73M2 — SIGNIFICANT CHANGE UP
EOSINOPHIL # BLD AUTO: 0.12 K/UL — SIGNIFICANT CHANGE UP (ref 0–0.7)
EOSINOPHIL NFR BLD AUTO: 1.8 % — SIGNIFICANT CHANGE UP (ref 0–8)
GLUCOSE SERPL-MCNC: 118 MG/DL — HIGH (ref 70–99)
HCT VFR BLD CALC: 30.3 % — LOW (ref 37–47)
HGB BLD-MCNC: 9.7 G/DL — LOW (ref 12–16)
IMM GRANULOCYTES NFR BLD AUTO: 0.4 % — HIGH (ref 0.1–0.3)
LYMPHOCYTES # BLD AUTO: 0.53 K/UL — LOW (ref 1.2–3.4)
LYMPHOCYTES # BLD AUTO: 7.9 % — LOW (ref 20.5–51.1)
MAGNESIUM SERPL-MCNC: 1.9 MG/DL — SIGNIFICANT CHANGE UP (ref 1.8–2.4)
MCHC RBC-ENTMCNC: 29.9 PG — SIGNIFICANT CHANGE UP (ref 27–31)
MCHC RBC-ENTMCNC: 32 G/DL — SIGNIFICANT CHANGE UP (ref 32–37)
MCV RBC AUTO: 93.5 FL — SIGNIFICANT CHANGE UP (ref 81–99)
MONOCYTES # BLD AUTO: 0.51 K/UL — SIGNIFICANT CHANGE UP (ref 0.1–0.6)
MONOCYTES NFR BLD AUTO: 7.6 % — SIGNIFICANT CHANGE UP (ref 1.7–9.3)
NEUTROPHILS # BLD AUTO: 5.51 K/UL — SIGNIFICANT CHANGE UP (ref 1.4–6.5)
NEUTROPHILS NFR BLD AUTO: 82 % — HIGH (ref 42.2–75.2)
NRBC # BLD: 0 /100 WBCS — SIGNIFICANT CHANGE UP (ref 0–0)
PHOSPHATE SERPL-MCNC: 4 MG/DL — SIGNIFICANT CHANGE UP (ref 2.1–4.9)
PLATELET # BLD AUTO: 221 K/UL — SIGNIFICANT CHANGE UP (ref 130–400)
PMV BLD: 9.6 FL — SIGNIFICANT CHANGE UP (ref 7.4–10.4)
POTASSIUM SERPL-MCNC: 4.3 MMOL/L — SIGNIFICANT CHANGE UP (ref 3.5–5)
POTASSIUM SERPL-SCNC: 4.3 MMOL/L — SIGNIFICANT CHANGE UP (ref 3.5–5)
PROT SERPL-MCNC: 6.5 G/DL — SIGNIFICANT CHANGE UP (ref 6–8)
RBC # BLD: 3.24 M/UL — LOW (ref 4.2–5.4)
RBC # FLD: 15.5 % — HIGH (ref 11.5–14.5)
SODIUM SERPL-SCNC: 141 MMOL/L — SIGNIFICANT CHANGE UP (ref 135–146)
WBC # BLD: 6.72 K/UL — SIGNIFICANT CHANGE UP (ref 4.8–10.8)
WBC # FLD AUTO: 6.72 K/UL — SIGNIFICANT CHANGE UP (ref 4.8–10.8)

## 2024-09-13 PROCEDURE — 99233 SBSQ HOSP IP/OBS HIGH 50: CPT

## 2024-09-13 RX ADMIN — ATORVASTATIN CALCIUM 10 MILLIGRAM(S): 10 TABLET, FILM COATED ORAL at 21:04

## 2024-09-13 RX ADMIN — IPRATROPIUM BROMIDE AND ALBUTEROL SULFATE 3 MILLILITER(S): .5; 3 SOLUTION RESPIRATORY (INHALATION) at 13:16

## 2024-09-13 RX ADMIN — IPRATROPIUM BROMIDE AND ALBUTEROL SULFATE 3 MILLILITER(S): .5; 3 SOLUTION RESPIRATORY (INHALATION) at 20:36

## 2024-09-13 RX ADMIN — Medication 5000 UNIT(S): at 17:31

## 2024-09-13 RX ADMIN — Medication 81 MILLIGRAM(S): at 12:19

## 2024-09-13 RX ADMIN — ALPRAZOLAM 0.25 MILLIGRAM(S): 0.5 TABLET ORAL at 21:04

## 2024-09-13 RX ADMIN — IPRATROPIUM BROMIDE AND ALBUTEROL SULFATE 3 MILLILITER(S): .5; 3 SOLUTION RESPIRATORY (INHALATION) at 08:31

## 2024-09-13 RX ADMIN — Medication 5000 UNIT(S): at 06:04

## 2024-09-13 RX ADMIN — CHLORHEXIDINE GLUCONATE ORAL RINSE 1 APPLICATION(S): 1.2 SOLUTION DENTAL at 12:22

## 2024-09-13 RX ADMIN — PANTOPRAZOLE SODIUM 40 MILLIGRAM(S): 40 TABLET, DELAYED RELEASE ORAL at 06:04

## 2024-09-13 NOTE — PROGRESS NOTE ADULT - ASSESSMENT
89-year-old female with history of JENNIFER on 3L NC O2 in the mornings and evenings, GERD, aortic stenosis, left carotid stenosis, anemia, anxiety, urinary retention (straight catheterizes herself for urination), breast cancer s/p mastectomy and reconstruction and brain cancer s/p craniotomy presents from [facility] for shortness of breath x 1 day.     #Dyspnea, suspect anxiety related vs due to Aortic stenosis, ro CHF  Uses oxygen intermittently at NH  CTA chest PE protocol 09/11 reviewed, no evidence of PE, extensive scoliotic/kyphotic changes  Currently endorses dyspnea resolved  On 3L NC saturating 100%  Pro BNP elevated  - fu Echocardiogram  - Monitor off antibiotics  - Montior respiratory status 24h, also w ambulation  - PT     # Anemia  - Hg of 9.1 , baseline 11  - monitor for now  -active TnS    # Anxiety/insomnia  - Alpra .25 mg at bedtime    DVT PPX, heparin    #Progress Note Handoff  Pending (specify): Echo  Disposition: GM, from [facility] .

## 2024-09-13 NOTE — PROGRESS NOTE ADULT - SUBJECTIVE AND OBJECTIVE BOX
24H events:    Patient is a 89y old Female who presents with a chief complaint of SOB (13 Sep 2024 14:00)    Primary diagnosis of Shortness of breath    Today is hospital day 1d. This morning patient was seen and examined at bedside, resting comfortably in bed.    No acute or major events overnight.    The patient states that she feels okay now. She has no CP, SOB abd pain or pain otherwise at present.    Code Status:  DNR DNI Trial NIV      PAST MEDICAL & SURGICAL HISTORY  Migraine    H/O brain tumor  benign 1977    Breast CA    Urinary retention with incomplete bladder emptying  pt self catheterizes  3-4 x day    H/O mastectomy, bilateral  1984    H/O craniotomy  1976      SOCIAL HISTORY:  Social History:      ALLERGIES:  No Known Allergies    MEDICATIONS:  STANDING MEDICATIONS  albuterol/ipratropium for Nebulization 3 milliLiter(s) Nebulizer every 6 hours  aspirin enteric coated 81 milliGRAM(s) Oral daily  atorvastatin 10 milliGRAM(s) Oral at bedtime  chlorhexidine 2% Cloths 1 Application(s) Topical daily  heparin   Injectable 5000 Unit(s) SubCutaneous every 12 hours  influenza  Vaccine (HIGH DOSE) 0.5 milliLiter(s) IntraMuscular once  pantoprazole    Tablet 40 milliGRAM(s) Oral before breakfast  polyethylene glycol 3350 17 Gram(s) Oral at bedtime    PRN MEDICATIONS  ALPRAZolam 0.25 milliGRAM(s) Oral at bedtime PRN    VITALS:   T(F): 97.9  HR: 82  BP: 110/70  RR: 18  SpO2: 97%    PHYSICAL EXAM:  GENERAL:   ( X ) NAD, lying in bed comfortably     (  ) obtunded     (  ) lethargic     (  ) somnolent    HEAD:   ( X ) Atraumatic     (  ) hematoma     (  ) laceration (specify location:       )     NECK:  ( X ) Supple     (  ) neck stiffness     (  ) nuchal rigidity     (  )  no JVD     (  ) JVD present ( -- cm)    HEART:  Rate -->     ( X ) normal rate     (  ) bradycardic     (  ) tachycardic  Rhythm -->     ( X ) regular     (  ) regularly irregular     (  ) irregularly irregular  Murmurs -->     (  ) normal s1s2     ( X ) systolic murmur     (  ) diastolic murmur     (  ) continuous murmur      (  ) S3 present     (  ) S4 present    LUNGS:   ( X ) Unlabored respirations     (  ) tachypnea  ( X ) B/L air entry     (  ) decreased breath sounds in:  (location     )    ( X ) no adventitious sound     (  ) crackles     (  ) wheezing      (  ) rhonchi      (specify location:       )  (  ) chest wall tenderness (specify location:       )    ABDOMEN:   ( X ) Soft     (  ) tense   |   ( X ) nondistended     (  ) distended   |   (  ) +BS     (  ) hypoactive bowel sounds     (  ) hyperactive bowel sounds  ( X ) nontender     (  ) RUQ tenderness     (  ) RLQ tenderness     (  ) LLQ tenderness     (  ) epigastric tenderness     (  ) diffuse tenderness  (  ) Splenomegaly      (  ) Hepatomegaly      (  ) Jaundice     (  ) ecchymosis     EXTREMITIES:  ( X ) Normal     (  ) Rash     (  ) ecchymosis     (  ) varicose veins      (  ) pitting edema     (  ) non-pitting edema   (  ) ulceration     (  ) gangrene:     (location:     )    NERVOUS SYSTEM:    ( X ) A&Ox3     (  ) confused     (  ) lethargic  CN II-XII:     ( X ) Intact     (  ) deficits found     (Specify:     )   Upper extremities:     ( X ) no sensorimotor deficits     (  ) weakness     (  ) loss of proprioception/vibration     (  ) loss of touch/temperature (specify:    )  Lower extremities:     ( X ) no sensorimotor deficits     (  ) weakness     (  ) loss of proprioception/vibration     (  ) loss of touch/temperature (specify:    )      LABS:                        9.7    6.72  )-----------( 221      ( 13 Sep 2024 07:25 )             30.3     09-13    141  |  100  |  19  ----------------------------<  118<H>  4.3   |  31  |  0.8    Ca    9.4      13 Sep 2024 07:25  Phos  4.0     09-13  Mg     1.9     09-13    TPro  6.5  /  Alb  3.8  /  TBili  0.5  /  DBili  x   /  AST  26  /  ALT  17  /  AlkPhos  70  09-13    Lactate, Blood: 0.9 mmol/L (09-12-24 @ 17:43)   24H events:    Patient is a 89y old Female who presents with a chief complaint of SOB (13 Sep 2024 14:00)    Primary diagnosis of Shortness of breath    Today is hospital day 1d. This morning patient was seen and examined at bedside, resting comfortably in bed.    No acute or major events overnight.    The patient states that she feels okay now. She has no CP, SOB abd pain or pain otherwise at present.    Code Status:  DNR DNI Trial NIV    Family communication:  Called Malou and gave medical updates and plan. She was thankful and agreeable.    PAST MEDICAL & SURGICAL HISTORY  Migraine    H/O brain tumor  benign 1977    Breast CA    Urinary retention with incomplete bladder emptying  pt self catheterizes  3-4 x day    H/O mastectomy, bilateral  1984    H/O craniotomy  1976      SOCIAL HISTORY:  Social History:      ALLERGIES:  No Known Allergies    MEDICATIONS:  STANDING MEDICATIONS  albuterol/ipratropium for Nebulization 3 milliLiter(s) Nebulizer every 6 hours  aspirin enteric coated 81 milliGRAM(s) Oral daily  atorvastatin 10 milliGRAM(s) Oral at bedtime  chlorhexidine 2% Cloths 1 Application(s) Topical daily  heparin   Injectable 5000 Unit(s) SubCutaneous every 12 hours  influenza  Vaccine (HIGH DOSE) 0.5 milliLiter(s) IntraMuscular once  pantoprazole    Tablet 40 milliGRAM(s) Oral before breakfast  polyethylene glycol 3350 17 Gram(s) Oral at bedtime    PRN MEDICATIONS  ALPRAZolam 0.25 milliGRAM(s) Oral at bedtime PRN    VITALS:   T(F): 97.9  HR: 82  BP: 110/70  RR: 18  SpO2: 97%    PHYSICAL EXAM:  GENERAL:   ( X ) NAD, lying in bed comfortably     (  ) obtunded     (  ) lethargic     (  ) somnolent    HEAD:   ( X ) Atraumatic     (  ) hematoma     (  ) laceration (specify location:       )     NECK:  ( X ) Supple     (  ) neck stiffness     (  ) nuchal rigidity     (  )  no JVD     (  ) JVD present ( -- cm)    HEART:  Rate -->     ( X ) normal rate     (  ) bradycardic     (  ) tachycardic  Rhythm -->     ( X ) regular     (  ) regularly irregular     (  ) irregularly irregular  Murmurs -->     (  ) normal s1s2     ( X ) systolic murmur     (  ) diastolic murmur     (  ) continuous murmur      (  ) S3 present     (  ) S4 present    LUNGS:   ( X ) Unlabored respirations     (  ) tachypnea  ( X ) B/L air entry     (  ) decreased breath sounds in:  (location     )    ( X ) no adventitious sound     (  ) crackles     (  ) wheezing      (  ) rhonchi      (specify location:       )  (  ) chest wall tenderness (specify location:       )    ABDOMEN:   ( X ) Soft     (  ) tense   |   ( X ) nondistended     (  ) distended   |   (  ) +BS     (  ) hypoactive bowel sounds     (  ) hyperactive bowel sounds  ( X ) nontender     (  ) RUQ tenderness     (  ) RLQ tenderness     (  ) LLQ tenderness     (  ) epigastric tenderness     (  ) diffuse tenderness  (  ) Splenomegaly      (  ) Hepatomegaly      (  ) Jaundice     (  ) ecchymosis     EXTREMITIES:  ( X ) Normal     (  ) Rash     (  ) ecchymosis     (  ) varicose veins      (  ) pitting edema     (  ) non-pitting edema   (  ) ulceration     (  ) gangrene:     (location:     )    NERVOUS SYSTEM:    ( X ) A&Ox3     (  ) confused     (  ) lethargic  CN II-XII:     ( X ) Intact     (  ) deficits found     (Specify:     )   Upper extremities:     ( X ) no sensorimotor deficits     (  ) weakness     (  ) loss of proprioception/vibration     (  ) loss of touch/temperature (specify:    )  Lower extremities:     ( X ) no sensorimotor deficits     (  ) weakness     (  ) loss of proprioception/vibration     (  ) loss of touch/temperature (specify:    )      LABS:                        9.7    6.72  )-----------( 221      ( 13 Sep 2024 07:25 )             30.3     09-13    141  |  100  |  19  ----------------------------<  118<H>  4.3   |  31  |  0.8    Ca    9.4      13 Sep 2024 07:25  Phos  4.0     09-13  Mg     1.9     09-13    TPro  6.5  /  Alb  3.8  /  TBili  0.5  /  DBili  x   /  AST  26  /  ALT  17  /  AlkPhos  70  09-13    Lactate, Blood: 0.9 mmol/L (09-12-24 @ 17:43)

## 2024-09-13 NOTE — PROGRESS NOTE ADULT - ASSESSMENT
89-year-old female from [facility] with history of JENNIFER on 3L NC O2 in the mornings and evenings, GERD, aortic stenosis, left carotid stenosis, anemia, anxiety, urinary retention (straight catheterizes herself for urination), breast cancer s/p mastectomy and reconstruction and brain cancer s/p craniotomy presents from [facility] for shortness of breath x 1 day.     #Dyspnea, suspect anxiety related vs due to Aortic stenosis, ro CHF  Uses oxygen intermittently at NH  CTA chest PE protocol 09/11 reviewed, no evidence of PE, extensive scoliotic/kyphotic changes  Currently endorses dyspnea resolved  On 3L NC saturating 100% (also uses O2 at home per note).  Pro BNP elevated  - fu Echocardiogram  - Monitor off antibiotics  - Montior respiratory status 24h, also w ambulation  - walked 5 ft with rw with PT on 9/12.  - Obtain orthostatics    # Anemia  - Hg of 9.1 , baseline 11  - monitor for now  -active TnS    # Anxiety/insomnia  - Alpra .25 mg at bedtime    DVT PPX, heparin    #Progress Note Handoff  Pending (specify): Echo  Family discussion: estee pt regarding plan of care  Disposition: [facility], from [facility] .  Code status: DNR/DNI: Trial NIV

## 2024-09-13 NOTE — PROGRESS NOTE ADULT - SUBJECTIVE AND OBJECTIVE BOX
Patient is a 89y old  Female who presents with a chief complaint of SOB (09-12-24)      Pt seen and examined at bedside. No CP or SOB.          PAST MEDICAL & SURGICAL HISTORY:  Migraine    H/O brain tumor  benign 1977    Breast CA    Urinary retention with incomplete bladder emptying  pt self catheterizes  3-4 x day    H/O mastectomy, bilateral  1984    H/O craniotomy  1976        VITAL SIGNS (Last 24 hrs):  T(C): 36.6 (09-13-24 @ 13:59), Max: 36.8 (09-12-24 @ 19:58)  HR: 82 (09-13-24 @ 13:59) (76 - 91)  BP: 110/70 (09-13-24 @ 13:59) (107/65 - 136/76)  RR: 18 (09-13-24 @ 13:59) (18 - 18)  SpO2: 97% (09-13-24 @ 13:59) (97% - 100%)  Wt(kg): --  Daily     Daily     I&O's Summary    12 Sep 2024 07:01  -  13 Sep 2024 07:00  --------------------------------------------------------  IN: 0 mL / OUT: 2150 mL / NET: -2150 mL        PHYSICAL EXAM:  GENERAL: NAD, elderly   HEAD:  Atraumatic, Normocephalic  EYES: EOMI, PERRLA, conjunctiva and sclera clear  NECK: Supple, No JVD  CHEST/LUNG: Clear to auscultation bilaterally; No wheeze  HEART: Regular rate and rhythm; + murmurs, rubs, or gallops  ABDOMEN: Soft, Nontender, Nondistended; Bowel sounds present  EXTREMITIES:  2+ Peripheral Pulses, No clubbing, cyanosis, or edema  PSYCH: AAOx3  NEUROLOGY: non-focal  SKIN: No rashes or lesions    Labs Reviewed  Spoke to patient in regards to abnormal labs.    CBC Full  -  ( 13 Sep 2024 07:25 )  WBC Count : 6.72 K/uL  Hemoglobin : 9.7 g/dL  Hematocrit : 30.3 %  Platelet Count - Automated : 221 K/uL  Mean Cell Volume : 93.5 fL  Mean Cell Hemoglobin : 29.9 pg  Mean Cell Hemoglobin Concentration : 32.0 g/dL  Auto Neutrophil # : 5.51 K/uL  Auto Lymphocyte # : 0.53 K/uL  Auto Monocyte # : 0.51 K/uL  Auto Eosinophil # : 0.12 K/uL  Auto Basophil # : 0.02 K/uL  Auto Neutrophil % : 82.0 %  Auto Lymphocyte % : 7.9 %  Auto Monocyte % : 7.6 %  Auto Eosinophil % : 1.8 %  Auto Basophil % : 0.3 %    BMP:    09-13 @ 07:25    Blood Urea Nitrogen - 19  Calcium - 9.4  Carbond Dioxide - 31  Chloride - 100  Creatinine - 0.8  Glucose - 118  Potassium - 4.3  Sodium - 141      Hemoglobin A1c -     Urine Culture:        COVID Labs  CRP:      D-Dimer:            Imaging reviewed independently and reviewed read    < from: CT Angio Chest PE Protocol w/ IV Cont (09.11.24 @ 23:31) >  IMPRESSION:  No evidence of acute pulmonary embolus. Limited study for other   pathologies due to extensive scoliotic and kyphotic changes. No definite   acute thoracic pathology.    < end of copied text >      MEDICATIONS  (STANDING):  albuterol/ipratropium for Nebulization 3 milliLiter(s) Nebulizer every 6 hours  aspirin enteric coated 81 milliGRAM(s) Oral daily  atorvastatin 10 milliGRAM(s) Oral at bedtime  chlorhexidine 2% Cloths 1 Application(s) Topical daily  heparin   Injectable 5000 Unit(s) SubCutaneous every 12 hours  influenza  Vaccine (HIGH DOSE) 0.5 milliLiter(s) IntraMuscular once  pantoprazole    Tablet 40 milliGRAM(s) Oral before breakfast  polyethylene glycol 3350 17 Gram(s) Oral at bedtime    MEDICATIONS  (PRN):  ALPRAZolam 0.25 milliGRAM(s) Oral at bedtime PRN for insomnia

## 2024-09-13 NOTE — PROGRESS NOTE ADULT - CONVERSATION DETAILS
Goals of Care Conversation done with pt. Discussed FULL CODE VS DNR/DNI. CPR and intubation discussed with Pt. Pt wishes to be DNR/DNI.  Pt AAOX3. All questions answered. ANTONIA signed and placed in chart

## 2024-09-14 PROCEDURE — 99232 SBSQ HOSP IP/OBS MODERATE 35: CPT

## 2024-09-14 RX ADMIN — Medication 81 MILLIGRAM(S): at 12:25

## 2024-09-14 RX ADMIN — PANTOPRAZOLE SODIUM 40 MILLIGRAM(S): 40 TABLET, DELAYED RELEASE ORAL at 06:06

## 2024-09-14 RX ADMIN — IPRATROPIUM BROMIDE AND ALBUTEROL SULFATE 3 MILLILITER(S): .5; 3 SOLUTION RESPIRATORY (INHALATION) at 14:30

## 2024-09-14 RX ADMIN — IPRATROPIUM BROMIDE AND ALBUTEROL SULFATE 3 MILLILITER(S): .5; 3 SOLUTION RESPIRATORY (INHALATION) at 07:38

## 2024-09-14 RX ADMIN — Medication 5000 UNIT(S): at 06:07

## 2024-09-14 RX ADMIN — CHLORHEXIDINE GLUCONATE ORAL RINSE 1 APPLICATION(S): 1.2 SOLUTION DENTAL at 12:28

## 2024-09-14 RX ADMIN — IPRATROPIUM BROMIDE AND ALBUTEROL SULFATE 3 MILLILITER(S): .5; 3 SOLUTION RESPIRATORY (INHALATION) at 20:27

## 2024-09-14 RX ADMIN — Medication 5000 UNIT(S): at 17:18

## 2024-09-14 RX ADMIN — ATORVASTATIN CALCIUM 10 MILLIGRAM(S): 10 TABLET, FILM COATED ORAL at 21:23

## 2024-09-14 NOTE — PROGRESS NOTE ADULT - SUBJECTIVE AND OBJECTIVE BOX
Progress Note:  Provider Speciality                            Hospitalist      MARISA DYKES [id number] 89y Female     CHIEF PRESENTING COMPLAINT:  Patient is a 89y old  Female who presents with a chief complaint of SOB (13 Sep 2024 15:13)        SUBJECTIVE:  Patient was seen and examined at bedside. Reports improvement in  presenting complaint.   No significant overnight events reported.     HISTORY OF PRESENTING ILLNESS:  HPI:  89-year-old female with history of JENNIFER on 3L NC O2 in the mornings and evenings, GERD, aortic stenosis, left carotid stenosis, anemia, anxiety, urinary retention (straight catheterizes herself for urination), breast cancer s/p mastectomy and reconstruction and brain cancer s/p craniotomy presents from [facility] for shortness of breath x 1 day.  Patient was sitting down watching TV when she started feeling short of breath.  Patient is normally not on O2 during the day and was started on NC after being evaluated at the facility sent to [facility] .   Denies any recent fevers, chills, sweats, chest pain, new cough, congestion, sore throat, runny nose, or calf pain.    In Ed afebrile , sating well on 3 L of NC   on labs Hb 9.1 , Trop 42 trended down to 38   lactate 2.1 , proBNP 1541 RVP negative      CTA chest r/o PE       admitted for further workup  (12 Sep 2024 05:35)        REVIEW OF SYSTEMS:  Patient denies  headache, fever, chills. Denies chest pain, shortness of breath, palpitation. Denies nausea, vomiting, abdominal pain or diarrhoea, Denies dysuria.   At least 10 systems were reviewed in ROS. All systems reviewed  are within normal limits except for the complaints as described in Subjective.    PAST MEDICAL & SURGICAL HISTORY:  PAST MEDICAL & SURGICAL HISTORY:  Migraine      H/O brain tumor  benign 1977      Breast CA      Urinary retention with incomplete bladder emptying  pt self catheterizes  3-4 x day      H/O mastectomy, bilateral  1984      H/O craniotomy  1976              VITAL SIGNS:  Vital Signs Last 24 Hrs  T(C): 37 (14 Sep 2024 13:45), Max: 37 (14 Sep 2024 13:45)  T(F): 98.6 (14 Sep 2024 13:45), Max: 98.6 (14 Sep 2024 13:45)  HR: 88 (14 Sep 2024 13:45) (79 - 98)  BP: 101/57 (14 Sep 2024 13:45) (101/57 - 113/73)  BP(mean): 72 (14 Sep 2024 13:45) (72 - 75)  RR: 18 (14 Sep 2024 13:45) (17 - 18)  SpO2: 96% (14 Sep 2024 13:45) (96% - 99%)    Parameters below as of 14 Sep 2024 13:45  Patient On (Oxygen Delivery Method): room air              PHYSICAL EXAMINATION:  Not in acute distress  General: No icterus  HEENT:   no JVD.  Heart: S1+S2 audible  Lungs: bilateral  moderate air entry, no wheezing, no crepitations.  Abdomen: Soft, non-tender, non-distended , no  rigidity or guarding.  CNS: Awake alert, CN  grossly intact.  Extremities:  No edema            CONSULTS:  Consultant(s) Notes Reviewed by me.   Care Discussed with Consultants/Other Providers where required.    All the images and labs were reviewed today        MEDICATIONS:  MEDICATIONS  (STANDING):  albuterol/ipratropium for Nebulization 3 milliLiter(s) Nebulizer every 6 hours  aspirin enteric coated 81 milliGRAM(s) Oral daily  atorvastatin 10 milliGRAM(s) Oral at bedtime  chlorhexidine 2% Cloths 1 Application(s) Topical daily  heparin   Injectable 5000 Unit(s) SubCutaneous every 12 hours  influenza  Vaccine (HIGH DOSE) 0.5 milliLiter(s) IntraMuscular once  pantoprazole    Tablet 40 milliGRAM(s) Oral before breakfast  polyethylene glycol 3350 17 Gram(s) Oral at bedtime    MEDICATIONS  (PRN):  ALPRAZolam 0.25 milliGRAM(s) Oral at bedtime PRN for insomnia

## 2024-09-14 NOTE — PROGRESS NOTE ADULT - ASSESSMENT
89-year-old female with history of JENNIFER on 3L NC O2 in the mornings and evenings, GERD, aortic stenosis, left carotid stenosis, anemia, anxiety, urinary retention (straight catheterizes herself for urination), breast cancer s/p mastectomy and reconstruction and brain cancer s/p craniotomy presents from [facility] for shortness of breath x 1 day.     Acute hypoxia , unclear etiology- improved  suspect anxiety related vs due to Aortic stenosis, ro CHF  Uses oxygen intermittently at NH  Doing well on RA today  CTA chest PE protocol 09/11 reviewed, no evidence of PE, extensive scoliotic/kyphotic changes  Pro BNP at 1541  Echocardiogram pending  No apparent infectious etiology  PT - recommends SNF  Hb at baseline. Chronic anemia   Anxiety/insomnia- Alpra .25 mg at bedtime  DVT PPX-heparin    #Progress Note Handoff  Pending : Echo  Disposition: Anticipated for  [facility] tomorrow after TTE .

## 2024-09-15 LAB
ANION GAP SERPL CALC-SCNC: 7 MMOL/L — SIGNIFICANT CHANGE UP (ref 7–14)
BUN SERPL-MCNC: 18 MG/DL — SIGNIFICANT CHANGE UP (ref 10–20)
CALCIUM SERPL-MCNC: 9.2 MG/DL — SIGNIFICANT CHANGE UP (ref 8.4–10.5)
CHLORIDE SERPL-SCNC: 102 MMOL/L — SIGNIFICANT CHANGE UP (ref 98–110)
CO2 SERPL-SCNC: 31 MMOL/L — SIGNIFICANT CHANGE UP (ref 17–32)
CREAT SERPL-MCNC: 0.8 MG/DL — SIGNIFICANT CHANGE UP (ref 0.7–1.5)
EGFR: 69 ML/MIN/1.73M2 — SIGNIFICANT CHANGE UP
GLUCOSE SERPL-MCNC: 116 MG/DL — HIGH (ref 70–99)
HCT VFR BLD CALC: 29.4 % — LOW (ref 37–47)
HGB BLD-MCNC: 9.4 G/DL — LOW (ref 12–16)
MCHC RBC-ENTMCNC: 29.9 PG — SIGNIFICANT CHANGE UP (ref 27–31)
MCHC RBC-ENTMCNC: 32 G/DL — SIGNIFICANT CHANGE UP (ref 32–37)
MCV RBC AUTO: 93.6 FL — SIGNIFICANT CHANGE UP (ref 81–99)
NRBC # BLD: 0 /100 WBCS — SIGNIFICANT CHANGE UP (ref 0–0)
PLATELET # BLD AUTO: 210 K/UL — SIGNIFICANT CHANGE UP (ref 130–400)
PMV BLD: 9.7 FL — SIGNIFICANT CHANGE UP (ref 7.4–10.4)
POTASSIUM SERPL-MCNC: 4.4 MMOL/L — SIGNIFICANT CHANGE UP (ref 3.5–5)
POTASSIUM SERPL-SCNC: 4.4 MMOL/L — SIGNIFICANT CHANGE UP (ref 3.5–5)
RBC # BLD: 3.14 M/UL — LOW (ref 4.2–5.4)
RBC # FLD: 15.3 % — HIGH (ref 11.5–14.5)
SODIUM SERPL-SCNC: 140 MMOL/L — SIGNIFICANT CHANGE UP (ref 135–146)
WBC # BLD: 5.49 K/UL — SIGNIFICANT CHANGE UP (ref 4.8–10.8)
WBC # FLD AUTO: 5.49 K/UL — SIGNIFICANT CHANGE UP (ref 4.8–10.8)

## 2024-09-15 PROCEDURE — 99232 SBSQ HOSP IP/OBS MODERATE 35: CPT

## 2024-09-15 PROCEDURE — 93306 TTE W/DOPPLER COMPLETE: CPT | Mod: 26

## 2024-09-15 RX ADMIN — Medication 5000 UNIT(S): at 05:26

## 2024-09-15 RX ADMIN — ATORVASTATIN CALCIUM 10 MILLIGRAM(S): 10 TABLET, FILM COATED ORAL at 21:04

## 2024-09-15 RX ADMIN — IPRATROPIUM BROMIDE AND ALBUTEROL SULFATE 3 MILLILITER(S): .5; 3 SOLUTION RESPIRATORY (INHALATION) at 13:35

## 2024-09-15 RX ADMIN — Medication 17 GRAM(S): at 21:04

## 2024-09-15 RX ADMIN — Medication 5000 UNIT(S): at 17:28

## 2024-09-15 RX ADMIN — ALPRAZOLAM 0.25 MILLIGRAM(S): 0.5 TABLET ORAL at 21:05

## 2024-09-15 RX ADMIN — CHLORHEXIDINE GLUCONATE ORAL RINSE 1 APPLICATION(S): 1.2 SOLUTION DENTAL at 11:52

## 2024-09-15 RX ADMIN — PANTOPRAZOLE SODIUM 40 MILLIGRAM(S): 40 TABLET, DELAYED RELEASE ORAL at 05:27

## 2024-09-15 RX ADMIN — Medication 81 MILLIGRAM(S): at 11:51

## 2024-09-15 NOTE — PROGRESS NOTE ADULT - SUBJECTIVE AND OBJECTIVE BOX
Progress Note:  Provider Speciality                            Hospitalist      MARISA DYKES [id number] 89y Female     CHIEF PRESENTING COMPLAINT:  Patient is a 89y old  Female who presents with a chief complaint of SOB (13 Sep 2024 15:13)        SUBJECTIVE:  Patient was seen and examined at bedside. Reports improvement in  presenting complaint.   No significant overnight events reported.     HISTORY OF PRESENTING ILLNESS:  HPI:  89-year-old female with history of JENNIFER on 3L NC O2 in the mornings and evenings, GERD, aortic stenosis, left carotid stenosis, anemia, anxiety, urinary retention (straight catheterizes herself for urination), breast cancer s/p mastectomy and reconstruction and brain cancer s/p craniotomy presents from [facility] for shortness of breath x 1 day.  Patient was sitting down watching TV when she started feeling short of breath.  Patient is normally not on O2 during the day and was started on NC after being evaluated at the facility sent to Ed .   Denies any recent fevers, chills, sweats, chest pain, new cough, congestion, sore throat, runny nose, or calf pain.    In Ed afebrile , sating well on 3 L of NC   on labs Hb 9.1 , Trop 42 trended down to 38   lactate 2.1 , proBNP 1541 RVP negative      CTA chest r/o PE       admitted for further workup  (12 Sep 2024 05:35)        REVIEW OF SYSTEMS:  Patient denies  headache, fever, chills. Denies chest pain, shortness of breath, palpitation. Denies nausea, vomiting, abdominal pain or diarrhoea, Denies dysuria.   At least 10 systems were reviewed in ROS. All systems reviewed  are within normal limits except for the complaints as described in Subjective.    PAST MEDICAL & SURGICAL HISTORY:  PAST MEDICAL & SURGICAL HISTORY:  Migraine      H/O brain tumor  benign 1977      Breast CA      Urinary retention with incomplete bladder emptying  pt self catheterizes  3-4 x day      H/O mastectomy, bilateral  1984      H/O craniotomy  1976              VITAL SIGNS:  Vital Signs Last 24 Hrs  T(C): 36 (15 Sep 2024 05:17), Max: 37.2 (14 Sep 2024 20:20)  T(F): 96.8 (15 Sep 2024 05:17), Max: 98.9 (14 Sep 2024 20:20)  HR: 82 (15 Sep 2024 05:17) (82 - 96)  BP: 107/48 (15 Sep 2024 05:17) (90/53 - 107/48)  BP(mean): 72 (14 Sep 2024 13:45) (72 - 72)  RR: 18 (15 Sep 2024 05:17) (17 - 18)  SpO2: 97% (14 Sep 2024 20:20) (96% - 97%)    Parameters below as of 14 Sep 2024 13:45  Patient On (Oxygen Delivery Method): room air              PHYSICAL EXAMINATION:  Not in acute distress  General: No icterus  HEENT:   no JVD.  Heart: S1+S2 audible  Lungs: bilateral  moderate air entry, no wheezing, no crepitations.  Abdomen: Soft, non-tender, non-distended , no  rigidity or guarding.  CNS: Awake alert, CN  grossly intact.  Extremities:  No edema            CONSULTS:  Consultant(s) Notes Reviewed by me.   Care Discussed with Consultants/Other Providers where required.    All the images and labs were reviewed today        MEDICATIONS:  MEDICATIONS  (STANDING):  albuterol/ipratropium for Nebulization 3 milliLiter(s) Nebulizer every 6 hours  aspirin enteric coated 81 milliGRAM(s) Oral daily  atorvastatin 10 milliGRAM(s) Oral at bedtime  chlorhexidine 2% Cloths 1 Application(s) Topical daily  heparin   Injectable 5000 Unit(s) SubCutaneous every 12 hours  influenza  Vaccine (HIGH DOSE) 0.5 milliLiter(s) IntraMuscular once  pantoprazole    Tablet 40 milliGRAM(s) Oral before breakfast  polyethylene glycol 3350 17 Gram(s) Oral at bedtime    MEDICATIONS  (PRN):  ALPRAZolam 0.25 milliGRAM(s) Oral at bedtime PRN for insomnia

## 2024-09-15 NOTE — PROGRESS NOTE ADULT - ASSESSMENT
89-year-old female with history of JENNIFER on 3L NC O2 in the mornings and evenings, GERD, aortic stenosis, left carotid stenosis, anemia, anxiety, urinary retention (straight catheterizes herself for urination), breast cancer s/p mastectomy and reconstruction and brain cancer s/p craniotomy presents from [facility] for shortness of breath x 1 day.     Acute hypoxia , unclear etiology- improved  suspect anxiety related vs due to Aortic stenosis, ro CHF  Uses oxygen intermittently at NH  Doing well on RA today  CTA chest PE protocol 09/11 reviewed, no evidence of PE, extensive scoliotic/kyphotic changes  Pro BNP at 1541  Echocardiogram  09/15- Ef 55%. Moderate AS  No apparent infectious etiology  PT - recommends SNF  Hb at baseline. Chronic anemia   Anxiety/insomnia- Alpra .25 mg at bedtime  DVT PPX-heparin    #Progress Note Handoff  Pending : Echo  Disposition: Anticipated for  [facility] tomorrow

## 2024-09-16 ENCOUNTER — TRANSCRIPTION ENCOUNTER (OUTPATIENT)
Age: 89
End: 2024-09-16

## 2024-09-16 LAB
ANION GAP SERPL CALC-SCNC: 9 MMOL/L — SIGNIFICANT CHANGE UP (ref 7–14)
BUN SERPL-MCNC: 21 MG/DL — HIGH (ref 10–20)
CALCIUM SERPL-MCNC: 9.3 MG/DL — SIGNIFICANT CHANGE UP (ref 8.4–10.4)
CHLORIDE SERPL-SCNC: 102 MMOL/L — SIGNIFICANT CHANGE UP (ref 98–110)
CO2 SERPL-SCNC: 30 MMOL/L — SIGNIFICANT CHANGE UP (ref 17–32)
CREAT SERPL-MCNC: 0.9 MG/DL — SIGNIFICANT CHANGE UP (ref 0.7–1.5)
EGFR: 60 ML/MIN/1.73M2 — SIGNIFICANT CHANGE UP
GLUCOSE SERPL-MCNC: 142 MG/DL — HIGH (ref 70–99)
HCT VFR BLD CALC: 31.2 % — LOW (ref 37–47)
HGB BLD-MCNC: 9.5 G/DL — LOW (ref 12–16)
MCHC RBC-ENTMCNC: 29.6 PG — SIGNIFICANT CHANGE UP (ref 27–31)
MCHC RBC-ENTMCNC: 30.4 G/DL — LOW (ref 32–37)
MCV RBC AUTO: 97.2 FL — SIGNIFICANT CHANGE UP (ref 81–99)
NRBC # BLD: 0 /100 WBCS — SIGNIFICANT CHANGE UP (ref 0–0)
PLATELET # BLD AUTO: 178 K/UL — SIGNIFICANT CHANGE UP (ref 130–400)
PMV BLD: 9.6 FL — SIGNIFICANT CHANGE UP (ref 7.4–10.4)
POTASSIUM SERPL-MCNC: 4 MMOL/L — SIGNIFICANT CHANGE UP (ref 3.5–5)
POTASSIUM SERPL-SCNC: 4 MMOL/L — SIGNIFICANT CHANGE UP (ref 3.5–5)
RBC # BLD: 3.21 M/UL — LOW (ref 4.2–5.4)
RBC # FLD: 15.1 % — HIGH (ref 11.5–14.5)
SODIUM SERPL-SCNC: 141 MMOL/L — SIGNIFICANT CHANGE UP (ref 135–146)
WBC # BLD: 5.02 K/UL — SIGNIFICANT CHANGE UP (ref 4.8–10.8)
WBC # FLD AUTO: 5.02 K/UL — SIGNIFICANT CHANGE UP (ref 4.8–10.8)

## 2024-09-16 PROCEDURE — 99232 SBSQ HOSP IP/OBS MODERATE 35: CPT

## 2024-09-16 RX ADMIN — PANTOPRAZOLE SODIUM 40 MILLIGRAM(S): 40 TABLET, DELAYED RELEASE ORAL at 05:30

## 2024-09-16 RX ADMIN — Medication 17 GRAM(S): at 21:55

## 2024-09-16 RX ADMIN — Medication 5000 UNIT(S): at 05:30

## 2024-09-16 RX ADMIN — IPRATROPIUM BROMIDE AND ALBUTEROL SULFATE 3 MILLILITER(S): .5; 3 SOLUTION RESPIRATORY (INHALATION) at 08:13

## 2024-09-16 RX ADMIN — Medication 81 MILLIGRAM(S): at 11:14

## 2024-09-16 RX ADMIN — ALPRAZOLAM 0.25 MILLIGRAM(S): 0.5 TABLET ORAL at 21:50

## 2024-09-16 RX ADMIN — Medication 5000 UNIT(S): at 18:01

## 2024-09-16 RX ADMIN — CHLORHEXIDINE GLUCONATE ORAL RINSE 1 APPLICATION(S): 1.2 SOLUTION DENTAL at 11:16

## 2024-09-16 RX ADMIN — ATORVASTATIN CALCIUM 10 MILLIGRAM(S): 10 TABLET, FILM COATED ORAL at 21:50

## 2024-09-16 NOTE — DISCHARGE NOTE PROVIDER - CARE PROVIDER_API CALL
Angel Luis Rodriguez  Internal Medicine  [address] [place] [address]  Phone: [phone]  Fax: [phone]  Follow Up Time: 1 week   Angel Luis Rodriguez  Internal Medicine  [address] [place] [address]  Phone: [phone]  Fax: [phone]  Follow Up Time: 1 week    Taras Gutierrez  Urology  [address] [place] [address]  Phone: [phone]  Fax: [phone]  Follow Up Time: 2 weeks

## 2024-09-16 NOTE — DISCHARGE NOTE PROVIDER - PROVIDER TOKENS
PROVIDER:[TOKEN:[[id number]],FOLLOWUP:[1 week]] PROVIDER:[TOKEN:[[id number]],FOLLOWUP:[1 week]],PROVIDER:[TOKEN:[[id number]],FOLLOWUP:[2 weeks]]

## 2024-09-16 NOTE — DISCHARGE NOTE PROVIDER - NSDCMRMEDTOKEN_GEN_ALL_CORE_FT
ALPRAZolam 0.25 mg oral tablet: 1 tab(s) orally once a day (at bedtime) as needed for  insomnia  aspirin 81 mg oral delayed release tablet: 1 tab(s) orally once a day  atorvastatin 10 mg oral tablet: 1 tab(s) orally once a day (at bedtime)  dexlansoprazole 60 mg oral delayed release capsule: 1 cap(s) orally once a day  loperamide 2 mg oral capsule: 1 cap(s) orally 4 times a day as needed for  diarrhea  Migraine relief tablet:   Multiple Vitamins oral tablet: 1 tab(s) orally once a day  polyethylene glycol 3350 oral powder for reconstitution: 17 gram(s) orally once a day (at bedtime)   ALPRAZolam 0.25 mg oral tablet: 1 tab(s) orally once a day (at bedtime) as needed for  insomnia  aspirin 81 mg oral delayed release tablet: 1 tab(s) orally once a day  atorvastatin 10 mg oral tablet: 1 tab(s) orally once a day (at bedtime)  dexlansoprazole 60 mg oral delayed release capsule: 1 cap(s) orally once a day  loperamide 2 mg oral capsule: 1 cap(s) orally 4 times a day as needed for  diarrhea  Migraine relief tablet:   Multiple Vitamins oral tablet: 1 tab(s) orally once a day  polyethylene glycol 3350 oral powder for reconstitution: 17 gram(s) orally once a day (at bedtime)  senna leaf extract oral tablet: 2 tab(s) orally once a day (at bedtime)   ALPRAZolam 0.25 mg oral tablet: 1 tab(s) orally once a day (at bedtime) as needed for  insomnia MDD: 1 tab  aspirin 81 mg oral delayed release tablet: 1 tab(s) orally once a day  atorvastatin 10 mg oral tablet: 1 tab(s) orally once a day (at bedtime)  cefpodoxime 200 mg oral tablet: 1 tab(s) orally 2 times a day Please complete your antibiotic course immediately after discharge from hospital. 2 pills a day for 3 days starting October 1st.  dexlansoprazole 60 mg oral delayed release capsule: 1 cap(s) orally once a day  loperamide 2 mg oral capsule: 1 cap(s) orally 4 times a day as needed for  diarrhea  Migraine relief tablet: Take one tablet as needed for migraine  Multiple Vitamins oral tablet: 1 tab(s) orally once a day  polyethylene glycol 3350 oral powder for reconstitution: 17 gram(s) orally once a day (at bedtime)  senna leaf extract oral tablet: 2 tab(s) orally once a day (at bedtime)   ALPRAZolam 0.25 mg oral tablet: 1 tab(s) orally once a day (at bedtime) as needed for  insomnia MDD: 1 tab  aspirin 81 mg oral delayed release tablet: 1 tab(s) orally once a day  atorvastatin 10 mg oral tablet: 1 tab(s) orally once a day (at bedtime)  cefpodoxime 200 mg oral tablet: 1 tab(s) orally 2 times a day Please complete your antibiotic course immediately after discharge from hospital. 2 pills a day for 3 days starting October 1st.  dexlansoprazole 60 mg oral delayed release capsule: 1 cap(s) orally once a day  ipratropium-albuterol 0.5 mg-2.5 mg/3 mL inhalation solution: 3 milliliter(s) inhaled every 6 hours as needed for  shortness of breath and/or wheezing  Multiple Vitamins oral tablet: 1 tab(s) orally once a day  senna leaf extract oral tablet: 2 tab(s) orally once a day (at bedtime)

## 2024-09-16 NOTE — PROGRESS NOTE ADULT - ASSESSMENT
89-year-old female with history of JENNIFER on 3L NC O2 in the mornings and evenings, GERD, aortic stenosis, left carotid stenosis, anemia, anxiety, urinary retention (straight catheterizes herself for urination), breast cancer s/p mastectomy and reconstruction and brain cancer s/p craniotomy presents from [facility] for shortness of breath x 1 day.     Acute hypoxia , unclear etiology- improved  suspect anxiety related vs due to Aortic stenosis, ro CHF  Uses oxygen intermittently at NH  Doing well on RA today  CTA chest PE protocol 09/11 reviewed, no evidence of PE, extensive scoliotic/kyphotic changes  Pro BNP at 1541  Echocardiogram  09/15- Ef 55%. Moderate AS  No apparent infectious etiology  PT - recommends SNF  Hb at baseline. Chronic anemia   Anxiety/insomnia- Alpra .25 mg at bedtime  DVT PPX-heparin    #Progress Note Handoff  Disposition: stable [facility]   [facility]

## 2024-09-16 NOTE — DISCHARGE NOTE PROVIDER - HOSPITAL COURSE
89-year-old female with history of JENNIFER on 3L NC O2 in the mornings and evenings, GERD, aortic stenosis, left carotid stenosis, anemia, anxiety, urinary retention (straight catheterizes herself for urination), breast cancer s/p mastectomy and reconstruction and brain cancer s/p craniotomy presents from [facility] for shortness of breath x 1 day.  Patient was sitting down watching TV when she started feeling short of breath.  Patient is normally not on O2 during the day and was started on NC after being evaluated at the facility sent to Ed .   Denies any recent fevers, chills, sweats, chest pain, new cough, congestion, sore throat, runny nose, or calf pain.    In Ed afebrile , sating well on 3 L of NC   on labs Hb 9.1 , Trop 42 trended down to 38   lactate 2.1 , proBNP 1541 RVP negative     Acute hypoxia , unclear etiology- improved  suspect anxiety related vs due to Aortic stenosis, ro CHF  Uses oxygen intermittently at NH  Doing well on RA today  CTA chest PE protocol 09/11 reviewed, no evidence of PE, extensive scoliotic/kyphotic changes  Pro BNP at 1541  Echocardiogram  09/15- Ef 55%. Moderate AS  No apparent infectious etiology  PT - recommends SNF  Hb at baseline. Chronic anemia   Anxiety/insomnia- Alpra .25 mg at bedtime    The TTE showed Spectral Doppler shows:  -  Left ventricular ejection fraction, by visual estimation, is 50 to 55%.  - Pseudonormal pattern of left ventricular myocardial filling (Grade II diastolic dysfunction).  - Thickened calcified av with significant restriction, with at least moderate aortic stenosis with peak AV rafael: 3.07 m/s with peak/mean gradient of 38/23 mmHg but may not be getting peak rafael and seems physically worse gradient.    She was assessed this morning, mentioned that her SOB had improved.  Stable for discharge, discussed during morning rounds     89-year-old female with history of JENNIFER on 3L NC O2 in the mornings and evenings, GERD, aortic stenosis, left carotid stenosis, anemia, anxiety, urinary retention (straight catheterizes herself for urination), breast cancer s/p mastectomy and reconstruction and brain cancer s/p craniotomy presents from [facility] for shortness of breath x 1 day.  Patient was sitting down watching TV when she started feeling short of breath.  Patient is normally not on O2 during the day and was started on NC after being evaluated at the facility sent to Ed .   Denies any recent fevers, chills, sweats, chest pain, new cough, congestion, sore throat, runny nose, or calf pain.    In Ed afebrile , sating well on 3 L of NC   on labs Hb 9.1 , Trop 42 trended down to 38   lactate 2.1 , proBNP 1541 RVP negative     Acute hypoxia , unclear etiology- improved  suspect anxiety related vs due to Aortic stenosis, ro CHF  Uses oxygen intermittently at NH  Doing well on RA today  CTA chest PE protocol 09/11 reviewed, no evidence of PE, extensive scoliotic/kyphotic changes  Pro BNP at 1541  Echocardiogram  09/15- Ef 55%. Moderate AS  No apparent infectious etiology  PT - recommends SNF  Hb at baseline. Chronic anemia   Anxiety/insomnia- Alpra .25 mg at bedtime    The TTE showed Spectral Doppler shows:  -  Left ventricular ejection fraction, by visual estimation, is 50 to 55%.  - Pseudonormal pattern of left ventricular myocardial filling (Grade II diastolic dysfunction).  - Thickened calcified av with significant restriction, with at least moderate aortic stenosis with peak AV rafael: 3.07 m/s with peak/mean gradient of 38/23 mmHg but may not be getting peak rafael and seems physically worse gradient.    For her urinary retention, the patient had been self catheterizing in her assisted living facility. Encouraged to do spontaneous voiding  Will be instructed by nursing staff about appropriate technique     She was assessed this morning, mentioned that her SOB had improved for the past three days.  She was able to exercise adequately with physical therapy  Stable for discharge, discussed during morning rounds with the attending     HPI:  89-year-old female with history of JENNIFER on 3L NC O2 in the mornings and evenings, GERD, aortic stenosis, left carotid stenosis, anemia, anxiety, urinary retention (straight catheterizes herself for urination), breast cancer s/p mastectomy and reconstruction and brain cancer s/p craniotomy presents from [facility] for shortness of breath x 1 day.  Patient was sitting down watching TV when she started feeling short of breath.  Patient is normally not on O2 during the day and was started on NC after being evaluated at the facility sent to Ed . Denies any recent fevers, chills, sweats, chest pain, new cough, congestion, sore throat, runny nose, or calf pain.    ED course:   Afebrile , sating well on 3 L of NC   Labs with Hb 9.1 , Trop 42 trended down to 38, lactate 2.1 , proBNP 1541 RVP negative     Hospital course:   # Acute hypoxia , unclear etiology- improved   - suspect anxiety related vs due to Aortic stenosis, ro CHF   - Uses oxygen intermittently at NH   - Doing well on RA today   - CTA chest PE protocol 09/11 reviewed, no evidence of PE, extensive scoliotic/kyphotic changes  - Pro BNP at 1541  - Echocardiogram  09/15- Ef 55%. Moderate AS. GIIDD.  - cont aprazolam 0.25mg QHS for anxiety/insomnia     # urinary retention   - patient has been doing self-catheterization in her assisted living facility   - Pt does self cath but because of cognitive decline self-cath will be an issue as she was found to be significantly retaining up to 900 ml.  - Lama was placed inpatient and pt will be discharged with lama   - She will need outpt Urology f/u     At time of discharge, pt is HDS, no acute concerns or complaints. SOB has resolved. Patient will be discharged with lama for urinary retention and will need outpt urology follow-up. Patient discharge diagnosis, follow up, and medication reconciliation discussed with attending provider, Dr. Olson, and discharge approved for 9/23/24.      Hospital course: This is a 89-year-old female with a complex medical history including JENNIFER, GERD, aortic stenosis, left carotid stenosis, anemia, anxiety, urinary retention, history of breast cancer and brain cancer, who presented from her nursing home with acute hypoxia.  Her workup, including a CT angiogram of the chest, was negative for pulmonary embolism but showed extensive scoliotic/kyphotic changes.  Her hypoxia improved with conservative management and the etiology is suspected to be related to anxiety and/or volume overload from her chronic diastolic heart failure.  Her echocardiogram showed an ejection fraction of 55% with moderate aortic stenosis.  She was found to be in urinary retention and a Lama catheter was placed.  Her chronic anemia remained stable at baseline.  She was treated with alprazolam for anxiety and insomnia. She is DNR/DNI.       HPI:  89-year-old female with history of JENNIFER on 3L NC O2 in the mornings and evenings, GERD, aortic stenosis, left carotid stenosis, anemia, anxiety, urinary retention (straight catheterizes herself for urination), breast cancer s/p mastectomy and reconstruction and brain cancer s/p craniotomy presents from [facility] for shortness of breath x 1 day.  Patient was sitting down watching TV when she started feeling short of breath.  Patient is normally not on O2 during the day and was started on NC after being evaluated at the facility sent to Ed . Denies any recent fevers, chills, sweats, chest pain, new cough, congestion, sore throat, runny nose, or calf pain.    ED course:   Afebrile , sating well on 3 L of NC   Labs with Hb 9.1 , Trop 42 trended down to 38, lactate 2.1 , proBNP 1541 RVP negative     Hospital course:   # Acute hypoxia , unclear etiology- improved   - suspect anxiety related vs due to Aortic stenosis, ro CHF   - Uses oxygen intermittently at NH   - Doing well on RA today   - CTA chest PE protocol 09/11 reviewed, no evidence of PE, extensive scoliotic/kyphotic changes  - Pro BNP at 1541  - Echocardiogram  09/15- Ef 55%. Moderate AS. GIIDD.  - cont aprazolam 0.25mg QHS for anxiety/insomnia     # urinary retention   - patient has been doing self-catheterization in her assisted living facility   - Pt does self cath but because of cognitive decline self-cath will be an issue as she was found to be significantly retaining up to 900 ml.  - Lama was placed inpatient and pt will be discharged with lama   - She will need outpt Urology f/u     At time of discharge, pt is HDS, no acute concerns or complaints. SOB has resolved. Patient will be discharged with lama for urinary retention and will need outpt urology follow-up.

## 2024-09-16 NOTE — PROGRESS NOTE ADULT - SUBJECTIVE AND OBJECTIVE BOX
Progress Note:  Provider Speciality                            Hospitalist      MARISA DYKES MRN-[id number] 89y Female     CHIEF PRESENTING COMPLAINT:  Patient is a 89y old  Female who presents with a chief complaint of SOB (13 Sep 2024 15:13)        SUBJECTIVE:  Patient was seen and examined at bedside. Reports improvement in  presenting complaint.   No significant overnight events reported.     HISTORY OF PRESENTING ILLNESS:  HPI:  89-year-old female with history of JENNIFER on 3L NC O2 in the mornings and evenings, GERD, aortic stenosis, left carotid stenosis, anemia, anxiety, urinary retention (straight catheterizes herself for urination), breast cancer s/p mastectomy and reconstruction and brain cancer s/p craniotomy presents from [facility] for shortness of breath x 1 day.  Patient was sitting down watching TV when she started feeling short of breath.  Patient is normally not on O2 during the day and was started on NC after being evaluated at the facility sent to Ed .   Denies any recent fevers, chills, sweats, chest pain, new cough, congestion, sore throat, runny nose, or calf pain.    In Ed afebrile , sating well on 3 L of NC   on labs Hb 9.1 , Trop 42 trended down to 38   lactate 2.1 , proBNP 1541 RVP negative      CTA chest r/o PE       admitted for further workup  (12 Sep 2024 05:35)        REVIEW OF SYSTEMS:  Patient denies  headache, fever, chills. Denies chest pain, shortness of breath, palpitation. Denies nausea, vomiting, abdominal pain or diarrhoea, Denies dysuria.   At least 10 systems were reviewed in ROS. All systems reviewed  are within normal limits except for the complaints as described in Subjective.    PAST MEDICAL & SURGICAL HISTORY:  PAST MEDICAL & SURGICAL HISTORY:  Migraine      H/O brain tumor  benign 1977      Breast CA      Urinary retention with incomplete bladder emptying  pt self catheterizes  3-4 x day      H/O mastectomy, bilateral  1984      H/O craniotomy  1976              VITAL SIGNS:  Vital Signs Last 24 Hrs  T(C): 37.1 (16 Sep 2024 04:26), Max: 37.1 (16 Sep 2024 04:26)  T(F): 98.8 (16 Sep 2024 04:26), Max: 98.8 (16 Sep 2024 04:26)  HR: 109 (16 Sep 2024 04:26) (87 - 109)  BP: 118/70 (16 Sep 2024 04:26) (109/60 - 123/74)  BP(mean): --  RR: 18 (16 Sep 2024 04:26) (18 - 19)  SpO2: 98% (16 Sep 2024 04:26) (98% - 98%)    Parameters below as of 16 Sep 2024 09:09  Patient On (Oxygen Delivery Method): nasal cannula  O2 Flow (L/min): 3          PHYSICAL EXAMINATION:  Not in acute distress  General: No icterus  HEENT:   no JVD.  Heart: S1+S2 audible  Lungs: bilateral  moderate air entry, no wheezing, no crepitations.  Abdomen: Soft, non-tender, non-distended , no  rigidity or guarding.  CNS: Awake alert, CN  grossly intact.  Extremities:  No edema            CONSULTS:  Consultant(s) Notes Reviewed by me.   Care Discussed with Consultants/Other Providers where required.    All the images and labs were reviewed today        MEDICATIONS:  MEDICATIONS  (STANDING):  albuterol/ipratropium for Nebulization 3 milliLiter(s) Nebulizer every 6 hours  aspirin enteric coated 81 milliGRAM(s) Oral daily  atorvastatin 10 milliGRAM(s) Oral at bedtime  chlorhexidine 2% Cloths 1 Application(s) Topical daily  heparin   Injectable 5000 Unit(s) SubCutaneous every 12 hours  influenza  Vaccine (HIGH DOSE) 0.5 milliLiter(s) IntraMuscular once  pantoprazole    Tablet 40 milliGRAM(s) Oral before breakfast  polyethylene glycol 3350 17 Gram(s) Oral at bedtime    MEDICATIONS  (PRN):  ALPRAZolam 0.25 milliGRAM(s) Oral at bedtime PRN for insomnia

## 2024-09-16 NOTE — DISCHARGE NOTE PROVIDER - CARE PROVIDERS DIRECT ADDRESSES
,DirectAddress_Unknown ,DirectAddress_Unknown,[email or web address]

## 2024-09-16 NOTE — CDI QUERY NOTE - NSCDIOTHERTXTBX_GEN_ALL_CORE_HH
Clinical documentation and/or evidence in the medical record indicates that this patient has CHF.  In order to ensure accurate coding and accuracy of the clinical record, the documentation in this patient’s medical record requires additional clarification.      Please include more specific documentation as to the type of CHF in your progress note and/or discharge summary.      Specify Acuity:  •	Acute   •	Chronic  •	Acute on Chronic    Specify Type:  •	Systolic CHF or HFrEF   •	Diastolic CHF or HFpEF   •	CHF w/ Recovered Ejection Fraction   •	Combined Systolic & Diastolic CHF  •	Other (specify)    Supporting documentation and/or clinical evidence:     9/12 H&P adult: ...  AHRF -unclear if its from new hear failure ? vs atelectasis -b/l LE edema , non pitting and chronic -In Ed afebrile , sating well on 3 L of NC     9/16 DC note: ...89-year-old female with history of JENNIFER on 3L NC O2 in the mornings and evenings, GERD, aortic stenosis, left carotid stenosis, anemia, anxiety, urinary retention (straight catheterizes herself for urination), breast cancer s/p mastectomy and reconstruction and brain cancer s/p craniotomy presents from [facility] for shortness of breath x 1 day.  Patient was sitting down watching TV when she started feeling short of breathAcute hypoxia , unclear etiology- improved  suspect anxiety related vs due to Aortic stenosis, ro CHF; ... Diagnosis: Acute CHF    Diagnostics: from: TTE Echo Complete w/o Contrast w/ Doppler (09.15.24 @ 09:44) >   Left ventricular ejection fraction, by visual estimation, is 50 to   55%.   Pro BNP at 1541     < from: Xray Chest 1 View- PORTABLE-Routine (09.12.24 @ 06:52) >Pulmonary vascular congestion.    Treatment: Oxygen      For reference, please see the [facility] Provider CHF Clinical Paper located on the [facility] Intranet - Clinical documentation improvement resources.        Reference Chart for Heart Failure:  SYSTOLIC:  Heart failure with reduced ejection fraction (HFrEF)  -	Evidence of reduced extent of contraction during systole resulting in systolic dysfunction.  -	Low ejection fraction (<40%).	DIASTOLIC:    DIASTOLIC:  Heart failure with preserved ejection fraction (HFpEF)  -	Evidence of EF>40 with echo evidence of diastolic dysfunction.   -	Evidence of normal (greater than or equal to 50) or elevated ejection fraction.

## 2024-09-16 NOTE — DISCHARGE NOTE PROVIDER - NSDCCPCAREPLAN_GEN_ALL_CORE_FT
PRINCIPAL DISCHARGE DIAGNOSIS  Diagnosis: Shortness of breath  Assessment and Plan of Treatment: You presented to the emergency department with shortness of breath. You were given oxgen in the nasal cannula and you oxygen saturation was good. The text for upper respiratory virus and the flu was negative and the CT scan that was done on admission did not show any blood clots in the lungs. Your heart sonogram showed aortic stenosis and this could explain part of your shortness of breath. Please follow up with your primary care physician. If you have any new symptoms or increase in shortness of breath please seek care immediately or call [phone]      SECONDARY DISCHARGE DIAGNOSES  Diagnosis: Acute CHF  Assessment and Plan of Treatment:     Diagnosis: Elevated troponin  Assessment and Plan of Treatment:      PRINCIPAL DISCHARGE DIAGNOSIS  Diagnosis: Shortness of breath  Assessment and Plan of Treatment: You presented to the emergency department with shortness of breath. You were given oxygen supplementation via nasal cannula and your oxygen saturation improved. You use oxygen as needed at your nursing facility and are recommended to continue doing this. The test for upper respiratory virus and the flu was negative and the CT scan that was done on admission did not show any blood clots in the lungs. Your heart sonogram showed aortic stenosis and this could explain part of your shortness of breath. Please take all your medications exactly as prescirbed and follow up with your primary care physician.   SEEK IMMEDIATE MEDICAL CARE IF YOU HAVE ANY OF THE FOLLOWING SYMPTOMS: worsening shortness of breath, chest pain, back pain, abdominal pain, fever, coughing up blood, lightheadedness/dizziness.        SECONDARY DISCHARGE DIAGNOSES  Diagnosis: Acute CHF  Assessment and Plan of Treatment:     Diagnosis: Elevated troponin  Assessment and Plan of Treatment:     Diagnosis: Urinary retention  Assessment and Plan of Treatment: You have been doing straight-catheterizatio at your nursing facility for urinary retention. You were straight-cathing while admitted but were found to be retaining 900mL of urine so had a lama placed. You will be discharged with this lama. You will need to follow up with urology as an outpatient for further recommendations.     PRINCIPAL DISCHARGE DIAGNOSIS  Diagnosis: Shortness of breath  Assessment and Plan of Treatment: You presented to the emergency department with shortness of breath. You were given oxygen supplementation via nasal cannula and your oxygen saturation improved. You use oxygen as needed at your nursing facility and are recommended to continue doing this. The test for upper respiratory virus and the flu was negative and the CT scan that was done on admission did not show any blood clots in the lungs. Your heart sonogram showed aortic stenosis and this could explain part of your shortness of breath. Please take all your medications exactly as prescirbed and follow up with your primary care physician.   SEEK IMMEDIATE MEDICAL CARE IF YOU HAVE ANY OF THE FOLLOWING SYMPTOMS: worsening shortness of breath, chest pain, back pain, abdominal pain, fever, coughing up blood, lightheadedness/dizziness.        SECONDARY DISCHARGE DIAGNOSES  Diagnosis: Urinary retention  Assessment and Plan of Treatment: You have been doing straight-catheterizatio at your nursing facility for urinary retention. You were straight-cathing while admitted but were found to be retaining 900mL of urine so had a lama placed. You will be discharged with this lama. You will need to follow up with urology as an outpatient for further recommendations.

## 2024-09-16 NOTE — DISCHARGE NOTE PROVIDER - NSDCFUADDAPPT_GEN_ALL_CORE_FT
Please follow up with urology as an outpatient for management of your lama and urinary retention  Appointments to be made:  -Internal medicine: Post hospitalization follow up.  -Urology: Please follow up with urology as an outpatient for management of your lama and urinary retention

## 2024-09-17 LAB
ALBUMIN SERPL ELPH-MCNC: 3.8 G/DL — SIGNIFICANT CHANGE UP (ref 3.5–5.2)
ALP SERPL-CCNC: 75 U/L — SIGNIFICANT CHANGE UP (ref 30–115)
ALT FLD-CCNC: 16 U/L — SIGNIFICANT CHANGE UP (ref 0–41)
ANION GAP SERPL CALC-SCNC: 9 MMOL/L — SIGNIFICANT CHANGE UP (ref 7–14)
AST SERPL-CCNC: 26 U/L — SIGNIFICANT CHANGE UP (ref 0–41)
BASOPHILS # BLD AUTO: 0.02 K/UL — SIGNIFICANT CHANGE UP (ref 0–0.2)
BASOPHILS NFR BLD AUTO: 0.4 % — SIGNIFICANT CHANGE UP (ref 0–1)
BILIRUB SERPL-MCNC: 0.3 MG/DL — SIGNIFICANT CHANGE UP (ref 0.2–1.2)
BUN SERPL-MCNC: 20 MG/DL — SIGNIFICANT CHANGE UP (ref 10–20)
CALCIUM SERPL-MCNC: 9.6 MG/DL — SIGNIFICANT CHANGE UP (ref 8.4–10.5)
CHLORIDE SERPL-SCNC: 99 MMOL/L — SIGNIFICANT CHANGE UP (ref 98–110)
CO2 SERPL-SCNC: 31 MMOL/L — SIGNIFICANT CHANGE UP (ref 17–32)
CREAT SERPL-MCNC: 0.8 MG/DL — SIGNIFICANT CHANGE UP (ref 0.7–1.5)
EGFR: 69 ML/MIN/1.73M2 — SIGNIFICANT CHANGE UP
EOSINOPHIL # BLD AUTO: 0.15 K/UL — SIGNIFICANT CHANGE UP (ref 0–0.7)
EOSINOPHIL NFR BLD AUTO: 3 % — SIGNIFICANT CHANGE UP (ref 0–8)
GLUCOSE SERPL-MCNC: 124 MG/DL — HIGH (ref 70–99)
HCT VFR BLD CALC: 32 % — LOW (ref 37–47)
HGB BLD-MCNC: 9.9 G/DL — LOW (ref 12–16)
IMM GRANULOCYTES NFR BLD AUTO: 0.4 % — HIGH (ref 0.1–0.3)
LYMPHOCYTES # BLD AUTO: 0.77 K/UL — LOW (ref 1.2–3.4)
LYMPHOCYTES # BLD AUTO: 15.6 % — LOW (ref 20.5–51.1)
MAGNESIUM SERPL-MCNC: 2 MG/DL — SIGNIFICANT CHANGE UP (ref 1.8–2.4)
MCHC RBC-ENTMCNC: 29.5 PG — SIGNIFICANT CHANGE UP (ref 27–31)
MCHC RBC-ENTMCNC: 30.9 G/DL — LOW (ref 32–37)
MCV RBC AUTO: 95.2 FL — SIGNIFICANT CHANGE UP (ref 81–99)
MONOCYTES # BLD AUTO: 0.42 K/UL — SIGNIFICANT CHANGE UP (ref 0.1–0.6)
MONOCYTES NFR BLD AUTO: 8.5 % — SIGNIFICANT CHANGE UP (ref 1.7–9.3)
NEUTROPHILS # BLD AUTO: 3.55 K/UL — SIGNIFICANT CHANGE UP (ref 1.4–6.5)
NEUTROPHILS NFR BLD AUTO: 72.1 % — SIGNIFICANT CHANGE UP (ref 42.2–75.2)
NRBC # BLD: 0 /100 WBCS — SIGNIFICANT CHANGE UP (ref 0–0)
PLATELET # BLD AUTO: 204 K/UL — SIGNIFICANT CHANGE UP (ref 130–400)
PMV BLD: 9.7 FL — SIGNIFICANT CHANGE UP (ref 7.4–10.4)
POTASSIUM SERPL-MCNC: 4.8 MMOL/L — SIGNIFICANT CHANGE UP (ref 3.5–5)
POTASSIUM SERPL-SCNC: 4.8 MMOL/L — SIGNIFICANT CHANGE UP (ref 3.5–5)
PROT SERPL-MCNC: 6.7 G/DL — SIGNIFICANT CHANGE UP (ref 6–8)
RBC # BLD: 3.36 M/UL — LOW (ref 4.2–5.4)
RBC # FLD: 14.8 % — HIGH (ref 11.5–14.5)
SODIUM SERPL-SCNC: 139 MMOL/L — SIGNIFICANT CHANGE UP (ref 135–146)
WBC # BLD: 4.93 K/UL — SIGNIFICANT CHANGE UP (ref 4.8–10.8)
WBC # FLD AUTO: 4.93 K/UL — SIGNIFICANT CHANGE UP (ref 4.8–10.8)

## 2024-09-17 PROCEDURE — 99232 SBSQ HOSP IP/OBS MODERATE 35: CPT

## 2024-09-17 RX ADMIN — ATORVASTATIN CALCIUM 10 MILLIGRAM(S): 10 TABLET, FILM COATED ORAL at 21:08

## 2024-09-17 RX ADMIN — Medication 5000 UNIT(S): at 18:11

## 2024-09-17 RX ADMIN — PANTOPRAZOLE SODIUM 40 MILLIGRAM(S): 40 TABLET, DELAYED RELEASE ORAL at 06:28

## 2024-09-17 RX ADMIN — CHLORHEXIDINE GLUCONATE ORAL RINSE 1 APPLICATION(S): 1.2 SOLUTION DENTAL at 11:29

## 2024-09-17 RX ADMIN — Medication 81 MILLIGRAM(S): at 11:28

## 2024-09-17 RX ADMIN — Medication 5000 UNIT(S): at 06:28

## 2024-09-17 NOTE — PROGRESS NOTE ADULT - SUBJECTIVE AND OBJECTIVE BOX
Progress Note:  Provider Speciality                            Hospitalist      MARISA DYKES MRN-[id number] 89y Female     CHIEF PRESENTING COMPLAINT:  Patient is a 89y old  Female who presents with a chief complaint of SOB (13 Sep 2024 15:13)        SUBJECTIVE:  Patient was seen and examined at bedside. Reports improvement in  presenting complaint.   No significant overnight events reported.     HISTORY OF PRESENTING ILLNESS:  HPI:  89-year-old female with history of JENNIFER on 3L NC O2 in the mornings and evenings, GERD, aortic stenosis, left carotid stenosis, anemia, anxiety, urinary retention (straight catheterizes herself for urination), breast cancer s/p mastectomy and reconstruction and brain cancer s/p craniotomy presents from [facility] for shortness of breath x 1 day.  Patient was sitting down watching TV when she started feeling short of breath.  Patient is normally not on O2 during the day and was started on NC after being evaluated at the facility sent to Ed .   Denies any recent fevers, chills, sweats, chest pain, new cough, congestion, sore throat, runny nose, or calf pain.    In Ed afebrile , sating well on 3 L of NC   on labs Hb 9.1 , Trop 42 trended down to 38   lactate 2.1 , proBNP 1541 RVP negative      CTA chest r/o PE       admitted for further workup  (12 Sep 2024 05:35)        REVIEW OF SYSTEMS:  Patient denies  headache, fever, chills. Denies chest pain, shortness of breath, palpitation. Denies nausea, vomiting, abdominal pain or diarrhoea, Denies dysuria.   At least 10 systems were reviewed in ROS. All systems reviewed  are within normal limits except for the complaints as described in Subjective.    PAST MEDICAL & SURGICAL HISTORY:  PAST MEDICAL & SURGICAL HISTORY:  Migraine      H/O brain tumor  benign 1977      Breast CA      Urinary retention with incomplete bladder emptying  pt self catheterizes  3-4 x day      H/O mastectomy, bilateral  1984      H/O craniotomy  1976              VITAL SIGNS:  Vital Signs Last 24 Hrs  T(C): 36.5 (16 Sep 2024 18:46), Max: 36.6 (16 Sep 2024 14:05)  T(F): 97.7 (16 Sep 2024 18:46), Max: 97.9 (16 Sep 2024 14:05)  HR: 92 (16 Sep 2024 18:46) (92 - 92)  BP: 114/66 (16 Sep 2024 18:46) (90/55 - 118/69)  BP(mean): 86 (16 Sep 2024 15:48) (86 - 86)  RR: 18 (16 Sep 2024 18:46) (17 - 18)  SpO2: 100% (16 Sep 2024 18:46) (97% - 100%)    Parameters below as of 16 Sep 2024 18:46  Patient On (Oxygen Delivery Method): nasal cannula            PHYSICAL EXAMINATION:  Not in acute distress  General: No icterus  HEENT:   no JVD.  Heart: S1+S2 audible  Lungs: bilateral  moderate air entry, no wheezing, no crepitations.  Abdomen: Soft, non-tender, non-distended , no  rigidity or guarding.  CNS: Awake alert, CN  grossly intact.  Extremities:  No edema            CONSULTS:  Consultant(s) Notes Reviewed by me.   Care Discussed with Consultants/Other Providers where required.    All the images and labs were reviewed today        MEDICATIONS:  MEDICATIONS  (STANDING):  albuterol/ipratropium for Nebulization 3 milliLiter(s) Nebulizer every 6 hours  aspirin enteric coated 81 milliGRAM(s) Oral daily  atorvastatin 10 milliGRAM(s) Oral at bedtime  chlorhexidine 2% Cloths 1 Application(s) Topical daily  heparin   Injectable 5000 Unit(s) SubCutaneous every 12 hours  influenza  Vaccine (HIGH DOSE) 0.5 milliLiter(s) IntraMuscular once  pantoprazole    Tablet 40 milliGRAM(s) Oral before breakfast  polyethylene glycol 3350 17 Gram(s) Oral at bedtime    MEDICATIONS  (PRN):  ALPRAZolam 0.25 milliGRAM(s) Oral at bedtime PRN for insomnia

## 2024-09-17 NOTE — PROGRESS NOTE ADULT - ASSESSMENT
89-year-old female with history of JENNIFER on 3L NC O2 in the mornings and evenings, GERD, aortic stenosis, left carotid stenosis, anemia, anxiety, urinary retention (straight catheterizes herself for urination), breast cancer s/p mastectomy and reconstruction and brain cancer s/p craniotomy presents from [facility] for shortness of breath x 1 day.     Acute hypoxia , unclear etiology- improved  suspect anxiety related vs volume overload due to Aortic stenosis,   No  CHF exacerbation . Chronic diastolic CHF  Uses oxygen intermittently at NH  Doing well on RA today  CTA chest PE protocol 09/11 reviewed, no evidence of PE, extensive scoliotic/kyphotic changes  Pro BNP at 1541  Echocardiogram  09/15- Ef 55%. Moderate AS  No apparent infectious etiology  PT - recommends SNF  Hb at baseline. Chronic anemia   Anxiety/insomnia- Alpra .25 mg at bedtime  Continue self-cath on discharge  DVT PPX-heparin    #Progress Note Handoff  Disposition: stable for discharge to   [facility] today

## 2024-09-18 LAB
ANION GAP SERPL CALC-SCNC: 10 MMOL/L — SIGNIFICANT CHANGE UP (ref 7–14)
BASOPHILS # BLD AUTO: 0.02 K/UL — SIGNIFICANT CHANGE UP (ref 0–0.2)
BASOPHILS NFR BLD AUTO: 0.4 % — SIGNIFICANT CHANGE UP (ref 0–1)
BUN SERPL-MCNC: 25 MG/DL — HIGH (ref 10–20)
CALCIUM SERPL-MCNC: 9.4 MG/DL — SIGNIFICANT CHANGE UP (ref 8.4–10.5)
CHLORIDE SERPL-SCNC: 99 MMOL/L — SIGNIFICANT CHANGE UP (ref 98–110)
CO2 SERPL-SCNC: 29 MMOL/L — SIGNIFICANT CHANGE UP (ref 17–32)
CREAT SERPL-MCNC: 0.8 MG/DL — SIGNIFICANT CHANGE UP (ref 0.7–1.5)
EGFR: 69 ML/MIN/1.73M2 — SIGNIFICANT CHANGE UP
EOSINOPHIL # BLD AUTO: 0.14 K/UL — SIGNIFICANT CHANGE UP (ref 0–0.7)
EOSINOPHIL NFR BLD AUTO: 3.1 % — SIGNIFICANT CHANGE UP (ref 0–8)
GLUCOSE SERPL-MCNC: 113 MG/DL — HIGH (ref 70–99)
HCT VFR BLD CALC: 31.1 % — LOW (ref 37–47)
HGB BLD-MCNC: 9.8 G/DL — LOW (ref 12–16)
IMM GRANULOCYTES NFR BLD AUTO: 0.4 % — HIGH (ref 0.1–0.3)
LYMPHOCYTES # BLD AUTO: 0.75 K/UL — LOW (ref 1.2–3.4)
LYMPHOCYTES # BLD AUTO: 16.7 % — LOW (ref 20.5–51.1)
MCHC RBC-ENTMCNC: 30 PG — SIGNIFICANT CHANGE UP (ref 27–31)
MCHC RBC-ENTMCNC: 31.5 G/DL — LOW (ref 32–37)
MCV RBC AUTO: 95.1 FL — SIGNIFICANT CHANGE UP (ref 81–99)
MONOCYTES # BLD AUTO: 0.34 K/UL — SIGNIFICANT CHANGE UP (ref 0.1–0.6)
MONOCYTES NFR BLD AUTO: 7.6 % — SIGNIFICANT CHANGE UP (ref 1.7–9.3)
NEUTROPHILS # BLD AUTO: 3.21 K/UL — SIGNIFICANT CHANGE UP (ref 1.4–6.5)
NEUTROPHILS NFR BLD AUTO: 71.8 % — SIGNIFICANT CHANGE UP (ref 42.2–75.2)
NRBC # BLD: 0 /100 WBCS — SIGNIFICANT CHANGE UP (ref 0–0)
PLATELET # BLD AUTO: 195 K/UL — SIGNIFICANT CHANGE UP (ref 130–400)
PMV BLD: 9.7 FL — SIGNIFICANT CHANGE UP (ref 7.4–10.4)
POTASSIUM SERPL-MCNC: 4.4 MMOL/L — SIGNIFICANT CHANGE UP (ref 3.5–5)
POTASSIUM SERPL-SCNC: 4.4 MMOL/L — SIGNIFICANT CHANGE UP (ref 3.5–5)
RBC # BLD: 3.27 M/UL — LOW (ref 4.2–5.4)
RBC # FLD: 14.7 % — HIGH (ref 11.5–14.5)
SODIUM SERPL-SCNC: 138 MMOL/L — SIGNIFICANT CHANGE UP (ref 135–146)
WBC # BLD: 4.48 K/UL — LOW (ref 4.8–10.8)
WBC # FLD AUTO: 4.48 K/UL — LOW (ref 4.8–10.8)

## 2024-09-18 PROCEDURE — 99232 SBSQ HOSP IP/OBS MODERATE 35: CPT

## 2024-09-18 RX ORDER — SENNOSIDES 8.6 MG
2 TABLET ORAL AT BEDTIME
Refills: 0 | Status: DISCONTINUED | OUTPATIENT
Start: 2024-09-18 | End: 2024-09-30

## 2024-09-18 RX ORDER — BISMUTH SUBSALICYLATE 262 MG
15 TABLET,CHEWABLE ORAL ONCE
Refills: 0 | Status: COMPLETED | OUTPATIENT
Start: 2024-09-18 | End: 2024-09-18

## 2024-09-18 RX ADMIN — CHLORHEXIDINE GLUCONATE ORAL RINSE 1 APPLICATION(S): 1.2 SOLUTION DENTAL at 11:29

## 2024-09-18 RX ADMIN — Medication 5000 UNIT(S): at 17:20

## 2024-09-18 RX ADMIN — IPRATROPIUM BROMIDE AND ALBUTEROL SULFATE 3 MILLILITER(S): .5; 3 SOLUTION RESPIRATORY (INHALATION) at 13:23

## 2024-09-18 RX ADMIN — Medication 2 TABLET(S): at 22:24

## 2024-09-18 RX ADMIN — Medication 15 MILLILITER(S): at 19:57

## 2024-09-18 RX ADMIN — IPRATROPIUM BROMIDE AND ALBUTEROL SULFATE 3 MILLILITER(S): .5; 3 SOLUTION RESPIRATORY (INHALATION) at 08:40

## 2024-09-18 RX ADMIN — Medication 81 MILLIGRAM(S): at 11:30

## 2024-09-18 RX ADMIN — PANTOPRAZOLE SODIUM 40 MILLIGRAM(S): 40 TABLET, DELAYED RELEASE ORAL at 06:05

## 2024-09-18 RX ADMIN — ATORVASTATIN CALCIUM 10 MILLIGRAM(S): 10 TABLET, FILM COATED ORAL at 22:24

## 2024-09-18 RX ADMIN — Medication 17 GRAM(S): at 22:24

## 2024-09-18 RX ADMIN — Medication 5000 UNIT(S): at 06:05

## 2024-09-18 NOTE — PROGRESS NOTE ADULT - SUBJECTIVE AND OBJECTIVE BOX
24H events:    Patient is a 89y old Female who presents with a chief complaint of SOB (18 Sep 2024 13:55)    Primary diagnosis of Shortness of breath    Today is hospital day 6d. This morning patient was seen and examined at bedside, resting comfortably in bed.    Patient has been retaining overnight, cath bringing back around 900cc  Hemodynamically stable, tolerating oral diet, constipation for past 4 days    Code Status: Full code    Family communication:  Contact date: 09/18/24  Name of person contacted: Malou  Relationship to patient: daughter  Communication details: Urinary retention  What matters most: urinary retention    PAST MEDICAL & SURGICAL HISTORY  Migraine    H/O brain tumor  benign 1977    Breast CA    Urinary retention with incomplete bladder emptying  pt self catheterizes  3-4 x day    H/O mastectomy, bilateral 1984    H/O craniotomy 1976    ALLERGIES:  No Known Allergies    MEDICATIONS:  STANDING MEDICATIONS  albuterol/ipratropium for Nebulization 3 milliLiter(s) Nebulizer every 6 hours  aspirin enteric coated 81 milliGRAM(s) Oral daily  atorvastatin 10 milliGRAM(s) Oral at bedtime  chlorhexidine 2% Cloths 1 Application(s) Topical daily  heparin   Injectable 5000 Unit(s) SubCutaneous every 12 hours  influenza  Vaccine (HIGH DOSE) 0.5 milliLiter(s) IntraMuscular once  pantoprazole    Tablet 40 milliGRAM(s) Oral before breakfast  polyethylene glycol 3350 17 Gram(s) Oral at bedtime  senna 2 Tablet(s) Oral at bedtime    PRN MEDICATIONS  ALPRAZolam 0.25 milliGRAM(s) Oral at bedtime PRN    VITALS:   T(F): 97.3  HR: 93  BP: 112/69  RR: 18  SpO2: 95%    PHYSICAL EXAM:  GENERAL: NAD, lying in bed comfortably  NECK: Supple, no neck stiffness/nuchal rigidity, no JVD    HEART: Regular rate and rhythm, normal S1/S2  LUNGS: No acute respiratory distress, clear b/l breath sounds, no wheezing, rales, rhonchi  ABDOMEN:  soft, non-tender, non-distented  EXTREMITIES: no rashes, extremities warm/dry  NERVOUS SYSTEM:  A&Ox3, CNII-XII intact, follows commands, answers questions appropriately   SKIN: No rashes or lesions       [facility] score:    LABS:                        9.8    4.48  )-----------( 195      ( 18 Sep 2024 07:20 )             31.1     09-18    138  |  99  |  25[H]  ----------------------------<  113[H]  4.4   |  29  |  0.8    Ca    9.4      18 Sep 2024 07:20  Mg     2.0     09-17    TPro  6.7  /  Alb  3.8  /  TBili  0.3  /  DBili  x   /  AST  26  /  ALT  16  /  AlkPhos  75  09-17    Urinalysis Basic - ( 18 Sep 2024 07:20 )    Color: x / Appearance: x / SG: x / pH: x  Gluc: 113 mg/dL / Ketone: x  / Bili: x / Urobili: x   Blood: x / Protein: x / Nitrite: x   Leuk Esterase: x / RBC: x / WBC x   Sq Epi: x / Non Sq Epi: x / Bacteria: x      RADIOLOGY:  ACC: [id number] EXAM:  XR CHEST PORTABLE ROUTINE 1V   ORDERED BY: ENE JAIMES     PROCEDURE DATE:  09/12/2024      INTERPRETATION:  Clinical History / Reason for exam: Dyspnea    Comparison : Chest radiograph 4/23/2024 and 9/11/2024.    Technique/Positioning: Frontal.    Findings:    Support devices: None.    Cardiac/mediastinum/hilum: Indeterminate    Lung parenchyma/Pleura: Pulmonary vascular congestion. No pleural effusion parenchymal opacity or air leak    Skeleton/soft tissues: Scoliosis    Impression:    Pulmonary vascular congestion. No pleural effusion parenchymal opacity or air leak      --- End of Report ---    SANDI JEFF MD; Attending Radiologist  This document has been electronically signed. Sep [id number]  2:55PM

## 2024-09-18 NOTE — PROGRESS NOTE ADULT - SUBJECTIVE AND OBJECTIVE BOX
Progress Note:  Provider Speciality                            Hospitalist      MARISA DYKES MRN-[id number] 89y Female     CHIEF PRESENTING COMPLAINT:  Patient is a 89y old  Female who presents with a chief complaint of SOB (13 Sep 2024 15:13)        SUBJECTIVE:  Patient was seen and examined at bedside. Reports improvement in  presenting complaint.   No significant overnight events reported.     HISTORY OF PRESENTING ILLNESS:  HPI:  89-year-old female with history of JENNIFER on 3L NC O2 in the mornings and evenings, GERD, aortic stenosis, left carotid stenosis, anemia, anxiety, urinary retention (straight catheterizes herself for urination), breast cancer s/p mastectomy and reconstruction and brain cancer s/p craniotomy presents from [facility] for shortness of breath x 1 day.  Patient was sitting down watching TV when she started feeling short of breath.  Patient is normally not on O2 during the day and was started on NC after being evaluated at the facility sent to Ed .   Denies any recent fevers, chills, sweats, chest pain, new cough, congestion, sore throat, runny nose, or calf pain.    In Ed afebrile , sating well on 3 L of NC   on labs Hb 9.1 , Trop 42 trended down to 38   lactate 2.1 , proBNP 1541 RVP negative      CTA chest r/o PE       admitted for further workup  (12 Sep 2024 05:35)        REVIEW OF SYSTEMS:  Patient denies  headache, fever, chills. Denies chest pain, shortness of breath, palpitation. Denies nausea, vomiting, abdominal pain or diarrhoea, Denies dysuria.   At least 10 systems were reviewed in ROS. All systems reviewed  are within normal limits except for the complaints as described in Subjective.    PAST MEDICAL & SURGICAL HISTORY:  PAST MEDICAL & SURGICAL HISTORY:  Migraine      H/O brain tumor  benign 1977      Breast CA      Urinary retention with incomplete bladder emptying  pt self catheterizes  3-4 x day      H/O mastectomy, bilateral  1984      H/O craniotomy  1976              VITAL SIGNS:  Vital Signs Last 24 Hrs  T(C): 36.3 (18 Sep 2024 05:00), Max: 36.4 (17 Sep 2024 20:56)  T(F): 97.3 (18 Sep 2024 05:00), Max: 97.5 (17 Sep 2024 20:56)  HR: 93 (18 Sep 2024 07:13) (80 - 95)  BP: 112/69 (18 Sep 2024 05:00) (104/65 - 112/69)  BP(mean): --  RR: 18 (18 Sep 2024 07:13) (18 - 18)  SpO2: 95% (18 Sep 2024 07:13) (95% - 100%)    Parameters below as of 18 Sep 2024 07:13  Patient On (Oxygen Delivery Method): room air          PHYSICAL EXAMINATION:  Not in acute distress  General: No icterus  HEENT:   no JVD.  Heart: S1+S2 audible  Lungs: bilateral  moderate air entry, no wheezing, no crepitations.  Abdomen: Soft, non-tender, non-distended , no  rigidity or guarding.  CNS: Awake alert, CN  grossly intact.  Extremities:  No edema            CONSULTS:  Consultant(s) Notes Reviewed by me.   Care Discussed with Consultants/Other Providers where required.    All the images and labs were reviewed today        MEDICATIONS:  MEDICATIONS  (STANDING):  albuterol/ipratropium for Nebulization 3 milliLiter(s) Nebulizer every 6 hours  aspirin enteric coated 81 milliGRAM(s) Oral daily  atorvastatin 10 milliGRAM(s) Oral at bedtime  chlorhexidine 2% Cloths 1 Application(s) Topical daily  heparin   Injectable 5000 Unit(s) SubCutaneous every 12 hours  influenza  Vaccine (HIGH DOSE) 0.5 milliLiter(s) IntraMuscular once  pantoprazole    Tablet 40 milliGRAM(s) Oral before breakfast  polyethylene glycol 3350 17 Gram(s) Oral at bedtime    MEDICATIONS  (PRN):  ALPRAZolam 0.25 milliGRAM(s) Oral at bedtime PRN for insomnia

## 2024-09-18 NOTE — PROVIDER CONTACT NOTE (OTHER) - RECOMMENDATIONS
MD to be aware, as per policy straight cath x3 then indwelling lama to be placed. MD to discuss and educate family.

## 2024-09-18 NOTE — PROVIDER CONTACT NOTE (OTHER) - ASSESSMENT
Pt has hx of urinary retention and being straight cathed q6h as ordered. Pt ambulated to bathroom given ample time to urinate, and encouraged to urinate on toilet. Pt unable to.

## 2024-09-18 NOTE — PROGRESS NOTE ADULT - ASSESSMENT
89-year-old female with history of JENNIFER on 3L NC O2 in the mornings and evenings, GERD, aortic stenosis, left carotid stenosis, anemia, anxiety, urinary retention (straight catheterizes herself for urination), breast cancer s/p mastectomy and reconstruction and brain cancer s/p craniotomy presents from [facility] for shortness of breath x 1 day.     Acute hypoxia , unclear etiology- improved  suspect anxiety related vs volume overload due to Aortic stenosis,   No  CHF exacerbation . Chronic diastolic CHF with some volume overload  Uses oxygen intermittently at NH  Doing well on RA today  CTA chest PE protocol 09/11 reviewed, no evidence of PE, extensive scoliotic/kyphotic changes  Pro BNP at 1541  Echocardiogram  09/15- Ef 55%. Moderate AS  No apparent infectious etiology  PT - recommends SNF  Hb at baseline. Chronic anemia   Anxiety/insomnia- Alpra .25 mg at bedtime  DVT PPX-heparin    #Progress Note Handoff  Disposition: Stable for discharge but   Pt does self cath but because of cognitive decline self-cath will be an issue as she was found to be significantly retaining upto 900 ml . She may have to have Rios on discharge which  might be a barrier to her current placement back to  [facility]

## 2024-09-18 NOTE — PROGRESS NOTE ADULT - ASSESSMENT
89-year-old female with history of JENNIFER on 3L NC O2 in the mornings and evenings, GERD, aortic stenosis, left carotid stenosis, anemia, anxiety, urinary retention (straight catheterizes herself for urination), breast cancer s/p mastectomy and reconstruction and brain cancer s/p craniotomy presents from [facility] for shortness of breath x 1 day.     # Acute hypoxia , unclear etiology- improved  - CTA chest PE protocol 09/11 reviewed, no evidence of PE, extensive scoliotic/kyphotic changes  - No apparent infectious etiology  - suspect anxiety related vs volume overload due to Aortic stenosis,   - Doing well on RA today    # Chronic diastolic CHF   - some volume overload  - Pro BNP at 1541   - Echocardiogram  09/15- Ef 55%. Moderate AS  - Not in CHF exacerbation .   - Uses oxygen intermittently at NH    #urinary retention  - patient self cath at home  - has been unable to void spontaneously while inpatient   - might need Rios catheter at discharge    #Chronic anemia  Hb stable at baseline.     #ADL  PT - recommends SNF     Anxiety/insomnia- Alpra .25 mg at bedtime    DVT PPX-heparin  GI prophylaxis: not indicated    #Progress Note Handoff  Disposition: Stable for discharge but   Pt does self cath but because of cognitive decline self-cath will be an issue as she was found to be significantly retaining upto 900 ml .   She may have to have Rios on discharge which  might be a barrier to her current placement back to  [facility]

## 2024-09-19 LAB
ANION GAP SERPL CALC-SCNC: 11 MMOL/L — SIGNIFICANT CHANGE UP (ref 7–14)
BASOPHILS # BLD AUTO: 0.02 K/UL — SIGNIFICANT CHANGE UP (ref 0–0.2)
BASOPHILS NFR BLD AUTO: 0.4 % — SIGNIFICANT CHANGE UP (ref 0–1)
BUN SERPL-MCNC: 29 MG/DL — HIGH (ref 10–20)
CALCIUM SERPL-MCNC: 9.3 MG/DL — SIGNIFICANT CHANGE UP (ref 8.4–10.4)
CHLORIDE SERPL-SCNC: 100 MMOL/L — SIGNIFICANT CHANGE UP (ref 98–110)
CO2 SERPL-SCNC: 27 MMOL/L — SIGNIFICANT CHANGE UP (ref 17–32)
CREAT SERPL-MCNC: 1 MG/DL — SIGNIFICANT CHANGE UP (ref 0.7–1.5)
EGFR: 53 ML/MIN/1.73M2 — LOW
EOSINOPHIL # BLD AUTO: 0.09 K/UL — SIGNIFICANT CHANGE UP (ref 0–0.7)
EOSINOPHIL NFR BLD AUTO: 1.8 % — SIGNIFICANT CHANGE UP (ref 0–8)
GLUCOSE SERPL-MCNC: 120 MG/DL — HIGH (ref 70–99)
HCT VFR BLD CALC: 31 % — LOW (ref 37–47)
HGB BLD-MCNC: 9.9 G/DL — LOW (ref 12–16)
IMM GRANULOCYTES NFR BLD AUTO: 0.8 % — HIGH (ref 0.1–0.3)
LYMPHOCYTES # BLD AUTO: 0.76 K/UL — LOW (ref 1.2–3.4)
LYMPHOCYTES # BLD AUTO: 15.4 % — LOW (ref 20.5–51.1)
MCHC RBC-ENTMCNC: 29.9 PG — SIGNIFICANT CHANGE UP (ref 27–31)
MCHC RBC-ENTMCNC: 31.9 G/DL — LOW (ref 32–37)
MCV RBC AUTO: 93.7 FL — SIGNIFICANT CHANGE UP (ref 81–99)
MONOCYTES # BLD AUTO: 0.35 K/UL — SIGNIFICANT CHANGE UP (ref 0.1–0.6)
MONOCYTES NFR BLD AUTO: 7.1 % — SIGNIFICANT CHANGE UP (ref 1.7–9.3)
NEUTROPHILS # BLD AUTO: 3.68 K/UL — SIGNIFICANT CHANGE UP (ref 1.4–6.5)
NEUTROPHILS NFR BLD AUTO: 74.5 % — SIGNIFICANT CHANGE UP (ref 42.2–75.2)
NRBC # BLD: 0 /100 WBCS — SIGNIFICANT CHANGE UP (ref 0–0)
PLATELET # BLD AUTO: 203 K/UL — SIGNIFICANT CHANGE UP (ref 130–400)
PMV BLD: 9.7 FL — SIGNIFICANT CHANGE UP (ref 7.4–10.4)
POTASSIUM SERPL-MCNC: 4.5 MMOL/L — SIGNIFICANT CHANGE UP (ref 3.5–5)
POTASSIUM SERPL-SCNC: 4.5 MMOL/L — SIGNIFICANT CHANGE UP (ref 3.5–5)
RBC # BLD: 3.31 M/UL — LOW (ref 4.2–5.4)
RBC # FLD: 14.7 % — HIGH (ref 11.5–14.5)
SODIUM SERPL-SCNC: 138 MMOL/L — SIGNIFICANT CHANGE UP (ref 135–146)
WBC # BLD: 4.94 K/UL — SIGNIFICANT CHANGE UP (ref 4.8–10.8)
WBC # FLD AUTO: 4.94 K/UL — SIGNIFICANT CHANGE UP (ref 4.8–10.8)

## 2024-09-19 PROCEDURE — 99231 SBSQ HOSP IP/OBS SF/LOW 25: CPT

## 2024-09-19 RX ORDER — ALPRAZOLAM 0.5 MG/1
0.25 TABLET ORAL AT BEDTIME
Refills: 0 | Status: DISCONTINUED | OUTPATIENT
Start: 2024-09-19 | End: 2024-09-26

## 2024-09-19 RX ADMIN — PANTOPRAZOLE SODIUM 40 MILLIGRAM(S): 40 TABLET, DELAYED RELEASE ORAL at 06:04

## 2024-09-19 RX ADMIN — IPRATROPIUM BROMIDE AND ALBUTEROL SULFATE 3 MILLILITER(S): .5; 3 SOLUTION RESPIRATORY (INHALATION) at 20:47

## 2024-09-19 RX ADMIN — Medication 81 MILLIGRAM(S): at 11:37

## 2024-09-19 RX ADMIN — Medication 5000 UNIT(S): at 06:05

## 2024-09-19 RX ADMIN — Medication 2 TABLET(S): at 21:42

## 2024-09-19 RX ADMIN — Medication 17 GRAM(S): at 21:42

## 2024-09-19 RX ADMIN — Medication 5000 UNIT(S): at 18:38

## 2024-09-19 RX ADMIN — CHLORHEXIDINE GLUCONATE ORAL RINSE 1 APPLICATION(S): 1.2 SOLUTION DENTAL at 11:37

## 2024-09-19 RX ADMIN — ATORVASTATIN CALCIUM 10 MILLIGRAM(S): 10 TABLET, FILM COATED ORAL at 21:42

## 2024-09-19 NOTE — PROGRESS NOTE ADULT - SUBJECTIVE AND OBJECTIVE BOX
Patient seen and examined. Events over the last 24 hours noted.   Pt denies complaints    T(F): 98.1 (09-19-24 @ 12:23), Max: 98.6 (09-18-24 @ 21:30)  HR: 83 (09-19-24 @ 12:23)  BP: 101/45 (09-19-24 @ 12:23)  RR: 18 (09-19-24 @ 12:23)  SpO2: 95% (09-19-24 @ 12:23) (94% - 98%)    PHYSICAL EXAM:  GEN: No acute distress  HEENT: normocephalic, atraumatic, anicteric  LUNGS: b/l breath sounds present, clear to auscultation bilaterally   HEART: S1/S2 present. RRR  ABD: Soft, non-tender, non-distended. Bowel sounds present  EXT: warm   NEURO: awake, no new focal deficits    LABS  09-19    138  |  100  |  29[H]  ----------------------------<  120[H]  4.5   |  27  |  1.0    Ca    9.3      19 Sep 2024 08:01                            9.9    4.94  )-----------( 203      ( 19 Sep 2024 08:01 )             31.0            MEDICATIONS  (STANDING):  albuterol/ipratropium for Nebulization 3 milliLiter(s) Nebulizer every 6 hours  aspirin enteric coated 81 milliGRAM(s) Oral daily  atorvastatin 10 milliGRAM(s) Oral at bedtime  chlorhexidine 2% Cloths 1 Application(s) Topical daily  heparin   Injectable 5000 Unit(s) SubCutaneous every 12 hours  influenza  Vaccine (HIGH DOSE) 0.5 milliLiter(s) IntraMuscular once  pantoprazole    Tablet 40 milliGRAM(s) Oral before breakfast  polyethylene glycol 3350 17 Gram(s) Oral at bedtime  senna 2 Tablet(s) Oral at bedtime    MEDICATIONS  (PRN):  ALPRAZolam 0.25 milliGRAM(s) Oral at bedtime PRN for insomnia

## 2024-09-19 NOTE — PROGRESS NOTE ADULT - SUBJECTIVE AND OBJECTIVE BOX
24H events:    Patient is a 89y old Female who presents with a chief complaint of SOB (19 Sep 2024 13:21)    Primary diagnosis of Shortness of breath        Today is 7d of hospitalization. This morning patient was seen and examined at bedside, resting comfortably in bed.    No acute or major events overnight.    Code Status: Full code    Family communication:  Contact date:  Name of person contacted:  Relationship to patient:  Communication details:  What matters most:    PAST MEDICAL & SURGICAL HISTORY  Migraine    H/O brain tumor  benign 1977    Breast CA    Urinary retention with incomplete bladder emptying  pt self catheterizes  3-4 x day    H/O mastectomy, bilateral  1984    H/O craniotomy  1976      SOCIAL HISTORY:  Social History:      ALLERGIES:  No Known Allergies    MEDICATIONS:  STANDING MEDICATIONS  albuterol/ipratropium for Nebulization 3 milliLiter(s) Nebulizer every 6 hours  aspirin enteric coated 81 milliGRAM(s) Oral daily  atorvastatin 10 milliGRAM(s) Oral at bedtime  chlorhexidine 2% Cloths 1 Application(s) Topical daily  heparin   Injectable 5000 Unit(s) SubCutaneous every 12 hours  influenza  Vaccine (HIGH DOSE) 0.5 milliLiter(s) IntraMuscular once  pantoprazole    Tablet 40 milliGRAM(s) Oral before breakfast  polyethylene glycol 3350 17 Gram(s) Oral at bedtime  senna 2 Tablet(s) Oral at bedtime    PRN MEDICATIONS  ALPRAZolam 0.25 milliGRAM(s) Oral at bedtime PRN    VITALS:   T(F): 98.1  HR: 83  BP: 101/45  RR: 18  SpO2: 95%    PHYSICAL EXAM:  GENERAL: NAD, lying in bed comfortably  NECK: Supple, no neck stiffness/nuchal rigidity, no JVD    HEART: Regular rate and rhythm, S1, S2 present with crescendo-decrescendo systolic murmur in right upper sternal border consistent with history of aortic stenosis  LUNGS: No acute respiratory distress, clear b/l breath sounds, no wheezing, rales, rhonchi  ABDOMEN:  soft, non-tender, non-distented  EXTREMITIES: no rashes, extremities warm/dry  NERVOUS SYSTEM:  A&Ox3, CNII-XII intact, follows commands, answers questions appropriately   SKIN: No rashes or lesions     LABS:                        9.9    4.94  )-----------( 203      ( 19 Sep 2024 08:01 )             31.0     09-19    138  |  100  |  29[H]  ----------------------------<  120[H]  4.5   |  27  |  1.0    Ca    9.3      19 Sep 2024 08:01        Urinalysis Basic - ( 19 Sep 2024 08:01 )    Color: x / Appearance: x / SG: x / pH: x  Gluc: 120 mg/dL / Ketone: x  / Bili: x / Urobili: x   Blood: x / Protein: x / Nitrite: x   Leuk Esterase: x / RBC: x / WBC x   Sq Epi: x / Non Sq Epi: x / Bacteria: x                RADIOLOGY:  ACC: [id number] EXAM:  XR CHEST PORTABLE ROUTINE 1V   ORDERED BY: ENE JAIMES     PROCEDURE DATE:  09/12/2024      INTERPRETATION:  Clinical History / Reason for exam: Dyspnea    Comparison : Chest radiograph 4/23/2024 and 9/11/2024.    Technique/Positioning: Frontal.    Findings:    Support devices: None.    Cardiac/mediastinum/hilum: Indeterminate    Lung parenchyma/Pleura: Pulmonary vascular congestion. No pleural effusion parenchymal opacity or air leak    Skeleton/soft tissues: Scoliosis    Impression:    Pulmonary vascular congestion. No pleural effusion parenchymal opacity or air leak      --- End of Report ---    SANDI JEFF MD; Attending Radiologist  This document has been electronically signed. Sep [id number]  2:55PM

## 2024-09-19 NOTE — PROGRESS NOTE ADULT - ASSESSMENT
89-year-old female with history of JENNIFER on 3L NC O2 in the mornings and evenings, GERD, aortic stenosis, left carotid stenosis, anemia, anxiety, urinary retention (straight catheterizes herself for urination), breast cancer s/p mastectomy and reconstruction and brain cancer s/p craniotomy presents from [facility] for shortness of breath x 1 day.     Acute hypoxia , unclear etiology- improved  suspect anxiety related vs volume overload due to Aortic stenosis,   No  CHF exacerbation . Chronic diastolic CHF with some volume overload  Uses oxygen intermittently at NH  Doing well on RA today  CTA chest PE protocol 09/11 reviewed, no evidence of PE, extensive scoliotic/kyphotic changes  Pro BNP at 1541  Echocardiogram  09/15- Ef 55%. Moderate AS  No apparent infectious etiology  Urinary retention -   Pt does self cath but because of cognitive decline self-cath will be an issue as she was found to be significantly retaining upto 900 ml .Iros was placed she will need outpt Urology f/u   PT - recommends SNF  Hb at baseline. Chronic anemia   Anxiety/insomnia- Alpra .25 mg at bedtime  DVT PPX-heparin    #Progress Note Handoff  Disposition: Stable for discharge, awaiting bed placement

## 2024-09-19 NOTE — PROGRESS NOTE ADULT - ASSESSMENT
89-year-old female with history of JENNIFER on 3L NC O2 in the mornings and evenings, GERD, aortic stenosis, left carotid stenosis, anemia, anxiety, urinary retention (straight catheterizes herself for urination), breast cancer s/p mastectomy and reconstruction and brain cancer s/p craniotomy presents from [facility] for shortness of breath x 1 day.     # Acute hypoxia , unclear etiology- improved  - CTA chest PE protocol 09/11 reviewed, no evidence of PE, extensive scoliotic/kyphotic changes  - No apparent infectious etiology  - suspect anxiety related vs volume overload due to Aortic stenosis,   - Doing well on RA today    # Chronic diastolic CHF   - some volume overload  - Pro BNP at 1541   - Echocardiogram  09/15- Ef 55%. Moderate AS  - Not in CHF exacerbation .   - Uses oxygen intermittently at NH    #urinary retention  - patient self cath at home  - has been unable to void spontaneously while inpatient, retaining 900ml   - will need Rios catheter at discharge    #Chronic anemia  Hb stable at baseline.     #ADL  PT - recommends SNF     Anxiety/insomnia- Alpra .25 mg at bedtime    DVT PPX-heparin  GI prophylaxis: not indicated    #Progress Note Handoff  Disposition: Stable for discharge but  Pt does self cath but because of cognitive decline self-cath will be an issue as she was found to be significantly retaining upto 900 ml .   She will need Rios on discharge which  might be a barrier to her current placement back to [facility]

## 2024-09-20 LAB
ALBUMIN SERPL ELPH-MCNC: 3.7 G/DL — SIGNIFICANT CHANGE UP (ref 3.5–5.2)
ALP SERPL-CCNC: 70 U/L — SIGNIFICANT CHANGE UP (ref 30–115)
ALT FLD-CCNC: 19 U/L — SIGNIFICANT CHANGE UP (ref 0–41)
ANION GAP SERPL CALC-SCNC: 9 MMOL/L — SIGNIFICANT CHANGE UP (ref 7–14)
AST SERPL-CCNC: 30 U/L — SIGNIFICANT CHANGE UP (ref 0–41)
BASOPHILS # BLD AUTO: 0.03 K/UL — SIGNIFICANT CHANGE UP (ref 0–0.2)
BASOPHILS NFR BLD AUTO: 0.6 % — SIGNIFICANT CHANGE UP (ref 0–1)
BILIRUB SERPL-MCNC: 0.4 MG/DL — SIGNIFICANT CHANGE UP (ref 0.2–1.2)
BUN SERPL-MCNC: 27 MG/DL — HIGH (ref 10–20)
CALCIUM SERPL-MCNC: 9.5 MG/DL — SIGNIFICANT CHANGE UP (ref 8.4–10.4)
CHLORIDE SERPL-SCNC: 101 MMOL/L — SIGNIFICANT CHANGE UP (ref 98–110)
CO2 SERPL-SCNC: 28 MMOL/L — SIGNIFICANT CHANGE UP (ref 17–32)
CREAT SERPL-MCNC: 0.8 MG/DL — SIGNIFICANT CHANGE UP (ref 0.7–1.5)
EGFR: 69 ML/MIN/1.73M2 — SIGNIFICANT CHANGE UP
EOSINOPHIL # BLD AUTO: 0.13 K/UL — SIGNIFICANT CHANGE UP (ref 0–0.7)
EOSINOPHIL NFR BLD AUTO: 2.6 % — SIGNIFICANT CHANGE UP (ref 0–8)
GLUCOSE SERPL-MCNC: 129 MG/DL — HIGH (ref 70–99)
HCT VFR BLD CALC: 31.2 % — LOW (ref 37–47)
HGB BLD-MCNC: 9.9 G/DL — LOW (ref 12–16)
IMM GRANULOCYTES NFR BLD AUTO: 0.2 % — SIGNIFICANT CHANGE UP (ref 0.1–0.3)
LYMPHOCYTES # BLD AUTO: 0.73 K/UL — LOW (ref 1.2–3.4)
LYMPHOCYTES # BLD AUTO: 14.5 % — LOW (ref 20.5–51.1)
MAGNESIUM SERPL-MCNC: 2.1 MG/DL — SIGNIFICANT CHANGE UP (ref 1.8–2.4)
MCHC RBC-ENTMCNC: 29.7 PG — SIGNIFICANT CHANGE UP (ref 27–31)
MCHC RBC-ENTMCNC: 31.7 G/DL — LOW (ref 32–37)
MCV RBC AUTO: 93.7 FL — SIGNIFICANT CHANGE UP (ref 81–99)
MONOCYTES # BLD AUTO: 0.39 K/UL — SIGNIFICANT CHANGE UP (ref 0.1–0.6)
MONOCYTES NFR BLD AUTO: 7.7 % — SIGNIFICANT CHANGE UP (ref 1.7–9.3)
NEUTROPHILS # BLD AUTO: 3.75 K/UL — SIGNIFICANT CHANGE UP (ref 1.4–6.5)
NEUTROPHILS NFR BLD AUTO: 74.4 % — SIGNIFICANT CHANGE UP (ref 42.2–75.2)
NRBC # BLD: 0 /100 WBCS — SIGNIFICANT CHANGE UP (ref 0–0)
PLATELET # BLD AUTO: 202 K/UL — SIGNIFICANT CHANGE UP (ref 130–400)
PMV BLD: 9.3 FL — SIGNIFICANT CHANGE UP (ref 7.4–10.4)
POTASSIUM SERPL-MCNC: 4.5 MMOL/L — SIGNIFICANT CHANGE UP (ref 3.5–5)
POTASSIUM SERPL-SCNC: 4.5 MMOL/L — SIGNIFICANT CHANGE UP (ref 3.5–5)
PROT SERPL-MCNC: 6.6 G/DL — SIGNIFICANT CHANGE UP (ref 6–8)
RBC # BLD: 3.33 M/UL — LOW (ref 4.2–5.4)
RBC # FLD: 14.8 % — HIGH (ref 11.5–14.5)
SODIUM SERPL-SCNC: 138 MMOL/L — SIGNIFICANT CHANGE UP (ref 135–146)
WBC # BLD: 5.04 K/UL — SIGNIFICANT CHANGE UP (ref 4.8–10.8)
WBC # FLD AUTO: 5.04 K/UL — SIGNIFICANT CHANGE UP (ref 4.8–10.8)

## 2024-09-20 PROCEDURE — 99231 SBSQ HOSP IP/OBS SF/LOW 25: CPT

## 2024-09-20 RX ADMIN — ATORVASTATIN CALCIUM 10 MILLIGRAM(S): 10 TABLET, FILM COATED ORAL at 21:33

## 2024-09-20 RX ADMIN — Medication 17 GRAM(S): at 21:33

## 2024-09-20 RX ADMIN — Medication 2 TABLET(S): at 21:33

## 2024-09-20 RX ADMIN — CHLORHEXIDINE GLUCONATE ORAL RINSE 1 APPLICATION(S): 1.2 SOLUTION DENTAL at 11:20

## 2024-09-20 RX ADMIN — PANTOPRAZOLE SODIUM 40 MILLIGRAM(S): 40 TABLET, DELAYED RELEASE ORAL at 05:27

## 2024-09-20 RX ADMIN — Medication 81 MILLIGRAM(S): at 11:22

## 2024-09-20 RX ADMIN — Medication 5000 UNIT(S): at 05:28

## 2024-09-20 RX ADMIN — Medication 5000 UNIT(S): at 17:44

## 2024-09-20 NOTE — PROGRESS NOTE ADULT - SUBJECTIVE AND OBJECTIVE BOX
Patient seen and examined. Events over the last 24 hours noted.   Pt resting in bed comfortably, denies complaints    T(F): 98.3 (09-20-24 @ 13:32), Max: 98.4 (09-20-24 @ 05:12)  HR: 84 (09-20-24 @ 13:32)  BP: 104/68 (09-20-24 @ 13:32)  RR: 18 (09-20-24 @ 13:32)  SpO2: 97% (09-20-24 @ 13:32) (97% - 100%)    PHYSICAL EXAM:  GEN: No acute distress  HEENT: normocephalic, atraumatic, anicteric  LUNGS: b/l breath sounds present, clear to auscultation bilaterally   HEART: S1/S2 present. RRR  ABD: Soft, non-tender, non-distended. Bowel sounds present  EXT: warm   NEURO: awake, no new focal deficits    LABS  09-20    138  |  101  |  27[H]  ----------------------------<  129[H]  4.5   |  28  |  0.8    Ca    9.5      20 Sep 2024 07:27  Mg     2.1     09-20    TPro  6.6  /  Alb  3.7  /  TBili  0.4  /  DBili  x   /  AST  30  /  ALT  19  /  AlkPhos  70  09-20                          9.9    5.04  )-----------( 202      ( 20 Sep 2024 07:27 )             31.2            MEDICATIONS  (STANDING):  albuterol/ipratropium for Nebulization 3 milliLiter(s) Nebulizer every 6 hours  aspirin enteric coated 81 milliGRAM(s) Oral daily  atorvastatin 10 milliGRAM(s) Oral at bedtime  chlorhexidine 2% Cloths 1 Application(s) Topical daily  heparin   Injectable 5000 Unit(s) SubCutaneous every 12 hours  influenza  Vaccine (HIGH DOSE) 0.5 milliLiter(s) IntraMuscular once  pantoprazole    Tablet 40 milliGRAM(s) Oral before breakfast  polyethylene glycol 3350 17 Gram(s) Oral at bedtime  senna 2 Tablet(s) Oral at bedtime    MEDICATIONS  (PRN):  ALPRAZolam 0.25 milliGRAM(s) Oral at bedtime PRN for insomnia

## 2024-09-20 NOTE — PROGRESS NOTE ADULT - ASSESSMENT
89-year-old female with history of JENNIFER on 3L NC O2 in the mornings and evenings, GERD, aortic stenosis, left carotid stenosis, anemia, anxiety, urinary retention (straight catheterizes herself for urination), breast cancer s/p mastectomy and reconstruction and brain cancer s/p craniotomy presents from [facility] for shortness of breath x 1 day.     Acute hypoxia , unclear etiology- improved  suspect anxiety related vs volume overload due to Aortic stenosis,   No  CHF exacerbation . Chronic diastolic CHF with some volume overload  Uses oxygen intermittently at NH  Doing well on RA today  CTA chest PE protocol 09/11 reviewed, no evidence of PE, extensive scoliotic/kyphotic changes  Pro BNP at 1541  Echocardiogram  09/15- Ef 55%. Moderate AS  No apparent infectious etiology  Urinary retention -   Pt does self cath but because of cognitive decline self-cath will be an issue as she was found to be significantly retaining upto 900 ml .Lama was placed she will need outpt Urology f/u   PT - recommends SNF  Hb at baseline. Chronic anemia   Anxiety/insomnia- Alpra .25 mg at bedtime  DVT PPX-heparin    #Progress Note Handoff  Disposition: Stable for discharge, awaiting bed placement. [facility] (place where she came from) denied her due to lama.

## 2024-09-20 NOTE — PROGRESS NOTE ADULT - SUBJECTIVE AND OBJECTIVE BOX
24H events:    Patient is a 89y old Female who presents with a chief complaint of SOB (19 Sep 2024 13:32)    Primary diagnosis of Shortness of breath        Today is 8d of hospitalization. This morning patient was seen and examined at bedside, resting comfortably in bed.    No acute or major events overnight.    Code Status:    Family communication:  Contact date:  Name of person contacted:  Relationship to patient:  Communication details:  What matters most:    PAST MEDICAL & SURGICAL HISTORY  Migraine    H/O brain tumor  benign 1977    Breast CA    Urinary retention with incomplete bladder emptying  pt self catheterizes  3-4 x day    H/O mastectomy, bilateral  1984    H/O craniotomy  1976      SOCIAL HISTORY:  Social History:      ALLERGIES:  No Known Allergies    MEDICATIONS:  STANDING MEDICATIONS  albuterol/ipratropium for Nebulization 3 milliLiter(s) Nebulizer every 6 hours  aspirin enteric coated 81 milliGRAM(s) Oral daily  atorvastatin 10 milliGRAM(s) Oral at bedtime  chlorhexidine 2% Cloths 1 Application(s) Topical daily  heparin   Injectable 5000 Unit(s) SubCutaneous every 12 hours  influenza  Vaccine (HIGH DOSE) 0.5 milliLiter(s) IntraMuscular once  pantoprazole    Tablet 40 milliGRAM(s) Oral before breakfast  polyethylene glycol 3350 17 Gram(s) Oral at bedtime  senna 2 Tablet(s) Oral at bedtime    PRN MEDICATIONS  ALPRAZolam 0.25 milliGRAM(s) Oral at bedtime PRN    VITALS:   T(F): 98.4  HR: 96  BP: 114/71  RR: 18  SpO2: 100%    PHYSICAL EXAM:  GENERAL: NAD, lying in bed comfortably  NECK: Supple, no neck stiffness/nuchal rigidity, no JVD    HEART: Regular rate and rhythm, S1, S2 present with crescendo-decrescendo systolic murmur in right upper sternal border consistent with history of aortic stenosis  LUNGS: No acute respiratory distress, clear b/l breath sounds, no wheezing, rales, rhonchi  ABDOMEN:  soft, non-tender, non-distented  EXTREMITIES: no rashes, extremities warm/dry  NERVOUS SYSTEM:  A&Ox3, CNII-XII intact, follows commands, answers questions appropriately   SKIN: No rashes or lesions    LABS:                        9.9    5.04  )-----------( 202      ( 20 Sep 2024 07:27 )             31.2     09-20    138  |  101  |  27[H]  ----------------------------<  129[H]  4.5   |  28  |  0.8    Ca    9.5      20 Sep 2024 07:27  Mg     2.1     09-20    TPro  6.6  /  Alb  3.7  /  TBili  0.4  /  DBili  x   /  AST  30  /  ALT  19  /  AlkPhos  70  09-20      Urinalysis Basic - ( 20 Sep 2024 07:27 )    Color: x / Appearance: x / SG: x / pH: x  Gluc: 129 mg/dL / Ketone: x  / Bili: x / Urobili: x   Blood: x / Protein: x / Nitrite: x   Leuk Esterase: x / RBC: x / WBC x   Sq Epi: x / Non Sq Epi: x / Bacteria: x

## 2024-09-20 NOTE — PROGRESS NOTE ADULT - ASSESSMENT
89-year-old female with history of JENNIFER on 3L NC O2 in the mornings and evenings, GERD, aortic stenosis, left carotid stenosis, anemia, anxiety, urinary retention (straight catheterizes herself for urination), breast cancer s/p mastectomy and reconstruction and brain cancer s/p craniotomy presents from [facility] for shortness of breath x 1 day.     #Acute hypoxia , unclear etiology- improved  - CTA chest PE protocol 09/11 reviewed, no evidence of PE, extensive scoliotic/kyphotic changes  - No apparent infectious etiology  - suspect anxiety related vs volume overload due to Aortic stenosis,   - Doing well on RA today    #Chronic diastolic CHF   - some volume overload  - Pro BNP at 1541   - Echocardiogram  09/15- Ef 55%. Moderate AS  - Not in CHF exacerbation .   - Uses oxygen intermittently at NH    #Urinary retention  - patient self cath at home  - has been unable to void spontaneously while inpatient, retaining ~900ml   - will need Rios catheter at discharge    #Chronic anemia  Hb stable at baseline.     #ADL  PT - recommends SNF    Anxiety/insomnia- Alpra .25 mg at bedtime    DVT PPX-heparin  GI prophylaxis: not indicated    #Progress Note Handoff  Disposition: Stable for discharge but  Pt does self cath but because of cognitive decline self-cath will be an issue as she was found to be significantly retaining upto 900 ml .   She will need Rios on discharge which  might be a barrier to her current placement back to [facility]

## 2024-09-21 LAB
ALBUMIN SERPL ELPH-MCNC: 4.1 G/DL — SIGNIFICANT CHANGE UP (ref 3.5–5.2)
ALP SERPL-CCNC: 74 U/L — SIGNIFICANT CHANGE UP (ref 30–115)
ALT FLD-CCNC: 21 U/L — SIGNIFICANT CHANGE UP (ref 0–41)
ANION GAP SERPL CALC-SCNC: 11 MMOL/L — SIGNIFICANT CHANGE UP (ref 7–14)
AST SERPL-CCNC: 31 U/L — SIGNIFICANT CHANGE UP (ref 0–41)
BASOPHILS # BLD AUTO: 0.03 K/UL — SIGNIFICANT CHANGE UP (ref 0–0.2)
BASOPHILS NFR BLD AUTO: 0.6 % — SIGNIFICANT CHANGE UP (ref 0–1)
BILIRUB SERPL-MCNC: 0.5 MG/DL — SIGNIFICANT CHANGE UP (ref 0.2–1.2)
BUN SERPL-MCNC: 26 MG/DL — HIGH (ref 10–20)
CALCIUM SERPL-MCNC: 9.7 MG/DL — SIGNIFICANT CHANGE UP (ref 8.4–10.5)
CHLORIDE SERPL-SCNC: 99 MMOL/L — SIGNIFICANT CHANGE UP (ref 98–110)
CO2 SERPL-SCNC: 28 MMOL/L — SIGNIFICANT CHANGE UP (ref 17–32)
CREAT SERPL-MCNC: 0.8 MG/DL — SIGNIFICANT CHANGE UP (ref 0.7–1.5)
EGFR: 69 ML/MIN/1.73M2 — SIGNIFICANT CHANGE UP
EOSINOPHIL # BLD AUTO: 0.12 K/UL — SIGNIFICANT CHANGE UP (ref 0–0.7)
EOSINOPHIL NFR BLD AUTO: 2.5 % — SIGNIFICANT CHANGE UP (ref 0–8)
GLUCOSE SERPL-MCNC: 168 MG/DL — HIGH (ref 70–99)
HCT VFR BLD CALC: 35.3 % — LOW (ref 37–47)
HGB BLD-MCNC: 11.2 G/DL — LOW (ref 12–16)
IMM GRANULOCYTES NFR BLD AUTO: 0.4 % — HIGH (ref 0.1–0.3)
LYMPHOCYTES # BLD AUTO: 0.65 K/UL — LOW (ref 1.2–3.4)
LYMPHOCYTES # BLD AUTO: 13.5 % — LOW (ref 20.5–51.1)
MAGNESIUM SERPL-MCNC: 2 MG/DL — SIGNIFICANT CHANGE UP (ref 1.8–2.4)
MCHC RBC-ENTMCNC: 30.1 PG — SIGNIFICANT CHANGE UP (ref 27–31)
MCHC RBC-ENTMCNC: 31.7 G/DL — LOW (ref 32–37)
MCV RBC AUTO: 94.9 FL — SIGNIFICANT CHANGE UP (ref 81–99)
MONOCYTES # BLD AUTO: 0.33 K/UL — SIGNIFICANT CHANGE UP (ref 0.1–0.6)
MONOCYTES NFR BLD AUTO: 6.8 % — SIGNIFICANT CHANGE UP (ref 1.7–9.3)
NEUTROPHILS # BLD AUTO: 3.67 K/UL — SIGNIFICANT CHANGE UP (ref 1.4–6.5)
NEUTROPHILS NFR BLD AUTO: 76.2 % — HIGH (ref 42.2–75.2)
NRBC # BLD: 0 /100 WBCS — SIGNIFICANT CHANGE UP (ref 0–0)
PLATELET # BLD AUTO: 191 K/UL — SIGNIFICANT CHANGE UP (ref 130–400)
PMV BLD: 9.3 FL — SIGNIFICANT CHANGE UP (ref 7.4–10.4)
POTASSIUM SERPL-MCNC: 4.5 MMOL/L — SIGNIFICANT CHANGE UP (ref 3.5–5)
POTASSIUM SERPL-SCNC: 4.5 MMOL/L — SIGNIFICANT CHANGE UP (ref 3.5–5)
PROT SERPL-MCNC: 7.1 G/DL — SIGNIFICANT CHANGE UP (ref 6–8)
RBC # BLD: 3.72 M/UL — LOW (ref 4.2–5.4)
RBC # FLD: 14.6 % — HIGH (ref 11.5–14.5)
SODIUM SERPL-SCNC: 138 MMOL/L — SIGNIFICANT CHANGE UP (ref 135–146)
WBC # BLD: 4.82 K/UL — SIGNIFICANT CHANGE UP (ref 4.8–10.8)
WBC # FLD AUTO: 4.82 K/UL — SIGNIFICANT CHANGE UP (ref 4.8–10.8)

## 2024-09-21 PROCEDURE — 99231 SBSQ HOSP IP/OBS SF/LOW 25: CPT

## 2024-09-21 RX ORDER — ACETAMINOPHEN 325 MG
650 TABLET ORAL ONCE
Refills: 0 | Status: COMPLETED | OUTPATIENT
Start: 2024-09-21 | End: 2024-09-21

## 2024-09-21 RX ORDER — OXYCODONE HYDROCHLORIDE 30 MG/1
5 TABLET, FILM COATED, EXTENDED RELEASE ORAL ONCE
Refills: 0 | Status: DISCONTINUED | OUTPATIENT
Start: 2024-09-21 | End: 2024-09-21

## 2024-09-21 RX ORDER — CHLORHEXIDINE GLUCONATE ORAL RINSE 1.2 MG/ML
1 SOLUTION DENTAL DAILY
Refills: 0 | Status: DISCONTINUED | OUTPATIENT
Start: 2024-09-21 | End: 2024-09-30

## 2024-09-21 RX ADMIN — OXYCODONE HYDROCHLORIDE 5 MILLIGRAM(S): 30 TABLET, FILM COATED, EXTENDED RELEASE ORAL at 16:35

## 2024-09-21 RX ADMIN — CHLORHEXIDINE GLUCONATE ORAL RINSE 1 APPLICATION(S): 1.2 SOLUTION DENTAL at 11:04

## 2024-09-21 RX ADMIN — Medication 5000 UNIT(S): at 06:00

## 2024-09-21 RX ADMIN — Medication 2 TABLET(S): at 21:39

## 2024-09-21 RX ADMIN — Medication 17 GRAM(S): at 21:39

## 2024-09-21 RX ADMIN — ATORVASTATIN CALCIUM 10 MILLIGRAM(S): 10 TABLET, FILM COATED ORAL at 21:39

## 2024-09-21 RX ADMIN — PANTOPRAZOLE SODIUM 40 MILLIGRAM(S): 40 TABLET, DELAYED RELEASE ORAL at 06:00

## 2024-09-21 RX ADMIN — Medication 81 MILLIGRAM(S): at 11:03

## 2024-09-21 RX ADMIN — OXYCODONE HYDROCHLORIDE 5 MILLIGRAM(S): 30 TABLET, FILM COATED, EXTENDED RELEASE ORAL at 15:45

## 2024-09-21 RX ADMIN — Medication 5000 UNIT(S): at 18:21

## 2024-09-21 RX ADMIN — IPRATROPIUM BROMIDE AND ALBUTEROL SULFATE 3 MILLILITER(S): .5; 3 SOLUTION RESPIRATORY (INHALATION) at 20:38

## 2024-09-21 NOTE — PROGRESS NOTE ADULT - SUBJECTIVE AND OBJECTIVE BOX
Patient seen and examined. Events over the last 24 hours noted.   Pt upset about her breakfast, otherwise no complaints    T(F): 97.7 (09-21-24 @ 12:02), Max: 98.4 (09-20-24 @ 21:06)  HR: 84 (09-21-24 @ 12:02)  BP: 93/60 (09-21-24 @ 12:02)  RR: 17 (09-21-24 @ 12:02)  SpO2: 100% (09-21-24 @ 12:02) (95% - 100%)    PHYSICAL EXAM:  GEN: No acute distress  HEENT: normocephalic, atraumatic, anicteric  LUNGS: b/l breath sounds present, clear to auscultation bilaterally   HEART: S1/S2 present. RRR  ABD: Soft, non-tender, non-distended. Bowel sounds present  EXT: warm   NEURO: awake, no new focal deficits    LABS  09-21    138  |  99  |  26[H]  ----------------------------<  168[H]  4.5   |  28  |  0.8    Ca    9.7      21 Sep 2024 09:02  Mg     2.0     09-21    TPro  7.1  /  Alb  4.1  /  TBili  0.5  /  DBili  x   /  AST  31  /  ALT  21  /  AlkPhos  74  09-21                          11.2   4.82  )-----------( 191      ( 21 Sep 2024 09:02 )             35.3            MEDICATIONS  (STANDING):  albuterol/ipratropium for Nebulization 3 milliLiter(s) Nebulizer every 6 hours  aspirin enteric coated 81 milliGRAM(s) Oral daily  atorvastatin 10 milliGRAM(s) Oral at bedtime  chlorhexidine 2% Cloths 1 Application(s) Topical daily  heparin   Injectable 5000 Unit(s) SubCutaneous every 12 hours  influenza  Vaccine (HIGH DOSE) 0.5 milliLiter(s) IntraMuscular once  pantoprazole    Tablet 40 milliGRAM(s) Oral before breakfast  polyethylene glycol 3350 17 Gram(s) Oral at bedtime  senna 2 Tablet(s) Oral at bedtime    MEDICATIONS  (PRN):  ALPRAZolam 0.25 milliGRAM(s) Oral at bedtime PRN for insomnia

## 2024-09-21 NOTE — PROGRESS NOTE ADULT - ASSESSMENT
89-year-old female with history of JENNIFER on 3L NC O2 in the mornings and evenings, GERD, aortic stenosis, left carotid stenosis, anemia, anxiety, urinary retention (straight catheterizes herself for urination), breast cancer s/p mastectomy and reconstruction and brain cancer s/p craniotomy presents from [facility] for shortness of breath x 1 day.     Acute hypoxia , unclear etiology- improved  suspect anxiety related vs volume overload due to Aortic stenosis,   No  CHF exacerbation . Chronic diastolic CHF with some volume overload  Uses oxygen intermittently at NH  Doing well on RA today  CTA chest PE protocol 09/11 reviewed, no evidence of PE, extensive scoliotic/kyphotic changes  Pro BNP at 1541  Echocardiogram  09/15- Ef 55%. Moderate AS  No apparent infectious etiology  Urinary retention -   Pt does self cath but because of cognitive decline self-cath will be an issue as she was found to be significantly retaining upto 900 ml .Lama was placed she will need outpt Urology f/u   PT - recommends SNF  Hb at baseline. Chronic anemia   Anxiety/insomnia- Alpra .25 mg at bedtime  DVT PPX-heparin    #Progress Note Handoff  Disposition: Stable for discharge, awaiting bed placement. [facility] (place where she came from) denied her due to lama. Family is contacting two other AL

## 2024-09-22 LAB
ALBUMIN SERPL ELPH-MCNC: 3.8 G/DL — SIGNIFICANT CHANGE UP (ref 3.5–5.2)
ALP SERPL-CCNC: 77 U/L — SIGNIFICANT CHANGE UP (ref 30–115)
ALT FLD-CCNC: 20 U/L — SIGNIFICANT CHANGE UP (ref 0–41)
ANION GAP SERPL CALC-SCNC: 12 MMOL/L — SIGNIFICANT CHANGE UP (ref 7–14)
AST SERPL-CCNC: 31 U/L — SIGNIFICANT CHANGE UP (ref 0–41)
BASOPHILS # BLD AUTO: 0.01 K/UL — SIGNIFICANT CHANGE UP (ref 0–0.2)
BASOPHILS NFR BLD AUTO: 0.2 % — SIGNIFICANT CHANGE UP (ref 0–1)
BILIRUB SERPL-MCNC: 0.4 MG/DL — SIGNIFICANT CHANGE UP (ref 0.2–1.2)
BUN SERPL-MCNC: 28 MG/DL — HIGH (ref 10–20)
CALCIUM SERPL-MCNC: 9.9 MG/DL — SIGNIFICANT CHANGE UP (ref 8.4–10.5)
CHLORIDE SERPL-SCNC: 100 MMOL/L — SIGNIFICANT CHANGE UP (ref 98–110)
CO2 SERPL-SCNC: 28 MMOL/L — SIGNIFICANT CHANGE UP (ref 17–32)
CREAT SERPL-MCNC: 0.9 MG/DL — SIGNIFICANT CHANGE UP (ref 0.7–1.5)
EGFR: 60 ML/MIN/1.73M2 — SIGNIFICANT CHANGE UP
EOSINOPHIL # BLD AUTO: 0.11 K/UL — SIGNIFICANT CHANGE UP (ref 0–0.7)
EOSINOPHIL NFR BLD AUTO: 1.8 % — SIGNIFICANT CHANGE UP (ref 0–8)
GLUCOSE SERPL-MCNC: 121 MG/DL — HIGH (ref 70–99)
HCT VFR BLD CALC: 35.5 % — LOW (ref 37–47)
HGB BLD-MCNC: 11 G/DL — LOW (ref 12–16)
IMM GRANULOCYTES NFR BLD AUTO: 0.3 % — SIGNIFICANT CHANGE UP (ref 0.1–0.3)
LYMPHOCYTES # BLD AUTO: 0.78 K/UL — LOW (ref 1.2–3.4)
LYMPHOCYTES # BLD AUTO: 12.7 % — LOW (ref 20.5–51.1)
MAGNESIUM SERPL-MCNC: 2.1 MG/DL — SIGNIFICANT CHANGE UP (ref 1.8–2.4)
MCHC RBC-ENTMCNC: 30.1 PG — SIGNIFICANT CHANGE UP (ref 27–31)
MCHC RBC-ENTMCNC: 31 G/DL — LOW (ref 32–37)
MCV RBC AUTO: 97 FL — SIGNIFICANT CHANGE UP (ref 81–99)
MONOCYTES # BLD AUTO: 0.49 K/UL — SIGNIFICANT CHANGE UP (ref 0.1–0.6)
MONOCYTES NFR BLD AUTO: 8 % — SIGNIFICANT CHANGE UP (ref 1.7–9.3)
NEUTROPHILS # BLD AUTO: 4.74 K/UL — SIGNIFICANT CHANGE UP (ref 1.4–6.5)
NEUTROPHILS NFR BLD AUTO: 77 % — HIGH (ref 42.2–75.2)
NRBC # BLD: 0 /100 WBCS — SIGNIFICANT CHANGE UP (ref 0–0)
PLATELET # BLD AUTO: 197 K/UL — SIGNIFICANT CHANGE UP (ref 130–400)
PMV BLD: 9.9 FL — SIGNIFICANT CHANGE UP (ref 7.4–10.4)
POTASSIUM SERPL-MCNC: 4.8 MMOL/L — SIGNIFICANT CHANGE UP (ref 3.5–5)
POTASSIUM SERPL-SCNC: 4.8 MMOL/L — SIGNIFICANT CHANGE UP (ref 3.5–5)
PROT SERPL-MCNC: 7.1 G/DL — SIGNIFICANT CHANGE UP (ref 6–8)
RBC # BLD: 3.66 M/UL — LOW (ref 4.2–5.4)
RBC # FLD: 14.6 % — HIGH (ref 11.5–14.5)
SODIUM SERPL-SCNC: 140 MMOL/L — SIGNIFICANT CHANGE UP (ref 135–146)
WBC # BLD: 6.15 K/UL — SIGNIFICANT CHANGE UP (ref 4.8–10.8)
WBC # FLD AUTO: 6.15 K/UL — SIGNIFICANT CHANGE UP (ref 4.8–10.8)

## 2024-09-22 PROCEDURE — 99231 SBSQ HOSP IP/OBS SF/LOW 25: CPT

## 2024-09-22 RX ORDER — LACTULOSE 10 G/15ML
10 SOLUTION ORAL; RECTAL ONCE
Refills: 0 | Status: COMPLETED | OUTPATIENT
Start: 2024-09-22 | End: 2024-09-22

## 2024-09-22 RX ADMIN — LACTULOSE 10 GRAM(S): 10 SOLUTION ORAL; RECTAL at 17:53

## 2024-09-22 RX ADMIN — CHLORHEXIDINE GLUCONATE ORAL RINSE 1 APPLICATION(S): 1.2 SOLUTION DENTAL at 11:48

## 2024-09-22 RX ADMIN — Medication 81 MILLIGRAM(S): at 11:35

## 2024-09-22 RX ADMIN — Medication 5000 UNIT(S): at 17:53

## 2024-09-22 RX ADMIN — Medication 17 GRAM(S): at 21:27

## 2024-09-22 RX ADMIN — PANTOPRAZOLE SODIUM 40 MILLIGRAM(S): 40 TABLET, DELAYED RELEASE ORAL at 08:14

## 2024-09-22 RX ADMIN — Medication 2 TABLET(S): at 21:28

## 2024-09-22 RX ADMIN — ATORVASTATIN CALCIUM 10 MILLIGRAM(S): 10 TABLET, FILM COATED ORAL at 21:28

## 2024-09-22 RX ADMIN — Medication 5000 UNIT(S): at 05:25

## 2024-09-22 NOTE — PROGRESS NOTE ADULT - SUBJECTIVE AND OBJECTIVE BOX
Patient seen and examined. Events over the last 24 hours noted.   Pt denies complaints, resting comfortably     T(F): 98 (09-22-24 @ 12:06), Max: 98.3 (09-22-24 @ 05:01)  HR: 75 (09-22-24 @ 12:06)  BP: 97/55 (09-22-24 @ 12:06)  RR: 18 (09-22-24 @ 12:06)  SpO2: 100% (09-22-24 @ 12:06) (98% - 100%)    PHYSICAL EXAM:  GEN: No acute distress  HEENT: normocephalic, atraumatic, anicteric  LUNGS: b/l breath sounds present, clear to auscultation bilaterally   HEART: S1/S2 present. RRR  ABD: Soft, non-tender, non-distended. Bowel sounds present  EXT: warm   NEURO: awake, no new focal deficits    LABS  09-22    140  |  100  |  28[H]  ----------------------------<  121[H]  4.8   |  28  |  0.9    Ca    9.9      22 Sep 2024 08:10  Mg     2.1     09-22    TPro  7.1  /  Alb  3.8  /  TBili  0.4  /  DBili  x   /  AST  31  /  ALT  20  /  AlkPhos  77  09-22                          11.0   6.15  )-----------( 197      ( 22 Sep 2024 08:10 )             35.5            MEDICATIONS  (STANDING):  albuterol/ipratropium for Nebulization 3 milliLiter(s) Nebulizer every 6 hours  aspirin enteric coated 81 milliGRAM(s) Oral daily  atorvastatin 10 milliGRAM(s) Oral at bedtime  chlorhexidine 2% Cloths 1 Application(s) Topical daily  chlorhexidine 2% Cloths 1 Application(s) Topical daily  heparin   Injectable 5000 Unit(s) SubCutaneous every 12 hours  influenza  Vaccine (HIGH DOSE) 0.5 milliLiter(s) IntraMuscular once  pantoprazole    Tablet 40 milliGRAM(s) Oral before breakfast  polyethylene glycol 3350 17 Gram(s) Oral at bedtime  senna 2 Tablet(s) Oral at bedtime    MEDICATIONS  (PRN):  ALPRAZolam 0.25 milliGRAM(s) Oral at bedtime PRN for insomnia

## 2024-09-23 LAB
ALBUMIN SERPL ELPH-MCNC: 4 G/DL — SIGNIFICANT CHANGE UP (ref 3.5–5.2)
ALP SERPL-CCNC: 78 U/L — SIGNIFICANT CHANGE UP (ref 30–115)
ALT FLD-CCNC: 20 U/L — SIGNIFICANT CHANGE UP (ref 0–41)
ANION GAP SERPL CALC-SCNC: 5 MMOL/L — LOW (ref 7–14)
AST SERPL-CCNC: 26 U/L — SIGNIFICANT CHANGE UP (ref 0–41)
BASOPHILS # BLD AUTO: 0.02 K/UL — SIGNIFICANT CHANGE UP (ref 0–0.2)
BASOPHILS NFR BLD AUTO: 0.4 % — SIGNIFICANT CHANGE UP (ref 0–1)
BILIRUB SERPL-MCNC: 0.3 MG/DL — SIGNIFICANT CHANGE UP (ref 0.2–1.2)
BUN SERPL-MCNC: 32 MG/DL — HIGH (ref 10–20)
CALCIUM SERPL-MCNC: 9.9 MG/DL — SIGNIFICANT CHANGE UP (ref 8.4–10.5)
CHLORIDE SERPL-SCNC: 102 MMOL/L — SIGNIFICANT CHANGE UP (ref 98–110)
CO2 SERPL-SCNC: 33 MMOL/L — HIGH (ref 17–32)
CREAT SERPL-MCNC: 0.9 MG/DL — SIGNIFICANT CHANGE UP (ref 0.7–1.5)
EGFR: 60 ML/MIN/1.73M2 — SIGNIFICANT CHANGE UP
EOSINOPHIL # BLD AUTO: 0.13 K/UL — SIGNIFICANT CHANGE UP (ref 0–0.7)
EOSINOPHIL NFR BLD AUTO: 2.6 % — SIGNIFICANT CHANGE UP (ref 0–8)
GLUCOSE SERPL-MCNC: 119 MG/DL — HIGH (ref 70–99)
HCT VFR BLD CALC: 34.2 % — LOW (ref 37–47)
HGB BLD-MCNC: 10.7 G/DL — LOW (ref 12–16)
IMM GRANULOCYTES NFR BLD AUTO: 0.4 % — HIGH (ref 0.1–0.3)
LYMPHOCYTES # BLD AUTO: 0.94 K/UL — LOW (ref 1.2–3.4)
LYMPHOCYTES # BLD AUTO: 18.5 % — LOW (ref 20.5–51.1)
MAGNESIUM SERPL-MCNC: 2.1 MG/DL — SIGNIFICANT CHANGE UP (ref 1.8–2.4)
MCHC RBC-ENTMCNC: 29.9 PG — SIGNIFICANT CHANGE UP (ref 27–31)
MCHC RBC-ENTMCNC: 31.3 G/DL — LOW (ref 32–37)
MCV RBC AUTO: 95.5 FL — SIGNIFICANT CHANGE UP (ref 81–99)
MONOCYTES # BLD AUTO: 0.47 K/UL — SIGNIFICANT CHANGE UP (ref 0.1–0.6)
MONOCYTES NFR BLD AUTO: 9.3 % — SIGNIFICANT CHANGE UP (ref 1.7–9.3)
NEUTROPHILS # BLD AUTO: 3.5 K/UL — SIGNIFICANT CHANGE UP (ref 1.4–6.5)
NEUTROPHILS NFR BLD AUTO: 68.8 % — SIGNIFICANT CHANGE UP (ref 42.2–75.2)
NRBC # BLD: 0 /100 WBCS — SIGNIFICANT CHANGE UP (ref 0–0)
PLATELET # BLD AUTO: 208 K/UL — SIGNIFICANT CHANGE UP (ref 130–400)
PMV BLD: 9.7 FL — SIGNIFICANT CHANGE UP (ref 7.4–10.4)
POTASSIUM SERPL-MCNC: 4.7 MMOL/L — SIGNIFICANT CHANGE UP (ref 3.5–5)
POTASSIUM SERPL-SCNC: 4.7 MMOL/L — SIGNIFICANT CHANGE UP (ref 3.5–5)
PROT SERPL-MCNC: 6.9 G/DL — SIGNIFICANT CHANGE UP (ref 6–8)
RBC # BLD: 3.58 M/UL — LOW (ref 4.2–5.4)
RBC # FLD: 14.4 % — SIGNIFICANT CHANGE UP (ref 11.5–14.5)
SODIUM SERPL-SCNC: 140 MMOL/L — SIGNIFICANT CHANGE UP (ref 135–146)
WBC # BLD: 5.08 K/UL — SIGNIFICANT CHANGE UP (ref 4.8–10.8)
WBC # FLD AUTO: 5.08 K/UL — SIGNIFICANT CHANGE UP (ref 4.8–10.8)

## 2024-09-23 PROCEDURE — 99231 SBSQ HOSP IP/OBS SF/LOW 25: CPT

## 2024-09-23 RX ORDER — SENNOSIDES 8.6 MG
2 TABLET ORAL
Qty: 0 | Refills: 0 | DISCHARGE
Start: 2024-09-23

## 2024-09-23 RX ADMIN — PANTOPRAZOLE SODIUM 40 MILLIGRAM(S): 40 TABLET, DELAYED RELEASE ORAL at 08:45

## 2024-09-23 RX ADMIN — CHLORHEXIDINE GLUCONATE ORAL RINSE 1 APPLICATION(S): 1.2 SOLUTION DENTAL at 11:03

## 2024-09-23 RX ADMIN — ATORVASTATIN CALCIUM 10 MILLIGRAM(S): 10 TABLET, FILM COATED ORAL at 21:53

## 2024-09-23 RX ADMIN — Medication 2 TABLET(S): at 21:52

## 2024-09-23 RX ADMIN — Medication 81 MILLIGRAM(S): at 11:03

## 2024-09-23 RX ADMIN — IPRATROPIUM BROMIDE AND ALBUTEROL SULFATE 3 MILLILITER(S): .5; 3 SOLUTION RESPIRATORY (INHALATION) at 08:43

## 2024-09-23 RX ADMIN — Medication 17 GRAM(S): at 21:52

## 2024-09-23 RX ADMIN — Medication 5000 UNIT(S): at 17:37

## 2024-09-23 RX ADMIN — IPRATROPIUM BROMIDE AND ALBUTEROL SULFATE 3 MILLILITER(S): .5; 3 SOLUTION RESPIRATORY (INHALATION) at 21:30

## 2024-09-23 NOTE — PROGRESS NOTE ADULT - SUBJECTIVE AND OBJECTIVE BOX
Patient seen and examined. Events over the last 24 hours noted.   Pt denies complaints. Awaiting discharge to assisted living.     T(F): 98.4 (09-23-24 @ 12:33), Max: 98.5 (09-22-24 @ 18:57)  HR: 79 (09-23-24 @ 12:33)  BP: 94/60 (09-23-24 @ 12:33)  RR: 18 (09-23-24 @ 12:33)  SpO2: 94% (09-23-24 @ 12:33) (94% - 100%)    PHYSICAL EXAM:  GEN: No acute distress  HEENT: normocephalic, atraumatic, anicteric  LUNGS: b/l breath sounds present, clear to auscultation bilaterally   HEART: S1/S2 present. RRR  ABD: Soft, non-tender, non-distended. Bowel sounds present  EXT: warm   NEURO: awake, no new focal deficits    LABS  09-23    140  |  102  |  32[H]  ----------------------------<  119[H]  4.7   |  33[H]  |  0.9    Ca    9.9      23 Sep 2024 07:39  Mg     2.1     09-23    TPro  6.9  /  Alb  4.0  /  TBili  0.3  /  DBili  x   /  AST  26  /  ALT  20  /  AlkPhos  78  09-23                          10.7   5.08  )-----------( 208      ( 23 Sep 2024 07:39 )             34.2            MEDICATIONS  (STANDING):  albuterol/ipratropium for Nebulization 3 milliLiter(s) Nebulizer every 6 hours  aspirin enteric coated 81 milliGRAM(s) Oral daily  atorvastatin 10 milliGRAM(s) Oral at bedtime  chlorhexidine 2% Cloths 1 Application(s) Topical daily  heparin   Injectable 5000 Unit(s) SubCutaneous every 12 hours  influenza  Vaccine (HIGH DOSE) 0.5 milliLiter(s) IntraMuscular once  pantoprazole    Tablet 40 milliGRAM(s) Oral before breakfast  polyethylene glycol 3350 17 Gram(s) Oral at bedtime  senna 2 Tablet(s) Oral at bedtime    MEDICATIONS  (PRN):  ALPRAZolam 0.25 milliGRAM(s) Oral at bedtime PRN for insomnia

## 2024-09-23 NOTE — PROGRESS NOTE ADULT - ASSESSMENT
89-year-old female with history of JENNIFER on 3L NC O2 in the mornings and evenings, GERD, aortic stenosis, left carotid stenosis, anemia, anxiety, urinary retention (straight catheterizes herself for urination), breast cancer s/p mastectomy and reconstruction and brain cancer s/p craniotomy presents from [facility] for shortness of breath x 1 day.     Acute hypoxia , unclear etiology- improved  suspect anxiety related vs volume overload due to Aortic stenosis,   No  CHF exacerbation . Chronic diastolic CHF with some volume overload  Uses oxygen intermittently at NH  CTA chest PE protocol 09/11 reviewed, no evidence of PE, extensive scoliotic/kyphotic changes  Pro BNP at 1541  Echocardiogram  09/15- Ef 55%. Moderate AS  No apparent infectious etiology  Urinary retention -   Pt does self cath but because of cognitive decline self-cath will be an issue as she was found to be significantly retaining upto 900 ml .Lama was placed she will need outpt Urology f/u   PT - recommends SNF  Hb at baseline. Chronic anemia   Anxiety/insomnia- Alpra .25 mg at bedtime  DVT PPX-heparin    #Progress Note Handoff  Disposition: Stable for discharge, awaiting bed placement. [facility] (place where she came from) denied her due to lama. Pt is to possibly discharge to [facility] tomorrow, d/w case mgmt.

## 2024-09-24 PROCEDURE — 99231 SBSQ HOSP IP/OBS SF/LOW 25: CPT

## 2024-09-24 RX ADMIN — Medication 5000 UNIT(S): at 06:52

## 2024-09-24 RX ADMIN — PANTOPRAZOLE SODIUM 40 MILLIGRAM(S): 40 TABLET, DELAYED RELEASE ORAL at 06:13

## 2024-09-24 RX ADMIN — Medication 81 MILLIGRAM(S): at 14:48

## 2024-09-24 RX ADMIN — CHLORHEXIDINE GLUCONATE ORAL RINSE 1 APPLICATION(S): 1.2 SOLUTION DENTAL at 14:47

## 2024-09-24 RX ADMIN — ATORVASTATIN CALCIUM 10 MILLIGRAM(S): 10 TABLET, FILM COATED ORAL at 21:39

## 2024-09-24 RX ADMIN — IPRATROPIUM BROMIDE AND ALBUTEROL SULFATE 3 MILLILITER(S): .5; 3 SOLUTION RESPIRATORY (INHALATION) at 20:06

## 2024-09-24 RX ADMIN — Medication 2 TABLET(S): at 21:39

## 2024-09-24 RX ADMIN — IPRATROPIUM BROMIDE AND ALBUTEROL SULFATE 3 MILLILITER(S): .5; 3 SOLUTION RESPIRATORY (INHALATION) at 08:07

## 2024-09-24 RX ADMIN — Medication 17 GRAM(S): at 22:00

## 2024-09-24 RX ADMIN — Medication 5000 UNIT(S): at 17:29

## 2024-09-24 RX ADMIN — ALPRAZOLAM 0.25 MILLIGRAM(S): 0.5 TABLET ORAL at 21:39

## 2024-09-24 NOTE — PROGRESS NOTE ADULT - ASSESSMENT
89-year-old female with history of JENNIFER on 3L NC O2 in the mornings and evenings, GERD, aortic stenosis, left carotid stenosis, anemia, anxiety, urinary retention (straight catheterizes herself for urination), breast cancer s/p mastectomy and reconstruction and brain cancer s/p craniotomy presents from [facility] for shortness of breath x 1 day.     #Acute hypoxia , unclear etiology- improved  - CTA chest PE protocol 09/11 reviewed, no evidence of PE, extensive scoliotic/kyphotic changes  - No apparent infectious etiology  - suspect anxiety related vs volume overload due to Aortic stenosis,   - Doing well on RA     #Chronic diastolic CHF   - some volume overload  - Pro BNP at 1541  - Echocardiogram  09/15- Ef 55%. Moderate AS  - Not in CHF exacerbation .   - Uses oxygen intermittently at NH    #Urinary retention  - patient self cath at home  - has been unable to void spontaneously while inpatient, retaining ~900ml   - lama cath placed, will discharge with cath and follow up with urology as oupt     #Chronic anemia  Hb stable at baseline.     #ADL  PT - recommends SNF    Anxiety/insomnia- Alpra .25 mg at bedtime    #Misc   DVT PPX-heparin  GI prophylaxis: not indicated  Disposition: lama is barrier to [facility], patient is pending bed at sunrise

## 2024-09-24 NOTE — PROGRESS NOTE ADULT - SUBJECTIVE AND OBJECTIVE BOX
24H events:    Patient is a 89y old Female who presents with a chief complaint of SOB (23 Sep 2024 15:12)    Primary diagnosis of Shortness of breath    Today is hospital day 12d.   BP running on the softer side the last few days.   Patient reports good oral intake.     PAST MEDICAL & SURGICAL HISTORY  Migraine    H/O brain tumor  benign 1977    Breast CA    Urinary retention with incomplete bladder emptying  pt self catheterizes  3-4 x day    H/O mastectomy, bilateral  1984    H/O craniotomy  1976      SOCIAL HISTORY:  Social History:      ALLERGIES:  No Known Allergies    MEDICATIONS:  STANDING MEDICATIONS  albuterol/ipratropium for Nebulization 3 milliLiter(s) Nebulizer every 6 hours  aspirin enteric coated 81 milliGRAM(s) Oral daily  atorvastatin 10 milliGRAM(s) Oral at bedtime  chlorhexidine 2% Cloths 1 Application(s) Topical daily  heparin   Injectable 5000 Unit(s) SubCutaneous every 12 hours  influenza  Vaccine (HIGH DOSE) 0.5 milliLiter(s) IntraMuscular once  pantoprazole    Tablet 40 milliGRAM(s) Oral before breakfast  polyethylene glycol 3350 17 Gram(s) Oral at bedtime  senna 2 Tablet(s) Oral at bedtime    PRN MEDICATIONS  ALPRAZolam 0.25 milliGRAM(s) Oral at bedtime PRN    VITALS:   T(F): 98.8  HR: 85  BP: 99/66  RR: 18  SpO2: 100%    PHYSICAL EXAM:    GENERAL: NAD, non-toxic appearing  NECK: Supple, no stiffness, no JVD, no thyromegaly   HEART: Regular rate and rhythm, normal s1s2  LUNGS: Unlabored respirations, b/l air entry, no adventitious breath sounds   ABDOMEN: Soft, nontender, nondistended; +BS  EXTREMITIES: No clubbing, cyanosis, or edema; 2+ peripheral pulses   NERVOUS SYSTEM: AOx3  SKIN: Warm and dry, no rashes or lesions      LABS:                        10.7   5.08  )-----------( 208      ( 23 Sep 2024 07:39 )             34.2     09-23    140  |  102  |  32[H]  ----------------------------<  119[H]  4.7   |  33[H]  |  0.9    Ca    9.9      23 Sep 2024 07:39  Mg     2.1     09-23    TPro  6.9  /  Alb  4.0  /  TBili  0.3  /  DBili  x   /  AST  26  /  ALT  20  /  AlkPhos  78  09-23      Urinalysis Basic - ( 23 Sep 2024 07:39 )    Color: x / Appearance: x / SG: x / pH: x  Gluc: 119 mg/dL / Ketone: x  / Bili: x / Urobili: x   Blood: x / Protein: x / Nitrite: x   Leuk Esterase: x / RBC: x / WBC x   Sq Epi: x / Non Sq Epi: x / Bacteria: x            RADIOLOGY:

## 2024-09-25 LAB
ANION GAP SERPL CALC-SCNC: 9 MMOL/L — SIGNIFICANT CHANGE UP (ref 7–14)
BASOPHILS # BLD AUTO: 0.02 K/UL — SIGNIFICANT CHANGE UP (ref 0–0.2)
BASOPHILS NFR BLD AUTO: 0.5 % — SIGNIFICANT CHANGE UP (ref 0–1)
BUN SERPL-MCNC: 31 MG/DL — HIGH (ref 10–20)
CALCIUM SERPL-MCNC: 9.8 MG/DL — SIGNIFICANT CHANGE UP (ref 8.4–10.5)
CHLORIDE SERPL-SCNC: 100 MMOL/L — SIGNIFICANT CHANGE UP (ref 98–110)
CO2 SERPL-SCNC: 30 MMOL/L — SIGNIFICANT CHANGE UP (ref 17–32)
CREAT SERPL-MCNC: 1.1 MG/DL — SIGNIFICANT CHANGE UP (ref 0.7–1.5)
EGFR: 47 ML/MIN/1.73M2 — LOW
EOSINOPHIL # BLD AUTO: 0.15 K/UL — SIGNIFICANT CHANGE UP (ref 0–0.7)
EOSINOPHIL NFR BLD AUTO: 3.5 % — SIGNIFICANT CHANGE UP (ref 0–8)
GLUCOSE SERPL-MCNC: 121 MG/DL — HIGH (ref 70–99)
HCT VFR BLD CALC: 32.7 % — LOW (ref 37–47)
HGB BLD-MCNC: 10.3 G/DL — LOW (ref 12–16)
IMM GRANULOCYTES NFR BLD AUTO: 0.5 % — HIGH (ref 0.1–0.3)
LYMPHOCYTES # BLD AUTO: 0.7 K/UL — LOW (ref 1.2–3.4)
LYMPHOCYTES # BLD AUTO: 16.2 % — LOW (ref 20.5–51.1)
MAGNESIUM SERPL-MCNC: 2 MG/DL — SIGNIFICANT CHANGE UP (ref 1.8–2.4)
MCHC RBC-ENTMCNC: 30.2 PG — SIGNIFICANT CHANGE UP (ref 27–31)
MCHC RBC-ENTMCNC: 31.5 G/DL — LOW (ref 32–37)
MCV RBC AUTO: 95.9 FL — SIGNIFICANT CHANGE UP (ref 81–99)
MONOCYTES # BLD AUTO: 0.42 K/UL — SIGNIFICANT CHANGE UP (ref 0.1–0.6)
MONOCYTES NFR BLD AUTO: 9.7 % — HIGH (ref 1.7–9.3)
NEUTROPHILS # BLD AUTO: 3.01 K/UL — SIGNIFICANT CHANGE UP (ref 1.4–6.5)
NEUTROPHILS NFR BLD AUTO: 69.6 % — SIGNIFICANT CHANGE UP (ref 42.2–75.2)
NRBC # BLD: 0 /100 WBCS — SIGNIFICANT CHANGE UP (ref 0–0)
PLATELET # BLD AUTO: 202 K/UL — SIGNIFICANT CHANGE UP (ref 130–400)
PMV BLD: 9.7 FL — SIGNIFICANT CHANGE UP (ref 7.4–10.4)
POTASSIUM SERPL-MCNC: 4.4 MMOL/L — SIGNIFICANT CHANGE UP (ref 3.5–5)
POTASSIUM SERPL-SCNC: 4.4 MMOL/L — SIGNIFICANT CHANGE UP (ref 3.5–5)
RBC # BLD: 3.41 M/UL — LOW (ref 4.2–5.4)
RBC # FLD: 14.7 % — HIGH (ref 11.5–14.5)
SODIUM SERPL-SCNC: 139 MMOL/L — SIGNIFICANT CHANGE UP (ref 135–146)
WBC # BLD: 4.32 K/UL — LOW (ref 4.8–10.8)
WBC # FLD AUTO: 4.32 K/UL — LOW (ref 4.8–10.8)

## 2024-09-25 PROCEDURE — 99231 SBSQ HOSP IP/OBS SF/LOW 25: CPT

## 2024-09-25 RX ORDER — ACETAMINOPHEN 325 MG
650 TABLET ORAL EVERY 6 HOURS
Refills: 0 | Status: DISCONTINUED | OUTPATIENT
Start: 2024-09-25 | End: 2024-09-30

## 2024-09-25 RX ADMIN — ALPRAZOLAM 0.25 MILLIGRAM(S): 0.5 TABLET ORAL at 21:44

## 2024-09-25 RX ADMIN — CHLORHEXIDINE GLUCONATE ORAL RINSE 1 APPLICATION(S): 1.2 SOLUTION DENTAL at 12:51

## 2024-09-25 RX ADMIN — Medication 5000 UNIT(S): at 17:53

## 2024-09-25 RX ADMIN — PANTOPRAZOLE SODIUM 40 MILLIGRAM(S): 40 TABLET, DELAYED RELEASE ORAL at 06:02

## 2024-09-25 RX ADMIN — IPRATROPIUM BROMIDE AND ALBUTEROL SULFATE 3 MILLILITER(S): .5; 3 SOLUTION RESPIRATORY (INHALATION) at 07:34

## 2024-09-25 RX ADMIN — ATORVASTATIN CALCIUM 10 MILLIGRAM(S): 10 TABLET, FILM COATED ORAL at 21:46

## 2024-09-25 RX ADMIN — Medication 5000 UNIT(S): at 06:05

## 2024-09-25 NOTE — PROGRESS NOTE ADULT - SUBJECTIVE AND OBJECTIVE BOX
24H events:    Patient is a 89y old Female who presents with a chief complaint of SOB (24 Sep 2024 10:50)    Primary diagnosis of Shortness of breath    Today is hospital day 13d.   Pending acceptance to [facility], initial paperwork sent yesterday.       PAST MEDICAL & SURGICAL HISTORY  Migraine    H/O brain tumor  benign 1977    Breast CA    Urinary retention with incomplete bladder emptying  pt self catheterizes  3-4 x day    H/O mastectomy, bilateral  1984    H/O craniotomy  1976      SOCIAL HISTORY:  Social History:      ALLERGIES:  No Known Allergies    MEDICATIONS:  STANDING MEDICATIONS  albuterol/ipratropium for Nebulization 3 milliLiter(s) Nebulizer every 6 hours  aspirin enteric coated 81 milliGRAM(s) Oral daily  atorvastatin 10 milliGRAM(s) Oral at bedtime  chlorhexidine 2% Cloths 1 Application(s) Topical daily  heparin   Injectable 5000 Unit(s) SubCutaneous every 12 hours  influenza  Vaccine (HIGH DOSE) 0.5 milliLiter(s) IntraMuscular once  pantoprazole    Tablet 40 milliGRAM(s) Oral before breakfast  polyethylene glycol 3350 17 Gram(s) Oral at bedtime  senna 2 Tablet(s) Oral at bedtime    PRN MEDICATIONS  ALPRAZolam 0.25 milliGRAM(s) Oral at bedtime PRN    VITALS:   T(F): 98  HR: 85  BP: 104/66  RR: 17  SpO2: 98%    PHYSICAL EXAM:      GENERAL: NAD, non-toxic appearing  NECK: Supple, no stiffness, no JVD, no thyromegaly   HEART: Regular rate and rhythm, normal s1s2  LUNGS: Unlabored respirations, b/l air entry, no adventitious breath sounds   ABDOMEN: Soft, nontender, nondistended; +BS  EXTREMITIES: No clubbing, cyanosis, or edema; 2+ peripheral pulses   NERVOUS SYSTEM: AOx3  SKIN: Warm and dry, no rashes or lesions      LABS:                        10.3   4.32  )-----------( 202      ( 25 Sep 2024 06:48 )             32.7                     RADIOLOGY:           24H events:    Patient is a 89y old Female who presents with a chief complaint of SOB (24 Sep 2024 10:50)    Primary diagnosis of Shortness of breath    Today is hospital day 13d.   Pending acceptance to [facility], initial paperwork and quantiferon sent yesterday.       PAST MEDICAL & SURGICAL HISTORY  Migraine    H/O brain tumor  benign 1977    Breast CA    Urinary retention with incomplete bladder emptying  pt self catheterizes  3-4 x day    H/O mastectomy, bilateral  1984    H/O craniotomy  1976      SOCIAL HISTORY:  Social History:      ALLERGIES:  No Known Allergies    MEDICATIONS:  STANDING MEDICATIONS  albuterol/ipratropium for Nebulization 3 milliLiter(s) Nebulizer every 6 hours  aspirin enteric coated 81 milliGRAM(s) Oral daily  atorvastatin 10 milliGRAM(s) Oral at bedtime  chlorhexidine 2% Cloths 1 Application(s) Topical daily  heparin   Injectable 5000 Unit(s) SubCutaneous every 12 hours  influenza  Vaccine (HIGH DOSE) 0.5 milliLiter(s) IntraMuscular once  pantoprazole    Tablet 40 milliGRAM(s) Oral before breakfast  polyethylene glycol 3350 17 Gram(s) Oral at bedtime  senna 2 Tablet(s) Oral at bedtime    PRN MEDICATIONS  ALPRAZolam 0.25 milliGRAM(s) Oral at bedtime PRN    VITALS:   T(F): 98  HR: 85  BP: 104/66  RR: 17  SpO2: 98%    PHYSICAL EXAM:      GENERAL: NAD, non-toxic appearing  NECK: Supple, no stiffness, no JVD, no thyromegaly   HEART: Regular rate and rhythm, normal s1s2  LUNGS: Unlabored respirations, b/l air entry, no adventitious breath sounds   ABDOMEN: Soft, nontender, nondistended; +BS  EXTREMITIES: No clubbing, cyanosis, or edema; 2+ peripheral pulses   NERVOUS SYSTEM: AOx3  SKIN: Warm and dry, no rashes or lesions      LABS:                        10.3   4.32  )-----------( 202      ( 25 Sep 2024 06:48 )             32.7                     RADIOLOGY:

## 2024-09-25 NOTE — PROGRESS NOTE ADULT - ASSESSMENT
89-year-old female with history of JENNIFER on 3L NC O2 in the mornings and evenings, GERD, aortic stenosis, left carotid stenosis, anemia, anxiety, urinary retention (straight catheterizes herself for urination), breast cancer s/p mastectomy and reconstruction and brain cancer s/p craniotomy presents from [facility] for shortness of breath x 1 day.     #Acute hypoxia , unclear etiology- improved  - CTA chest PE protocol 09/11 reviewed, no evidence of PE, extensive scoliotic/kyphotic changes  - No apparent infectious etiology  - suspect anxiety related vs volume overload due to Aortic stenosis,   - Doing well on RA     #Chronic diastolic CHF   - some volume overload  - Pro BNP at 1541  - Echocardiogram  09/15- Ef 55%. Moderate AS  - Not in CHF exacerbation .   - Uses oxygen intermittently at NH    #Urinary retention  - patient self cath at home  - has been unable to void spontaneously while inpatient, retaining ~900ml   - lama cath placed, will discharge with cath and follow up with urology as oupt     #Chronic anemia  Hb stable at baseline.     #ADL  PT - recommends SNF    Anxiety/insomnia- Alpra .25 mg at bedtime    #Misc   DVT PPX-heparin  GI prophylaxis: not indicated  Disposition: lama is barrier to [facility], patient is [facility]

## 2024-09-26 PROCEDURE — 99231 SBSQ HOSP IP/OBS SF/LOW 25: CPT

## 2024-09-26 RX ORDER — LOPERAMIDE HYDROCHLORIDE 2 MG/1
1 CAPSULE ORAL
Qty: 120 | Refills: 0
Start: 2024-09-26 | End: 2024-10-25

## 2024-09-26 RX ORDER — ALPRAZOLAM 0.5 MG/1
1 TABLET ORAL
Qty: 30 | Refills: 0
Start: 2024-09-26 | End: 2024-10-25

## 2024-09-26 RX ORDER — SENNOSIDES 8.6 MG
2 TABLET ORAL
Qty: 60 | Refills: 0
Start: 2024-09-26 | End: 2024-10-25

## 2024-09-26 RX ORDER — ASPIRIN 325 MG
1 TABLET ORAL
Qty: 30 | Refills: 0
Start: 2024-09-26 | End: 2024-10-25

## 2024-09-26 RX ORDER — MULTI VITAMIN/MINERAL SUPPLEMENT WITH ASCORBIC ACID, NIACIN, PYRIDOXINE, PANTOTHENIC ACID, FOLIC ACID, RIBOFLAVIN, THIAMIN, BIOTIN, COBALAMIN AND ZINC. 60; 20; 12.5; 10; 10; 1.7; 1.5; 1; .3; .006 MG/1; MG/1; MG/1; MG/1; MG/1; MG/1; MG/1; MG/1; MG/1; MG/1
1 TABLET, COATED ORAL
Qty: 30 | Refills: 0
Start: 2024-09-26 | End: 2024-10-25

## 2024-09-26 RX ORDER — DEXLANSOPRAZOLE 60 MG
1 CAPSULE, DELAYED RELEASE, BIPHASIC ORAL
Qty: 30 | Refills: 0
Start: 2024-09-26 | End: 2024-10-25

## 2024-09-26 RX ORDER — ATORVASTATIN CALCIUM 10 MG/1
1 TABLET, FILM COATED ORAL
Qty: 30 | Refills: 0
Start: 2024-09-26 | End: 2024-10-25

## 2024-09-26 RX ADMIN — PANTOPRAZOLE SODIUM 40 MILLIGRAM(S): 40 TABLET, DELAYED RELEASE ORAL at 05:47

## 2024-09-26 RX ADMIN — Medication 5000 UNIT(S): at 05:48

## 2024-09-26 RX ADMIN — ALPRAZOLAM 0.25 MILLIGRAM(S): 0.5 TABLET ORAL at 21:28

## 2024-09-26 RX ADMIN — IPRATROPIUM BROMIDE AND ALBUTEROL SULFATE 3 MILLILITER(S): .5; 3 SOLUTION RESPIRATORY (INHALATION) at 14:30

## 2024-09-26 RX ADMIN — ATORVASTATIN CALCIUM 10 MILLIGRAM(S): 10 TABLET, FILM COATED ORAL at 21:26

## 2024-09-26 RX ADMIN — CHLORHEXIDINE GLUCONATE ORAL RINSE 1 APPLICATION(S): 1.2 SOLUTION DENTAL at 11:38

## 2024-09-26 RX ADMIN — Medication 5000 UNIT(S): at 17:36

## 2024-09-26 RX ADMIN — IPRATROPIUM BROMIDE AND ALBUTEROL SULFATE 3 MILLILITER(S): .5; 3 SOLUTION RESPIRATORY (INHALATION) at 20:59

## 2024-09-26 RX ADMIN — Medication 81 MILLIGRAM(S): at 11:38

## 2024-09-26 RX ADMIN — Medication 2 TABLET(S): at 21:26

## 2024-09-26 NOTE — PROGRESS NOTE ADULT - ASSESSMENT
89-year-old female with history of JENNIFER on 3L NC O2 in the mornings and evenings, GERD, aortic stenosis, left carotid stenosis, anemia, anxiety, urinary retention (straight catheterizes herself for urination), breast cancer s/p mastectomy and reconstruction and brain cancer s/p craniotomy presents from [facility] for shortness of breath x 1 day.     #Acute hypoxia , unclear etiology- improved  - CTA chest PE protocol 09/11 reviewed, no evidence of PE, extensive scoliotic/kyphotic changes  - No apparent infectious etiology  - suspect anxiety related vs volume overload due to Aortic stenosis  - Doing well on RA     #Chronic diastolic CHF   - some volume overload  - Pro BNP at 1541  - Echocardiogram  09/15- Ef 55%. Moderate AS  - Not in CHF exacerbation .   - Uses oxygen intermittently at NH    #Urinary retention  - patient self cath at home  - has been unable to void spontaneously while inpatient, retaining ~900ml   - lama cath placed, will discharge with cath and follow up with urology as oupt     #Chronic anemia  Hb stable at baseline.     #ADL  PT - recommends SNF    Anxiety/insomnia- Alpra .25 mg at bedtime    #Misc   DVT PPX-heparin  GI prophylaxis: not indicated  Disposition: lama is barrier to [facility] house, patient is pending bed at [facility]

## 2024-09-26 NOTE — PROGRESS NOTE ADULT - SUBJECTIVE AND OBJECTIVE BOX
24H events:    Patient is a 89y old Female who presents with a chief complaint of SOB (25 Sep 2024 08:39)    Primary diagnosis of Shortness of breath    Today is hospital day 14d.   Pending discharge to [place].     PAST MEDICAL & SURGICAL HISTORY  Migraine    H/O brain tumor  benign 1977    Breast CA    Urinary retention with incomplete bladder emptying  pt self catheterizes  3-4 x day    H/O mastectomy, bilateral  1984    H/O craniotomy  1976      SOCIAL HISTORY:  Social History:      ALLERGIES:  No Known Allergies    MEDICATIONS:  STANDING MEDICATIONS  albuterol/ipratropium for Nebulization 3 milliLiter(s) Nebulizer every 6 hours  aspirin enteric coated 81 milliGRAM(s) Oral daily  atorvastatin 10 milliGRAM(s) Oral at bedtime  chlorhexidine 2% Cloths 1 Application(s) Topical daily  heparin   Injectable 5000 Unit(s) SubCutaneous every 12 hours  influenza  Vaccine (HIGH DOSE) 0.5 milliLiter(s) IntraMuscular once  pantoprazole    Tablet 40 milliGRAM(s) Oral before breakfast  polyethylene glycol 3350 17 Gram(s) Oral at bedtime  senna 2 Tablet(s) Oral at bedtime    PRN MEDICATIONS  acetaminophen     Tablet .. 650 milliGRAM(s) Oral every 6 hours PRN  ALPRAZolam 0.25 milliGRAM(s) Oral at bedtime PRN    VITALS:   T(F): 97.1  HR: 80  BP: 103/64  RR: 17  SpO2: 99%    PHYSICAL EXAM:    GENERAL: NAD, non-toxic appearing  NECK: Supple, no stiffness, no JVD, no thyromegaly   HEART: Regular rate and rhythm, normal s1s2  LUNGS: Unlabored respirations, b/l air entry, no adventitious breath sounds   ABDOMEN: Soft, nontender, nondistended; +BS  EXTREMITIES: No clubbing, cyanosis, or edema; 2+ peripheral pulses   NERVOUS SYSTEM: AOx3  SKIN: Warm and dry, no rashes or lesions      LABS:                        10.3   4.32  )-----------( 202      ( 25 Sep 2024 06:48 )             32.7     09-25    139  |  100  |  31[H]  ----------------------------<  121[H]  4.4   |  30  |  1.1    Ca    9.8      25 Sep 2024 06:48  Mg     2.0     09-25        Urinalysis Basic - ( 25 Sep 2024 06:48 )    Color: x / Appearance: x / SG: x / pH: x  Gluc: 121 mg/dL / Ketone: x  / Bili: x / Urobili: x   Blood: x / Protein: x / Nitrite: x   Leuk Esterase: x / RBC: x / WBC x   Sq Epi: x / Non Sq Epi: x / Bacteria: x        RADIOLOGY:

## 2024-09-27 LAB
CK MB CFR SERPL CALC: 1.5 NG/ML — SIGNIFICANT CHANGE UP (ref 0.6–6.3)
CK SERPL-CCNC: 24 U/L — SIGNIFICANT CHANGE UP (ref 0–225)
GLUCOSE BLDC GLUCOMTR-MCNC: 117 MG/DL — HIGH (ref 70–99)
TROPONIN T, HIGH SENSITIVITY RESULT: 29 NG/L — HIGH (ref 6–13)
TROPONIN T, HIGH SENSITIVITY RESULT: 33 NG/L — HIGH (ref 6–13)
TROPONIN T, HIGH SENSITIVITY RESULT: 36 NG/L — HIGH (ref 6–13)

## 2024-09-27 PROCEDURE — 99231 SBSQ HOSP IP/OBS SF/LOW 25: CPT

## 2024-09-27 PROCEDURE — 71045 X-RAY EXAM CHEST 1 VIEW: CPT | Mod: 26

## 2024-09-27 PROCEDURE — 93010 ELECTROCARDIOGRAM REPORT: CPT

## 2024-09-27 RX ORDER — TUBERCULIN PURIFIED PROTEIN DERIVATIVE 5 [IU]/.1ML
5 INJECTION, SOLUTION INTRADERMAL ONCE
Refills: 0 | Status: COMPLETED | OUTPATIENT
Start: 2024-09-27 | End: 2024-09-27

## 2024-09-27 RX ORDER — MAG HYDROX/ALUMINUM HYD/SIMETH 200-200-20
30 SUSPENSION, ORAL (FINAL DOSE FORM) ORAL ONCE
Refills: 0 | Status: DISCONTINUED | OUTPATIENT
Start: 2024-09-27 | End: 2024-09-30

## 2024-09-27 RX ORDER — SODIUM CHLORIDE IRRIG SOLUTION 0.9 %
500 SOLUTION, IRRIGATION IRRIGATION ONCE
Refills: 0 | Status: COMPLETED | OUTPATIENT
Start: 2024-09-27 | End: 2024-09-27

## 2024-09-27 RX ADMIN — Medication 650 MILLIGRAM(S): at 06:05

## 2024-09-27 RX ADMIN — PANTOPRAZOLE SODIUM 40 MILLIGRAM(S): 40 TABLET, DELAYED RELEASE ORAL at 08:22

## 2024-09-27 RX ADMIN — Medication 250 MILLILITER(S): at 19:21

## 2024-09-27 RX ADMIN — IPRATROPIUM BROMIDE AND ALBUTEROL SULFATE 3 MILLILITER(S): .5; 3 SOLUTION RESPIRATORY (INHALATION) at 14:15

## 2024-09-27 RX ADMIN — Medication 5000 UNIT(S): at 05:57

## 2024-09-27 RX ADMIN — TUBERCULIN PURIFIED PROTEIN DERIVATIVE 5 UNIT(S): 5 INJECTION, SOLUTION INTRADERMAL at 15:08

## 2024-09-27 RX ADMIN — Medication 81 MILLIGRAM(S): at 12:11

## 2024-09-27 RX ADMIN — IPRATROPIUM BROMIDE AND ALBUTEROL SULFATE 3 MILLILITER(S): .5; 3 SOLUTION RESPIRATORY (INHALATION) at 08:12

## 2024-09-27 RX ADMIN — Medication 6 MILLIGRAM(S): at 19:20

## 2024-09-27 RX ADMIN — CHLORHEXIDINE GLUCONATE ORAL RINSE 1 APPLICATION(S): 1.2 SOLUTION DENTAL at 14:28

## 2024-09-27 RX ADMIN — Medication 5000 UNIT(S): at 17:48

## 2024-09-27 RX ADMIN — IPRATROPIUM BROMIDE AND ALBUTEROL SULFATE 3 MILLILITER(S): .5; 3 SOLUTION RESPIRATORY (INHALATION) at 20:27

## 2024-09-27 NOTE — RAPID RESPONSE TEAM SUMMARY - NSSITUATIONBACKGROUNDRRT_GEN_ALL_CORE
rapid called for pulse 166 and bp 102/71, pt reports to have lt sided chest pain and palpitations, without radiation to arms, neck, jaw. ekg showed svt, given 6mg adenosine. bp improved to 115/68 hr 115, repeat ekg showed sinus tachy. started on 500 cc LR over 2 hrs as pt has hx of as. tachy likely 2/2 dehydration. stat labs drawn. fs 117, T 99.1, o2 100% on 2l NC. Pt is now vitally and hemodynamically stable. family was called, unable to reach.

## 2024-09-27 NOTE — PROGRESS NOTE ADULT - SUBJECTIVE AND OBJECTIVE BOX
24H events:    Patient is a 89y old Female who presents with a chief complaint of SOB (26 Sep 2024 09:08)    Primary diagnosis of Shortness of breath    Today is hospital day 15d.   Pending discharge to [place], most likely on Monday.   Meds sent via [facility], script given for O2, pending QuantiFeron results.       PAST MEDICAL & SURGICAL HISTORY  Migraine    H/O brain tumor  benign 1977    Breast CA    Urinary retention with incomplete bladder emptying  pt self catheterizes  3-4 x day    H/O mastectomy, bilateral  1984    H/O craniotomy  1976      SOCIAL HISTORY:  Social History:      ALLERGIES:  No Known Allergies    MEDICATIONS:  STANDING MEDICATIONS  albuterol/ipratropium for Nebulization 3 milliLiter(s) Nebulizer every 6 hours  aspirin enteric coated 81 milliGRAM(s) Oral daily  atorvastatin 10 milliGRAM(s) Oral at bedtime  chlorhexidine 2% Cloths 1 Application(s) Topical daily  heparin   Injectable 5000 Unit(s) SubCutaneous every 12 hours  influenza  Vaccine (HIGH DOSE) 0.5 milliLiter(s) IntraMuscular once  pantoprazole    Tablet 40 milliGRAM(s) Oral before breakfast  polyethylene glycol 3350 17 Gram(s) Oral at bedtime  senna 2 Tablet(s) Oral at bedtime    PRN MEDICATIONS  acetaminophen     Tablet .. 650 milliGRAM(s) Oral every 6 hours PRN    VITALS:   T(F): 97.5  HR: 92  BP: 129/73  RR: 18  SpO2: 99%    PHYSICAL EXAM:    GENERAL: NAD, non-toxic appearing  NECK: Supple, no stiffness, no JVD, no thyromegaly   HEART: Regular rate and rhythm, normal s1s2  LUNGS: Unlabored respirations, b/l air entry, no adventitious breath sounds   ABDOMEN: Soft, nontender, nondistended; +BS  EXTREMITIES: No clubbing, cyanosis, or edema; 2+ peripheral pulses   NERVOUS SYSTEM: AOx3  SKIN: Warm and dry, no rashes or lesions      LABS:                        RADIOLOGY:

## 2024-09-27 NOTE — PROGRESS NOTE ADULT - ASSESSMENT
89-year-old female with history of JENNIFER on 3L NC O2 in the mornings and evenings, GERD, aortic stenosis, left carotid stenosis, anemia, anxiety, urinary retention (straight catheterizes herself for urination), breast cancer s/p mastectomy and reconstruction and brain cancer s/p craniotomy presents from [facility] for shortness of breath x 1 day.     #Acute hypoxia , unclear etiology- improved  - CTA chest PE protocol 09/11 reviewed, no evidence of PE, extensive scoliotic/kyphotic changes  - No apparent infectious etiology  - suspect anxiety related vs volume overload due to Aortic stenosis  - Doing well on RA     #Chronic diastolic CHF   - some volume overload  - Pro BNP at 1541  - Echocardiogram  09/15- Ef 55%. Moderate AS  - Not in CHF exacerbation .   - Uses oxygen intermittently at NH    #Urinary retention  - patient self cath at home  - has been unable to void spontaneously while inpatient, retaining ~900ml   - lama cath placed, will discharge with cath and follow up with urology as oupt     #Chronic anemia  Hb stable at baseline.     #ADL  PT - recommends SNF    Anxiety/insomnia- Alpra .25 mg at bedtime    #Misc   DVT PPX-heparin  GI prophylaxis: not indicated  Disposition: lama is barrier to [facility] house, patient is pending bed at [facility]

## 2024-09-27 NOTE — PROGRESS NOTE ADULT - ATTENDING COMMENTS
89-year-old female with history of JENNIFER on 3L NC O2 in the mornings and evenings, GERD, aortic stenosis, left carotid stenosis, anemia, anxiety, urinary retention (straight catheterizes herself for urination), breast cancer s/p mastectomy and reconstruction and brain cancer s/p craniotomy presents from [facility] for shortness of breath x 1 day.     Acute hypoxia , unclear etiology- improved  suspect anxiety related vs volume overload due to Aortic stenosis,   No  CHF exacerbation . Chronic diastolic CHF with some volume overload  Uses oxygen intermittently at NH  Doing well on RA today  CTA chest PE protocol 09/11 reviewed, no evidence of PE, extensive scoliotic/kyphotic changes  Pro BNP at 1541  Echocardiogram  09/15- Ef 55%. Moderate AS  No apparent infectious etiology  Urinary retention -   Pt does self cath but because of cognitive decline self-cath will be an issue as she was found to be significantly retaining upto 900 ml .Lama was placed she will need outpt Urology f/u   PT - recommends SNF  Hb at baseline. Chronic anemia   Anxiety/insomnia- Alpra .25 mg at bedtime  DVT PPX-heparin    #Progress Note Handoff  Disposition: Stable for discharge, awaiting bed placement. [facility] (place where she came from) denied her due to lama. Pt is to possibly discharge to [facility] tomorrow, d/w case mgmt
Acute hypoxia , unclear etiology- improved  suspect anxiety related vs volume overload due to Aortic stenosis  Echocardiogram  09/15- Ef 55%. Moderate AS  No  CHF exacerbation . Chronic diastolic CHF with some volume overload-improved  Uses oxygen intermittently at NH  CTA chest PE protocol 09/11 reviewed, no evidence of PE, extensive scoliotic/kyphotic changes  Pro BNP at 1541  No apparent infectious etiology  Chronic Urinary retention- On Rios  Hb at baseline. Chronic anemia  Anxiety/insomnia- Alpra .25 mg at bedtime  DVT PPX-heparin  PT - recommends SNF  Pt is DNR/DNI  #Progress Note Handoff  Pending SNF placement .
Acute hypoxia , unclear etiology- improved  suspect anxiety related vs volume overload due to Aortic stenosis,   No  CHF exacerbation . Chronic diastolic CHF with some volume overload  Uses oxygen intermittently at NH  Doing well on RA today  CTA chest PE protocol 09/11 reviewed, no evidence of PE, extensive scoliotic/kyphotic changes  Pro BNP at 1541  Echocardiogram  09/15- Ef 55%. Moderate AS  No apparent infectious etiology  PT - recommends SNF  Chronic Urinary retention- On Rios  Hb at baseline. Chronic anemia  Anxiety/insomnia- Alpra .25 mg at bedtime  DVT PPX-heparin  Pt is DNR/DNI  #Progress Note Handoff  Pending SNF placement .
Pt seen ambulating hallway with walker with PT. Stable for discharge, awaiting bed placement. [facility] (place where she came from) denied her due to lama. Pt is to possibly discharge to [facility] tomorrow, d/w case mgmt.
Acute hypoxia , unclear etiology- improved  suspect anxiety related vs volume overload due to Aortic stenosis,   No  CHF exacerbation . Chronic diastolic CHF with some volume overload  Uses oxygen intermittently at NH  Doing well on RA today  CTA chest PE protocol 09/11 reviewed, no evidence of PE, extensive scoliotic/kyphotic changes  Pro BNP at 1541  Echocardiogram  09/15- Ef 55%. Moderate AS  No apparent infectious etiology  PT - recommends SNF  Chronic Urinary retention- On Rios  Hb at baseline. Chronic anemia  Anxiety/insomnia- Alpra .25 mg at bedtime  DVT PPX-heparin    #Progress Note Handoff  Pending SNF placement

## 2024-09-28 LAB
ALBUMIN SERPL ELPH-MCNC: 3.8 G/DL — SIGNIFICANT CHANGE UP (ref 3.5–5.2)
ALP SERPL-CCNC: 77 U/L — SIGNIFICANT CHANGE UP (ref 30–115)
ALT FLD-CCNC: 14 U/L — SIGNIFICANT CHANGE UP (ref 0–41)
ANION GAP SERPL CALC-SCNC: 6 MMOL/L — LOW (ref 7–14)
APPEARANCE UR: ABNORMAL
AST SERPL-CCNC: 22 U/L — SIGNIFICANT CHANGE UP (ref 0–41)
BILIRUB SERPL-MCNC: 0.4 MG/DL — SIGNIFICANT CHANGE UP (ref 0.2–1.2)
BILIRUB UR-MCNC: NEGATIVE — SIGNIFICANT CHANGE UP
BUN SERPL-MCNC: 25 MG/DL — HIGH (ref 10–20)
CALCIUM SERPL-MCNC: 9.4 MG/DL — SIGNIFICANT CHANGE UP (ref 8.4–10.4)
CHLORIDE SERPL-SCNC: 101 MMOL/L — SIGNIFICANT CHANGE UP (ref 98–110)
CO2 SERPL-SCNC: 28 MMOL/L — SIGNIFICANT CHANGE UP (ref 17–32)
COLOR SPEC: YELLOW — SIGNIFICANT CHANGE UP
CREAT SERPL-MCNC: 1.1 MG/DL — SIGNIFICANT CHANGE UP (ref 0.7–1.5)
DIFF PNL FLD: NEGATIVE — SIGNIFICANT CHANGE UP
EGFR: 47 ML/MIN/1.73M2 — LOW
GLUCOSE SERPL-MCNC: 130 MG/DL — HIGH (ref 70–99)
GLUCOSE UR QL: NEGATIVE MG/DL — SIGNIFICANT CHANGE UP
HCT VFR BLD CALC: 34.9 % — LOW (ref 37–47)
HGB BLD-MCNC: 10.9 G/DL — LOW (ref 12–16)
KETONES UR-MCNC: NEGATIVE MG/DL — SIGNIFICANT CHANGE UP
LEUKOCYTE ESTERASE UR-ACNC: ABNORMAL
MAGNESIUM SERPL-MCNC: 2.1 MG/DL — SIGNIFICANT CHANGE UP (ref 1.8–2.4)
MCHC RBC-ENTMCNC: 29.7 PG — SIGNIFICANT CHANGE UP (ref 27–31)
MCHC RBC-ENTMCNC: 31.2 G/DL — LOW (ref 32–37)
MCV RBC AUTO: 95.1 FL — SIGNIFICANT CHANGE UP (ref 81–99)
NITRITE UR-MCNC: NEGATIVE — SIGNIFICANT CHANGE UP
NRBC # BLD: 0 /100 WBCS — SIGNIFICANT CHANGE UP (ref 0–0)
NT-PROBNP SERPL-SCNC: 703 PG/ML — HIGH (ref 0–300)
PH UR: 8 — SIGNIFICANT CHANGE UP (ref 5–8)
PLATELET # BLD AUTO: 244 K/UL — SIGNIFICANT CHANGE UP (ref 130–400)
PMV BLD: 9.7 FL — SIGNIFICANT CHANGE UP (ref 7.4–10.4)
POTASSIUM SERPL-MCNC: 4.4 MMOL/L — SIGNIFICANT CHANGE UP (ref 3.5–5)
POTASSIUM SERPL-SCNC: 4.4 MMOL/L — SIGNIFICANT CHANGE UP (ref 3.5–5)
PROT SERPL-MCNC: 6.8 G/DL — SIGNIFICANT CHANGE UP (ref 6–8)
PROT UR-MCNC: 100 MG/DL
RBC # BLD: 3.67 M/UL — LOW (ref 4.2–5.4)
RBC # FLD: 14.7 % — HIGH (ref 11.5–14.5)
SODIUM SERPL-SCNC: 135 MMOL/L — SIGNIFICANT CHANGE UP (ref 135–146)
SP GR SPEC: 1.01 — SIGNIFICANT CHANGE UP (ref 1–1.03)
UROBILINOGEN FLD QL: 1 MG/DL — SIGNIFICANT CHANGE UP (ref 0.2–1)
WBC # BLD: 4.93 K/UL — SIGNIFICANT CHANGE UP (ref 4.8–10.8)
WBC # FLD AUTO: 4.93 K/UL — SIGNIFICANT CHANGE UP (ref 4.8–10.8)

## 2024-09-28 PROCEDURE — 93010 ELECTROCARDIOGRAM REPORT: CPT

## 2024-09-28 PROCEDURE — 99232 SBSQ HOSP IP/OBS MODERATE 35: CPT

## 2024-09-28 RX ORDER — QUETIAPINE FUMARATE 50 MG/1
12.5 TABLET, FILM COATED ORAL EVERY 8 HOURS
Refills: 0 | Status: DISCONTINUED | OUTPATIENT
Start: 2024-09-28 | End: 2024-09-30

## 2024-09-28 RX ADMIN — ATORVASTATIN CALCIUM 10 MILLIGRAM(S): 10 TABLET, FILM COATED ORAL at 21:44

## 2024-09-28 RX ADMIN — IPRATROPIUM BROMIDE AND ALBUTEROL SULFATE 3 MILLILITER(S): .5; 3 SOLUTION RESPIRATORY (INHALATION) at 19:23

## 2024-09-28 RX ADMIN — IPRATROPIUM BROMIDE AND ALBUTEROL SULFATE 3 MILLILITER(S): .5; 3 SOLUTION RESPIRATORY (INHALATION) at 08:28

## 2024-09-28 RX ADMIN — CHLORHEXIDINE GLUCONATE ORAL RINSE 1 APPLICATION(S): 1.2 SOLUTION DENTAL at 12:03

## 2024-09-28 RX ADMIN — Medication 81 MILLIGRAM(S): at 12:01

## 2024-09-28 RX ADMIN — QUETIAPINE FUMARATE 12.5 MILLIGRAM(S): 50 TABLET, FILM COATED ORAL at 17:07

## 2024-09-28 RX ADMIN — Medication 5000 UNIT(S): at 05:55

## 2024-09-28 RX ADMIN — Medication 17 GRAM(S): at 21:44

## 2024-09-28 RX ADMIN — PANTOPRAZOLE SODIUM 40 MILLIGRAM(S): 40 TABLET, DELAYED RELEASE ORAL at 05:55

## 2024-09-28 RX ADMIN — Medication 5000 UNIT(S): at 17:07

## 2024-09-28 RX ADMIN — IPRATROPIUM BROMIDE AND ALBUTEROL SULFATE 3 MILLILITER(S): .5; 3 SOLUTION RESPIRATORY (INHALATION) at 13:19

## 2024-09-28 RX ADMIN — Medication 2 TABLET(S): at 21:44

## 2024-09-28 NOTE — PROGRESS NOTE ADULT - SUBJECTIVE AND OBJECTIVE BOX
MARISA DYKES  89y  Female      Patient is a 89y old  Female who presents with a chief complaint of SOB (27 Sep 2024 09:27)      INTERVAL HPI/OVERNIGHT EVENTS:  pt seen this am   -RR yesterday - discussed with the team - s/p adenosine - doing well - continue with tele monitoring   -check electrolytes - no new labs for me to review - discussed with residents in rounds - on call intern will follow       Vital Signs Last 24 Hrs  T(C): 36.8 (28 Sep 2024 05:56), Max: 36.8 (27 Sep 2024 14:05)  T(F): 98.2 (28 Sep 2024 05:56), Max: 98.2 (27 Sep 2024 14:05)  HR: 86 (28 Sep 2024 05:56) (86 - 99)  BP: 96/59 (28 Sep 2024 05:56) (96/59 - 110/68)  BP(mean): --  RR: 18 (27 Sep 2024 22:01) (17 - 18)  SpO2: 95% (27 Sep 2024 22:01) (95% - 100%)    Parameters below as of 27 Sep 2024 20:38  Patient On (Oxygen Delivery Method): nasal cannula  O2 Flow (L/min): 2      PHYSICAL EXAM:  GENERAL: Not in distress - hard of hearing - daughter at bedside   NERVOUS SYSTEM:  Alert & Oriented X 2  CHEST/LUNG: decreased BS at bases   CV/HEART: Regular rate and rhythm  GI/ABDOMEN: Soft abdomen     LAB:      no new labs available to be reviewed - order (my first encounter with the patient)    CARDIAC MARKERS ( 27 Sep 2024 19:09 )  x     / x     / x     / x     / 1.5 ng/mL      Daily     Daily Weight in k.4 (28 Sep 2024 05:56)  CAPILLARY BLOOD GLUCOSE      POCT Blood Glucose.: 117 mg/dL (27 Sep 2024 18:59)      acetaminophen     Tablet .. 650 milliGRAM(s) Oral every 6 hours PRN  albuterol/ipratropium for Nebulization 3 milliLiter(s) Nebulizer every 6 hours  aluminum hydroxide/magnesium hydroxide/simethicone Suspension 30 milliLiter(s) Oral once PRN  aspirin enteric coated 81 milliGRAM(s) Oral daily  atorvastatin 10 milliGRAM(s) Oral at bedtime  chlorhexidine 2% Cloths 1 Application(s) Topical daily  heparin   Injectable 5000 Unit(s) SubCutaneous every 12 hours  influenza  Vaccine (HIGH DOSE) 0.5 milliLiter(s) IntraMuscular once  pantoprazole    Tablet 40 milliGRAM(s) Oral before breakfast  polyethylene glycol 3350 17 Gram(s) Oral at bedtime  senna 2 Tablet(s) Oral at bedtime

## 2024-09-28 NOTE — PROGRESS NOTE ADULT - ASSESSMENT
·	AHRF on admission - unclear etiology   -reviewed notes from previous team - unsure about differentials   -had RR 9/27 for suspected SVT - converted to normal sinus after 6mg of adenosine IVP  -no overt heart failure   -no signs of clinical pna  -check cbc, CMP, bnp, procalcitonin, CXR, LE duplex (not done), u/a   -trops stable and ckmb negative   -last echo with Grade II diastolic dysfunction, moderate aortic stenosis     ·	Chronic Urinary retention   -c/w lama    ·	Anxiety/insomnia   -home meds     ·	Age related debility  -rehab/pt as tolerated       DISPO: STR? discussed with daughter at bedside - [facility] - CM to discuss options (not discharge ready)    Attending Physician Dr. Lena Sherman # [id number]        ·	AHRF on admission - unclear etiology   -reviewed notes from previous team - unsure about differentials   -had RR 9/27 for suspected SVT - converted to normal sinus after 6mg of adenosine IVP  -no overt heart failure   -no signs of clinical pna  -check cbc, CMP, bnp, procalcitonin, CXR, LE duplex (not done), u/a   -trops stable and ckmb negative   -last echo with Grade II diastolic dysfunction, moderate aortic stenosis     ·	Chronic Urinary retention   -c/w lama    ·	Anxiety/insomnia   -home meds   -seroquel prn -  monitor QTc - last one low 500ms     ·	Age related debility  -rehab/pt as tolerated       DISPO: STR? discussed with daughter at bedside - mentioned [facility] - CM to discuss options (not discharge ready)    Attending Physician Dr. Lena Sherman # [id number]

## 2024-09-29 LAB
GAMMA INTERFERON BACKGROUND BLD IA-ACNC: 0.03 IU/ML — SIGNIFICANT CHANGE UP
M TB IFN-G BLD-IMP: NEGATIVE — SIGNIFICANT CHANGE UP
M TB IFN-G CD4+ BCKGRND COR BLD-ACNC: 0 IU/ML — SIGNIFICANT CHANGE UP
M TB IFN-G CD4+CD8+ BCKGRND COR BLD-ACNC: 0 IU/ML — SIGNIFICANT CHANGE UP
QUANT TB PLUS MITOGEN MINUS NIL: 1.28 IU/ML — SIGNIFICANT CHANGE UP

## 2024-09-29 PROCEDURE — 93010 ELECTROCARDIOGRAM REPORT: CPT

## 2024-09-29 PROCEDURE — 99232 SBSQ HOSP IP/OBS MODERATE 35: CPT

## 2024-09-29 RX ORDER — CEFTRIAXONE SODIUM 1 G
VIAL (EA) INJECTION
Refills: 0 | Status: DISCONTINUED | OUTPATIENT
Start: 2024-09-29 | End: 2024-09-30

## 2024-09-29 RX ORDER — ALPRAZOLAM 0.5 MG/1
0.25 TABLET ORAL AT BEDTIME
Refills: 0 | Status: DISCONTINUED | OUTPATIENT
Start: 2024-09-29 | End: 2024-09-30

## 2024-09-29 RX ORDER — CEFTRIAXONE SODIUM 1 G
1000 VIAL (EA) INJECTION ONCE
Refills: 0 | Status: COMPLETED | OUTPATIENT
Start: 2024-09-29 | End: 2024-09-29

## 2024-09-29 RX ORDER — CEFTRIAXONE SODIUM 1 G
1000 VIAL (EA) INJECTION EVERY 24 HOURS
Refills: 0 | Status: DISCONTINUED | OUTPATIENT
Start: 2024-09-30 | End: 2024-09-30

## 2024-09-29 RX ADMIN — ALPRAZOLAM 0.25 MILLIGRAM(S): 0.5 TABLET ORAL at 21:55

## 2024-09-29 RX ADMIN — Medication 5000 UNIT(S): at 17:24

## 2024-09-29 RX ADMIN — Medication 5000 UNIT(S): at 05:52

## 2024-09-29 RX ADMIN — PANTOPRAZOLE SODIUM 40 MILLIGRAM(S): 40 TABLET, DELAYED RELEASE ORAL at 05:52

## 2024-09-29 RX ADMIN — Medication 81 MILLIGRAM(S): at 11:08

## 2024-09-29 RX ADMIN — CHLORHEXIDINE GLUCONATE ORAL RINSE 1 APPLICATION(S): 1.2 SOLUTION DENTAL at 13:08

## 2024-09-29 RX ADMIN — IPRATROPIUM BROMIDE AND ALBUTEROL SULFATE 3 MILLILITER(S): .5; 3 SOLUTION RESPIRATORY (INHALATION) at 13:24

## 2024-09-29 RX ADMIN — ATORVASTATIN CALCIUM 10 MILLIGRAM(S): 10 TABLET, FILM COATED ORAL at 21:06

## 2024-09-29 RX ADMIN — IPRATROPIUM BROMIDE AND ALBUTEROL SULFATE 3 MILLILITER(S): .5; 3 SOLUTION RESPIRATORY (INHALATION) at 20:04

## 2024-09-29 RX ADMIN — QUETIAPINE FUMARATE 12.5 MILLIGRAM(S): 50 TABLET, FILM COATED ORAL at 20:46

## 2024-09-29 RX ADMIN — IPRATROPIUM BROMIDE AND ALBUTEROL SULFATE 3 MILLILITER(S): .5; 3 SOLUTION RESPIRATORY (INHALATION) at 07:58

## 2024-09-29 RX ADMIN — Medication 100 MILLIGRAM(S): at 11:09

## 2024-09-29 RX ADMIN — Medication 650 MILLIGRAM(S): at 05:57

## 2024-09-29 NOTE — PROGRESS NOTE ADULT - SUBJECTIVE AND OBJECTIVE BOX
MARISA DYKES  89y  Female      Patient is a 89y old  Female who presents with a chief complaint of SOB (27 Sep 2024 09:27)      INTERVAL HPI/OVERNIGHT EVENTS:  pt seen this am   -doing well   -labs noted   -IV abx 3 days  -oob and or bedside ROM exercises as tolerated  -CM for d/c planning       Vital Signs Last 24 Hrs  T(C): 36.5 (29 Sep 2024 05:22), Max: 36.6 (28 Sep 2024 13:49)  T(F): 97.7 (29 Sep 2024 05:22), Max: 97.9 (28 Sep 2024 13:49)  HR: 87 (29 Sep 2024 05:22) (82 - 87)  BP: 118/75 (29 Sep 2024 05:22) (118/75 - 140/85)  BP(mean): --  RR: 18 (29 Sep 2024 05:22) (17 - 18)  SpO2: --          PHYSICAL EXAM:  GENERAL: Not in distress - hard of hearing   NERVOUS SYSTEM:  Alert & Oriented X 2  CHEST/LUNG: decreased BS at bases   CV/HEART: Regular rate and rhythm  GI/ABDOMEN: Soft abdomen     LAB:                            10.9   4.93  )-----------( 244      ( 28 Sep 2024 19:55 )             34.9   -    135  |  101  |  25[H]  ----------------------------<  130[H]  4.4   |  28  |  1.1    Ca    9.4      28 Sep 2024 19:55  Mg     2.1         TPro  6.8  /  Alb  3.8  /  TBili  0.4  /  DBili  x   /  AST  22  /  ALT  14  /  AlkPhos  77  09-28        Daily     Daily Weight in k.4 (28 Sep 2024 05:56)  CAPILLARY BLOOD GLUCOSE      POCT Blood Glucose.: 117 mg/dL (27 Sep 2024 18:59)      MEDICATIONS  (STANDING):  albuterol/ipratropium for Nebulization 3 milliLiter(s) Nebulizer every 6 hours  aspirin enteric coated 81 milliGRAM(s) Oral daily  atorvastatin 10 milliGRAM(s) Oral at bedtime  cefTRIAXone   IVPB      cefTRIAXone   IVPB 1000 milliGRAM(s) IV Intermittent once  chlorhexidine 2% Cloths 1 Application(s) Topical daily  heparin   Injectable 5000 Unit(s) SubCutaneous every 12 hours  influenza  Vaccine (HIGH DOSE) 0.5 milliLiter(s) IntraMuscular once  pantoprazole    Tablet 40 milliGRAM(s) Oral before breakfast  polyethylene glycol 3350 17 Gram(s) Oral at bedtime  senna 2 Tablet(s) Oral at bedtime    MEDICATIONS  (PRN):  acetaminophen     Tablet .. 650 milliGRAM(s) Oral every 6 hours PRN Temp greater or equal to 38C (100.4F), Mild Pain (1 - 3)  aluminum hydroxide/magnesium hydroxide/simethicone Suspension 30 milliLiter(s) Oral once PRN Dyspepsia  QUEtiapine 12.5 milliGRAM(s) Oral every 8 hours PRN agitation

## 2024-09-29 NOTE — PROGRESS NOTE ADULT - ASSESSMENT
·	AHRF on admission - unclear etiology   -reviewed notes from previous team - unsure about differentials   -had RR 9/27 for suspected SVT - converted to normal sinus after 6mg of adenosine IVP  -no overt heart failure   -no signs of clinical pna  -check cbc, CMP, bnp, procalcitonin, CXR, LE duplex (not done)  -trops stable and ckmb negative   -last echo with Grade II diastolic dysfunction, moderate aortic stenosis     ·	Acute cystitis  abx 3 days and then stop     ·	Chronic Urinary retention   -c/w lama    ·	Anxiety/insomnia   -home meds   -seroquel prn -  monitor QTc - last one low 500ms     ·	Age related debility  -rehab/pt as tolerated       DISPO: STR? discussed with daughter at bedside 9/28 - mentioned [facility] -  to discuss options       Attending Physician Dr. Lena Sherman # [id number]

## 2024-09-30 ENCOUNTER — TRANSCRIPTION ENCOUNTER (OUTPATIENT)
Age: 89
End: 2024-09-30

## 2024-09-30 VITALS
SYSTOLIC BLOOD PRESSURE: 109 MMHG | RESPIRATION RATE: 18 BRPM | HEART RATE: 99 BPM | DIASTOLIC BLOOD PRESSURE: 68 MMHG | TEMPERATURE: 98 F

## 2024-09-30 LAB
ANION GAP SERPL CALC-SCNC: 8 MMOL/L — SIGNIFICANT CHANGE UP (ref 7–14)
BASOPHILS # BLD AUTO: 0.02 K/UL — SIGNIFICANT CHANGE UP (ref 0–0.2)
BASOPHILS NFR BLD AUTO: 0.3 % — SIGNIFICANT CHANGE UP (ref 0–1)
BUN SERPL-MCNC: 28 MG/DL — HIGH (ref 10–20)
CALCIUM SERPL-MCNC: 9.6 MG/DL — SIGNIFICANT CHANGE UP (ref 8.4–10.5)
CHLORIDE SERPL-SCNC: 100 MMOL/L — SIGNIFICANT CHANGE UP (ref 98–110)
CO2 SERPL-SCNC: 29 MMOL/L — SIGNIFICANT CHANGE UP (ref 17–32)
CREAT SERPL-MCNC: 1 MG/DL — SIGNIFICANT CHANGE UP (ref 0.7–1.5)
EGFR: 53 ML/MIN/1.73M2 — LOW
EOSINOPHIL # BLD AUTO: 0.18 K/UL — SIGNIFICANT CHANGE UP (ref 0–0.7)
EOSINOPHIL NFR BLD AUTO: 3.1 % — SIGNIFICANT CHANGE UP (ref 0–8)
GLUCOSE BLDC GLUCOMTR-MCNC: 112 MG/DL — HIGH (ref 70–99)
GLUCOSE SERPL-MCNC: 107 MG/DL — HIGH (ref 70–99)
HCT VFR BLD CALC: 32.9 % — LOW (ref 37–47)
HGB BLD-MCNC: 10.4 G/DL — LOW (ref 12–16)
IMM GRANULOCYTES NFR BLD AUTO: 0.3 % — SIGNIFICANT CHANGE UP (ref 0.1–0.3)
LYMPHOCYTES # BLD AUTO: 0.87 K/UL — LOW (ref 1.2–3.4)
LYMPHOCYTES # BLD AUTO: 14.9 % — LOW (ref 20.5–51.1)
MAGNESIUM SERPL-MCNC: 1.9 MG/DL — SIGNIFICANT CHANGE UP (ref 1.8–2.4)
MCHC RBC-ENTMCNC: 30.1 PG — SIGNIFICANT CHANGE UP (ref 27–31)
MCHC RBC-ENTMCNC: 31.6 G/DL — LOW (ref 32–37)
MCV RBC AUTO: 95.4 FL — SIGNIFICANT CHANGE UP (ref 81–99)
MONOCYTES # BLD AUTO: 0.42 K/UL — SIGNIFICANT CHANGE UP (ref 0.1–0.6)
MONOCYTES NFR BLD AUTO: 7.2 % — SIGNIFICANT CHANGE UP (ref 1.7–9.3)
NEUTROPHILS # BLD AUTO: 4.33 K/UL — SIGNIFICANT CHANGE UP (ref 1.4–6.5)
NEUTROPHILS NFR BLD AUTO: 74.2 % — SIGNIFICANT CHANGE UP (ref 42.2–75.2)
NRBC # BLD: 0 /100 WBCS — SIGNIFICANT CHANGE UP (ref 0–0)
PLATELET # BLD AUTO: 235 K/UL — SIGNIFICANT CHANGE UP (ref 130–400)
PMV BLD: 9.3 FL — SIGNIFICANT CHANGE UP (ref 7.4–10.4)
POTASSIUM SERPL-MCNC: 4.5 MMOL/L — SIGNIFICANT CHANGE UP (ref 3.5–5)
POTASSIUM SERPL-SCNC: 4.5 MMOL/L — SIGNIFICANT CHANGE UP (ref 3.5–5)
PROCALCITONIN SERPL-MCNC: 0.04 NG/ML — SIGNIFICANT CHANGE UP (ref 0.02–0.1)
RBC # BLD: 3.45 M/UL — LOW (ref 4.2–5.4)
RBC # FLD: 14.4 % — SIGNIFICANT CHANGE UP (ref 11.5–14.5)
SODIUM SERPL-SCNC: 137 MMOL/L — SIGNIFICANT CHANGE UP (ref 135–146)
WBC # BLD: 5.84 K/UL — SIGNIFICANT CHANGE UP (ref 4.8–10.8)
WBC # FLD AUTO: 5.84 K/UL — SIGNIFICANT CHANGE UP (ref 4.8–10.8)

## 2024-09-30 PROCEDURE — 99239 HOSP IP/OBS DSCHRG MGMT >30: CPT

## 2024-09-30 PROCEDURE — 99497 ADVNCD CARE PLAN 30 MIN: CPT | Mod: 25

## 2024-09-30 RX ORDER — IPRATROPIUM BROMIDE AND ALBUTEROL SULFATE .5; 3 MG/3ML; MG/3ML
3 SOLUTION RESPIRATORY (INHALATION)
Qty: 0 | Refills: 0 | DISCHARGE
Start: 2024-09-30

## 2024-09-30 RX ADMIN — IPRATROPIUM BROMIDE AND ALBUTEROL SULFATE 3 MILLILITER(S): .5; 3 SOLUTION RESPIRATORY (INHALATION) at 08:33

## 2024-09-30 RX ADMIN — CHLORHEXIDINE GLUCONATE ORAL RINSE 1 APPLICATION(S): 1.2 SOLUTION DENTAL at 11:15

## 2024-09-30 RX ADMIN — Medication 100 MILLIGRAM(S): at 11:13

## 2024-09-30 RX ADMIN — Medication 81 MILLIGRAM(S): at 11:59

## 2024-09-30 RX ADMIN — Medication 650 MILLIGRAM(S): at 05:43

## 2024-09-30 RX ADMIN — PANTOPRAZOLE SODIUM 40 MILLIGRAM(S): 40 TABLET, DELAYED RELEASE ORAL at 05:37

## 2024-09-30 RX ADMIN — Medication 5000 UNIT(S): at 05:37

## 2024-09-30 NOTE — PROGRESS NOTE ADULT - PROVIDER SPECIALTY LIST ADULT
Hospitalist
Internal Medicine
Hospitalist
Internal Medicine
Internal Medicine
Hospitalist
Internal Medicine
Hospitalist
Internal Medicine
Hospitalist

## 2024-09-30 NOTE — PROGRESS NOTE ADULT - TIME BILLING
direct patient care and chart review  -coordinated current plan of care with medical staff on MDR
direct patient care and chart review  -coordinated current plan of care with medical staff on MDR
direct patient care and chart review  -coordinated current plan of care with medical staff on MDR  -d/c papers edited
time spent on review of labs, imaging studies, old records, obtaining history, personally examining patient, counselling and communicating with patient, entering orders for medications/tests/etc, discussions with other health care providers, documentation in electronic health records, independent interpretation of labs, imaging/procedure results and care coordination.

## 2024-09-30 NOTE — DISCHARGE NOTE NURSING/CASE MANAGEMENT/SOCIAL WORK - NSDCFUADDAPPT_GEN_ALL_CORE_FT
Please follow up with urology as an outpatient for management of your lama and urinary retention   Pt is [facility] , Pt d/c to [facility] , & pt's New  PCP Dr Noel Meraz @ [place] will see Pt on Thursday 10/03/24 per [place] [facility] /Wellness team  .

## 2024-09-30 NOTE — DISCHARGE NOTE NURSING/CASE MANAGEMENT/SOCIAL WORK - PATIENT PORTAL LINK FT
You can access the FollowMyHealth Patient Portal offered by [facility] by registering at the following website: [email or web address]. By joining [facility]’s FollowMyHealth portal, you will also be able to view your health information using other applications (apps) compatible with our system.

## 2024-09-30 NOTE — GOALS OF CARE CONVERSATION - ADVANCED CARE PLANNING - CONVERSATION DETAILS
Discussed with the patient her current condition. Fears and concerns about her health as well as hopes for the future. She stated that if her health care condition worsens should would not like to be intubated or resuscitated. Colchicine Counseling:  Patient counseled regarding adverse effects including but not limited to stomach upset (nausea, vomiting, stomach pain, or diarrhea).  Patient instructed to limit alcohol consumption while taking this medication.  Colchicine may reduce blood counts especially with prolonged use.  The patient understands that monitoring of kidney function and blood counts may be required, especially at baseline. The patient verbalized understanding of the proper use and possible adverse effects of colchicine.  All of the patient's questions and concerns were addressed.

## 2024-09-30 NOTE — PROGRESS NOTE ADULT - SUBJECTIVE AND OBJECTIVE BOX
SUBJECTIVE/OVERNIGHT EVENTS  Today is hospital day 18d. This morning patient was seen and examined at bedside, resting comfortably in bed. No acute or major events overnight.    HPI:  89-year-old female with history of JENNIFER on 3L NC O2 in the mornings and evenings, GERD, aortic stenosis, left carotid stenosis, anemia, anxiety, urinary retention (straight catheterizes herself for urination), breast cancer s/p mastectomy and reconstruction and brain cancer s/p craniotomy presents from [facility] for shortness of breath x 1 day.  Patient was sitting down watching TV when she started feeling short of breath.  Patient is normally not on O2 during the day and was started on NC after being evaluated at the facility sent to Ed .   Denies any recent fevers, chills, sweats, chest pain, new cough, congestion, sore throat, runny nose, or calf pain.    In Ed afebrile , sating well on 3 L of NC   on labs Hb 9.1 , Trop 42 trended down to 38   lactate 2.1 , proBNP 1541 RVP negative      CTA chest r/o PE       admitted for further workup  (12 Sep 2024 05:35)      MEDICATIONS  STANDING MEDICATIONS  albuterol/ipratropium for Nebulization 3 milliLiter(s) Nebulizer every 6 hours  aspirin enteric coated 81 milliGRAM(s) Oral daily  atorvastatin 10 milliGRAM(s) Oral at bedtime  cefTRIAXone   IVPB 1000 milliGRAM(s) IV Intermittent every 24 hours  cefTRIAXone   IVPB      chlorhexidine 2% Cloths 1 Application(s) Topical daily  heparin   Injectable 5000 Unit(s) SubCutaneous every 12 hours  influenza  Vaccine (HIGH DOSE) 0.5 milliLiter(s) IntraMuscular once  pantoprazole    Tablet 40 milliGRAM(s) Oral before breakfast  polyethylene glycol 3350 17 Gram(s) Oral at bedtime  senna 2 Tablet(s) Oral at bedtime    PRN MEDICATIONS  acetaminophen     Tablet .. 650 milliGRAM(s) Oral every 6 hours PRN  ALPRAZolam 0.25 milliGRAM(s) Oral at bedtime PRN  aluminum hydroxide/magnesium hydroxide/simethicone Suspension 30 milliLiter(s) Oral once PRN  QUEtiapine 12.5 milliGRAM(s) Oral every 8 hours PRN    VITALS  T(F): 97.5 (09-30-24 @ 04:30), Max: 97.9 (09-29-24 @ 11:56)  HR: 80 (09-30-24 @ 04:30) (76 - 80)  BP: 108/68 (09-30-24 @ 04:30) (93/61 - 108/68)  RR: 18 (09-30-24 @ 04:30) (18 - 18)  SpO2: 100% (09-29-24 @ 11:56) (100% - 100%)  POCT Blood Glucose.: 112 mg/dL (09-30-24 @ 07:49)          PHYSICAL EXAM      GENERAL: No acute distress, well-developed  HEAD:  Atraumatic, Normocephalic  ENT: PERRL, conjunctiva and sclera clear, neck supple, no JVD, moist mucosa, posterior oropharynx clear  CHEST/LUNG: Clear to auscultation bilaterally; No wheeze, equal breath sounds bilaterally, respirations nonlabored  HEART: Regular rate and rhythm; No murmurs, rubs, or gallops  ABDOMEN: Soft, nontender, nondistended; Bowel sounds present, no organomegaly  BACK: no spinal tenderness, no CVA tenderness  EXTREMITIES:  No clubbing, cyanosis, or edema  PSYCH: Nl behavior, nl affect  NEUROLOGY: AAOx3, non-focal, moves all extremities spontaneously  SKIN: Normal color, No rashes or lesions        PAST MEDICAL & SURGICAL HISTORY:  Migraine      H/O brain tumor  benign 1977      Breast CA      Urinary retention with incomplete bladder emptying  pt self catheterizes  3-4 x day      H/O mastectomy, bilateral  1984      H/O craniotomy  1976            LABS             10.4   5.84  )-----------( 235      ( 09-30-24 @ 08:19 )             32.9     137  |  100  |  28  -------------------------<  107   09-30-24 @ 08:19  4.5  |  29  |  1.0    Ca      9.6     09-30-24 @ 08:19  Mg     1.9     09-30-24 @ 08:19    TPro  6.8  /  Alb  3.8  /  TBili  0.4  /  DBili  x   /  AST  22  /  ALT  14  /  AlkPhos  77  /  GGT  x     09-28-24 @ 19:55      Pro-Brain Natriuretic Peptide: 703 pg/mL (09-28-24 @ 19:55)  Troponin T, High Sensitivity Result: 33 ng/L (09-27-24 @ 21:12)  Troponin T, High Sensitivity Result: 36 ng/L (09-27-24 @ 19:09)    Urinalysis Basic - ( 30 Sep 2024 08:19 )    Color: x / Appearance: x / SG: x / pH: x  Gluc: 107 mg/dL / Ketone: x  / Bili: x / Urobili: x   Blood: x / Protein: x / Nitrite: x   Leuk Esterase: x / RBC: x / WBC x   Sq Epi: x / Non Sq Epi: x / Bacteria: x          IMAGING

## 2024-09-30 NOTE — PROGRESS NOTE ADULT - ASSESSMENT
·	AHRF on admission - unclear etiology (resolved)  -reviewed notes from previous team - unsure about differentials   -had RR 9/27 for SVT - converted to normal sinus after 6mg of adenosine IVP and did well on tele floor with no significant events   -no overt heart failure   -no signs of clinical pna  -labs noted   -pt decline LE duplex   -Ct chest -ve for acute PE  -trops stable and ckmb negative   -last echo with Grade II diastolic dysfunction, moderate aortic stenosis     ·	Acute cystitis  abx 3 days and then stop     ·	Chronic Urinary retention   -c/w lama    ·	Anxiety/insomnia   -home meds   -seroquel prn -  monitor QTc - last one low 500ms     ·	Age related debility  -rehab/pt as tolerated       DISPO:  [facility] d/c today - daughter Malou requested today's d/c - she is coming to the hospital and is in touch with the  - outpatient follow up with Dr. Cohen on Thursday (appt made)      Attending Physician Dr. Lena Sherman # [id number]        ·	AHRF on admission - unclear etiology (resolved)  -reviewed notes from previous team - unsure about differentials   -had RR 9/27 for SVT - converted to normal sinus after 6mg of adenosine IVP and did well on tele floor with no significant events   -no overt heart failure   -no signs of clinical pna  -labs noted   -pt decline LE duplex   -Ct chest -ve for acute PE  -trops stable and ckmb negative   -last echo with Grade II diastolic dysfunction, moderate aortic stenosis     ·	Acute cystitis  abx 3 days and then stop     ·	Chronic Urinary retention   -c/w lama    ·	Anxiety/insomnia   -home meds   -seroquel prn -  monitor QTc - last one low 500ms     ·	Age related debility  -rehab/pt as tolerated     *DNR/DNI - prognosis guarded - GOC revisited with the patient prior to d/c     DISPO:  [facility] d/c today - daughter Malou requested today's d/c - she is coming to the hospital and is in touch with the  - outpatient follow up with Dr. Cohen on Thursday (appt made)      Attending Physician Dr. Lena Sherman # [id number]

## 2024-09-30 NOTE — DISCHARGE NOTE NURSING/CASE MANAGEMENT/SOCIAL WORK - NSDCPEFALRISK_GEN_ALL_CORE
For information on Fall & Injury Prevention, visit: [email or web address] OR  [email or web address] OR  https://www.cdc.gov/steadi/patient.html

## 2024-09-30 NOTE — PROGRESS NOTE ADULT - SUBJECTIVE AND OBJECTIVE BOX
MARISA DYKES  89y  Female      Patient is a 89y old  Female who presents with a chief complaint of SOB (27 Sep 2024 09:27)      INTERVAL HPI/OVERNIGHT EVENTS:  pt seen this am   -doing well   -labs noted   -CM for d/c planning to [facility]     Vital Signs Last 24 Hrs  T(C): 36.6 (30 Sep 2024 13:22), Max: 36.6 (29 Sep 2024 20:02)  T(F): 97.8 (30 Sep 2024 13:22), Max: 97.8 (29 Sep 2024 20:02)  HR: 99 (30 Sep 2024 13:22) (78 - 99)  BP: 109/68 (30 Sep 2024 13:22) (93/61 - 109/68)  BP(mean): 81 (30 Sep 2024 04:30) (81 - 81)  RR: 18 (30 Sep 2024 13:22) (18 - 18)  SpO2: 95% (30 Sep 2024 12:00) (95% - 95%)    Parameters below as of 30 Sep 2024 12:00  Patient On (Oxygen Delivery Method): room air      PHYSICAL EXAM:  GENERAL: Not in distress - hard of hearing   NERVOUS SYSTEM:  Alert & Oriented X 2  CHEST/LUNG: decreased BS at bases   CV/HEART: Regular rate and rhythm  GI/ABDOMEN: Soft abdomen     LAB:                            10.9   4.93  )-----------( 244      ( 28 Sep 2024 19:55 )             34.9       135  |  101  |  25[H]  ----------------------------<  130[H]  4.4   |  28  |  1.1    Ca    9.4      28 Sep 2024 19:55  Mg     2.1         TPro  6.8  /  Alb  3.8  /  TBili  0.4  /  DBili  x   /  AST  22  /  ALT  14  /  AlkPhos  77          Daily     Daily Weight in k.4 (28 Sep 2024 05:56)  CAPILLARY BLOOD GLUCOSE      POCT Blood Glucose.: 117 mg/dL (27 Sep 2024 18:59)      MEDICATIONS  (STANDING):  albuterol/ipratropium for Nebulization 3 milliLiter(s) Nebulizer every 6 hours  aspirin enteric coated 81 milliGRAM(s) Oral daily  atorvastatin 10 milliGRAM(s) Oral at bedtime  cefTRIAXone   IVPB      cefTRIAXone   IVPB 1000 milliGRAM(s) IV Intermittent once  chlorhexidine 2% Cloths 1 Application(s) Topical daily  heparin   Injectable 5000 Unit(s) SubCutaneous every 12 hours  influenza  Vaccine (HIGH DOSE) 0.5 milliLiter(s) IntraMuscular once  pantoprazole    Tablet 40 milliGRAM(s) Oral before breakfast  polyethylene glycol 3350 17 Gram(s) Oral at bedtime  senna 2 Tablet(s) Oral at bedtime    MEDICATIONS  (PRN):  acetaminophen     Tablet .. 650 milliGRAM(s) Oral every 6 hours PRN Temp greater or equal to 38C (100.4F), Mild Pain (1 - 3)  aluminum hydroxide/magnesium hydroxide/simethicone Suspension 30 milliLiter(s) Oral once PRN Dyspepsia  QUEtiapine 12.5 milliGRAM(s) Oral every 8 hours PRN agitation

## 2024-10-01 ENCOUNTER — APPOINTMENT (OUTPATIENT)
Age: 89
End: 2024-10-01
Payer: MEDICARE

## 2024-10-01 ENCOUNTER — TRANSCRIPTION ENCOUNTER (OUTPATIENT)
Age: 89
End: 2024-10-01

## 2024-10-01 VITALS
RESPIRATION RATE: 18 BRPM | DIASTOLIC BLOOD PRESSURE: 60 MMHG | OXYGEN SATURATION: 95 % | HEART RATE: 88 BPM | SYSTOLIC BLOOD PRESSURE: 100 MMHG

## 2024-10-01 DIAGNOSIS — R06.02 SHORTNESS OF BREATH: ICD-10-CM

## 2024-10-01 DIAGNOSIS — I65.23 OCCLUSION AND STENOSIS OF BILATERAL CAROTID ARTERIES: ICD-10-CM

## 2024-10-01 DIAGNOSIS — R33.9 RETENTION OF URINE, UNSPECIFIED: ICD-10-CM

## 2024-10-01 PROCEDURE — 99495 TRANSJ CARE MGMT MOD F2F 14D: CPT

## 2024-10-01 RX ORDER — CEFPODOXIME PROXETIL 50 MG/5 ML
1 SUSPENSION, RECONSTITUTED, ORAL (ML) ORAL
Qty: 6 | Refills: 0
Start: 2024-10-01 | End: 2024-10-03

## 2024-10-01 RX ORDER — CEFPODOXIME PROXETIL 50 MG/5 ML
1 SUSPENSION, RECONSTITUTED, ORAL (ML) ORAL
Qty: 2 | Refills: 0
Start: 2024-10-01 | End: 2024-10-01

## 2024-10-01 RX ORDER — ALPRAZOLAM 0.5 MG/1
0.5 TABLET ORAL
Refills: 0 | Status: ACTIVE | COMMUNITY
Start: 2024-10-01

## 2024-10-01 RX ORDER — CEFPODOXIME PROXETIL 100 MG/1
100 TABLET, FILM COATED ORAL
Refills: 0 | Status: ACTIVE | COMMUNITY
Start: 2024-10-01

## 2024-10-01 NOTE — PHYSICAL EXAM
[No Acute Distress] : no acute distress [Well Developed] : well developed [Normal Sclera/Conjunctiva] : normal sclera/conjunctiva [Normal Outer Ear/Nose] : the outer ears and nose were normal in appearance [No Respiratory Distress] : no respiratory distress  [Clear to Auscultation] : lungs were clear to auscultation bilaterally [Normal Rate] : normal rate  [Regular Rhythm] : with a regular rhythm [No Edema] : there was no peripheral edema [Soft] : abdomen soft [Normal] : no joint swelling and grossly normal strength and tone [Normal Affect] : the affect was normal [Alert and Oriented x3] : oriented to person, place, and time [Normal Mood] : the mood was normal [de-identified] : daina

## 2024-10-01 NOTE — ASSESSMENT
[FreeTextEntry1] : Patient is a 89 year old female enrolled in the [facility] TCM program s/p a recent discharge from [facility] 9/12-9/30 for SOB. PMH JENNIFER, GERD, aortic stenosis, left carotid stenosis, anemia, anxiety, urinary retention, history of breast cancer and brain cancer. Patient was treated and sent home with HCS. HCS in place. Upon arrival patient alert in nad, denies cp, sob, edema, fever, chills, n/v/d, cough. Reports feeling better. F/U appointments with pcp on site 10/3 and to schedule urology. Patient was contacted by Zuly Diop RN from TCM and medication reconciliation was done with in 48 hours of discharge 10/1.

## 2024-10-01 NOTE — HISTORY OF PRESENT ILLNESS
[FreeTextEntry1] : F/U hospital discharge for SOB. [de-identified] : Patient is a 89 year old female enrolled in the [facility] TCM program s/p a recent discharge from [facility] 9/12-9/30 for SOB. PMH JENNIFER, GERD, aortic stenosis, left carotid stenosis, anemia, anxiety, urinary retention, history of breast cancer and brain cancer. Patient was treated and sent home with HCS. HCS in place. Upon arrival patient alert in nad, denies cp, sob, edema, fever, chills, n/v/d, cough. Reports feeling better. F/U appointments with pcp on site 10/3 and to schedule urology. Patient was contacted by Zuly Diop RN from [facility] and medication reconciliation was done with in 48 hours of discharge 10/1.  Copied from [facility]: Hospital Course: Hospital course: This is a 89-year-old female with a complex medical history including JENNIFER, GERD, aortic stenosis, left carotid stenosis, anemia, anxiety, urinary retention, history of breast cancer and brain cancer, who presented from her nursing home with acute hypoxia. Her workup, including a CT angiogram of the chest, was negative for pulmonary embolism but showed extensive scoliotic/kyphotic changes. Her hypoxia improved with conservative management and the etiology is suspected to be related to anxiety and/or volume overload from her chronic diastolic heart failure. Her echocardiogram showed an ejection fraction of 55% with moderate aortic stenosis. She was found to be in urinary retention and a Lama catheter was placed. Her chronic anemia remained stable at baseline. She was treated with alprazolam for anxiety and insomnia. She is DNR/DNI.  HPI: 89-year-old female with history of JENNIFER on 3L NC O2 in the mornings and evenings, GERD, aortic stenosis, left carotid stenosis, anemia, anxiety, urinary retention (straight catheterizes herself for urination), breast cancer s/p mastectomy and reconstruction and brain cancer s/p craniotomy presents from [facility] for shortness of breath x 1 day. Patient was sitting down watching TV when she started feeling short of breath. Patient is normally not on O2 during the day and was started on NC after being evaluated at the facility sent to Ed . Denies any recent fevers, chills, sweats, chest pain, new cough, congestion, sore throat, runny nose, or calf pain. ED course:  Afebrile , sating well on 3 L of NC  Labs with Hb 9.1 , Trop 42 trended down to 38, lactate 2.1 , proBNP 1541 RVP negative  Hospital course:  # Acute hypoxia , unclear etiology- improved  - suspect anxiety related vs due to Aortic stenosis, ro CHF  - Uses oxygen intermittently at NH  - Doing well on RA today  - CTA chest PE protocol 09/11 reviewed, no evidence of PE, extensive scoliotic/kyphotic changes - Pro BNP at 1541 - Echocardiogram 09/15- Ef 55%. Moderate AS. GIIDD. - cont aprazolam 0.25mg QHS for anxiety/insomnia  # urinary retention  - patient has been doing self-catheterization in her assisted living facility  - Pt does self cath but because of cognitive decline self-cath will be an issue as she was found to be significantly retaining up to 900 ml. - Lama was placed inpatient and pt will be discharged with lama  - She will need outpt Urology f/u  At time of discharge, pt is HDS, no acute concerns or complaints. SOB has resolved. Patient will be discharged with lama for urinary retention and will need outpt urology follow-up.

## 2024-10-01 NOTE — REVIEW OF SYSTEMS
[Fatigue] : fatigue [Hearing Loss] : hearing loss [Dyspnea on Exertion] : dyspnea on exertion [Negative] : Psychiatric [FreeTextEntry8] : lama [FreeTextEntry9] : walker

## 2024-10-01 NOTE — PLAN
[FreeTextEntry1] : AS: f/u pcp/cardio. c/w statin/asa.  Urinary retention: maintain lama. perineal care discussed. schedule urology f/u  SOB: home 02 prn. nebs prn. f/u pcp. coughing and deep breathing encouraged.

## 2024-10-07 ENCOUNTER — TRANSCRIPTION ENCOUNTER (OUTPATIENT)
Age: 89
End: 2024-10-07

## 2024-10-07 DIAGNOSIS — G47.33 OBSTRUCTIVE SLEEP APNEA (ADULT) (PEDIATRIC): ICD-10-CM

## 2024-10-07 DIAGNOSIS — I50.32 CHRONIC DIASTOLIC (CONGESTIVE) HEART FAILURE: ICD-10-CM

## 2024-10-07 DIAGNOSIS — G47.00 INSOMNIA, UNSPECIFIED: ICD-10-CM

## 2024-10-07 DIAGNOSIS — K21.9 GASTRO-ESOPHAGEAL REFLUX DISEASE WITHOUT ESOPHAGITIS: ICD-10-CM

## 2024-10-07 DIAGNOSIS — I47.10 SUPRAVENTRICULAR TACHYCARDIA, UNSPECIFIED: ICD-10-CM

## 2024-10-07 DIAGNOSIS — D64.9 ANEMIA, UNSPECIFIED: ICD-10-CM

## 2024-10-07 DIAGNOSIS — E78.5 HYPERLIPIDEMIA, UNSPECIFIED: ICD-10-CM

## 2024-10-07 DIAGNOSIS — R33.9 RETENTION OF URINE, UNSPECIFIED: ICD-10-CM

## 2024-10-07 DIAGNOSIS — Z66 DO NOT RESUSCITATE: ICD-10-CM

## 2024-10-07 DIAGNOSIS — N30.00 ACUTE CYSTITIS WITHOUT HEMATURIA: ICD-10-CM

## 2024-10-07 DIAGNOSIS — J96.01 ACUTE RESPIRATORY FAILURE WITH HYPOXIA: ICD-10-CM

## 2024-10-07 DIAGNOSIS — Z79.82 LONG TERM (CURRENT) USE OF ASPIRIN: ICD-10-CM

## 2024-10-07 DIAGNOSIS — Z99.89 DEPENDENCE ON OTHER ENABLING MACHINES AND DEVICES: ICD-10-CM

## 2024-10-07 DIAGNOSIS — R54 AGE-RELATED PHYSICAL DEBILITY: ICD-10-CM

## 2024-10-07 DIAGNOSIS — I35.0 NONRHEUMATIC AORTIC (VALVE) STENOSIS: ICD-10-CM

## 2024-10-11 ENCOUNTER — TRANSCRIPTION ENCOUNTER (OUTPATIENT)
Age: 89
End: 2024-10-11

## 2024-10-14 ENCOUNTER — EMERGENCY (EMERGENCY)
Facility: HOSPITAL | Age: 89
LOS: 0 days | Discharge: HOME | End: 2024-10-14
Attending: EMERGENCY MEDICINE
Payer: MEDICARE

## 2024-10-14 ENCOUNTER — TRANSCRIPTION ENCOUNTER (OUTPATIENT)
Age: 89
End: 2024-10-14

## 2024-10-14 VITALS
HEIGHT: 61 IN | SYSTOLIC BLOOD PRESSURE: 115 MMHG | WEIGHT: 110.01 LBS | DIASTOLIC BLOOD PRESSURE: 77 MMHG | OXYGEN SATURATION: 97 % | RESPIRATION RATE: 16 BRPM | TEMPERATURE: 98 F | HEART RATE: 82 BPM

## 2024-10-14 DIAGNOSIS — Z79.82 LONG TERM (CURRENT) USE OF ASPIRIN: ICD-10-CM

## 2024-10-14 DIAGNOSIS — Z90.13 ACQUIRED ABSENCE OF BILATERAL BREASTS AND NIPPLES: Chronic | ICD-10-CM

## 2024-10-14 DIAGNOSIS — R41.0 DISORIENTATION, UNSPECIFIED: ICD-10-CM

## 2024-10-14 DIAGNOSIS — N39.0 URINARY TRACT INFECTION, SITE NOT SPECIFIED: ICD-10-CM

## 2024-10-14 DIAGNOSIS — Z98.890 OTHER SPECIFIED POSTPROCEDURAL STATES: Chronic | ICD-10-CM

## 2024-10-14 LAB
ALBUMIN SERPL ELPH-MCNC: 3.8 G/DL — SIGNIFICANT CHANGE UP (ref 3.5–5.2)
ALP SERPL-CCNC: 77 U/L — SIGNIFICANT CHANGE UP (ref 30–115)
ALT FLD-CCNC: 12 U/L — SIGNIFICANT CHANGE UP (ref 0–41)
ANION GAP SERPL CALC-SCNC: 4 MMOL/L — LOW (ref 7–14)
APPEARANCE UR: CLEAR — SIGNIFICANT CHANGE UP
AST SERPL-CCNC: 24 U/L — SIGNIFICANT CHANGE UP (ref 0–41)
BACTERIA # UR AUTO: ABNORMAL /HPF
BASOPHILS # BLD AUTO: 0.02 K/UL — SIGNIFICANT CHANGE UP (ref 0–0.2)
BASOPHILS NFR BLD AUTO: 0.4 % — SIGNIFICANT CHANGE UP (ref 0–1)
BILIRUB SERPL-MCNC: 0.3 MG/DL — SIGNIFICANT CHANGE UP (ref 0.2–1.2)
BILIRUB UR-MCNC: NEGATIVE — SIGNIFICANT CHANGE UP
BUN SERPL-MCNC: 19 MG/DL — SIGNIFICANT CHANGE UP (ref 10–20)
CALCIUM SERPL-MCNC: 9.3 MG/DL — SIGNIFICANT CHANGE UP (ref 8.4–10.5)
CAST: 1 /LPF — SIGNIFICANT CHANGE UP (ref 0–4)
CHLORIDE SERPL-SCNC: 103 MMOL/L — SIGNIFICANT CHANGE UP (ref 98–110)
CO2 SERPL-SCNC: 34 MMOL/L — HIGH (ref 17–32)
COLOR SPEC: YELLOW — SIGNIFICANT CHANGE UP
CREAT SERPL-MCNC: 0.9 MG/DL — SIGNIFICANT CHANGE UP (ref 0.7–1.5)
DIFF PNL FLD: NEGATIVE — SIGNIFICANT CHANGE UP
EGFR: 60 ML/MIN/1.73M2 — SIGNIFICANT CHANGE UP
EOSINOPHIL # BLD AUTO: 0.12 K/UL — SIGNIFICANT CHANGE UP (ref 0–0.7)
EOSINOPHIL NFR BLD AUTO: 2.1 % — SIGNIFICANT CHANGE UP (ref 0–8)
GLUCOSE SERPL-MCNC: 112 MG/DL — HIGH (ref 70–99)
GLUCOSE UR QL: NEGATIVE MG/DL — SIGNIFICANT CHANGE UP
HCT VFR BLD CALC: 32.3 % — LOW (ref 37–47)
HGB BLD-MCNC: 10.2 G/DL — LOW (ref 12–16)
IMM GRANULOCYTES NFR BLD AUTO: 0.4 % — HIGH (ref 0.1–0.3)
KETONES UR-MCNC: NEGATIVE MG/DL — SIGNIFICANT CHANGE UP
LEUKOCYTE ESTERASE UR-ACNC: ABNORMAL
LYMPHOCYTES # BLD AUTO: 0.86 K/UL — LOW (ref 1.2–3.4)
LYMPHOCYTES # BLD AUTO: 15.1 % — LOW (ref 20.5–51.1)
MCHC RBC-ENTMCNC: 30.2 PG — SIGNIFICANT CHANGE UP (ref 27–31)
MCHC RBC-ENTMCNC: 31.6 G/DL — LOW (ref 32–37)
MCV RBC AUTO: 95.6 FL — SIGNIFICANT CHANGE UP (ref 81–99)
MONOCYTES # BLD AUTO: 0.53 K/UL — SIGNIFICANT CHANGE UP (ref 0.1–0.6)
MONOCYTES NFR BLD AUTO: 9.3 % — SIGNIFICANT CHANGE UP (ref 1.7–9.3)
NEUTROPHILS # BLD AUTO: 4.13 K/UL — SIGNIFICANT CHANGE UP (ref 1.4–6.5)
NEUTROPHILS NFR BLD AUTO: 72.7 % — SIGNIFICANT CHANGE UP (ref 42.2–75.2)
NITRITE UR-MCNC: POSITIVE
NRBC # BLD: 0 /100 WBCS — SIGNIFICANT CHANGE UP (ref 0–0)
PH UR: 7.5 — SIGNIFICANT CHANGE UP (ref 5–8)
PLATELET # BLD AUTO: 154 K/UL — SIGNIFICANT CHANGE UP (ref 130–400)
PMV BLD: 9.1 FL — SIGNIFICANT CHANGE UP (ref 7.4–10.4)
POTASSIUM SERPL-MCNC: 4.7 MMOL/L — SIGNIFICANT CHANGE UP (ref 3.5–5)
POTASSIUM SERPL-SCNC: 4.7 MMOL/L — SIGNIFICANT CHANGE UP (ref 3.5–5)
PROT SERPL-MCNC: 6.8 G/DL — SIGNIFICANT CHANGE UP (ref 6–8)
PROT UR-MCNC: SIGNIFICANT CHANGE UP MG/DL
RBC # BLD: 3.38 M/UL — LOW (ref 4.2–5.4)
RBC # FLD: 14.7 % — HIGH (ref 11.5–14.5)
RBC CASTS # UR COMP ASSIST: 1 /HPF — SIGNIFICANT CHANGE UP (ref 0–4)
SODIUM SERPL-SCNC: 141 MMOL/L — SIGNIFICANT CHANGE UP (ref 135–146)
SP GR SPEC: 1.02 — SIGNIFICANT CHANGE UP (ref 1–1.03)
SQUAMOUS # UR AUTO: 10 /HPF — HIGH (ref 0–5)
UROBILINOGEN FLD QL: 1 MG/DL — SIGNIFICANT CHANGE UP (ref 0.2–1)
WBC # BLD: 5.68 K/UL — SIGNIFICANT CHANGE UP (ref 4.8–10.8)
WBC # FLD AUTO: 5.68 K/UL — SIGNIFICANT CHANGE UP (ref 4.8–10.8)
WBC UR QL: 39 /HPF — HIGH (ref 0–5)

## 2024-10-14 PROCEDURE — 70450 CT HEAD/BRAIN W/O DYE: CPT | Mod: MC

## 2024-10-14 PROCEDURE — 36415 COLL VENOUS BLD VENIPUNCTURE: CPT

## 2024-10-14 PROCEDURE — 99284 EMERGENCY DEPT VISIT MOD MDM: CPT | Mod: 25

## 2024-10-14 PROCEDURE — 80053 COMPREHEN METABOLIC PANEL: CPT

## 2024-10-14 PROCEDURE — 85025 COMPLETE CBC W/AUTO DIFF WBC: CPT

## 2024-10-14 PROCEDURE — 70450 CT HEAD/BRAIN W/O DYE: CPT | Mod: 26,MC

## 2024-10-14 PROCEDURE — 81001 URINALYSIS AUTO W/SCOPE: CPT

## 2024-10-14 PROCEDURE — 87086 URINE CULTURE/COLONY COUNT: CPT

## 2024-10-14 PROCEDURE — 99284 EMERGENCY DEPT VISIT MOD MDM: CPT | Mod: GC

## 2024-10-14 PROCEDURE — 87040 BLOOD CULTURE FOR BACTERIA: CPT | Mod: 59

## 2024-10-14 PROCEDURE — 51702 INSERT TEMP BLADDER CATH: CPT

## 2024-10-14 PROCEDURE — 87077 CULTURE AEROBIC IDENTIFY: CPT

## 2024-10-14 PROCEDURE — 87186 SC STD MICRODIL/AGAR DIL: CPT

## 2024-10-14 RX ORDER — CEFPODOXIME PROXETIL 50 MG/5 ML
200 SUSPENSION, RECONSTITUTED, ORAL (ML) ORAL ONCE
Refills: 0 | Status: COMPLETED | OUTPATIENT
Start: 2024-10-14 | End: 2024-10-14

## 2024-10-14 RX ORDER — CEFPODOXIME PROXETIL 50 MG/5 ML
1 SUSPENSION, RECONSTITUTED, ORAL (ML) ORAL
Qty: 14 | Refills: 0
Start: 2024-10-14 | End: 2024-10-20

## 2024-10-14 RX ADMIN — Medication 200 MILLIGRAM(S): at 08:58

## 2024-10-14 NOTE — ED PROVIDER NOTE - PATIENT PORTAL LINK FT
You can access the FollowMyHealth Patient Portal offered by [facility] by registering at the following website: [email or web address]. By joining [facility]’s FollowMyHealth portal, you will also be able to view your health information using other applications (apps) compatible with our system.

## 2024-10-14 NOTE — ED PROVIDER NOTE - NSFOLLOWUPINSTRUCTIONS_ED_ALL_ED_FT
FOLLOW UP WITH YOUR DOCTOR THIS WEEK FOR REEVALUATION.     RETURN TO ED IMMEDIATELY WITH ANY WORSENING SYMPTOMS, CHEST PAIN, SHORTNESS OF BREATH, ABDOMINAL PAIN, FEVERS, WEAKNESS, DIZZINESS, PERSISTENT OR SEVERE HEADACHE OR ANY OTHER CONCERNS.    Urinary Tract Infection    A urinary tract infection (UTI) is an infection of any part of the urinary tract, which includes the kidneys, ureters, bladder, and urethra. Risk factors include ignoring your need to urinate, wiping back to front if female, being an uncircumcised male, and having diabetes or a weak immune system. Symptoms include frequent urination, pain or burning with urination, foul smelling urine, cloudy urine, pain in the lower abdomen, blood in the urine, and fever. If you were prescribed an antibiotic medicine, take it as told by your health care provider. Do not stop taking the antibiotic even if you start to feel better.    SEEK IMMEDIATE MEDICAL CARE IF YOU HAVE THE FOLLOWING SYMPTOMS: severe back or abdominal pain, inability to keep fluids or medicine down, dizziness/lightheadedness, or a change in mental status.

## 2024-10-14 NOTE — ED CLERICAL - NS ED CLERK NOTE PRE-ARRIVAL INFORMATION; ADDITIONAL PRE-ARRIVAL INFORMATION
This patient is enrolled in the [facility] readmission reduction initiative and has active care navigation. This patient can be followed up by the care navigation team within 24 hours.  To arrange close follow-up or to obtain additional clinical information, please call the contact number above.     The on call [facility] Hospitalist has been notified and will coordinate care in concert with the ED Physician including consults as necessary. Call [phone] (North), [phone] (South) to reach the hospitalist. You may also call the Hospitalist Division at  at either site.     Consider CDU for management per guideline

## 2024-10-14 NOTE — ED PROVIDER NOTE - OUTSIDE FACILITY/AGENCY DETAILS FREE TEXT FOR MDM ADDL HISTORY OBTAINED FROM QUESTION
Nursing home. Pt has been confused for a few days and took out her own lama. Normally is a/ox3 and cooperative

## 2024-10-14 NOTE — ED PROVIDER NOTE - OBJECTIVE STATEMENT
89-year-old female, with past medical history of bilateral breast cancer status post bilateral mastectomy, brain tumor status postcraniotomy, chronic urinary retention with chronic Rios, presents to the ED from nursing home after the patient pulled out her Rios this morning.  Nursing staff says the patient has been increasingly confused over the past couple of days.  Patient does not remember having a Rios catheter.  She currently has no complaints.  Denies fever, chills, abdominal pain, dysuria, hematuria, nausea, vomiting, diarrhea.

## 2024-10-14 NOTE — ED PROVIDER NOTE - CLINICAL SUMMARY MEDICAL DECISION MAKING FREE TEXT BOX
83-year-old female present to the ED for Rios dislodgment.  Patient has been acting confused as per facility.  Physical exam is unremarkable.  Rios was replaced.  UA sent.  UA revealed positive nitrites.  Symptoms consistent with UTI.  On my reassessment patient is alert and oriented.  Intermittently confused.  Vital stable.  Afebrile.  Given cefpodoxime.  Discharged with cefpodoxime prescription.  Return precautions given.

## 2024-10-14 NOTE — ED PROVIDER NOTE - CONDITION AT DISCHARGE:
ACTIVITY: Rest and take it easy for the first 24 hours.  A responsible adult is recommended to remain with you during that time.  It is normal to feel sleepy.  We encourage you to not do anything that requires balance, judgment or coordination.    MILD FLU-LIKE SYMPTOMS ARE NORMAL. YOU MAY EXPERIENCE GENERALIZED MUSCLE ACHES, THROAT IRRITATION, HEADACHE AND/OR SOME NAUSEA.    FOR 24 HOURS DO NOT:  Drive, operate machinery or run household appliances.  Drink beer or alcoholic beverages.   Make important decisions or sign legal documents.        DIET: To avoid nausea, slowly advance diet as tolerated, avoiding spicy or greasy foods for the first day.  Add more substantial food to your diet according to your physician's instructions.  Babies can be fed formula or breast milk as soon as they are hungry.  INCREASE FLUIDS AND FIBER TO AVOID CONSTIPATION.    SURGICAL DRESSING/BATHING & SPECIAL INSTRUCTIONS:    Elevate/ice   Take pain medications scheduled until block wears off.  Hold for sedation.   Heel touch weightbearing   Post op shoe at all times   rewrap ace looser 6-8 hours post op    FOLLOW-UP APPOINTMENT:  A follow-up appointment should be arranged with your doctor ; call to schedule.    You should CALL YOUR PHYSICIAN if you develop:  Fever greater than 101 degrees F.  Pain not relieved by medication, or persistent nausea or vomiting.  Excessive bleeding (blood soaking through dressing) or unexpected drainage from the wound.  Extreme redness or swelling around the incision site, drainage of pus or foul smelling drainage.  Inability to urinate or empty your bladder within 8 hours.  Problems with breathing or chest pain.    You should call 911 if you develop problems with breathing or chest pain.  If you are unable to contact your doctor or surgical center, you should go to the nearest emergency room or urgent care center.  Physician's telephone #: [phone]    If any questions arise, call your doctor.  If your  doctor is not available, please feel free to call the Surgical Center at [phone].  The Center is open Monday through Friday from 7AM to 7PM.  You can also call the HEALTH HOTLINE open 24 hours/day, 7 days/week and speak to a nurse at [phone], or toll free at [phone].    A registered nurse may call you a few days after your surgery to see how you are doing after your procedure.    MEDICATIONS: Resume taking daily medication.  Take prescribed pain medication with food.  If no medication is prescribed, you may take non-aspirin pain medication if needed.  PAIN MEDICATION CAN BE VERY CONSTIPATING.  Take a stool softener or laxative such as senokot, pericolace, or milk of magnesia if needed.    Dr Abbott to write prescription  .  Last pain medication given at 8:45 AM.    If your physician has prescribed pain medication that includes Acetaminophen (Tylenol), do not take additional Acetaminophen (Tylenol) while taking the prescribed medication.    Depression / Suicide Risk    As you are discharged from this [facility] facility, it is important to learn how to keep safe from harming yourself.    Recognize the warning signs:  · Abrupt changes in personality, positive or negative- including increase in energy   · Giving away possessions  · Change in eating patterns- significant weight changes-  positive or negative  · Change in sleeping patterns- unable to sleep or sleeping all the time   · Unwillingness or inability to communicate  · Depression  · Unusual sadness, discouragement and loneliness  · Talk of wanting to die  · Neglect of personal appearance   · Rebelliousness- reckless behavior  · Withdrawal from people/activities they love  · Confusion- inability to concentrate     If you or a loved one observes any of these behaviors or has concerns about self-harm, here's what you can do:  · Talk about it- your feelings and reasons for harming yourself  · Remove any means that you might use to hurt yourself  (examples: pills, rope, extension cords, firearm)  · Get professional help from the community (Mental Health, Substance Abuse, psychological counseling)  · Do not be alone:Call your Safe Contact- someone whom you trust who will be there for you.  · Call your local CRISIS HOTLINE 276-2108 or 631-810-9504  · Call your local Children's Mobile Crisis Response Team [place] (556) 400-7334 or www.[employer]  · Call the toll free National Suicide Prevention Hotlines   · National Suicide Prevention Lifeline 933-896-FLLB (2181)  National Hope Line Network 800-SUICIDE (018-0984)    Peripheral Nerve Block Discharge Instructions from Same Day Surgery and Inpatient :    What to Expect - Lower Extremity  · The block may cause you to experience numbness and weakness in your thigh and knee or calf and foot on the same side as your surgery  · Numbness, tingling and / or weakness are all normal. For some people, this may be an unpleasant sensation  · These issues will be resolved when the local anesthetic wears off   · You may experience numbness and tingling in your thigh on the same side as your surgery if the block medicine was injected at your groin area  · Numbness will make it difficult to walk  · You may have problems with balance and walking so be very careful   · Follow your surgeon's direction regarding weight bearing on your surgical limb  · Be very careful with your numb limb  Precautions  · The numbness may affect your balance  · Be careful when walking or moving around  · Your leg may be weak: be very careful putting weight on it  · If your surgeon did not specify a time, you should not bear weight for 24 hours  · Be sure to ask for help when you need it  · It is better to have help than to fall and hurt yourself  ·   Prevent Injury  · Protect the limb like a baby  · Beware of exposing your limb to extreme heat or cold or trauma  · The limb may be injured without you noticing because it is numb  · Keep the  "limb elevated whenever possible  · Do not sleep on the limb  · Change the position of the limb regularly  · Avoid putting pressure on your surgical limb  Pain Control  · The initial block on the day of surgery will make your extremity feel \"numb\"  · Any consecutive injection including prior to discharge from the hospital will make your extremity feel \"numb\"  · You may feel an aching or burning when the local anesthesia starts to wear off  · Take pain pills as prescribed by your surgeon  · Call your surgeon or anesthesiologist if you do not have adequate pain control  ·   " Improved

## 2024-10-14 NOTE — ED PROVIDER NOTE - PHYSICAL EXAMINATION
GENERAL: Well-developed; well-nourished; in no acute distress.   SKIN: warm, dry  HEAD: Normocephalic; atraumatic.  EYES: PERRLA, EOMI, no conjunctival erythema  ENT: No nasal discharge; airway clear. MMM  NECK: Supple; non tender.  CARD: Regular rate and rhythm. S1, S2 normal; no murmurs, gallops, or rubs.   RESP: LCTAB; No wheezes, rales, rhonchi, or stridor.  ABD: soft, nontender, and nondistended  EXT: Normal ROM.  No LE TTP or edema bilaterally.  NEURO: A/ox3, situationally confused  PSYCH: Cooperative, appropriate.

## 2024-10-17 LAB
-  AMOXICILLIN/CLAVULANIC ACID: SIGNIFICANT CHANGE UP
-  AMPICILLIN/SULBACTAM: SIGNIFICANT CHANGE UP
-  AMPICILLIN: SIGNIFICANT CHANGE UP
-  AZTREONAM: SIGNIFICANT CHANGE UP
-  CEFAZOLIN: SIGNIFICANT CHANGE UP
-  CEFEPIME: SIGNIFICANT CHANGE UP
-  CEFOXITIN: SIGNIFICANT CHANGE UP
-  CEFTRIAXONE: SIGNIFICANT CHANGE UP
-  CIPROFLOXACIN: SIGNIFICANT CHANGE UP
-  ERTAPENEM: SIGNIFICANT CHANGE UP
-  GENTAMICIN: SIGNIFICANT CHANGE UP
-  IMIPENEM: SIGNIFICANT CHANGE UP
-  LEVOFLOXACIN: SIGNIFICANT CHANGE UP
-  MEROPENEM: SIGNIFICANT CHANGE UP
-  NITROFURANTOIN: SIGNIFICANT CHANGE UP
-  PIPERACILLIN/TAZOBACTAM: SIGNIFICANT CHANGE UP
-  TOBRAMYCIN: SIGNIFICANT CHANGE UP
-  TRIMETHOPRIM/SULFAMETHOXAZOLE: SIGNIFICANT CHANGE UP
CULTURE RESULTS: ABNORMAL
METHOD TYPE: SIGNIFICANT CHANGE UP
ORGANISM # SPEC MICROSCOPIC CNT: ABNORMAL
ORGANISM # SPEC MICROSCOPIC CNT: SIGNIFICANT CHANGE UP
SPECIMEN SOURCE: SIGNIFICANT CHANGE UP

## 2024-10-17 RX ORDER — NITROFURANTOIN MONOHYD/M-CRYST 100 MG
1 CAPSULE ORAL
Qty: 14 | Refills: 0
Start: 2024-10-17 | End: 2024-10-23

## 2024-10-19 LAB
CULTURE RESULTS: SIGNIFICANT CHANGE UP
CULTURE RESULTS: SIGNIFICANT CHANGE UP
SPECIMEN SOURCE: SIGNIFICANT CHANGE UP
SPECIMEN SOURCE: SIGNIFICANT CHANGE UP

## 2024-10-22 ENCOUNTER — TRANSCRIPTION ENCOUNTER (OUTPATIENT)
Age: 89
End: 2024-10-22

## 2024-10-23 ENCOUNTER — EMERGENCY (EMERGENCY)
Facility: HOSPITAL | Age: 89
LOS: 0 days | Discharge: HOME | End: 2024-10-23
Attending: EMERGENCY MEDICINE | Admitting: STUDENT IN AN ORGANIZED HEALTH CARE EDUCATION/TRAINING PROGRAM
Payer: MEDICARE

## 2024-10-23 VITALS
DIASTOLIC BLOOD PRESSURE: 74 MMHG | OXYGEN SATURATION: 98 % | RESPIRATION RATE: 18 BRPM | SYSTOLIC BLOOD PRESSURE: 120 MMHG | TEMPERATURE: 98 F | HEART RATE: 78 BPM

## 2024-10-23 VITALS
TEMPERATURE: 98 F | RESPIRATION RATE: 18 BRPM | WEIGHT: 100.97 LBS | HEIGHT: 61 IN | HEART RATE: 79 BPM | OXYGEN SATURATION: 97 % | DIASTOLIC BLOOD PRESSURE: 78 MMHG | SYSTOLIC BLOOD PRESSURE: 115 MMHG

## 2024-10-23 DIAGNOSIS — Z90.13 ACQUIRED ABSENCE OF BILATERAL BREASTS AND NIPPLES: Chronic | ICD-10-CM

## 2024-10-23 DIAGNOSIS — I50.9 HEART FAILURE, UNSPECIFIED: ICD-10-CM

## 2024-10-23 DIAGNOSIS — Z98.890 OTHER SPECIFIED POSTPROCEDURAL STATES: Chronic | ICD-10-CM

## 2024-10-23 LAB
ALBUMIN SERPL ELPH-MCNC: 3.9 G/DL — SIGNIFICANT CHANGE UP (ref 3.5–5.2)
ALP SERPL-CCNC: 84 U/L — SIGNIFICANT CHANGE UP (ref 30–115)
ALT FLD-CCNC: 13 U/L — SIGNIFICANT CHANGE UP (ref 0–41)
ANION GAP SERPL CALC-SCNC: 4 MMOL/L — LOW (ref 7–14)
APPEARANCE UR: CLEAR — SIGNIFICANT CHANGE UP
AST SERPL-CCNC: 23 U/L — SIGNIFICANT CHANGE UP (ref 0–41)
BACTERIA # UR AUTO: ABNORMAL /HPF
BASOPHILS # BLD AUTO: 0.02 K/UL — SIGNIFICANT CHANGE UP (ref 0–0.2)
BASOPHILS NFR BLD AUTO: 0.5 % — SIGNIFICANT CHANGE UP (ref 0–1)
BILIRUB SERPL-MCNC: 0.3 MG/DL — SIGNIFICANT CHANGE UP (ref 0.2–1.2)
BILIRUB UR-MCNC: NEGATIVE — SIGNIFICANT CHANGE UP
BUN SERPL-MCNC: 18 MG/DL — SIGNIFICANT CHANGE UP (ref 10–20)
CALCIUM SERPL-MCNC: 9.7 MG/DL — SIGNIFICANT CHANGE UP (ref 8.4–10.5)
CAST: 1 /LPF — SIGNIFICANT CHANGE UP (ref 0–4)
CHLORIDE SERPL-SCNC: 101 MMOL/L — SIGNIFICANT CHANGE UP (ref 98–110)
CO2 SERPL-SCNC: 34 MMOL/L — HIGH (ref 17–32)
COLOR SPEC: YELLOW — SIGNIFICANT CHANGE UP
CREAT SERPL-MCNC: 0.8 MG/DL — SIGNIFICANT CHANGE UP (ref 0.7–1.5)
DIFF PNL FLD: ABNORMAL
EGFR: 69 ML/MIN/1.73M2 — SIGNIFICANT CHANGE UP
EOSINOPHIL # BLD AUTO: 0.18 K/UL — SIGNIFICANT CHANGE UP (ref 0–0.7)
EOSINOPHIL NFR BLD AUTO: 4.1 % — SIGNIFICANT CHANGE UP (ref 0–8)
GLUCOSE SERPL-MCNC: 95 MG/DL — SIGNIFICANT CHANGE UP (ref 70–99)
GLUCOSE UR QL: NEGATIVE MG/DL — SIGNIFICANT CHANGE UP
HCT VFR BLD CALC: 31 % — LOW (ref 37–47)
HGB BLD-MCNC: 9.7 G/DL — LOW (ref 12–16)
IMM GRANULOCYTES NFR BLD AUTO: 0.2 % — SIGNIFICANT CHANGE UP (ref 0.1–0.3)
KETONES UR-MCNC: NEGATIVE MG/DL — SIGNIFICANT CHANGE UP
LEUKOCYTE ESTERASE UR-ACNC: ABNORMAL
LYMPHOCYTES # BLD AUTO: 0.81 K/UL — LOW (ref 1.2–3.4)
LYMPHOCYTES # BLD AUTO: 18.3 % — LOW (ref 20.5–51.1)
MAGNESIUM SERPL-MCNC: 2 MG/DL — SIGNIFICANT CHANGE UP (ref 1.8–2.4)
MCHC RBC-ENTMCNC: 29.8 PG — SIGNIFICANT CHANGE UP (ref 27–31)
MCHC RBC-ENTMCNC: 31.3 G/DL — LOW (ref 32–37)
MCV RBC AUTO: 95.4 FL — SIGNIFICANT CHANGE UP (ref 81–99)
MONOCYTES # BLD AUTO: 0.53 K/UL — SIGNIFICANT CHANGE UP (ref 0.1–0.6)
MONOCYTES NFR BLD AUTO: 12 % — HIGH (ref 1.7–9.3)
NEUTROPHILS # BLD AUTO: 2.88 K/UL — SIGNIFICANT CHANGE UP (ref 1.4–6.5)
NEUTROPHILS NFR BLD AUTO: 64.9 % — SIGNIFICANT CHANGE UP (ref 42.2–75.2)
NITRITE UR-MCNC: NEGATIVE — SIGNIFICANT CHANGE UP
NRBC # BLD: 0 /100 WBCS — SIGNIFICANT CHANGE UP (ref 0–0)
NT-PROBNP SERPL-SCNC: 1271 PG/ML — HIGH (ref 0–300)
PH UR: 6.5 — SIGNIFICANT CHANGE UP (ref 5–8)
PLATELET # BLD AUTO: 160 K/UL — SIGNIFICANT CHANGE UP (ref 130–400)
PMV BLD: 10 FL — SIGNIFICANT CHANGE UP (ref 7.4–10.4)
POTASSIUM SERPL-MCNC: 3.9 MMOL/L — SIGNIFICANT CHANGE UP (ref 3.5–5)
POTASSIUM SERPL-SCNC: 3.9 MMOL/L — SIGNIFICANT CHANGE UP (ref 3.5–5)
PROT SERPL-MCNC: 6.8 G/DL — SIGNIFICANT CHANGE UP (ref 6–8)
PROT UR-MCNC: 30 MG/DL
RBC # BLD: 3.25 M/UL — LOW (ref 4.2–5.4)
RBC # FLD: 14.5 % — SIGNIFICANT CHANGE UP (ref 11.5–14.5)
RBC CASTS # UR COMP ASSIST: 29 /HPF — HIGH (ref 0–4)
SODIUM SERPL-SCNC: 139 MMOL/L — SIGNIFICANT CHANGE UP (ref 135–146)
SP GR SPEC: 1.01 — SIGNIFICANT CHANGE UP (ref 1–1.03)
SQUAMOUS # UR AUTO: 7 /HPF — HIGH (ref 0–5)
TROPONIN T, HIGH SENSITIVITY RESULT: 36 NG/L — HIGH (ref 6–13)
TROPONIN T, HIGH SENSITIVITY RESULT: 37 NG/L — HIGH (ref 6–13)
UROBILINOGEN FLD QL: 1 MG/DL — SIGNIFICANT CHANGE UP (ref 0.2–1)
WBC # BLD: 4.43 K/UL — LOW (ref 4.8–10.8)
WBC # FLD AUTO: 4.43 K/UL — LOW (ref 4.8–10.8)
WBC UR QL: 12 /HPF — HIGH (ref 0–5)

## 2024-10-23 PROCEDURE — 96376 TX/PRO/DX INJ SAME DRUG ADON: CPT

## 2024-10-23 PROCEDURE — 84484 ASSAY OF TROPONIN QUANT: CPT

## 2024-10-23 PROCEDURE — 96374 THER/PROPH/DIAG INJ IV PUSH: CPT

## 2024-10-23 PROCEDURE — 87086 URINE CULTURE/COLONY COUNT: CPT

## 2024-10-23 PROCEDURE — 87077 CULTURE AEROBIC IDENTIFY: CPT

## 2024-10-23 PROCEDURE — 99223 1ST HOSP IP/OBS HIGH 75: CPT | Mod: FS

## 2024-10-23 PROCEDURE — 99284 EMERGENCY DEPT VISIT MOD MDM: CPT

## 2024-10-23 PROCEDURE — 87186 SC STD MICRODIL/AGAR DIL: CPT

## 2024-10-23 PROCEDURE — 81001 URINALYSIS AUTO W/SCOPE: CPT

## 2024-10-23 PROCEDURE — 71045 X-RAY EXAM CHEST 1 VIEW: CPT | Mod: 26

## 2024-10-23 PROCEDURE — 71045 X-RAY EXAM CHEST 1 VIEW: CPT

## 2024-10-23 PROCEDURE — 85025 COMPLETE CBC W/AUTO DIFF WBC: CPT

## 2024-10-23 PROCEDURE — 93005 ELECTROCARDIOGRAM TRACING: CPT

## 2024-10-23 PROCEDURE — 80053 COMPREHEN METABOLIC PANEL: CPT

## 2024-10-23 PROCEDURE — 83735 ASSAY OF MAGNESIUM: CPT

## 2024-10-23 PROCEDURE — G0378: CPT

## 2024-10-23 PROCEDURE — 36415 COLL VENOUS BLD VENIPUNCTURE: CPT

## 2024-10-23 PROCEDURE — 93010 ELECTROCARDIOGRAM REPORT: CPT

## 2024-10-23 PROCEDURE — 83880 ASSAY OF NATRIURETIC PEPTIDE: CPT

## 2024-10-23 PROCEDURE — 99285 EMERGENCY DEPT VISIT HI MDM: CPT | Mod: 25

## 2024-10-23 RX ORDER — FUROSEMIDE 10 MG/ML
20 INJECTION INTRAVENOUS ONCE
Refills: 0 | Status: COMPLETED | OUTPATIENT
Start: 2024-10-23 | End: 2024-10-23

## 2024-10-23 RX ORDER — NITROFURANTOIN MONOHYD/M-CRYST 100 MG
100 CAPSULE ORAL
Refills: 0 | Status: DISCONTINUED | OUTPATIENT
Start: 2024-10-23 | End: 2024-10-23

## 2024-10-23 RX ORDER — PANTOPRAZOLE SODIUM 40 MG/1
40 TABLET, DELAYED RELEASE ORAL
Refills: 0 | Status: DISCONTINUED | OUTPATIENT
Start: 2024-10-23 | End: 2024-10-23

## 2024-10-23 RX ORDER — ASPIRIN 325 MG
81 TABLET ORAL DAILY
Refills: 0 | Status: DISCONTINUED | OUTPATIENT
Start: 2024-10-23 | End: 2024-10-23

## 2024-10-23 RX ORDER — ATORVASTATIN CALCIUM 10 MG/1
10 TABLET, FILM COATED ORAL ONCE
Refills: 0 | Status: COMPLETED | OUTPATIENT
Start: 2024-10-23 | End: 2024-10-23

## 2024-10-23 RX ORDER — FUROSEMIDE 10 MG/ML
1 INJECTION INTRAVENOUS
Qty: 30 | Refills: 0
Start: 2024-10-23 | End: 2024-12-21

## 2024-10-23 RX ADMIN — FUROSEMIDE 20 MILLIGRAM(S): 10 INJECTION INTRAVENOUS at 11:49

## 2024-10-23 RX ADMIN — Medication 81 MILLIGRAM(S): at 09:44

## 2024-10-23 RX ADMIN — FUROSEMIDE 20 MILLIGRAM(S): 10 INJECTION INTRAVENOUS at 07:05

## 2024-10-23 RX ADMIN — ATORVASTATIN CALCIUM 10 MILLIGRAM(S): 10 TABLET, FILM COATED ORAL at 09:44

## 2024-10-23 RX ADMIN — PANTOPRAZOLE SODIUM 40 MILLIGRAM(S): 40 TABLET, DELAYED RELEASE ORAL at 09:44

## 2024-10-23 RX ADMIN — Medication 100 MILLIGRAM(S): at 09:44

## 2024-10-23 NOTE — ED CDU PROVIDER DISPOSITION NOTE - CLINICAL COURSE
evaluated for sob, treated for chf. Given diuretics with adequate UOP. no orthopnea. Stable on room air.

## 2024-10-23 NOTE — ED CDU PROVIDER DISPOSITION NOTE - ATTENDING APP SHARED VISIT CONTRIBUTION OF CARE
evaluated for chf which was mild. given lasix. consulted with hospitalist team. started on furosemide 20 mg every other day. Discussed with social work, patient discharged back to facility.

## 2024-10-23 NOTE — ED ADULT NURSE REASSESSMENT NOTE - NS ED NURSE REASSESS COMMENT FT1
Patient assessed, Aox2 with periods of confusion- patient has lama from home, leg bag changed to dependent drainage bag. Awaiting further orders, will continue to monitor.

## 2024-10-23 NOTE — ED CDU PROVIDER INITIAL DAY NOTE - OBJECTIVE STATEMENT
89-year-old female past medical history of aortic stenosis, DM, breast cancer with, urinary retention status post chronic Rios, CHF presents ED for evaluation shortness of breath who reports that she had sob while walking with walker last night. she thus called ems. denies any orthopnea or leg swelling. denies

## 2024-10-23 NOTE — ED CDU PROVIDER INITIAL DAY NOTE - PROGRESS NOTE DETAILS
Patient comfortable, awaiting hospitalist recommendations Per Dr Augustin, can dc after 2nd dose of Lasix IV

## 2024-10-23 NOTE — ED CDU PROVIDER DISPOSITION NOTE - CARE PROVIDERS DIRECT ADDRESSES
[email or web address]

## 2024-10-23 NOTE — ED CDU PROVIDER DISPOSITION NOTE - PATIENT PORTAL LINK FT
You can access the FollowMyHealth Patient Portal offered by [facility] by registering at the following website: [email or web address]. By joining [employer]’s FollowMyHealth portal, you will also be able to view your health information using other applications (apps) compatible with our system.

## 2024-10-23 NOTE — ED CLERICAL - NS ED CARE COORDINATION INFORMATION
This patient is enrolled in the STARS readmission reduction initiative and has active care navigation. This patient can be followed up by the care navigation team within 24 hours.  To arrange close follow-up or to obtain additional clinical information, please call the contact number above.     The on call [facility] Hospitalist has been notified and will coordinate care in concert with the ED Physician including consults as necessary.Please reach out to the Hospitalist on-call directly (schedule on AMiON posted on the intranet). You may also call the Hospitalist Division at [phone] at either site. Consider CDU for management per guideline

## 2024-10-23 NOTE — CONSULT NOTE ADULT - SUBJECTIVE AND OBJECTIVE BOX
T H I S   I S    N O  T   A    F I N A L I Z E D   N O T MARISA MELGAR  89y, Female  Allergy: No Known Allergies    Hospital Day:     Patient seen and examined earlier today.     PMH/PSH:  PAST MEDICAL & SURGICAL HISTORY:  Migraine      H/O brain tumor  benign       Breast CA      Urinary retention with incomplete bladder emptying  pt self catheterizes  3-4 x day      H/O mastectomy, bilateral        H/O craniotomy            LAST 24-Hr EVENTS:    VITALS:  T(F): 97.5 (10-23-24 @ 07:44), Max: 97.7 (10-23-24 @ 02:38)  HR: 78 (10-23-24 @ 07:44)  BP: 120/74 (10-23-24 @ 07:44) (115/78 - 120/74)  RR: 18 (10-23-24 @ 07:44)  SpO2: 98% (10-23-24 @ 07:44)          TESTS & MEASUREMENTS:  Weight/Height/BMI  Height (cm): 154.9 (10-23-24 @ 02:38)  Weight (kg): 45.8 (10-23-24 @ 02:38)  BMI (kg/m2): 19.1 (10-23-24 @ 02:38)                          9.7    4.43  )-----------( 160      ( 23 Oct 2024 05:09 )             31.0         10-23    139  |  101  |  18  ----------------------------<  95  3.9   |  34[H]  |  0.8    Ca    9.7      23 Oct 2024 05:09  Mg     2.0     10-23    TPro  6.8  /  Alb  3.9  /  TBili  0.3  /  DBili  x   /  AST  23  /  ALT  13  /  AlkPhos  84  10-23    LIVER FUNCTIONS - ( 23 Oct 2024 05:09 )  Alb: 3.9 g/dL / Pro: 6.8 g/dL / ALK PHOS: 84 U/L / ALT: 13 U/L / AST: 23 U/L / GGT: x               Troponin T, High Sensitivity Result: 36 ng/L (10-23-24 @ 07:30)  Troponin T, High Sensitivity Result: 37 ng/L (10-23-24 @ 05:09)      Urinalysis with Rflx Culture (collected 10-23-24 @ 07:40)      Urinalysis Basic - ( 23 Oct 2024 07:40 )    Color: Yellow / Appearance: Clear / S.014 / pH: x  Gluc: x / Ketone: Negative mg/dL  / Bili: Negative / Urobili: 1.0 mg/dL   Blood: x / Protein: 30 mg/dL / Nitrite: Negative   Leuk Esterase: Trace / RBC: 29 /HPF / WBC 12 /HPF   Sq Epi: x / Non Sq Epi: 7 /HPF / Bacteria: Occasional /HPF                            RADIOLOGY, ECG, & ADDITIONAL TESTS:  12 Lead ECG:   Ventricular Rate 78 BPM    Atrial Rate 78 BPM   Normal sinus rhythm  right bundle branch block  Possible Right ventricular hypertrophy  Abnormal ECG    Confirmed by ERVIN NEWMAN, [facility] ([id number]) on 2024 10:55:50 PM (24 @ 07:34)    < from: Xray Chest 1 View- PORTABLE-Urgent (10.23.24 @ 05:09) >  IMPRESSION:    Pulmonary vascular congestion    --- End of Report ---    < end of copied text >    < from: TTE Echo Complete w/o Contrast w/ Doppler (09.15.24 @ 09:44) >  Summary:   1. Left ventricular ejection fraction, by visual estimation, is 50 to   55%.   2. Technically limited study.   3. LV Ejection Fraction by Sifuentes's Method.   4. Elevated mean left atrial pressure.   5. Spectral Doppler shows pseudonormal pattern of left ventricular   myocardial filling (Grade II diastolic dysfunction).   6. Degenerative mitral valve.   7. No evidence of mitral valve regurgitation.   8. Thickened calcified av with significant restriction, with at least   moderate aortic stenosis with peak AV rafael: 3.07 m/s with peak/mean   gradient of 38/23 mmHg but may not be getting peak rafael and seems   physically worse gradient.        RECENT DIAGNOSTIC ORDERS:  Culture - Urine: 07:40 (10-23-24 @ 07:54)      MEDICATIONS:  MEDICATIONS  (STANDING):  aspirin  chewable 81 milliGRAM(s) Oral daily  atorvastatin 10 milliGRAM(s) Oral once  nitrofurantoin monohydrate/macrocrystals (MACROBID) 100 milliGRAM(s) Oral two times a day  pantoprazole    Tablet 40 milliGRAM(s) Oral before breakfast      HOME MEDICATIONS (as per chart review):  ALPRAZolam 0.25 mg oral tablet ()  aspirin 81 mg oral delayed release tablet ()  atorvastatin 10 mg oral tablet ()  cefpodoxime 100 mg oral tablet (10-14)  cefpodoxime 200 mg oral tablet ()  dexlansoprazole 60 mg oral delayed release capsule ()  ipratropium-albuterol 0.5 mg-2.5 mg/3 mL inhalation solution ()  Macrobid 100 mg oral capsule (10-17)  Multiple Vitamins oral tablet ()  senna leaf extract oral tablet ()      PHYSICAL EXAM:  GENERAL: pleasant and in NAD/P, comfortable and speaking in full sentences   CHEST/LUNG: Clear to auscultation bilaterally - ant ausc   HEART: S1S2  ABDOMEN: BS(+)/ soft/ indwelling lama   EXTREMITIES:  no clubbing/ chronic skin changes              DONIS MARISA  89y, Female  Allergy: No Known Allergies    Hospital Day:     Patient seen and examined earlier today.     PMH/PSH:  PAST MEDICAL & SURGICAL HISTORY:  Migraine  H/O brain tumor  benign   Breast CA  Urinary retention with incomplete bladder emptying  pt self catheterizes  3-4 x day  H/O mastectomy, bilateral    H/O craniotomy      HPI:  Patient known with JENINFER on 3L O2 at baseline. PMH also includes GERD, AS, left carotic stenosis and anemia, anxiety, and urinary retention. She is also s/p mastectomy and breast reconstruction. Was recently admitted for dyspnea. Currently sent to ED for "wandering around" and dyspnea. Patient is poor historian but denies dyspnea at this time. She also denies new cough, sweats,  fever, expectoration, or chest pain.     All ROS otherwise (-)     VITALS:  T(F): 97.5 (10-23-24 @ 07:44), Max: 97.7 (10-23-24 @ 02:38)  HR: 78 (10-23-24 @ 07:44)  BP: 120/74 (10-23-24 @ 07:44) (115/78 - 120/74)  RR: 18 (10-23-24 @ 07:44)  SpO2: 98% (10-23-24 @ 07:44)          TESTS & MEASUREMENTS:  Weight/Height/BMI  Height (cm): 154.9 (10-23-24 @ 02:38)  Weight (kg): 45.8 (10-23-24 @ 02:38)  BMI (kg/m2): 19.1 (10-23-24 @ 02:38)                          9.7    4.43  )-----------( 160      ( 23 Oct 2024 05:09 )             31.0         10-23    139  |  101  |  18  ----------------------------<  95  3.9   |  34[H]  |  0.8    Ca    9.7      23 Oct 2024 05:09  Mg     2.0     10-23    TPro  6.8  /  Alb  3.9  /  TBili  0.3  /  DBili  x   /  AST  23  /  ALT  13  /  AlkPhos  84  10-23    LIVER FUNCTIONS - ( 23 Oct 2024 05:09 )  Alb: 3.9 g/dL / Pro: 6.8 g/dL / ALK PHOS: 84 U/L / ALT: 13 U/L / AST: 23 U/L / GGT: x               Troponin T, High Sensitivity Result: 36 ng/L (10-23-24 @ 07:30)  Troponin T, High Sensitivity Result: 37 ng/L (10-23-24 @ 05:09)      Urinalysis with Rflx Culture (collected 10-23-24 @ 07:40)      Urinalysis Basic - ( 23 Oct 2024 07:40 )    Color: Yellow / Appearance: Clear / S.014 / pH: x  Gluc: x / Ketone: Negative mg/dL  / Bili: Negative / Urobili: 1.0 mg/dL   Blood: x / Protein: 30 mg/dL / Nitrite: Negative   Leuk Esterase: Trace / RBC: 29 /HPF / WBC 12 /HPF   Sq Epi: x / Non Sq Epi: 7 /HPF / Bacteria: Occasional /HPF                            RADIOLOGY, ECG, & ADDITIONAL TESTS:  12 Lead ECG:   Ventricular Rate 78 BPM    Atrial Rate 78 BPM   Normal sinus rhythm  right bundle branch block  Possible Right ventricular hypertrophy  Abnormal ECG    Confirmed by ERVIN NEWMAN, [facility] ([id number]) on 2024 10:55:50 PM (24 @ 07:34)    < from: Xray Chest 1 View- PORTABLE-Urgent (10.23.24 @ 05:09) >  IMPRESSION:    Pulmonary vascular congestion    --- End of Report ---    < end of copied text >    < from: TTE Echo Complete w/o Contrast w/ Doppler (09.15.24 @ 09:44) >  Summary:   1. Left ventricular ejection fraction, by visual estimation, is 50 to   55%.   2. Technically limited study.   3. LV Ejection Fraction by Sifuentes's Method.   4. Elevated mean left atrial pressure.   5. Spectral Doppler shows pseudonormal pattern of left ventricular   myocardial filling (Grade II diastolic dysfunction).   6. Degenerative mitral valve.   7. No evidence of mitral valve regurgitation.   8. Thickened calcified av with significant restriction, with at least   moderate aortic stenosis with peak AV rafael: 3.07 m/s with peak/mean   gradient of 38/23 mmHg but may not be getting peak rafael and seems   physically worse gradient.        RECENT DIAGNOSTIC ORDERS:  Culture - Urine: 07:40 (10-23-24 @ 07:54)      MEDICATIONS:  MEDICATIONS  (STANDING):  aspirin  chewable 81 milliGRAM(s) Oral daily  atorvastatin 10 milliGRAM(s) Oral once  nitrofurantoin monohydrate/macrocrystals (MACROBID) 100 milliGRAM(s) Oral two times a day  pantoprazole    Tablet 40 milliGRAM(s) Oral before breakfast      HOME MEDICATIONS (as per chart review):  ALPRAZolam 0.25 mg oral tablet ()  aspirin 81 mg oral delayed release tablet ()  atorvastatin 10 mg oral tablet ()  cefpodoxime 100 mg oral tablet (10-14)  cefpodoxime 200 mg oral tablet ()  dexlansoprazole 60 mg oral delayed release capsule ()  ipratropium-albuterol 0.5 mg-2.5 mg/3 mL inhalation solution ()  Macrobid 100 mg oral capsule (10-17)  Multiple Vitamins oral tablet ()  senna leaf extract oral tablet ()      PHYSICAL EXAM:  GENERAL: pleasant and in NAD/P, comfortable and speaking in full sentences   CHEST/LUNG: Clear to auscultation bilaterally - ant ausc   HEART: S1S2  ABDOMEN: BS(+)/ soft/ indwelling lama   EXTREMITIES:  no clubbing/ chronic skin changes

## 2024-10-23 NOTE — ED CDU PROVIDER INITIAL DAY NOTE - CLINICAL SUMMARY MEDICAL DECISION MAKING FREE TEXT BOX
patient evaluated for sob, placed in obs for chf. trop baseline, bnp slightly elevated, xray with vascular congestion. on exam scant crackles however poor respiratory effort. no edema in lower ext. no orthopnea, pulse ox nml. plan is to provide lasix and obtain echo.

## 2024-10-23 NOTE — ED CDU PROVIDER INITIAL DAY NOTE - PHYSICAL EXAMINATION
Gen: Alert, NAD, well appearing  Head: NC, AT, PERRL, EOMI, normal lids/conjunctiva  ENT: normal hearing  Neck: +supple, no tenderness/meningismus,  Pulm: Bilateral BS, normal resp effort, no wheeze/stridor/retractions  CV: RRR. +murmur  Abd: soft, NT/ND  Mskel: no edema/erythema/cyanosis  Skin: no rash, warm/dry  Neuro: AAOx3, no sensory/motor deficits

## 2024-10-23 NOTE — ED CDU PROVIDER DISPOSITION NOTE - NSFOLLOWUPINSTRUCTIONS_ED_ALL_ED_FT
Take lasix (furosemide) 20 mg once every OTHER DAY. Please follow up with your primary care physician. You can use your nasal cannula 2 L while ambulating. Your prescription is sent to [facility].     Heart Failure  Heart failure is a condition in which the heart has trouble pumping blood because it has become weak or stiff. This means that the heart does not pump blood efficiently for the body to work well. For some people with heart failure, fluid may back up into the lungs and there may be swelling (edema) in the lower legs. Heart failure is usually a long-term (chronic) condition. It is important for you to take good care of yourself and follow the treatment plan from your health care provider.    What are the causes?  This condition is caused by some health problems, including:  High blood pressure (hypertension). Hypertension causes the heart muscle to work harder than normal. High blood pressure eventually causes the heart to become stiff and weak.  Coronary artery disease (CAD). CAD is the buildup of cholesterol and fat (plaques) in the arteries of the heart.  Heart attack (myocardial infarction). Injured tissue, which is caused by the heart attack, does not contract as well and the heart's ability to pump blood is weakened.  Abnormal heart valves. When the heart valves do not open and close properly, the heart muscle must pump harder to keep the blood flowing.  Heart muscle disease (cardiomyopathy or myocarditis). Heart muscle disease is damage to the heart muscle from a variety of causes, such as drug or alcohol abuse, infections, or unknown causes. These can increase the risk of heart failure.  Lung disease. When the lungs do not work properly, the heart must work harder.  What increases the risk?  Risk of heart failure increases as a person ages. This condition is also more likely to develop in people who:  Are overweight.  Are male.  Smoke or chew tobacco.  Abuse alcohol or illegal drugs.  Have taken medicines that can damage the heart, such as chemotherapy drugs.  Have diabetes.  High blood sugar (glucose) is associated with high fat (lipid) levels in the blood.  Diabetes can also damage tiny blood vessels that carry nutrients to the heart muscle.  Have abnormal heart rhythms.  Have thyroid problems.  Have low blood counts (anemia).  What are the signs or symptoms?  Symptoms of this condition include:  Shortness of breath with activity, such as when climbing stairs.  Persistent cough.  Swelling of the feet, ankles, legs, or abdomen.  Unexplained weight gain.  Difficulty breathing when lying flat (orthopnea).  Waking from sleep because of the need to sit up and get more air.  Rapid heartbeat.  Fatigue and loss of energy.  Feeling light-headed, dizzy, or close to fainting.  Loss of appetite.  Nausea.  Increased urination during the night (nocturia).  Confusion.  How is this diagnosed?  This condition is diagnosed based on:  Medical history, symptoms, and a physical exam.  Diagnostic tests, which may include:  Echocardiogram.  Electrocardiogram (ECG).  Chest X-ray.  Blood tests.  Exercise stress test.  Radionuclide scans.  Cardiac catheterization and angiogram.  How is this treated?  Treatment for this condition is aimed at managing the symptoms of heart failure. Medicines, behavioral changes, or other treatments may be necessary to treat heart failure.    Medicines     These may include:  Angiotensin-converting enzyme (ACE) inhibitors. This type of medicine blocks the effects of a blood protein called angiotensin-converting enzyme. ACE inhibitors relax (dilate) the blood vessels and help to lower blood pressure.  Angiotensin receptor blockers (ARBs). This type of medicine blocks the actions of a blood protein called angiotensin. ARBs dilate the blood vessels and help to lower blood pressure.  Water pills (diuretics). Diuretics cause the kidneys to remove salt and water from the blood. The extra fluid is removed through urination, leaving a lower volume of blood that the heart has to pump.  Beta blockers. These improve heart muscle strength and they prevent the heart from beating too quickly.  Digoxin. This increases the force of the heartbeat.  Healthy behavior changes    These may include:  Reaching and maintaining a healthy weight.  Stopping smoking or chewing tobacco.  Eating heart-healthy foods.  Limiting or avoiding alcohol.  Stopping use of street drugs (illegal drugs).  Physical activity.  Other treatments    These may include:  Surgery to open blocked coronary arteries or repair damaged heart valves.  Placement of a biventricular pacemaker to improve heart muscle function (cardiac resynchronization therapy). This device paces both the right ventricle and left ventricle.  Placement of a device to treat serious abnormal heart rhythms (implantable cardioverter defibrillator, or ICD).  Placement of a device to improve the pumping ability of the heart (left ventricular assist device, or LVAD).  Heart transplant. This can cure heart failure, and it is considered for certain patients who do not improve with other therapies.  Follow these instructions at home:  Medicines     Take over-the-counter and prescription medicines only as told by your health care provider. Medicines are important in reducing the workload of your heart, slowing the progression of heart failure, and improving your symptoms.  Do not stop taking your medicine unless your health care provider told you to do that.  Do not skip any dose of medicine.  Refill your prescriptions before you run out of medicine. You need your medicines every day.  Eating and drinking     Eat heart-healthy foods. Talk with a dietitian to make an eating plan that is right for you.  Choose foods that contain no trans fat and are low in saturated fat and cholesterol. Healthy choices include fresh or frozen fruits and vegetables, fish, lean meats, legumes, fat-free or low-fat dairy products, and whole-grain or high-fiber foods.  Limit salt (sodium) if directed by your health care provider. Sodium restriction may reduce symptoms of heart failure. Ask a dietitian to recommend heart-healthy seasonings.  Use healthy cooking methods instead of frying. Healthy methods include roasting, grilling, broiling, baking, poaching, steaming, and stir-frying.  Limit your fluid intake if directed by your health care provider. Fluid restriction may reduce symptoms of heart failure.  Lifestyle     Stop smoking or using chewing tobacco. Nicotine and tobacco can damage your heart and your blood vessels. Do not use nicotine gum or patches before talking to your health care provider.  Limit alcohol intake to no more than 1 drink per day for non-pregnant women and 2 drinks per day for men. One drink equals 12 oz of beer, 5 oz of wine, or 1½ oz of hard liquor.  Drinking more than that is harmful to your heart. Tell your health care provider if you drink alcohol several times a week.  Talk with your health care provider about whether any level of alcohol use is safe for you.  If your heart has already been damaged by alcohol or you have severe heart failure, drinking alcohol should be stopped completely.  Stop use of illegal drugs.  Lose weight if directed by your health care provider. Weight loss may reduce symptoms of heart failure.  Do moderate physical activity if directed by your health care provider. People who are elderly and people with severe heart failure should consult with a health care provider for physical activity recommendations.  Monitor important information     Weigh yourself every day. Keeping track of your weight daily helps you to notice excess fluid sooner.  Weigh yourself every morning after you urinate and before you eat breakfast.  Wear the same amount of clothing each time you weigh yourself.  Record your daily weight. Provide your health care provider with your weight record.  Monitor and record your blood pressure as told by your health care provider.  Check your pulse as told by your health care provider.  Dealing with extreme temperatures     If the weather is extremely hot:  Avoid vigorous physical activity.  Use air conditioning or fans or seek a cooler location.  Avoid caffeine and alcohol.  Wear loose-fitting, lightweight, and light-colored clothing.  If the weather is extremely cold:  Avoid vigorous physical activity.  Layer your clothes.  Wear mittens or gloves, a hat, and a scarf when you go outside.  Avoid alcohol.  General instructions     Manage other health conditions such as hypertension, diabetes, thyroid disease, or abnormal heart rhythms as told by your health care provider.  Learn to manage stress. If you need help to do this, ask your health care provider.  Plan rest periods when fatigued.  Get ongoing education and support as needed.  Participate in or seek rehabilitation as needed to maintain or improve independence and quality of life.  Stay up to date with immunizations. Keeping current on pneumococcal and influenza immunizations is especially important to prevent respiratory infections.  Keep all follow-up visits as told by your health care provider. This is important.  Contact a health care provider if:  You have a rapid weight gain.  You have increasing shortness of breath that is unusual for you.  You are unable to participate in your usual physical activities.  You tire easily.  You cough more than normal, especially with physical activity.  You have any swelling or more swelling in areas such as your hands, feet, ankles, or abdomen.  You are unable to sleep because it is hard to breathe.  You feel like your heart is beating quickly (palpitations).  You become dizzy or light-headed when you stand up.  Get help right away if:  You have difficulty breathing.  You notice or your family notices a change in your awareness, such as having trouble staying awake or having difficulty with concentration.  You have pain or discomfort in your chest.  You have an episode of fainting (syncope).  This information is not intended to replace advice given to you by your health care provider. Make sure you discuss any questions you have with your health care provider.

## 2024-10-23 NOTE — CONSULT NOTE ADULT - NS ATTEST RISK PROBLEM GEN_ALL_CORE FT
acute diastolic  heart failure acute diastolic  heart failure, frailty, and advanced illness.   I participated in direct clinical care, patient counseling,  coordination with OBS team, nursing and case management/social work teams, review of tests and scheduled medications,  and review of previous records and admissions.

## 2024-10-23 NOTE — CONSULT NOTE ADULT - ASSESSMENT
·	Acute lung edema in the setting of mildly decompensated diastolic heart failure      (G2DD on Echo, Elevated BNP, CXR findings, Immediate improvement with Lasix)   ·	NO evidence of ACS or dynamic EKG changes       (elevated troponin due to fluid overload at baseline, no chest pain)   ·	Chronic urinary retention   ·	On antibiotics already for cystitis     REC  ·	Give another Lasix 20 mg IVP later today   ·	Will need to be on oral Lasix 20 mg every other day (first dose Saturday    ·	Continue home meds otherwise   ·	May use O2 2 L via NC upon ambulation if needed (currently patient with SpO2 96% at rest)     Discussed with ED-OBS team

## 2024-10-23 NOTE — ED PROVIDER NOTE - OBJECTIVE STATEMENT
89-year-old female past medical history of aortic stenosis, DM, breast cancer with, urinary retention status post chronic Rios, CHF presents ED for evaluation shortness of breath.  Patient with acute onset shortness of breath and nursing today associated chest tightness.  Denies any cough, nausea, vomiting, diarrhea, hemoptysis.

## 2024-10-23 NOTE — ED ADULT TRIAGE NOTE - CHIEF COMPLAINT QUOTE
pt BIBEMS from assisted living, as per EMS pt was wandering facility since 2300 stating that she was short of breath, pt uses 2L NC PRN, when staff brought pt back to her room and plaed her on O2, she took it off and started wandering again

## 2024-10-23 NOTE — ED PROVIDER NOTE - CLINICAL SUMMARY MEDICAL DECISION MAKING FREE TEXT BOX
sob, pvc on xray, mostly stable, star p[marjan will put in edou for conjoined hospitalist and ED care.

## 2024-10-23 NOTE — ED PROVIDER NOTE - PHYSICAL EXAMINATION
CONST: Well appearing in NAD  EYES: PERRL, EOMI, Sclera and conjunctiva clear.   ENT: Oropharynx normal appearing, no erythema or exudates. Uvula midline.  CARD: Normal S1 S2; Normal rate and rhythm  RESP: Crackles at the lung bases. No resp distress.   GI: Soft, non-tender, non-distended.  MS: Normal ROM in all extremities. No midline spinal tenderness.  SKIN: Warm, dry, no acute rashes.   NEURO: Awake and alert, No focal deficits. Strength 5/5 with no sensory deficits.

## 2024-10-24 ENCOUNTER — TRANSCRIPTION ENCOUNTER (OUTPATIENT)
Age: 89
End: 2024-10-24

## 2024-10-29 ENCOUNTER — TRANSCRIPTION ENCOUNTER (OUTPATIENT)
Age: 89
End: 2024-10-29

## 2024-10-29 RX ORDER — NITROFURANTOIN MONOHYD/M-CRYST 100 MG
1 CAPSULE ORAL
Qty: 14 | Refills: 0
Start: 2024-10-29 | End: 2024-11-04

## 2024-10-30 ENCOUNTER — EMERGENCY (EMERGENCY)
Facility: HOSPITAL | Age: 89
LOS: 0 days | Discharge: HOME | End: 2024-10-31
Attending: EMERGENCY MEDICINE
Payer: MEDICARE

## 2024-10-30 VITALS
TEMPERATURE: 98 F | SYSTOLIC BLOOD PRESSURE: 99 MMHG | RESPIRATION RATE: 18 BRPM | HEART RATE: 84 BPM | OXYGEN SATURATION: 97 % | DIASTOLIC BLOOD PRESSURE: 63 MMHG | WEIGHT: 100.97 LBS

## 2024-10-30 DIAGNOSIS — T83.021A DISPLACEMENT OF INDWELLING URETHRAL CATHETER, INITIAL ENCOUNTER: ICD-10-CM

## 2024-10-30 NOTE — ED PROVIDER NOTE - PATIENT PORTAL LINK FT
You can access the FollowMyHealth Patient Portal offered by [facility] by registering at the following website: [email or web address]. By joining [facility]’s FollowMyHealth portal, you will also be able to view your health information using other applications (apps) compatible with our system.

## 2024-10-30 NOTE — ED PROVIDER NOTE - NSFOLLOWUPINSTRUCTIONS_ED_ALL_ED_FT
Please follow up with your primary care physician within 24-72 hours and return immediately if symptoms worsen.    Kaur Catheter Care, Adult    A Kaur catheter is a soft, flexible tube that is placed into the bladder to drain urine. A Kaur catheter may be inserted if:    You leak urine or are not able to control when you urinate (urinary incontinence).  You are not able to urinate when you need to (urinary retention).   You had prostate surgery or surgery on the genitals.  You have certain medical conditions, such as multiple sclerosis, dementia, or a spinal cord injury.    If you are going home with a Kaur catheter in place, follow the instructions below.    TAKING CARE OF THE CATHETER   Wash your hands with soap and water.   Using mild soap and warm water on a clean washcloth:    Clean the area on your body closest to the catheter insertion site using a circular motion, moving away from the catheter. Never wipe toward the catheter because this could sweep bacteria up into the urethra and cause infection.   Remove all traces of soap. Pat the area dry with a clean towel. For males, reposition the foreskin.    Attach the catheter to your leg so there is no tension on the catheter. Use adhesive tape or a leg strap. If you are using adhesive tape, remove any sticky residue left behind by the previous tape you used.   Keep the drainage bag below the level of the bladder, but keep it off the floor.   Check throughout the day to be sure the catheter is working and urine is draining freely. Make sure the tubing does not become kinked.  Do not pull on the catheter or try to remove it. Pulling could damage internal tissues.    TAKING CARE OF THE DRAINAGE BAGS  You will be given two drainage bags to take home. One is a large overnight drainage bag, and the other is a smaller leg bag that fits underneath clothing. You may wear the overnight bag at any time, but you should never wear the smaller leg bag at night. Follow the instructions below for how to empty, change, and clean your drainage bags.    Emptying the Drainage Bag    You must empty your drainage bag when it is ?–½ full or at least 2–3 times a day.     Wash your hands with soap and water.   Keep the drainage bag below your hips, below the level of your bladder. This stops urine from going back into the tubing and into your bladder.    Hold the dirty bag over the toilet or a clean container.   Open the pour spout at the bottom of the bag and empty the urine into the toilet or container. Do not let the pour spout touch the toilet, container, or any other surface. Doing so can place bacteria on the bag, which can cause an infection.   Clean the pour spout with a gauze pad or cotton ball that has rubbing alcohol on it.   Close the pour spout.   Attach the bag to your leg with adhesive tape or a leg strap.   Wash your hands well.    Changing the Drainage Bag    Change your drainage bag once a month or sooner if it starts to smell bad or look dirty. Below are steps to follow when changing the drainage bag.     Wash your hands with soap and water.   Pinch off the rubber catheter so that urine does not spill out.   Disconnect the catheter tube from the drainage tube at the connection valve. Do not let the tubes touch any surface.    Clean the end of the catheter tube with an alcohol wipe. Use a different alcohol wipe to clean the end of the drainage tube.   Connect the catheter tube to the drainage tube of the clean drainage bag.   Attach the new bag to the leg with adhesive tape or a leg strap. Avoid attaching the new bag too tightly.    Wash your hands well.    Cleaning the Drainage Bag     Wash your hands with soap and water.   Wash the bag in warm, soapy water.   Rinse the bag thoroughly with warm water.   Fill the bag with a solution of white vinegar and water (1 cup vinegar to 1 qt warm water [.2 L vinegar to 1 L warm water]). Close the bag and soak it for 30 minutes in the solution.    Rinse the bag with warm water.    Hang the bag to dry with the pour spout open and hanging downward.   Store the clean bag (once it is dry) in a clean plastic bag.   Wash your hands well.     PREVENTING INFECTION  Wash your hands before and after handling your catheter.  Take showers daily and wash the area where the catheter enters your body. Do not take baths. Replace wet leg straps with dry ones, if this applies.  Do not use powders, sprays, or lotions on the genital area. Only use creams, lotions, or ointments as directed by your caregiver.  For females, wipe from front to back after each bowel movement.   Drink enough fluids to keep your urine clear or pale yellow unless you have a fluid restriction.   Do not let the drainage bag or tubing touch or lie on the floor.   Wear cotton underwear to absorb moisture and to keep your .    SEEK MEDICAL CARE IF:  Your urine is cloudy or smells unusually bad.  Your catheter becomes clogged.  You are not draining urine into the bag or your bladder feels full.  Your catheter starts to leak.    SEEK IMMEDIATE MEDICAL CARE IF:  You have pain, swelling, redness, or pus where the catheter enters the body.  You have pain in the abdomen, legs, lower back, or bladder.  You have a fever.  You see blood fill the catheter, or your urine is pink or red.  You have nausea, vomiting, or chills.  Your catheter gets pulled out.    MAKE SURE YOU:  Understand these instructions.  Will watch your condition.  Will get help right away if you are not doing well or get worse.    ADDITIONAL NOTES AND INSTRUCTIONS    Please follow up with your Primary MD in 24-48 hr.  Seek immediate medical care for any new/worsening signs or symptoms.

## 2024-10-30 NOTE — ED PROVIDER NOTE - PHYSICAL EXAMINATION
Gen: NAD  Head: NCAT  HEENT: PERRL, oral mucosa moist, normal conjunctiva, oropharynx clear without exudate or erythema  Lung: CTAB, no respiratory distress, no wheezing, rales, rhonchi  CV: normal s1/s2, rrr, Normal perfusion, pulses 2+ throughout  Abd: soft, NTND, no CVA tenderness  Genitourinary: no pelvic tenderness  MSK: No edema, no visible deformities  Neuro: CN II-XII grossly intact, No focal neurologic deficits  Skin: No rash   Psych: normal affect

## 2024-10-30 NOTE — ED PROVIDER NOTE - CLINICAL SUMMARY MEDICAL DECISION MAKING FREE TEXT BOX
89-year-old female presents from nursing facility for Rasmussen change. Spoke with staff at nursing facility who state patient pulled out Rasmussen earlier today. pt denies any other symptoms including fevers, chill, headache, recent illness/travel, cough, abdominal pain, chest pain, or SOB. oriented x 3 bedside. denies medical complaints. confirmed with [facility] facility- only sent to ED to have rasmussen replaced. nontender abdomen. no cva tenderness. rasmussen replaced by nurses. urine appears clear. afebrile. stable for dc.

## 2024-10-30 NOTE — ED PROVIDER NOTE - OBJECTIVE STATEMENT
89-year-old female presents from nursing facility for Kaur change. Spoke with staff at nursing facility who state patient pulled out Kaur earlier today. pt denies any other symptoms including fevers, chill, headache, recent illness/travel, cough, abdominal pain, chest pain, or SOB. 89-year-old female presents from nursing facility for Kaur change. Spoke with staff at nursing facility who state patient pulled out Kaur earlier today. pt denies any other symptoms including fevers, chill, headache, recent illness/travel, cough, abdominal pain, chest pain, or SOB.

## 2024-10-31 VITALS
RESPIRATION RATE: 18 BRPM | OXYGEN SATURATION: 96 % | TEMPERATURE: 98 F | DIASTOLIC BLOOD PRESSURE: 71 MMHG | SYSTOLIC BLOOD PRESSURE: 110 MMHG | HEART RATE: 94 BPM

## 2024-11-12 ENCOUNTER — EMERGENCY (EMERGENCY)
Facility: HOSPITAL | Age: 89
LOS: 0 days | Discharge: HOME | End: 2024-11-13
Attending: EMERGENCY MEDICINE
Payer: MEDICARE

## 2024-11-12 VITALS
SYSTOLIC BLOOD PRESSURE: 93 MMHG | OXYGEN SATURATION: 100 % | RESPIRATION RATE: 18 BRPM | DIASTOLIC BLOOD PRESSURE: 61 MMHG | HEART RATE: 61 BPM | TEMPERATURE: 98 F

## 2024-11-12 VITALS
RESPIRATION RATE: 16 BRPM | TEMPERATURE: 98 F | HEART RATE: 100 BPM | SYSTOLIC BLOOD PRESSURE: 120 MMHG | DIASTOLIC BLOOD PRESSURE: 81 MMHG | OXYGEN SATURATION: 97 % | WEIGHT: 110.01 LBS

## 2024-11-12 DIAGNOSIS — T83.021A DISPLACEMENT OF INDWELLING URETHRAL CATHETER, INITIAL ENCOUNTER: ICD-10-CM

## 2024-11-12 PROCEDURE — 51702 INSERT TEMP BLADDER CATH: CPT

## 2024-11-12 PROCEDURE — 99283 EMERGENCY DEPT VISIT LOW MDM: CPT | Mod: 25

## 2024-11-12 PROCEDURE — 99283 EMERGENCY DEPT VISIT LOW MDM: CPT

## 2024-11-12 NOTE — ED PROVIDER NOTE - NSFOLLOWUPINSTRUCTIONS_ED_ALL_ED_FT
Rios Catheter Care, Adult    A Rios catheter is a soft, flexible tube that is placed into the bladder to drain urine. A Rios catheter may be inserted if:    You leak urine or are not able to control when you urinate (urinary incontinence).  You are not able to urinate when you need to (urinary retention).   You had prostate surgery or surgery on the genitals.  You have certain medical conditions, such as multiple sclerosis, dementia, or a spinal cord injury.    If you are going home with a Rios catheter in place, follow the instructions below.    TAKING CARE OF THE CATHETER   Wash your hands with soap and water.   Using mild soap and warm water on a clean washcloth:    Clean the area on your body closest to the catheter insertion site using a circular motion, moving away from the catheter. Never wipe toward the catheter because this could sweep bacteria up into the urethra and cause infection.   Remove all traces of soap. Pat the area dry with a clean towel. For males, reposition the foreskin.    Attach the catheter to your leg so there is no tension on the catheter. Use adhesive tape or a leg strap. If you are using adhesive tape, remove any sticky residue left behind by the previous tape you used.   Keep the drainage bag below the level of the bladder, but keep it off the floor.   Check throughout the day to be sure the catheter is working and urine is draining freely. Make sure the tubing does not become kinked.  Do not pull on the catheter or try to remove it. Pulling could damage internal tissues.    TAKING CARE OF THE DRAINAGE BAGS  You will be given two drainage bags to take home. One is a large overnight drainage bag, and the other is a smaller leg bag that fits underneath clothing. You may wear the overnight bag at any time, but you should never wear the smaller leg bag at night. Follow the instructions below for how to empty, change, and clean your drainage bags.    Emptying the Drainage Bag    You must empty your drainage bag when it is ?–½ full or at least 2–3 times a day.     Wash your hands with soap and water.   Keep the drainage bag below your hips, below the level of your bladder. This stops urine from going back into the tubing and into your bladder.    Hold the dirty bag over the toilet or a clean container.   Open the pour spout at the bottom of the bag and empty the urine into the toilet or container. Do not let the pour spout touch the toilet, container, or any other surface. Doing so can place bacteria on the bag, which can cause an infection.   Clean the pour spout with a gauze pad or cotton ball that has rubbing alcohol on it.   Close the pour spout.   Attach the bag to your leg with adhesive tape or a leg strap.   Wash your hands well.    Changing the Drainage Bag    Change your drainage bag once a month or sooner if it starts to smell bad or look dirty. Below are steps to follow when changing the drainage bag.     Wash your hands with soap and water.   Pinch off the rubber catheter so that urine does not spill out.   Disconnect the catheter tube from the drainage tube at the connection valve. Do not let the tubes touch any surface.    Clean the end of the catheter tube with an alcohol wipe. Use a different alcohol wipe to clean the end of the drainage tube.   Connect the catheter tube to the drainage tube of the clean drainage bag.   Attach the new bag to the leg with adhesive tape or a leg strap. Avoid attaching the new bag too tightly.    Wash your hands well.    Cleaning the Drainage Bag     Wash your hands with soap and water.   Wash the bag in warm, soapy water.   Rinse the bag thoroughly with warm water.   Fill the bag with a solution of white vinegar and water (1 cup vinegar to 1 qt warm water [.2 L vinegar to 1 L warm water]). Close the bag and soak it for 30 minutes in the solution.    Rinse the bag with warm water.    Hang the bag to dry with the pour spout open and hanging downward.   Store the clean bag (once it is dry) in a clean plastic bag.   Wash your hands well.     PREVENTING INFECTION  Wash your hands before and after handling your catheter.  Take showers daily and wash the area where the catheter enters your body. Do not take baths. Replace wet leg straps with dry ones, if this applies.  Do not use powders, sprays, or lotions on the genital area. Only use creams, lotions, or ointments as directed by your caregiver.  For females, wipe from front to back after each bowel movement.   Drink enough fluids to keep your urine clear or pale yellow unless you have a fluid restriction.   Do not let the drainage bag or tubing touch or lie on the floor.   Wear cotton underwear to absorb moisture and to keep your .    SEEK MEDICAL CARE IF:  Your urine is cloudy or smells unusually bad.  Your catheter becomes clogged.  You are not draining urine into the bag or your bladder feels full.  Your catheter starts to leak.    SEEK IMMEDIATE MEDICAL CARE IF:  You have pain, swelling, redness, or pus where the catheter enters the body.  You have pain in the abdomen, legs, lower back, or bladder.  You have a fever.  You see blood fill the catheter, or your urine is pink or red.  You have nausea, vomiting, or chills.  Your catheter gets pulled out.    MAKE SURE YOU:  Understand these instructions.  Will watch your condition.  Will get help right away if you are not doing well or get worse.    ADDITIONAL NOTES AND INSTRUCTIONS    Please follow up with your Primary MD in 24-48 hr.  Seek immediate medical care for any new/worsening signs or symptoms.

## 2024-11-12 NOTE — ED PROVIDER NOTE - PHYSICAL EXAMINATION
VITAL SIGNS: I have reviewed nursing notes and confirm.  CONSTITUTIONAL: elderly but appears well nourished and well hydrated  SKIN: Skin exam is warm and dry, no acute rash.  HEAD: Normocephalic; atraumatic.  EYES: PERRL, EOM intact; conjunctiva and sclera clear.  ENT: No nasal discharge; airway clear.  CARD: S1, S2 normal; no murmurs, gallops, or rubs. Regular rate and rhythm.  RESP: No wheezes, rales or rhonchi.  ABD: Normal bowel sounds; soft; non-distended; non-tender  EXT: Normal ROM.  NEURO: unable to assess orientation, does not answer questions or follow commands, is moving limbs spontaneously, has some spontaneous, non sensical speech

## 2024-11-12 NOTE — ED PROVIDER NOTE - PATIENT PORTAL LINK FT
You can access the FollowMyHealth Patient Portal offered by [facility] by registering at the following website: [email or web address]. By joining [facility]’s FollowMyHealth portal, you will also be able to view your health information using other applications (apps) compatible with our system.

## 2024-11-12 NOTE — ED PROVIDER NOTE - OBJECTIVE STATEMENT
Hx per chart and NH record.    Patent is a 89 yo F, from [facility], here for replacement of lama catheter after it was accidentally dislodged. Patient voices no complaints.

## 2024-11-29 ENCOUNTER — INPATIENT (INPATIENT)
Facility: HOSPITAL | Age: 88
LOS: 2 days | Discharge: HOME | DRG: 872 | End: 2024-12-02
Attending: HOSPITALIST | Admitting: INTERNAL MEDICINE
Payer: MEDICARE

## 2024-11-29 VITALS
RESPIRATION RATE: 18 BRPM | HEART RATE: 88 BPM | WEIGHT: 115.08 LBS | DIASTOLIC BLOOD PRESSURE: 71 MMHG | HEIGHT: 61 IN | OXYGEN SATURATION: 100 % | SYSTOLIC BLOOD PRESSURE: 105 MMHG | TEMPERATURE: 98 F

## 2024-11-29 DIAGNOSIS — A41.9 SEPSIS, UNSPECIFIED ORGANISM: ICD-10-CM

## 2024-11-29 DIAGNOSIS — Z98.890 OTHER SPECIFIED POSTPROCEDURAL STATES: Chronic | ICD-10-CM

## 2024-11-29 DIAGNOSIS — Z90.13 ACQUIRED ABSENCE OF BILATERAL BREASTS AND NIPPLES: Chronic | ICD-10-CM

## 2024-11-29 LAB
ALBUMIN SERPL ELPH-MCNC: 3.8 G/DL — SIGNIFICANT CHANGE UP (ref 3.5–5.2)
ALP SERPL-CCNC: 82 U/L — SIGNIFICANT CHANGE UP (ref 30–115)
ALT FLD-CCNC: 12 U/L — SIGNIFICANT CHANGE UP (ref 0–41)
ANION GAP SERPL CALC-SCNC: 12 MMOL/L — SIGNIFICANT CHANGE UP (ref 7–14)
APPEARANCE UR: ABNORMAL
AST SERPL-CCNC: 20 U/L — SIGNIFICANT CHANGE UP (ref 0–41)
BACTERIA # UR AUTO: ABNORMAL /HPF
BASE EXCESS BLDV CALC-SCNC: 8.8 MMOL/L — HIGH (ref -2–3)
BASOPHILS # BLD AUTO: 0.01 K/UL — SIGNIFICANT CHANGE UP (ref 0–0.2)
BASOPHILS NFR BLD AUTO: 0.1 % — SIGNIFICANT CHANGE UP (ref 0–1)
BILIRUB SERPL-MCNC: 0.3 MG/DL — SIGNIFICANT CHANGE UP (ref 0.2–1.2)
BILIRUB UR-MCNC: NEGATIVE — SIGNIFICANT CHANGE UP
BUN SERPL-MCNC: 25 MG/DL — HIGH (ref 10–20)
CA-I SERPL-SCNC: 1.25 MMOL/L — SIGNIFICANT CHANGE UP (ref 1.15–1.33)
CALCIUM SERPL-MCNC: 9.2 MG/DL — SIGNIFICANT CHANGE UP (ref 8.4–10.5)
CAST: 4 /LPF — SIGNIFICANT CHANGE UP (ref 0–4)
CHLORIDE SERPL-SCNC: 98 MMOL/L — SIGNIFICANT CHANGE UP (ref 98–110)
CO2 SERPL-SCNC: 29 MMOL/L — SIGNIFICANT CHANGE UP (ref 17–32)
COLOR SPEC: YELLOW — SIGNIFICANT CHANGE UP
CREAT SERPL-MCNC: 1 MG/DL — SIGNIFICANT CHANGE UP (ref 0.7–1.5)
DIFF PNL FLD: NEGATIVE — SIGNIFICANT CHANGE UP
EGFR: 53 ML/MIN/1.73M2 — LOW
EOSINOPHIL # BLD AUTO: 0.07 K/UL — SIGNIFICANT CHANGE UP (ref 0–0.7)
EOSINOPHIL NFR BLD AUTO: 0.8 % — SIGNIFICANT CHANGE UP (ref 0–8)
FLUAV AG NPH QL: SIGNIFICANT CHANGE UP
FLUBV AG NPH QL: SIGNIFICANT CHANGE UP
GAS PNL BLDV: 137 MMOL/L — SIGNIFICANT CHANGE UP (ref 136–145)
GAS PNL BLDV: SIGNIFICANT CHANGE UP
GAS PNL BLDV: SIGNIFICANT CHANGE UP
GLUCOSE SERPL-MCNC: 122 MG/DL — HIGH (ref 70–99)
GLUCOSE UR QL: NEGATIVE MG/DL — SIGNIFICANT CHANGE UP
HCO3 BLDV-SCNC: 35 MMOL/L — HIGH (ref 22–29)
HCT VFR BLD CALC: 33.6 % — LOW (ref 37–47)
HCT VFR BLDA CALC: 35 % — SIGNIFICANT CHANGE UP (ref 34.5–46.5)
HGB BLD CALC-MCNC: 11.7 G/DL — SIGNIFICANT CHANGE UP (ref 11.7–16.1)
HGB BLD-MCNC: 10.7 G/DL — LOW (ref 12–16)
IMM GRANULOCYTES NFR BLD AUTO: 0.3 % — SIGNIFICANT CHANGE UP (ref 0.1–0.3)
KETONES UR-MCNC: NEGATIVE MG/DL — SIGNIFICANT CHANGE UP
LACTATE BLDV-MCNC: 1.1 MMOL/L — SIGNIFICANT CHANGE UP (ref 0.5–2)
LEUKOCYTE ESTERASE UR-ACNC: ABNORMAL
LYMPHOCYTES # BLD AUTO: 0.83 K/UL — LOW (ref 1.2–3.4)
LYMPHOCYTES # BLD AUTO: 8.9 % — LOW (ref 20.5–51.1)
MCHC RBC-ENTMCNC: 28.6 PG — SIGNIFICANT CHANGE UP (ref 27–31)
MCHC RBC-ENTMCNC: 31.8 G/DL — LOW (ref 32–37)
MCV RBC AUTO: 89.8 FL — SIGNIFICANT CHANGE UP (ref 81–99)
MONOCYTES # BLD AUTO: 0.91 K/UL — HIGH (ref 0.1–0.6)
MONOCYTES NFR BLD AUTO: 9.8 % — HIGH (ref 1.7–9.3)
NEUTROPHILS # BLD AUTO: 7.46 K/UL — HIGH (ref 1.4–6.5)
NEUTROPHILS NFR BLD AUTO: 80.1 % — HIGH (ref 42.2–75.2)
NITRITE UR-MCNC: NEGATIVE — SIGNIFICANT CHANGE UP
NRBC # BLD: 0 /100 WBCS — SIGNIFICANT CHANGE UP (ref 0–0)
PCO2 BLDV: 57 MMHG — HIGH (ref 39–42)
PH BLDV: 7.4 — SIGNIFICANT CHANGE UP (ref 7.32–7.43)
PH UR: 6.5 — SIGNIFICANT CHANGE UP (ref 5–8)
PLATELET # BLD AUTO: 165 K/UL — SIGNIFICANT CHANGE UP (ref 130–400)
PMV BLD: 10.1 FL — SIGNIFICANT CHANGE UP (ref 7.4–10.4)
PO2 BLDV: 25 MMHG — SIGNIFICANT CHANGE UP (ref 25–45)
POTASSIUM BLDV-SCNC: 4.5 MMOL/L — SIGNIFICANT CHANGE UP (ref 3.5–5.1)
POTASSIUM SERPL-MCNC: 4.1 MMOL/L — SIGNIFICANT CHANGE UP (ref 3.5–5)
POTASSIUM SERPL-SCNC: 4.1 MMOL/L — SIGNIFICANT CHANGE UP (ref 3.5–5)
PROT SERPL-MCNC: 6.8 G/DL — SIGNIFICANT CHANGE UP (ref 6–8)
PROT UR-MCNC: 30 MG/DL
RBC # BLD: 3.74 M/UL — LOW (ref 4.2–5.4)
RBC # FLD: 14.1 % — SIGNIFICANT CHANGE UP (ref 11.5–14.5)
RBC CASTS # UR COMP ASSIST: 3 /HPF — SIGNIFICANT CHANGE UP (ref 0–4)
RSV RNA NPH QL NAA+NON-PROBE: SIGNIFICANT CHANGE UP
SAO2 % BLDV: 34.4 % — LOW (ref 67–88)
SARS-COV-2 RNA SPEC QL NAA+PROBE: SIGNIFICANT CHANGE UP
SODIUM SERPL-SCNC: 139 MMOL/L — SIGNIFICANT CHANGE UP (ref 135–146)
SP GR SPEC: 1.01 — SIGNIFICANT CHANGE UP (ref 1–1.03)
SQUAMOUS # UR AUTO: 8 /HPF — HIGH (ref 0–5)
TROPONIN T, HIGH SENSITIVITY RESULT: 48 NG/L — HIGH (ref 6–13)
TROPONIN T, HIGH SENSITIVITY RESULT: 51 NG/L — HIGH (ref 6–13)
UROBILINOGEN FLD QL: 1 MG/DL — SIGNIFICANT CHANGE UP (ref 0.2–1)
WBC # BLD: 9.31 K/UL — SIGNIFICANT CHANGE UP (ref 4.8–10.8)
WBC # FLD AUTO: 9.31 K/UL — SIGNIFICANT CHANGE UP (ref 4.8–10.8)
WBC UR QL: 55 /HPF — HIGH (ref 0–5)

## 2024-11-29 PROCEDURE — 93971 EXTREMITY STUDY: CPT | Mod: LT

## 2024-11-29 PROCEDURE — 99285 EMERGENCY DEPT VISIT HI MDM: CPT | Mod: FS

## 2024-11-29 PROCEDURE — 83540 ASSAY OF IRON: CPT

## 2024-11-29 PROCEDURE — 70450 CT HEAD/BRAIN W/O DYE: CPT | Mod: 26,MC

## 2024-11-29 PROCEDURE — 36415 COLL VENOUS BLD VENIPUNCTURE: CPT

## 2024-11-29 PROCEDURE — 83735 ASSAY OF MAGNESIUM: CPT

## 2024-11-29 PROCEDURE — 85025 COMPLETE CBC W/AUTO DIFF WBC: CPT

## 2024-11-29 PROCEDURE — 92610 EVALUATE SWALLOWING FUNCTION: CPT | Mod: GN

## 2024-11-29 PROCEDURE — 71045 X-RAY EXAM CHEST 1 VIEW: CPT | Mod: 26

## 2024-11-29 PROCEDURE — 97162 PT EVAL MOD COMPLEX 30 MIN: CPT | Mod: GP

## 2024-11-29 PROCEDURE — 93010 ELECTROCARDIOGRAM REPORT: CPT

## 2024-11-29 PROCEDURE — 82728 ASSAY OF FERRITIN: CPT

## 2024-11-29 PROCEDURE — 80053 COMPREHEN METABOLIC PANEL: CPT

## 2024-11-29 PROCEDURE — 99223 1ST HOSP IP/OBS HIGH 75: CPT

## 2024-11-29 PROCEDURE — 84484 ASSAY OF TROPONIN QUANT: CPT

## 2024-11-29 PROCEDURE — 84466 ASSAY OF TRANSFERRIN: CPT

## 2024-11-29 PROCEDURE — 80048 BASIC METABOLIC PNL TOTAL CA: CPT

## 2024-11-29 PROCEDURE — 83550 IRON BINDING TEST: CPT

## 2024-11-29 RX ORDER — CEFTRIAXONE SODIUM 1 G
1000 VIAL (EA) INJECTION EVERY 24 HOURS
Refills: 0 | Status: DISCONTINUED | OUTPATIENT
Start: 2024-11-29 | End: 2024-11-30

## 2024-11-29 RX ORDER — ACETAMINOPHEN, DIPHENHYDRAMINE HCL, PHENYLEPHRINE HCL 325; 25; 5 MG/1; MG/1; MG/1
3 TABLET ORAL AT BEDTIME
Refills: 0 | Status: DISCONTINUED | OUTPATIENT
Start: 2024-11-29 | End: 2024-12-02

## 2024-11-29 RX ORDER — HEPARIN SODIUM,PORCINE 1000/ML
5000 VIAL (ML) INJECTION EVERY 12 HOURS
Refills: 0 | Status: DISCONTINUED | OUTPATIENT
Start: 2024-11-29 | End: 2024-12-02

## 2024-11-29 RX ORDER — PANTOPRAZOLE SODIUM 40 MG/1
40 TABLET, DELAYED RELEASE ORAL
Refills: 0 | Status: DISCONTINUED | OUTPATIENT
Start: 2024-11-29 | End: 2024-12-02

## 2024-11-29 RX ORDER — CEFTRIAXONE SODIUM 1 G
1000 VIAL (EA) INJECTION ONCE
Refills: 0 | Status: COMPLETED | OUTPATIENT
Start: 2024-11-29 | End: 2024-11-29

## 2024-11-29 RX ORDER — SODIUM CHLORIDE 9 MG/ML
500 INJECTION, SOLUTION INTRAMUSCULAR; INTRAVENOUS; SUBCUTANEOUS ONCE
Refills: 0 | Status: COMPLETED | OUTPATIENT
Start: 2024-11-29 | End: 2024-11-29

## 2024-11-29 RX ADMIN — Medication 100 MILLIGRAM(S): at 18:14

## 2024-11-29 RX ADMIN — SODIUM CHLORIDE 500 MILLILITER(S): 9 INJECTION, SOLUTION INTRAMUSCULAR; INTRAVENOUS; SUBCUTANEOUS at 19:30

## 2024-11-29 NOTE — H&P ADULT - NSHPLABSRESULTS_GEN_ALL_CORE
LABS:                        10.7   9.31  )-----------( 165      ( 29 Nov 2024 16:20 )             33.6     11-29    139  |  98  |  25[H]  ----------------------------<  122[H]  4.1   |  29  |  1.0    Ca    9.2      29 Nov 2024 16:20    TPro  6.8  /  Alb  3.8  /  TBili  0.3  /  DBili  x   /  AST  20  /  ALT  12  /  AlkPhos  82  11-29        Urinalysis with Rflx Culture (collected 29 Nov 2024 17:50)

## 2024-11-29 NOTE — ED PROVIDER NOTE - CLINICAL SUMMARY MEDICAL DECISION MAKING FREE TEXT BOX
89-year-old female presents with lethargy.  Patient noted to be febrile in the emergency department.  Will obtain labs imaging EKG. of note will hold off on fluids as patient has a history of CHF.  Consider admission. iv antibiotics. Labs and EKG were ordered and reviewed.  Imaging was ordered and reviewed by me.  Appropriate medications for patient's presenting complaints were ordered and effects were reassessed.  Patient's records (prior hospital, ED visit, and/or nursing home notes if available) were reviewed.  Additional history was obtained from EMS, family, and/or PCP (where available).  Escalation to admission/observation was considered.   Patient requires inpatient hospitalization - medicine.

## 2024-11-29 NOTE — ED ADULT NURSE NOTE - NSFALLHARMRISKINTERV_ED_ALL_ED
Assistance OOB with selected safe patient handling equipment if applicable/Assistance with ambulation/Communicate risk of Fall with Harm to all staff, patient, and family/Monitor gait and stability/Provide visual cue: red socks, yellow wristband, yellow gown, etc/Reinforce activity limits and safety measures with patient and family/Bed in lowest position, wheels locked, appropriate side rails in place/Call bell, personal items and telephone in reach/Instruct patient to call for assistance before getting out of bed/chair/stretcher/Non-slip footwear applied when patient is off stretcher/[place] to call system/Physically safe environment - no spills, clutter or unnecessary equipment/Purposeful Proactive Rounding/Room/bathroom lighting operational, light cord in reach

## 2024-11-29 NOTE — ED ADULT TRIAGE NOTE - CHIEF COMPLAINT QUOTE
Patient bibems from [facility] living [facility] in [facility] co lethargy since yesterday worsening today, pt arouses to voice and oriented x3. Pts SpO2 was 80's on RA as per EMS and 100% on 3L nc. Cleared by crit for main.  Patient bibems from [facility] living [facility] in [facility] co lethargy since yesterday worsening today, pt arouses to voice and oriented x3. Pts SpO2 was 80's on RA as per EMS and 100% on 3L nc. Cleared by crit for main.

## 2024-11-29 NOTE — ED PROVIDER NOTE - ATTENDING APP SHARED VISIT CONTRIBUTION OF CARE
89 yr old f w/ a pmh significant for aortic stenosis diabetes breast cancer CHF who presents for change in mental status.  As per nursing home facility patient was found to be lethargic today.  Unknown when last normal was however facility says that the last documented was approximately a month ago.  Patient only complains of weakness denies any nausea vomiting fevers chills chest pain shortness of breath or any other medical complaints.    VITAL SIGNS: I have reviewed nursing notes and confirm.  CONSTITUTIONAL: non-toxic, well appearing  SKIN: no rash, no petechiae.  EYES: EOMI, pink conjunctiva, anicteric  ENT: tongue midline, no exudates, MMM  NECK: Supple; no meningismus, no JVD  CARD: RRR, no murmurs, equal radial pulses bilaterally 2+  RESP: CTAB, no respiratory distress  ABD: Soft, non-tender, non-distended, no peritoneal signs, no HSM, no CVA tenderness  EXT: Normal ROM x4. No edema. No calves tenderness  NEURO: Alert, oriented x3. CN2-12 intact, equal strength bilaterally    89-year-old female presents with lethargy.  Patient noted to be febrile in the emergency department.  Will obtain labs imaging EKG reassess dispo pending of note will hold off on fluids as patient has a history of CHF.  Consider admission.

## 2024-11-29 NOTE — ED PROVIDER NOTE - DISPOSITION TYPE
ADMIT Olumiant Pregnancy And Lactation Text: Based on animal studies, Olumiant may cause embryo-fetal harm when administered to pregnant women.  The medication should not be used in pregnancy.  Breastfeeding is not recommended during treatment.

## 2024-11-29 NOTE — ED ADULT NURSE NOTE - CHIEF COMPLAINT QUOTE
Patient bibems from [facility] living [facility] in [facility] co lethargy since yesterday worsening today, pt arouses to voice and oriented x3. Pts SpO2 was 80's on RA as per EMS and 100% on 3L nc. Cleared by crit for main.

## 2024-11-29 NOTE — H&P ADULT - NSHPPHYSICALEXAM_GEN_ALL_CORE
T(C): 36.2 (11-29-24 @ 21:31), Max: 38 (11-29-24 @ 15:23)  HR: 94 (11-29-24 @ 21:31) (88 - 94)  BP: 112/71 (11-29-24 @ 21:31) (105/71 - 112/71)  RR: 18 (11-29-24 @ 21:31) (18 - 18)  SpO2: 95% (11-29-24 @ 21:31) (95% - 100%)    CONSTITUTIONAL: Well groomed, no apparent distress  RESP: No respiratory distress, no use of accessory muscles; CTA b/l  CV: RRR, +S1S2, no MRG; no JVD; no peripheral edema  GI: Soft, NT, ND, no rebound, no guarding; no palpable masses; no hepatosplenomegaly  SKIN: No rashes or ulcers noted; no subcutaneous nodules or induration palpable  NEURO: Appropriate insight/judgment; A+O x 3 T(C): 36.2 (11-29-24 @ 21:31), Max: 38 (11-29-24 @ 15:23)  HR: 94 (11-29-24 @ 21:31) (88 - 94)  BP: 112/71 (11-29-24 @ 21:31) (105/71 - 112/71)  RR: 18 (11-29-24 @ 21:31) (18 - 18)  SpO2: 95% (11-29-24 @ 21:31) (95% - 100%)    CONSTITUTIONAL: Well groomed, no apparent distress  RESP: No respiratory distress, no use of accessory muscles; CTA b/l  CV: RRR, +S1S2, no MRG; no JVD; no peripheral edema, on room air on my exam  GI: Soft, NT, ND, no rebound, no guarding; no palpable masses; no hepatosplenomegaly  SKIN: No rashes or ulcers noted; no subcutaneous nodules or induration palpable  NEURO: Lethargic, AAO x 2-3 on my exam, answering questions appropriately but falling asleep

## 2024-11-29 NOTE — ED PROVIDER NOTE - OBJECTIVE STATEMENT
89-year-old female with history of JENNIFER on 3L NC O2 in the mornings and evenings, GERD, aortic stenosis, left carotid stenosis, anemia, anxiety, urinary retention (straight catheterizes herself for urination), breast cancer s/p mastectomy and reconstruction and brain cancer s/p craniotomy presents from [facility] for eval of AMS and lethargy. Per NH patient found to be hypotensive and altered, typically AOx2-3 at baseline. Denies and SOB, CP, abd pain, n/v/d.

## 2024-11-29 NOTE — H&P ADULT - ATTENDING COMMENTS
89-year-old female with history of JENNIFER on 3L NC O2 in the mornings and evenings, GERD, HFpEF (EF 55% in 9/24), Aortic Stenosis, Left carotid stenosis, anemia, anxiety, urinary retention (straight catheterizes herself for urination), breast cancer s/p mastectomy and reconstruction and brain cancer s/p craniotomy presents from [facility] for eval of AMS and lethargy worsening today. Pts SpO2 was 80's on RA as per EMS and 100% on 3L nc.    Agree  with assessment  except for changes below.   Vital Signs Last 24 Hrs  T(C): 36.2 (29 Nov 2024 21:31), Max: 38 (29 Nov 2024 15:23)  T(F): 97.2 (29 Nov 2024 21:31), Max: 100.4 (29 Nov 2024 15:23)  HR: 94 (29 Nov 2024 21:31) (88 - 94)  BP: 112/71 (29 Nov 2024 21:31) (105/71 - 112/71)  BP(mean): 85 (29 Nov 2024 21:31) (85 - 85)  RR: 18 (29 Nov 2024 21:31) (18 - 18)  SpO2: 95% (29 Nov 2024 21:31) (95% - 100%)    Parameters below as of 29 Nov 2024 21:31  Patient On (Oxygen Delivery Method): room air    CXR (11.29.24 @ 15:30): No radiographic evidence of acute cardiopulmonary disease.    CT Head No Cont (11.29.24 @ 16:01): 1.  The patient is again noted to be status post left frontoparietal craniotomy/craniotomy with cranioplasty. Streak artifact from the cranioplasty hardware limiting evaluation. 2.  No gross acute intracranial pathology   - s/p Ceftriaxone 1000mg and NS 500cc bolus in the ED      IMPRESSION 89-year-old female with history of JENNIFER on 3L NC O2 in the mornings and evenings, GERD, HFpEF (EF 55% in 9/24), Aortic Stenosis, Left carotid stenosis, anemia, anxiety, urinary retention (straight catheterizes herself for urination), breast cancer s/p mastectomy and reconstruction and brain cancer s/p craniotomy presents from [facility] for eval of AMS and lethargy worsening today. Pts SpO2 was 80's on RA as per EMS and 100% on 3L nc.    Agree  with assessment  except for changes below.   Vital Signs Last 24 Hrs  T(C): 36.2 (29 Nov 2024 21:31), Max: 38 (29 Nov 2024 15:23)  T(F): 97.2 (29 Nov 2024 21:31), Max: 100.4 (29 Nov 2024 15:23)  HR: 94 (29 Nov 2024 21:31) (88 - 94)  BP: 112/71 (29 Nov 2024 21:31) (105/71 - 112/71)  BP(mean): 85 (29 Nov 2024 21:31) (85 - 85)  RR: 18 (29 Nov 2024 21:31) (18 - 18)  SpO2: 95% (29 Nov 2024 21:31) (95% - 100%)    Parameters below as of 29 Nov 2024 21:31  Patient On (Oxygen Delivery Method): room air    CXR (11.29.24 @ 15:30): No radiographic evidence of acute cardiopulmonary disease.    CT Head No Cont (11.29.24 @ 16:01): 1.  The patient is again noted to be status post left frontoparietal craniotomy/craniotomy with cranioplasty. Streak artifact from the cranioplasty hardware limiting evaluation. 2.  No gross acute intracranial pathology   - s/p Ceftriaxone 1000mg and NS 500cc bolus in the ED      IMPRESSION  Acute  Toxic Metabolic  Encephalopathy  Secondary to UTI   Hx  Chronic  Urinary Retention   - EKG: NSR HR 87  - Labs: WBC 9, Hgb 10.7, Trop 48-51, electrolytes within normal  - VBG: pH 7.4, pCO2 57, pO2 25, HCO3 35, lactate 1.1  - UA: small LE, neg nitrite, moderate bacteria, 55 WBC, RVP: negative   - s/p Ceftriaxone 1000mg and NS 500cc bolus in the ED  - Continue Ceftriaxone   - Pt straight catheterizes herself for urination  - Follow up repeat troponin   - Follow up blood cultures (received)  - Follow up urine cultures (received)     Hx Chronic anemia   - Hgb 10.7 on admission  - Follow up iron studies    Hx JENNIFER  - On 3L NC O2 in the mornings and evenings    Hx GERD  - PPI    Hx HFpEF (EF 55% in 9/24)  Hx Aortic stenosis  - TTE (9/2024): EF 50-55%, GIIDD, aortic stenosis        Hx Breast cancer s/p mastectomy and reconstruction  Hx Brain cancer s/p craniotomy  CT Head Findings  noted 89-year-old female with history of JENNIFER on 3L NC O2 in the mornings and evenings, GERD, HFpEF (EF 55% in 9/24), Aortic Stenosis, Left carotid stenosis, anemia, anxiety, urinary retention (straight catheterizes herself for urination), breast cancer s/p mastectomy and reconstruction and brain cancer s/p craniotomy presents from [facility] for eval of AMS and lethargy worsening today. Pts SpO2 was 80's on RA as per EMS and 100% on 3L nc.    Agree  with assessment  except for changes below.   Vital Signs Last 24 Hrs  T(C): 36.2 (29 Nov 2024 21:31), Max: 38 (29 Nov 2024 15:23)  T(F): 97.2 (29 Nov 2024 21:31), Max: 100.4 (29 Nov 2024 15:23)  HR: 94 (29 Nov 2024 21:31) (88 - 94)  BP: 112/71 (29 Nov 2024 21:31) (105/71 - 112/71)  BP(mean): 85 (29 Nov 2024 21:31) (85 - 85)  RR: 18 (29 Nov 2024 21:31) (18 - 18)  SpO2: 95% (29 Nov 2024 21:31) (95% - 100%)    Parameters below as of 29 Nov 2024 21:31  Patient On (Oxygen Delivery Method): room air    CXR (11.29.24 @ 15:30): No radiographic evidence of acute cardiopulmonary disease.    CT Head No Cont (11.29.24 @ 16:01): 1.  The patient is again noted to be status post left frontoparietal craniotomy/craniotomy with cranioplasty. Streak artifact from the cranioplasty hardware limiting evaluation. 2.  No gross acute intracranial pathology   - s/p Ceftriaxone 1000mg and NS 500cc bolus in the ED    PHYSICAL EXAM  GENERAL: NAD, elderly   HEAD:  NCAT, EOMI, MM  NECK: Supple, Nontender  NERVOUS SYSTEM:  AAOx1, NFD  CHEST/LUNG: +bs b/l, No wheezing   HEART: +s1s2 RRR  ABDOMEN: soft, NT/ND  EXTREMITIES:  pp, no edema  SKIN: age related skin changes     IMPRESSION  Acute  Toxic Metabolic  Encephalopathy  Secondary to UTI   Hx  Chronic  Urinary Retention   - EKG: NSR HR 87  - Labs: WBC 9, Hgb 10.7, Trop 48-51, electrolytes within normal  - VBG: pH 7.4, pCO2 57, pO2 25, HCO3 35, lactate 1.1  - UA: small LE, neg nitrite, moderate bacteria, 55 WBC, RVP: negative   - s/p Ceftriaxone 1000mg and NS 500cc bolus in the ED  - Continue Ceftriaxone   - Pt straight catheterizes herself for urination  - Follow up repeat troponin   - Follow up blood cultures (received)  - Follow up urine cultures (received)     Hx Chronic anemia   - Hgb 10.7 on admission  - Follow up iron studies    Hx JENNIFER  - On 3L NC O2 in the mornings and evenings    Hx GERD  - PPI    Hx HFpEF (EF 55% in 9/24)  Hx Aortic stenosis  - TTE (9/2024): EF 50-55%, GIIDD, aortic stenosis        Hx Breast cancer s/p mastectomy and reconstruction  Hx Brain cancer s/p craniotomy  CT Head Findings  noted    Seen on 11/29

## 2024-11-29 NOTE — ED ADULT TRIAGE NOTE - BP NONINVASIVE DIASTOLIC (MM HG)
71
57M who underwent a FB, R thoracotomy, decortication, chest wall reconstruction, lat flap with Dr Jaime on 10/11/18. He was admitted on 1/6/19 to [facility] with a R pleural effusion and respiratory failure,  A R PTC was placed there and he was intubated.  He still had a R SANDY drain in place and he was transferred to [facility]. Patient was found to have aspirated a foreign body and +MRSA empyema. He was treated and discharged on 1/22/2019.   He was found by his home nurse to be in acute distress and recommended to proceed to the ED.  At presentation, he was found to be in acute hypercarbic respiratory failure complicated by hemodynamic instability requiring emergent intubation and pressor support.  Pt with respiratory failure now  s/p Bronch 1/29/19  s/p Trach PEG 1/31/19. He was trach decannulated 2/22/19 and  Trach recannulated 2/24/2019. Patient also found to have MRSA in pleural fluid now finished with antibiotic therapy. 2/28/19 Patient medically stable for discharge to rehab facility per Dr. Jose Alfredo Jaime

## 2024-11-29 NOTE — H&P ADULT - HISTORY OF PRESENT ILLNESS
89-year-old female with history of JENNIFER on 3L NC O2 in the mornings and evenings, GERD, HFpEF (EF 55% in 9/24), aortic stenosis, left carotid stenosis, anemia, anxiety, urinary retention (straight catheterizes herself for urination), breast cancer s/p mastectomy and reconstruction and brain cancer s/p craniotomy presents from [facility] for eval of AMS and lethargy worsening today. Pts SpO2 was 80's on RA as per EMS and 100% on 3L nc   Per NH patient found to be hypotensive and altered, typically AOx2-3 at baseline.  Denies fever/chills, chest pain, SOB, abdominal pain, nausea/vomiting, diarrhea.     Vitals in ED: /71, HR 88, T 100.4, RR 18, SpO2 100% on 3L NC  EKG: NSR HR 87  Labs: WBC 9, Hgb 10.7, Trop 48-51, electrolytes within normal  VBG: pH 7.4, pCO2 57, pO2 25, HCO3 35, lactate 1.1  UA: small LE, neg nitrite, moderate bacteria, 55 WBC  RVP: negative   Imaging:   - CXR (11.29.24 @ 15:30): No radiographic evidence of acute cardiopulmonary disease.  - CT Head No Cont (11.29.24 @ 16:01): 1.  The patient is again noted to be status post left frontoparietal craniotomy/craniotomy with cranioplasty. Streak artifact from the cranioplasty hardware limiting evaluation. 2.  No gross acute intracranial pathology     In the ED, patient received Ceftriaxone 1000mg and NS 500cc bolus.

## 2024-11-29 NOTE — H&P ADULT - ASSESSMENT
89-year-old female with history of JENNIFER on 3L NC O2 in the mornings and evenings, GERD, HFpEF (EF 55% in 9/24), aortic stenosis, left carotid stenosis, anemia, anxiety, urinary retention (straight catheterizes herself for urination), breast cancer s/p mastectomy and reconstruction and brain cancer s/p craniotomy presents [facility] for eval of AMS and lethargy worsening today. Pts SpO2 was 80's on RA as per EMS and 100% on 3L nc   Per NH patient found to be hypotensive and altered, typically AOx2-3 at baseline.  Denies fever/chills, chest pain, SOB, abdominal pain, nausea/vomiting, diarrhea.       #Weakness, AMS  - Vitals in ED: /71, HR 88, T 100.4, RR 18, SpO2 100% on 3L NC  - EKG: NSR HR 87  - Labs: WBC 9, Hgb 10.7, Trop 48-51, electrolytes within normal  - VBG: pH 7.4, pCO2 57, pO2 25, HCO3 35, lactate 1.1  - UA: small LE, neg nitrite, moderate bacteria, 55 WBC  - RVP: negative   - CXR (11.29.24 @ 15:30): No radiographic evidence of acute cardiopulmonary disease.  - CT Head No Cont (11.29.24 @ 16:01): 1.  The patient is again noted to be status post left frontoparietal craniotomy/craniotomy with cranioplasty. Streak artifact from the cranioplasty hardware limiting evaluation. 2.  No gross acute intracranial pathology   - s/p Ceftriaxone 1000mg and NS 500cc bolus in the ED    - Continue Ceftriaxone   - Follow up repeat troponin   - Follow up blood cultures (received)  - Follow up urine cultures (received)     INCOMPLETE NOTE    #Chronic anemia   - Hgb 10.7 on admission  - Follow up iron studies    #JENNIFER  - On 3L NC O2 in the mornings and evenings    #GERD  - PPO    #HFpEF (EF 55% in 9/24)  #Aortic stenosis  - TTE (9/2024): EF 50-55%, GIIDD, aortic stenosis    #Urinary retention   - Pt straight catheterizes herself for urination    #Breast cancer s/p mastectomy and reconstruction  #Brain cancer s/p craniotomy            #DVT ppx: Lovenox   #GI ppx: Pantoprazole  #Diet: DASH  #Activity: IAT  #Code:  #Dispo: from [place], admit to Medicine  #Medrec    #Handoff    89-year-old female with history of JENNIFER on 3L NC O2 in the mornings and evenings, GERD, HFpEF (EF 55% in 9/24), aortic stenosis, left carotid stenosis, anemia, anxiety, urinary retention (straight catheterizes herself for urination), breast cancer s/p mastectomy and reconstruction and brain cancer s/p craniotomy presents from [facility] assisted living for eval of AMS and lethargy worsening today. Pts SpO2 was 80's on RA as per EMS and 100% on 3L nc   Per NH patient found to be hypotensive and altered, typically AOx2-3 at baseline.  Denies fever/chills, chest pain, SOB, abdominal pain, nausea/vomiting, diarrhea.     #Weakness, AMS  - Vitals in ED: /71, HR 88, T 100.4, RR 18, SpO2 100% on 3L NC  - EKG: NSR HR 87  - Labs: WBC 9, Hgb 10.7, Trop 48-51, electrolytes within normal  - VBG: pH 7.4, pCO2 57, pO2 25, HCO3 35, lactate 1.1  - UA: small LE, neg nitrite, moderate bacteria, 55 WBC  - RVP: negative   - CXR (11.29.24 @ 15:30): No radiographic evidence of acute cardiopulmonary disease.  - CT Head No Cont (11.29.24 @ 16:01): 1.  The patient is again noted to be status post left frontoparietal craniotomy/craniotomy with cranioplasty. Streak artifact from the cranioplasty hardware limiting evaluation. 2.  No gross acute intracranial pathology   - s/p Ceftriaxone 1000mg and NS 500cc bolus in the ED  - Continue Ceftriaxone   - Follow up repeat troponin   - Follow up blood cultures (received)  - Follow up urine cultures (received)     #Chronic anemia   - Hgb 10.7 on admission  - Follow up iron studies    #JENNIFER  - On 3L NC O2 in the mornings and evenings    #GERD  - PPI    #HFpEF (EF 55% in 9/24)  #Aortic stenosis  - TTE (9/2024): EF 50-55%, GIIDD, aortic stenosis    #Urinary retention   - Pt straight catheterizes herself for urination    #Breast cancer s/p mastectomy and reconstruction  #Brain cancer s/p craniotomy            #DVT ppx: Lovenox   #GI ppx: Pantoprazole  #Diet: DASH  #Activity: IAT  #Dispo: from [place], admit to Medicine  #Medrec: Called sun[facility] > 5 times with no response. No papers from [facility] in the patient's chart. Please confirm patient medrec in AM.    #Handoff  - Please confirm patient medrec in AM.  - Follow up repeat troponin   - Follow up blood cultures (received)  - Follow up urine cultures (received)       89-year-old female with history of JENNIFER on 3L NC O2 in the mornings and evenings, GERD, HFpEF (EF 55% in 9/24), aortic stenosis, left carotid stenosis, anemia, anxiety, urinary retention (straight catheterizes herself for urination), breast cancer s/p mastectomy and reconstruction and brain cancer s/p craniotomy presents from [facility] assisted living for eval of AMS and lethargy worsening today. Pts SpO2 was 80's on RA as per EMS and 100% on 3L nc   Per NH patient found to be hypotensive and altered, typically AOx2-3 at baseline.  Denies fever/chills, chest pain, SOB, abdominal pain, nausea/vomiting, diarrhea.     #Weakness, AMS  - Vitals in ED: /71, HR 88, T 100.4, RR 18, SpO2 100% on 3L NC  - EKG: NSR HR 87  - Labs: WBC 9, Hgb 10.7, Trop 48-51, electrolytes within normal  - VBG: pH 7.4, pCO2 57, pO2 25, HCO3 35, lactate 1.1  - UA: small LE, neg nitrite, moderate bacteria, 55 WBC  - RVP: negative   - CXR (11.29.24 @ 15:30): No radiographic evidence of acute cardiopulmonary disease.  - CT Head No Cont (11.29.24 @ 16:01): 1.  The patient is again noted to be status post left frontoparietal craniotomy/craniotomy with cranioplasty. Streak artifact from the cranioplasty hardware limiting evaluation. 2.  No gross acute intracranial pathology   - s/p Ceftriaxone 1000mg and NS 500cc bolus in the ED  - Continue Ceftriaxone   - Follow up repeat troponin   - Follow up blood cultures (received)  - Follow up urine cultures (received)     #Chronic anemia   - Hgb 10.7 on admission  - Follow up iron studies    #JENNIFER  - On 3L NC O2 in the mornings and evenings    #GERD  - PPI    #HFpEF (EF 55% in 9/24)  #Aortic stenosis  - TTE (9/2024): EF 50-55%, GIIDD, aortic stenosis    #Urinary retention   - Pt straight catheterizes herself for urination    #Breast cancer s/p mastectomy and reconstruction  #Brain cancer s/p craniotomy            #DVT ppx: Heparin  #GI ppx: Pantoprazole  #Diet: DASH  #Activity: IAT  #Dispo: from [place], admit to Medicine  #Medrec: Called sunrise > 5 times with no response. No papers from sunrise in the patient's chart. Please confirm patient medrec in AM.    #Handoff  - Please confirm patient medrec in AM.  - Follow up repeat troponin   - Follow up blood cultures (received)  - Follow up urine cultures (received)

## 2024-11-30 LAB
ALBUMIN SERPL ELPH-MCNC: 3.5 G/DL — SIGNIFICANT CHANGE UP (ref 3.5–5.2)
ALP SERPL-CCNC: 79 U/L — SIGNIFICANT CHANGE UP (ref 30–115)
ALT FLD-CCNC: 11 U/L — SIGNIFICANT CHANGE UP (ref 0–41)
ANION GAP SERPL CALC-SCNC: 11 MMOL/L — SIGNIFICANT CHANGE UP (ref 7–14)
AST SERPL-CCNC: 19 U/L — SIGNIFICANT CHANGE UP (ref 0–41)
BASOPHILS # BLD AUTO: 0.03 K/UL — SIGNIFICANT CHANGE UP (ref 0–0.2)
BASOPHILS NFR BLD AUTO: 0.4 % — SIGNIFICANT CHANGE UP (ref 0–1)
BILIRUB SERPL-MCNC: 0.4 MG/DL — SIGNIFICANT CHANGE UP (ref 0.2–1.2)
BUN SERPL-MCNC: 21 MG/DL — HIGH (ref 10–20)
CALCIUM SERPL-MCNC: 9.3 MG/DL — SIGNIFICANT CHANGE UP (ref 8.4–10.5)
CHLORIDE SERPL-SCNC: 99 MMOL/L — SIGNIFICANT CHANGE UP (ref 98–110)
CO2 SERPL-SCNC: 28 MMOL/L — SIGNIFICANT CHANGE UP (ref 17–32)
CREAT SERPL-MCNC: 0.9 MG/DL — SIGNIFICANT CHANGE UP (ref 0.7–1.5)
E COLI DNA BLD POS QL NAA+NON-PROBE: SIGNIFICANT CHANGE UP
EGFR: 60 ML/MIN/1.73M2 — SIGNIFICANT CHANGE UP
EOSINOPHIL # BLD AUTO: 0.06 K/UL — SIGNIFICANT CHANGE UP (ref 0–0.7)
EOSINOPHIL NFR BLD AUTO: 0.8 % — SIGNIFICANT CHANGE UP (ref 0–8)
FERRITIN SERPL-MCNC: 44 NG/ML — SIGNIFICANT CHANGE UP (ref 13–330)
GLUCOSE SERPL-MCNC: 189 MG/DL — HIGH (ref 70–99)
GRAM STN FLD: ABNORMAL
HCT VFR BLD CALC: 33.3 % — LOW (ref 37–47)
HGB BLD-MCNC: 10.5 G/DL — LOW (ref 12–16)
IMM GRANULOCYTES NFR BLD AUTO: 0.4 % — HIGH (ref 0.1–0.3)
IRON SATN MFR SERPL: 18 UG/DL — LOW (ref 35–150)
IRON SATN MFR SERPL: 5 % — LOW (ref 15–50)
LYMPHOCYTES # BLD AUTO: 0.57 K/UL — LOW (ref 1.2–3.4)
LYMPHOCYTES # BLD AUTO: 7.2 % — LOW (ref 20.5–51.1)
MAGNESIUM SERPL-MCNC: 2 MG/DL — SIGNIFICANT CHANGE UP (ref 1.8–2.4)
MCHC RBC-ENTMCNC: 28.6 PG — SIGNIFICANT CHANGE UP (ref 27–31)
MCHC RBC-ENTMCNC: 31.5 G/DL — LOW (ref 32–37)
MCV RBC AUTO: 90.7 FL — SIGNIFICANT CHANGE UP (ref 81–99)
METHOD TYPE: SIGNIFICANT CHANGE UP
MONOCYTES # BLD AUTO: 0.59 K/UL — SIGNIFICANT CHANGE UP (ref 0.1–0.6)
MONOCYTES NFR BLD AUTO: 7.4 % — SIGNIFICANT CHANGE UP (ref 1.7–9.3)
NEUTROPHILS # BLD AUTO: 6.69 K/UL — HIGH (ref 1.4–6.5)
NEUTROPHILS NFR BLD AUTO: 83.8 % — HIGH (ref 42.2–75.2)
NRBC # BLD: 0 /100 WBCS — SIGNIFICANT CHANGE UP (ref 0–0)
PLATELET # BLD AUTO: 148 K/UL — SIGNIFICANT CHANGE UP (ref 130–400)
PMV BLD: 10.2 FL — SIGNIFICANT CHANGE UP (ref 7.4–10.4)
POTASSIUM SERPL-MCNC: 3.9 MMOL/L — SIGNIFICANT CHANGE UP (ref 3.5–5)
POTASSIUM SERPL-SCNC: 3.9 MMOL/L — SIGNIFICANT CHANGE UP (ref 3.5–5)
PROT SERPL-MCNC: 6.9 G/DL — SIGNIFICANT CHANGE UP (ref 6–8)
RBC # BLD: 3.67 M/UL — LOW (ref 4.2–5.4)
RBC # FLD: 14.2 % — SIGNIFICANT CHANGE UP (ref 11.5–14.5)
SODIUM SERPL-SCNC: 138 MMOL/L — SIGNIFICANT CHANGE UP (ref 135–146)
SPECIMEN SOURCE: SIGNIFICANT CHANGE UP
SPECIMEN SOURCE: SIGNIFICANT CHANGE UP
TIBC SERPL-MCNC: 331 UG/DL — SIGNIFICANT CHANGE UP (ref 220–430)
TRANSFERRIN SERPL-MCNC: 277 MG/DL — SIGNIFICANT CHANGE UP (ref 200–360)
TROPONIN T, HIGH SENSITIVITY RESULT: 48 NG/L — HIGH (ref 6–13)
UIBC SERPL-MCNC: 313 UG/DL — SIGNIFICANT CHANGE UP (ref 110–370)
WBC # BLD: 7.97 K/UL — SIGNIFICANT CHANGE UP (ref 4.8–10.8)
WBC # FLD AUTO: 7.97 K/UL — SIGNIFICANT CHANGE UP (ref 4.8–10.8)

## 2024-11-30 PROCEDURE — 93971 EXTREMITY STUDY: CPT | Mod: 26,LT

## 2024-11-30 PROCEDURE — 99232 SBSQ HOSP IP/OBS MODERATE 35: CPT

## 2024-11-30 RX ORDER — CLONAZEPAM 0.12 MG/1
1 TABLET, ORALLY DISINTEGRATING ORAL
Refills: 0 | Status: DISCONTINUED | OUTPATIENT
Start: 2024-11-30 | End: 2024-12-02

## 2024-11-30 RX ORDER — TRAZODONE HYDROCHLORIDE 150 MG/1
0 TABLET ORAL
Refills: 0 | DISCHARGE

## 2024-11-30 RX ORDER — CEFEPIME 2 G/1
1000 INJECTION, POWDER, FOR SOLUTION INTRAVENOUS EVERY 12 HOURS
Refills: 0 | Status: DISCONTINUED | OUTPATIENT
Start: 2024-11-30 | End: 2024-11-30

## 2024-11-30 RX ORDER — FUROSEMIDE 40 MG/1
20 TABLET ORAL DAILY
Refills: 0 | Status: DISCONTINUED | OUTPATIENT
Start: 2024-11-30 | End: 2024-11-30

## 2024-11-30 RX ORDER — CLONAZEPAM 0.12 MG/1
1 TABLET, ORALLY DISINTEGRATING ORAL
Refills: 0 | DISCHARGE

## 2024-11-30 RX ORDER — CEFTRIAXONE SODIUM 1 G
2000 VIAL (EA) INJECTION EVERY 24 HOURS
Refills: 0 | Status: DISCONTINUED | OUTPATIENT
Start: 2024-12-01 | End: 2024-12-02

## 2024-11-30 RX ORDER — POLYETHYLENE GLYCOL 3350 17 G/17G
17 POWDER, FOR SOLUTION ORAL
Refills: 0 | DISCHARGE

## 2024-11-30 RX ORDER — CLONAZEPAM 0.12 MG/1
1 TABLET, ORALLY DISINTEGRATING ORAL
Refills: 0 | Status: DISCONTINUED | OUTPATIENT
Start: 2024-11-30 | End: 2024-11-30

## 2024-11-30 RX ORDER — CEFEPIME 2 G/1
1000 INJECTION, POWDER, FOR SOLUTION INTRAVENOUS ONCE
Refills: 0 | Status: DISCONTINUED | OUTPATIENT
Start: 2024-11-30 | End: 2024-11-30

## 2024-11-30 RX ORDER — TRAZODONE HYDROCHLORIDE 150 MG/1
100 TABLET ORAL AT BEDTIME
Refills: 0 | Status: DISCONTINUED | OUTPATIENT
Start: 2024-11-30 | End: 2024-12-02

## 2024-11-30 RX ORDER — POLYETHYLENE GLYCOL 3350 17 G/17G
17 POWDER, FOR SOLUTION ORAL DAILY
Refills: 0 | Status: DISCONTINUED | OUTPATIENT
Start: 2024-11-30 | End: 2024-12-02

## 2024-11-30 RX ORDER — CHLORHEXIDINE GLUCONATE 1.2 MG/ML
1 RINSE ORAL
Refills: 0 | Status: DISCONTINUED | OUTPATIENT
Start: 2024-11-30 | End: 2024-12-02

## 2024-11-30 RX ADMIN — PANTOPRAZOLE SODIUM 40 MILLIGRAM(S): 40 TABLET, DELAYED RELEASE ORAL at 05:09

## 2024-11-30 RX ADMIN — Medication 5000 UNIT(S): at 18:31

## 2024-11-30 RX ADMIN — TRAZODONE HYDROCHLORIDE 100 MILLIGRAM(S): 150 TABLET ORAL at 21:30

## 2024-11-30 RX ADMIN — CLONAZEPAM 1 MILLIGRAM(S): 0.12 TABLET, ORALLY DISINTEGRATING ORAL at 10:03

## 2024-11-30 RX ADMIN — Medication 25 MILLIGRAM(S): at 23:52

## 2024-11-30 RX ADMIN — Medication 100 MILLIGRAM(S): at 05:05

## 2024-11-30 RX ADMIN — Medication 5000 UNIT(S): at 05:09

## 2024-11-30 RX ADMIN — CEFEPIME 100 MILLIGRAM(S): 2 INJECTION, POWDER, FOR SOLUTION INTRAVENOUS at 08:56

## 2024-11-30 RX ADMIN — CHLORHEXIDINE GLUCONATE 1 APPLICATION(S): 1.2 RINSE ORAL at 18:32

## 2024-11-30 RX ADMIN — Medication 81 MILLIGRAM(S): at 11:31

## 2024-11-30 RX ADMIN — POLYETHYLENE GLYCOL 3350 17 GRAM(S): 17 POWDER, FOR SOLUTION ORAL at 11:29

## 2024-11-30 RX ADMIN — Medication 25 MILLIGRAM(S): at 13:52

## 2024-11-30 RX ADMIN — Medication 10 MILLIGRAM(S): at 21:30

## 2024-11-30 RX ADMIN — Medication 100 MILLIGRAM(S): at 23:29

## 2024-11-30 NOTE — SWALLOW BEDSIDE ASSESSMENT ADULT - SWALLOW EVAL: DIAGNOSIS
tolerated regular textures and thin liquids via self fed straw sip with no overt s/s of aspiration/ penetration.

## 2024-11-30 NOTE — SWALLOW BEDSIDE ASSESSMENT ADULT - SLP PERTINENT HISTORY OF CURRENT PROBLEM
89-year-old female with history of JENNIFER on 3L NC O2 in the mornings and evenings, GERD, HFpEF (EF 55% in 9/24), aortic stenosis, left carotid stenosis, anemia, anxiety, urinary retention (straight catheterizes herself for urination), breast cancer s/p mastectomy and reconstruction and brain cancer s/p craniotomy presents from [facility] assisted living for eval of AMS and lethargy. Per NH pt found to be hypotensive and altered, typically AOx2-3 at baseline. CXR (11.29.24 @ 15:30): No radiographic evidence of acute cardiopulmonary disease.- CT Head No Cont (11.29.24 @ 16:01): 1.  The patient is again noted to be status post left frontoparietal craniotomy/craniotomy with cranioplasty. Streak artifact from the cranioplasty hardware limiting evaluation. 2.  No gross acute intracranial pathology

## 2024-11-30 NOTE — CONSULT NOTE ADULT - SUBJECTIVE AND OBJECTIVE BOX
MARISA DYKES  89y, Female  Allergies    No Known Allergies    Intolerances    LOS  1d    HPI  HPI:   89-year-old female with history of JENNIFER on 3L NC O2 in the mornings and evenings, GERD, HFpEF (EF 55% in 9/24), aortic stenosis, left carotid stenosis, anemia, anxiety, urinary retention (straight catheterizes herself for urination), breast cancer s/p mastectomy and reconstruction and brain cancer s/p craniotomy presents from [facility] assisted living for eval of AMS and lethargy worsening today. Pts SpO2 was 80's on RA as per EMS and 100% on 3L nc   Per NH patient found to be hypotensive and altered, typically AOx2-3 at baseline.  Denies fever/chills, chest pain, SOB, abdominal pain, nausea/vomiting, diarrhea.     Vitals in ED: /71, HR 88, T 100.4, RR 18, SpO2 100% on 3L NC  EKG: NSR HR 87  Labs: WBC 9, Hgb 10.7, Trop 48-51, electrolytes within normal  VBG: pH 7.4, pCO2 57, pO2 25, HCO3 35, lactate 1.1  UA: small LE, neg nitrite, moderate bacteria, 55 WBC  RVP: negative   Imaging:   - CXR (11.29.24 @ 15:30): No radiographic evidence of acute cardiopulmonary disease.  - CT Head No Cont (11.29.24 @ 16:01): 1.  The patient is again noted to be status post left frontoparietal craniotomy/craniotomy with cranioplasty. Streak artifact from the cranioplasty hardware limiting evaluation. 2.  No gross acute intracranial pathology     In the ED, patient received Ceftriaxone 1000mg and NS 500cc bolus.     (29 Nov 2024 21:37)      INFECTIOUS DISEASE HISTORY:  Hospital course-  ID consulted for antimicrobial recommendations.     Prior hospital charts reviewed [Yes]  Primary team notes reviewed [Yes]  Other consultant notes reviewed [Yes]    REVIEW OF SYSTEMS:  CONSTITUTIONAL: No fever or chills  HEENT: No sore throat  RESPIRATORY: No cough, no shortness of breath  CARDIOVASCULAR: No chest pain or palpitations  GASTROINTESTINAL: No abdominal or epigastric pain  GENITOURINARY: No dysuria  NEUROLOGICAL: No headache/dizziness  MSK: No joint pain, erythema, or swelling; no back pain  SKIN: No itching, rashes  All other ROS negative except noted above    PAST MEDICAL & SURGICAL HISTORY:  Migraine      H/O brain tumor  benign 1977      Breast CA      Urinary retention with incomplete bladder emptying  pt self catheterizes  3-4 x day      H/O mastectomy, bilateral  1984      H/O craniotomy  1976      SOCIAL HISTORY:  - No recent travel    FAMILY HISTORY: No pertinent PMH for first degree relative.       ANTIMICROBIALS:      ANTIMICROBIALS (past 90 days):  MEDICATIONS  (STANDING):    cefepime   IVPB   100 mL/Hr IV Intermittent (11-30-24 @ 08:56)    cefTRIAXone   IVPB   100 mL/Hr IV Intermittent (11-29-24 @ 18:14)    cefTRIAXone   IVPB   100 mL/Hr IV Intermittent (11-30-24 @ 05:05)        OTHER MEDS:   MEDICATIONS  (STANDING):  aspirin enteric coated 81 daily  atorvastatin 10 at bedtime  clonazePAM  Tablet 1 two times a day  heparin   Injectable 5000 every 12 hours  melatonin 3 at bedtime PRN  pantoprazole    Tablet 40 before breakfast  polyethylene glycol 3350 17 daily  QUEtiapine 25 two times a day PRN  traZODone 100 at bedtime      VITALS:  Vital Signs Last 24 Hrs  T(F): 97.7 (11-30-24 @ 04:17), Max: 100.4 (11-29-24 @ 15:23)    Vital Signs Last 24 Hrs  HR: 85 (11-30-24 @ 04:17) (85 - 94)  BP: 102/64 (11-30-24 @ 04:17) (102/64 - 112/71)  RR: 18 (11-30-24 @ 04:17)  SpO2: 96% (11-30-24 @ 08:17) (94% - 100%)  Wt(kg): --    EXAM:  GENERAL: NAD, frail looking On NC  HEAD: No head lesions  NECK: Supple  CHEST/LUNG: Shallow breath sounds.   HEART: S1 S2  ABDOMEN: Soft, nontender  EXTREMITIES: No clubbing  NERVOUS SYSTEM: Awake. Minimally interactive.   MSK: No joint erythema, swelling or pain  SKIN: No rashes or lesions, no superficial thrombophlebitis    Labs:                        10.5   7.97  )-----------( 148      ( 30 Nov 2024 09:35 )             33.3     11-30    138  |  99  |  21[H]  ----------------------------<  189[H]  3.9   |  28  |  0.9    Ca    9.3      30 Nov 2024 09:35  Mg     2.0     11-30    TPro  6.9  /  Alb  3.5  /  TBili  0.4  /  DBili  x   /  AST  19  /  ALT  11  /  AlkPhos  79  11-30      WBC Trend:  WBC Count: 7.97 (11-30-24 @ 09:35)  WBC Count: 9.31 (11-29-24 @ 16:20)      Auto Neutrophil #: 6.69 K/uL (11-30-24 @ 09:35)  Auto Neutrophil #: 7.46 K/uL (11-29-24 @ 16:20)  Auto Neutrophil #: 2.88 K/uL (10-23-24 @ 05:09)  Auto Neutrophil #: 4.13 K/uL (10-14-24 @ 08:56)  Auto Neutrophil #: 4.33 K/uL (09-30-24 @ 08:19)      Creatine Trend:  Creatinine: 0.9 (11-30)  Creatinine: 1.0 (11-29)      Liver Biochemical Testing Trend:  Alanine Aminotransferase (ALT/SGPT): 11 (11-30)  Alanine Aminotransferase (ALT/SGPT): 12 (11-29)  Alanine Aminotransferase (ALT/SGPT): 13 (10-23)  Alanine Aminotransferase (ALT/SGPT): 12 (10-14)  Alanine Aminotransferase (ALT/SGPT): 14 (09-28)  Aspartate Aminotransferase (AST/SGOT): 19 (11-30-24 @ 09:35)  Aspartate Aminotransferase (AST/SGOT): 20 (11-29-24 @ 16:20)  Aspartate Aminotransferase (AST/SGOT): 23 (10-23-24 @ 05:09)  Aspartate Aminotransferase (AST/SGOT): 24 (10-14-24 @ 08:56)  Aspartate Aminotransferase (AST/SGOT): 22 (09-28-24 @ 19:55)  Bilirubin Total: 0.4 (11-30)  Bilirubin Total: 0.3 (11-29)  Bilirubin Total: 0.3 (10-23)  Bilirubin Total: 0.3 (10-14)  Bilirubin Total: 0.4 (09-28)      Trend LDH      Auto Eosinophil %: 0.8 % (11-30-24 @ 09:35)  Auto Eosinophil %: 0.8 % (11-29-24 @ 16:20)      Urinalysis Basic - ( 30 Nov 2024 09:35 )    Color: x / Appearance: x / SG: x / pH: x  Gluc: 189 mg/dL / Ketone: x  / Bili: x / Urobili: x   Blood: x / Protein: x / Nitrite: x   Leuk Esterase: x / RBC: x / WBC x   Sq Epi: x / Non Sq Epi: x / Bacteria: x        MICROBIOLOGY:    Female    Urinalysis with Rflx Culture (collected 29 Nov 2024 17:50)    Culture - Blood (collected 29 Nov 2024 17:31)  Source: .Blood BLOOD  Gram Stain (30 Nov 2024 08:34):    Growth in aerobic bottle: Gram Negative Rods  Preliminary Report (30 Nov 2024 08:34):    Growth in aerobic bottle: Gram Negative Rods    Culture - Blood (collected 29 Nov 2024 17:31)  Source: .Blood BLOOD  Gram Stain (30 Nov 2024 09:19):    Growth in anaerobic bottle: Gram Negative Rods    Growth in aerobic bottle: Gram Negative Rods  Preliminary Report (30 Nov 2024 09:19):    Growth in anaerobic bottle: Gram Negative Rods    Growth in aerobic bottle: Gram Negative Rods    Direct identification is available within approximately 3-5    hours either by Blood Panel Multiplexed PCR or Direct    MALDI-TOF. Details: [email or web address]  Organism: Blood Culture PCR (30 Nov 2024 09:00)  Organism: Blood Culture PCR (30 Nov 2024 09:00)      Method Type: PCR      -  Escherichia coli: Detec    Troponin T, High Sensitivity Result: 48 (11-30)  Troponin T, High Sensitivity Result: 51 (11-29)  Troponin T, High Sensitivity Result: 48 (11-29)    Blood Gas Venous - Lactate: 1.1 (11-29 @ 15:41)        INFLAMMATORY MARKERS      RADIOLOGY & ADDITIONAL TESTS:  I have personally reviewed the imagings.  CXR  Xray Chest 1 View- PORTABLE-Urgent:   ACC: [id number] EXAM:  XR CHEST PORTABLE URGENT 1V   ORDERED BY: TOAN TAYLOR     PROCEDURE DATE:  11/29/2024          INTERPRETATION:  CLINICAL HISTORY: Chest pain    COMPARISON: 10/23/2024.    TECHNIQUE: Frontal    FINDINGS:    Support devices: None.    Cardiac/mediastinum/hilum: Stable.    Lung parenchyma/Pleura: No focal parenchymal opacities, pleural   effusions, or pneumothorax.    Skeleton/soft tissues: Stable.      IMPRESSION:    No radiographic evidence of acute cardiopulmonary disease.    --- End of Report ---            SANDI JEFF MD; Attending Radiologist  This document has been electronically signed. Nov 29 2024  3:45PM (11-29-24 @ 15:30)      CT    < from: CT Head No Cont (11.29.24 @ 16:01) >  IMPRESSION:    1.  The patient is again noted to be status post left frontoparietal   craniotomy/craniotomy with cranioplasty. Streak artifact from the   cranioplasty hardware limiting evaluation    2.  No gross acute intracranial pathology    --- End of Report ---        < end of copied text >  < from: Xray Chest 1 View- PORTABLE-Urgent (11.29.24 @ 15:30) >    IMPRESSION:    No radiographic evidence of acute cardiopulmonary disease.    --- End of Report ---    < end of copied text >    CARDIOLOGY TESTING  12 Lead ECG:   Ventricular Rate 87 BPM    Atrial Rate 87 BPM    P-R Interval 136 ms    QRS Duration 116 ms    Q-T Interval 406 ms    QTC Calculation(Bazett) 488 ms    P Axis 13 degrees    R Axis 84 degrees    T Axis 55 degrees    Diagnosis Line Normal sinus rhythm  Incomplete right bundle branch block  Borderline ECG    Confirmed by EDEN NEWMAN, ELSA ([id number]) on 11/30/2024 6:10:46 AM (11-29-24 @ 16:44)

## 2024-11-30 NOTE — PROVIDER CONTACT NOTE (OTHER) - SITUATION
Patient admitted to 3B from ED. Upon assessment patient c/o of left upper arm pain. Tourniquet was noted on left upper arm .

## 2024-11-30 NOTE — SWALLOW BEDSIDE ASSESSMENT ADULT - SWALLOW EVAL: BUCCAL STRENGTH AND MOBILITY
H & P


Time Seen by Provider: 05/14/18 16:52


HPI/ROS: 





CHIEF COMPLAINT:  Black stool





HISTORY OF PRESENT ILLNESS:  Patient presents from the clinic with a chief 

complaint of"I have black pee coming out of my butt"since last Wednesday.  The 

patient had last chemotherapy 5 months ago.  He has been off all medications 

for the last 2 weeks including ibuprofen.  Does not have a history of ulcer and 

says that he has not had any oral intake in about a week.  He says been having 

watery black stool for the past week which is severe and worse with any oral 

intake.  Associated with some mild diffuse abdominal cramping but not with 

dizziness or syncope.





REVIEW OF SYSTEMS:


Eye: no change in vision


ENT: no sore throat


Cardiac: no chest pain or syncope


Pulmonary: no cough or SOB


Abdomen:  HPI vomited once last week but none today


Musculoskeletal: no back pain


Skin: no rash


Neuro: no headache


Constitutional: no fever


: no urinary symptoms





A comprehensive 10 point review of systems is otherwise negative aside from 

elements mentioned in the history of present illness.





PAST MEDICAL HISTORY:  Includes hypertension, neuropathy, anal cancer.





Social history:  Tobacco smoker





General Appearance: Alert and conversant, cooperative.


Eyes: No scleral  icterus. 


ENT, Mouth:  Dry mucous membranes


Respiratory: Normal respiratory effort, breath sounds equal, lungs are clear to 

auscultation.


Cardiovascular:  Regular rate and rhythm.  Tachycardic without murmur.


Gastrointestinal:  Mild tenderness but no rebound or guarding, rectal exam 

shows slightly dark stool sent for Hemoccult.


Neurological: Alert, face symmetric, normal motor and sensory in extremities. 


Skin: Warm and dry, no rashes.


Musculoskeletal: No peripheral edema.


Psychiatric: Not agitated.





Emergency Department course/MDM:


I-STAT and CT, hematocrit, fecal occult blood, IV fluids.





1845; results discussed patient wants to be discharged which I think is 

reasonable.  Does not appear to have GI bleed or acute surgical abdominal 

process.  Mild dehydration is resolving with IV fluids.  Patient tells me he 

wants to leave and go to [employer] and order a sandwich which I think is 

reasonable.


Smoking Status: Current every day smoker


Constitutional: 


 Initial Vital Signs











Temperature (C)  36.7 C   05/14/18 16:34


 


Heart Rate  104 H  05/14/18 16:34


 


Respiratory Rate  18   05/14/18 16:34


 


Blood Pressure  131/81 H  05/14/18 16:34


 


O2 Sat (%)  96   05/14/18 16:34








 











O2 Delivery Mode               Room Air














Allergies/Adverse Reactions: 


 





Penicillins Allergy (Intermediate, Verified 05/14/18 16:33)


 Rash








Home Medications: 














 Medication  Instructions  Recorded


 


Lidocaine 5% [Lidoderm 5% Patch] 1 ea TD DAILY 08/11/15


 


Terazosin HCl [Hytrin 2 MG (*)] 2 mg PO HS 08/11/15


 


traMADol [Ultram 50 mg (*)] 50 - 100 mg PO Q8 PRN 08/11/15


 


traZODone [traZODONE 100MG (*)] 200 mg PO HS 08/11/15


 


Baclofen [Baclofen 10 mg (*)] 10 mg PO HS 10/23/17


 


Fluticasone Nasal [Flonase Nasal 2 sprays NASAL DAILY 10/23/17





Spray]  


 


Gabapentin [Neurontin 400 MG (*)] 800 mg PO TID 10/23/17


 


Losartan Potassium [Cozaar 50 mg 50 mg PO DAILY 10/23/17





(*)]  


 


Omeprazole [Prilosec 20 mg] 40 mg PO DAILY 10/23/17


 


oxyCODONE IR [Oxycodone Ir (*)] 5 mg PO BID PRN 10/23/17


 


amLODIPine BESYLATE [Norvasc 2.5 2.5 mg PO DAILY #30 tab 10/29/17





mg (*)]  


 


Ondansetron HCl [Zofran] 4 mg PO Q4-6PRN PRN #10 tablet 02/08/18














Medical Decision Making





- Diagnostics


Imaging Results: 


 Imaging Impressions





Abdomen CT  05/14/18 17:28


Impression: 1. Chronic or recurrent circumferential rectal wall thickening 

raises the possibility of proctitis vs stable rectal cancer.


2. Diverticulosis without evidence of diverticulitis.


3. Chronic versus recurrent abdominal mesenteric edema without obvious 

underlying cause.


4. Cholelithiasis without secondary evidence of cholecystitis.


5. Bladder hypertrophy likely secondary to prostate enlargement.


 


Results called and discussed with TIFFANI PADRON at 5/14/2018 18:05


 


General information for patients regarding this examination can be found at 

[email or web address].


 


If you have questions or comments about this report, please contact me at [phone] (hospital) or [phone] (cell). 


 








Mild rectal thickening, no intra-abdominal mass or bowel obstruction or other 

reason for abdominal pain per Oppenheimer at 6:05 p.m.


Imaging: Discussed imaging studies w/ On call Radiologist


Differential Diagnosis: 





Differential considered including but not limited to infectious colitis, GI 

bleed, gastroenteritis, other diarrhea, Clostridium difficile.





- Data Points


Laboratory Results: 


 Laboratory Results





 05/14/18 17:12 





 05/14/18 17:12 





 











  05/14/18 05/14/18 05/14/18





  17:27 17:12 17:12


 


WBC      5.65 10^3/uL 10^3/uL





     (3.80-9.50) 


 


RBC      4.31 10^6/uL L 10^6/uL





     (4.40-6.38) 


 


Hgb      16.3 g/dL g/dL





     (13.7-17.5) 


 


POC Hgb  17.0 gm/dL gm/dL    





   (13.7-17.5)   


 


Hct      43.8 % %





     (40.0-51.0) 


 


POC Hct  50 % %    





   (40-51)   


 


MCV      101.6 fL H fL





     (81.5-99.8) 


 


MCH      37.8 pg H pg





     (27.9-34.1) 


 


MCHC      37.2 g/dL H g/dL





     (32.4-36.7) 


 


RDW      12.5 % %





     (11.5-15.2) 


 


Plt Count      147 10^3/uL L 10^3/uL





     (150-400) 


 


MPV      9.4 fL fL





     (8.7-11.7) 


 


Neut % (Auto)      66.7 % %





     (39.3-74.2) 


 


Lymph % (Auto)      20.2 % %





     (15.0-45.0) 


 


Mono % (Auto)      11.3 % %





     (4.5-13.0) 


 


Eos % (Auto)      0.7 % %





     (0.6-7.6) 


 


Baso % (Auto)      0.7 % %





     (0.3-1.7) 


 


Nucleat RBC Rel Count      0.0 % %





     (0.0-0.2) 


 


Absolute Neuts (auto)      3.77 10^3/uL 10^3/uL





     (1.70-6.50) 


 


Absolute Lymphs (auto)      1.14 10^3/uL 10^3/uL





     (1.00-3.00) 


 


Absolute Monos (auto)      0.64 10^3/uL 10^3/uL





     (0.30-0.80) 


 


Absolute Eos (auto)      0.04 10^3/uL 10^3/uL





     (0.03-0.40) 


 


Absolute Basos (auto)      0.04 10^3/uL 10^3/uL





     (0.02-0.10) 


 


Absolute Nucleated RBC      0.00 10^3/uL 10^3/uL





     (0-0.01) 


 


Immature Gran %      0.4 % %





     (0.0-1.1) 


 


Immature Gran #      0.02 10^3/uL 10^3/uL





     (0.00-0.10) 


 


POC Sodium  137 mEq/L mEq/L    





   (135-145)   


 


Sodium    137 mEq/L mEq/L  





    (135-145)  


 


POC Potassium  2.8 mEq/L L mEq/L    





   (3.3-5.0)   


 


Potassium    3.3 mEq/L mEq/L  





    (3.3-5.0)  


 


POC Chloride  96 mEq/L L mEq/L    





   ()   


 


Chloride    95 mEq/L L mEq/L  





    ()  


 


Carbon Dioxide    22 mEq/l mEq/l  





    (22-31)  


 


Anion Gap    20 mEq/L H mEq/L  





    (8-16)  


 


POC BUN  17 mg/dL mg/dL    





   (7-23)   


 


BUN    18 mg/dL mg/dL  





    (7-23)  


 


Creatinine    0.8 mg/dL mg/dL  





    (0.7-1.3)  


 


POC Creatinine  1.0 mg/dL mg/dL    





   (0.7-1.3)   


 


Estimated GFR    > 60   





    


 


Glucose    87 mg/dL mg/dL  





    ()  


 


POC Glucose  89 mg/dL mg/dL    





   ()   


 


Calcium    9.2 mg/dL mg/dL  





    (8.5-10.4)  


 


Stool Occult Bld Scrn      





    














  05/14/18





  17:09


 


WBC  





  


 


RBC  





  


 


Hgb  





  


 


POC Hgb  





  


 


Hct  





  


 


POC Hct  





  


 


MCV  





  


 


MCH  





  


 


MCHC  





  


 


RDW  





  


 


Plt Count  





  


 


MPV  





  


 


Neut % (Auto)  





  


 


Lymph % (Auto)  





  


 


Mono % (Auto)  





  


 


Eos % (Auto)  





  


 


Baso % (Auto)  





  


 


Nucleat RBC Rel Count  





  


 


Absolute Neuts (auto)  





  


 


Absolute Lymphs (auto)  





  


 


Absolute Monos (auto)  





  


 


Absolute Eos (auto)  





  


 


Absolute Basos (auto)  





  


 


Absolute Nucleated RBC  





  


 


Immature Gran %  





  


 


Immature Gran #  





  


 


POC Sodium  





  


 


Sodium  





  


 


POC Potassium  





  


 


Potassium  





  


 


POC Chloride  





  


 


Chloride  





  


 


Carbon Dioxide  





  


 


Anion Gap  





  


 


POC BUN  





  


 


BUN  





  


 


Creatinine  





  


 


POC Creatinine  





  


 


Estimated GFR  





  


 


Glucose  





  


 


POC Glucose  





  


 


Calcium  





  


 


Stool Occult Bld Scrn  NEGATIVE 





   (NEGATIVE) 











Medications Given: 


 








Discontinued Medications





Sodium Chloride (Ns)  1,000 mls @ 0 mls/hr IV EDNOW ONE; Wide Open


   PRN Reason: Protocol


   Stop: 05/14/18 17:05


   Last Admin: 05/14/18 17:12 Dose:  1,000 mls


Loperamide HCl ( Imodium)  4 mg PO EDNOW ONE


   Stop: 05/14/18 19:32


   Last Admin: 05/14/18 19:34 Dose:  4 mg





Point of Care Test Results: 


 











  05/14/18





  17:27


 


POC Sodium  137


 


POC Potassium  2.8 L


 


POC Chloride  96 L


 


POC BUN  17


 


POC Creatinine  1.0


 


POC Glucose  89














Departure





- Departure


Disposition: Home, Routine, Self-Care


Clinical Impression: 


Diarrhea


Qualifiers:


 Diarrhea type: unspecified type Qualified Code(s): R19.7 - Diarrhea, 

unspecified





Condition: Good


Instructions:  Acute Diarrhea (ED)


Referrals: 


Caren Knox [Primary Care Provider] - As per Instructions intact

## 2024-12-01 LAB
-  AMPICILLIN/SULBACTAM: SIGNIFICANT CHANGE UP
-  AMPICILLIN: SIGNIFICANT CHANGE UP
-  AZTREONAM: SIGNIFICANT CHANGE UP
-  CEFAZOLIN: SIGNIFICANT CHANGE UP
-  CEFEPIME: SIGNIFICANT CHANGE UP
-  CEFOXITIN: SIGNIFICANT CHANGE UP
-  CEFTRIAXONE: SIGNIFICANT CHANGE UP
-  CIPROFLOXACIN: SIGNIFICANT CHANGE UP
-  ERTAPENEM: SIGNIFICANT CHANGE UP
-  GENTAMICIN: SIGNIFICANT CHANGE UP
-  IMIPENEM: SIGNIFICANT CHANGE UP
-  LEVOFLOXACIN: SIGNIFICANT CHANGE UP
-  MEROPENEM: SIGNIFICANT CHANGE UP
-  PIPERACILLIN/TAZOBACTAM: SIGNIFICANT CHANGE UP
-  TOBRAMYCIN: SIGNIFICANT CHANGE UP
-  TRIMETHOPRIM/SULFAMETHOXAZOLE: SIGNIFICANT CHANGE UP
ANION GAP SERPL CALC-SCNC: 11 MMOL/L — SIGNIFICANT CHANGE UP (ref 7–14)
BASOPHILS # BLD AUTO: 0.02 K/UL — SIGNIFICANT CHANGE UP (ref 0–0.2)
BASOPHILS NFR BLD AUTO: 0.3 % — SIGNIFICANT CHANGE UP (ref 0–1)
BUN SERPL-MCNC: 21 MG/DL — HIGH (ref 10–20)
CALCIUM SERPL-MCNC: 8.9 MG/DL — SIGNIFICANT CHANGE UP (ref 8.4–10.5)
CHLORIDE SERPL-SCNC: 101 MMOL/L — SIGNIFICANT CHANGE UP (ref 98–110)
CO2 SERPL-SCNC: 28 MMOL/L — SIGNIFICANT CHANGE UP (ref 17–32)
CREAT SERPL-MCNC: 0.9 MG/DL — SIGNIFICANT CHANGE UP (ref 0.7–1.5)
CULTURE RESULTS: ABNORMAL
CULTURE RESULTS: ABNORMAL
EGFR: 60 ML/MIN/1.73M2 — SIGNIFICANT CHANGE UP
EOSINOPHIL # BLD AUTO: 0.21 K/UL — SIGNIFICANT CHANGE UP (ref 0–0.7)
EOSINOPHIL NFR BLD AUTO: 3.3 % — SIGNIFICANT CHANGE UP (ref 0–8)
GLUCOSE SERPL-MCNC: 99 MG/DL — SIGNIFICANT CHANGE UP (ref 70–99)
HCT VFR BLD CALC: 32 % — LOW (ref 37–47)
HGB BLD-MCNC: 10.3 G/DL — LOW (ref 12–16)
IMM GRANULOCYTES NFR BLD AUTO: 0.3 % — SIGNIFICANT CHANGE UP (ref 0.1–0.3)
LYMPHOCYTES # BLD AUTO: 0.75 K/UL — LOW (ref 1.2–3.4)
LYMPHOCYTES # BLD AUTO: 11.9 % — LOW (ref 20.5–51.1)
MCHC RBC-ENTMCNC: 28.6 PG — SIGNIFICANT CHANGE UP (ref 27–31)
MCHC RBC-ENTMCNC: 32.2 G/DL — SIGNIFICANT CHANGE UP (ref 32–37)
MCV RBC AUTO: 88.9 FL — SIGNIFICANT CHANGE UP (ref 81–99)
METHOD TYPE: SIGNIFICANT CHANGE UP
MONOCYTES # BLD AUTO: 0.8 K/UL — HIGH (ref 0.1–0.6)
MONOCYTES NFR BLD AUTO: 12.7 % — HIGH (ref 1.7–9.3)
NEUTROPHILS # BLD AUTO: 4.5 K/UL — SIGNIFICANT CHANGE UP (ref 1.4–6.5)
NEUTROPHILS NFR BLD AUTO: 71.5 % — SIGNIFICANT CHANGE UP (ref 42.2–75.2)
NRBC # BLD: 0 /100 WBCS — SIGNIFICANT CHANGE UP (ref 0–0)
ORGANISM # SPEC MICROSCOPIC CNT: ABNORMAL
ORGANISM # SPEC MICROSCOPIC CNT: ABNORMAL
ORGANISM # SPEC MICROSCOPIC CNT: SIGNIFICANT CHANGE UP
PLATELET # BLD AUTO: 161 K/UL — SIGNIFICANT CHANGE UP (ref 130–400)
PMV BLD: 10.2 FL — SIGNIFICANT CHANGE UP (ref 7.4–10.4)
POTASSIUM SERPL-MCNC: 4 MMOL/L — SIGNIFICANT CHANGE UP (ref 3.5–5)
POTASSIUM SERPL-SCNC: 4 MMOL/L — SIGNIFICANT CHANGE UP (ref 3.5–5)
RBC # BLD: 3.6 M/UL — LOW (ref 4.2–5.4)
RBC # FLD: 14 % — SIGNIFICANT CHANGE UP (ref 11.5–14.5)
SODIUM SERPL-SCNC: 140 MMOL/L — SIGNIFICANT CHANGE UP (ref 135–146)
SPECIMEN SOURCE: SIGNIFICANT CHANGE UP
SPECIMEN SOURCE: SIGNIFICANT CHANGE UP
WBC # BLD: 6.3 K/UL — SIGNIFICANT CHANGE UP (ref 4.8–10.8)
WBC # FLD AUTO: 6.3 K/UL — SIGNIFICANT CHANGE UP (ref 4.8–10.8)

## 2024-12-01 PROCEDURE — 99232 SBSQ HOSP IP/OBS MODERATE 35: CPT

## 2024-12-01 RX ADMIN — Medication 5000 UNIT(S): at 05:36

## 2024-12-01 RX ADMIN — TRAZODONE HYDROCHLORIDE 100 MILLIGRAM(S): 150 TABLET ORAL at 21:11

## 2024-12-01 RX ADMIN — Medication 25 MILLIGRAM(S): at 21:11

## 2024-12-01 RX ADMIN — Medication 10 MILLIGRAM(S): at 21:12

## 2024-12-01 RX ADMIN — PANTOPRAZOLE SODIUM 40 MILLIGRAM(S): 40 TABLET, DELAYED RELEASE ORAL at 05:36

## 2024-12-01 RX ADMIN — Medication 81 MILLIGRAM(S): at 11:57

## 2024-12-01 RX ADMIN — Medication 5000 UNIT(S): at 17:31

## 2024-12-01 RX ADMIN — CLONAZEPAM 1 MILLIGRAM(S): 0.12 TABLET, ORALLY DISINTEGRATING ORAL at 05:37

## 2024-12-01 RX ADMIN — POLYETHYLENE GLYCOL 3350 17 GRAM(S): 17 POWDER, FOR SOLUTION ORAL at 11:57

## 2024-12-01 RX ADMIN — CHLORHEXIDINE GLUCONATE 1 APPLICATION(S): 1.2 RINSE ORAL at 05:37

## 2024-12-01 RX ADMIN — Medication 100 MILLIGRAM(S): at 22:55

## 2024-12-02 ENCOUNTER — TRANSCRIPTION ENCOUNTER (OUTPATIENT)
Age: 88
End: 2024-12-02

## 2024-12-02 VITALS
DIASTOLIC BLOOD PRESSURE: 56 MMHG | HEART RATE: 82 BPM | SYSTOLIC BLOOD PRESSURE: 112 MMHG | OXYGEN SATURATION: 98 % | TEMPERATURE: 97 F

## 2024-12-02 LAB
-  AMOXICILLIN/CLAVULANIC ACID: SIGNIFICANT CHANGE UP
-  AMPICILLIN/SULBACTAM: SIGNIFICANT CHANGE UP
-  AMPICILLIN: SIGNIFICANT CHANGE UP
-  AZTREONAM: SIGNIFICANT CHANGE UP
-  CEFAZOLIN: SIGNIFICANT CHANGE UP
-  CEFEPIME: SIGNIFICANT CHANGE UP
-  CEFOXITIN: SIGNIFICANT CHANGE UP
-  CEFTRIAXONE: SIGNIFICANT CHANGE UP
-  CEFUROXIME: SIGNIFICANT CHANGE UP
-  CIPROFLOXACIN: SIGNIFICANT CHANGE UP
-  ERTAPENEM: SIGNIFICANT CHANGE UP
-  GENTAMICIN: SIGNIFICANT CHANGE UP
-  IMIPENEM: SIGNIFICANT CHANGE UP
-  LEVOFLOXACIN: SIGNIFICANT CHANGE UP
-  MEROPENEM: SIGNIFICANT CHANGE UP
-  NITROFURANTOIN: SIGNIFICANT CHANGE UP
-  PIPERACILLIN/TAZOBACTAM: SIGNIFICANT CHANGE UP
-  TOBRAMYCIN: SIGNIFICANT CHANGE UP
-  TRIMETHOPRIM/SULFAMETHOXAZOLE: SIGNIFICANT CHANGE UP
ANION GAP SERPL CALC-SCNC: 11 MMOL/L — SIGNIFICANT CHANGE UP (ref 7–14)
BASOPHILS # BLD AUTO: 0.03 K/UL — SIGNIFICANT CHANGE UP (ref 0–0.2)
BASOPHILS NFR BLD AUTO: 0.5 % — SIGNIFICANT CHANGE UP (ref 0–1)
BUN SERPL-MCNC: 24 MG/DL — HIGH (ref 10–20)
CALCIUM SERPL-MCNC: 9.2 MG/DL — SIGNIFICANT CHANGE UP (ref 8.4–10.4)
CHLORIDE SERPL-SCNC: 102 MMOL/L — SIGNIFICANT CHANGE UP (ref 98–110)
CO2 SERPL-SCNC: 27 MMOL/L — SIGNIFICANT CHANGE UP (ref 17–32)
CREAT SERPL-MCNC: 0.8 MG/DL — SIGNIFICANT CHANGE UP (ref 0.7–1.5)
CULTURE RESULTS: ABNORMAL
EGFR: 69 ML/MIN/1.73M2 — SIGNIFICANT CHANGE UP
EOSINOPHIL # BLD AUTO: 0.2 K/UL — SIGNIFICANT CHANGE UP (ref 0–0.7)
EOSINOPHIL NFR BLD AUTO: 3.2 % — SIGNIFICANT CHANGE UP (ref 0–8)
GLUCOSE SERPL-MCNC: 124 MG/DL — HIGH (ref 70–99)
HCT VFR BLD CALC: 33.7 % — LOW (ref 37–47)
HGB BLD-MCNC: 10.8 G/DL — LOW (ref 12–16)
IMM GRANULOCYTES NFR BLD AUTO: 0.2 % — SIGNIFICANT CHANGE UP (ref 0.1–0.3)
LYMPHOCYTES # BLD AUTO: 0.74 K/UL — LOW (ref 1.2–3.4)
LYMPHOCYTES # BLD AUTO: 12 % — LOW (ref 20.5–51.1)
MCHC RBC-ENTMCNC: 28.6 PG — SIGNIFICANT CHANGE UP (ref 27–31)
MCHC RBC-ENTMCNC: 32 G/DL — SIGNIFICANT CHANGE UP (ref 32–37)
MCV RBC AUTO: 89.2 FL — SIGNIFICANT CHANGE UP (ref 81–99)
METHOD TYPE: SIGNIFICANT CHANGE UP
MONOCYTES # BLD AUTO: 0.75 K/UL — HIGH (ref 0.1–0.6)
MONOCYTES NFR BLD AUTO: 12.2 % — HIGH (ref 1.7–9.3)
NEUTROPHILS # BLD AUTO: 4.43 K/UL — SIGNIFICANT CHANGE UP (ref 1.4–6.5)
NEUTROPHILS NFR BLD AUTO: 71.9 % — SIGNIFICANT CHANGE UP (ref 42.2–75.2)
NRBC # BLD: 0 /100 WBCS — SIGNIFICANT CHANGE UP (ref 0–0)
ORGANISM # SPEC MICROSCOPIC CNT: ABNORMAL
ORGANISM # SPEC MICROSCOPIC CNT: SIGNIFICANT CHANGE UP
PLATELET # BLD AUTO: 176 K/UL — SIGNIFICANT CHANGE UP (ref 130–400)
PMV BLD: 10 FL — SIGNIFICANT CHANGE UP (ref 7.4–10.4)
POTASSIUM SERPL-MCNC: 4.2 MMOL/L — SIGNIFICANT CHANGE UP (ref 3.5–5)
POTASSIUM SERPL-SCNC: 4.2 MMOL/L — SIGNIFICANT CHANGE UP (ref 3.5–5)
RBC # BLD: 3.78 M/UL — LOW (ref 4.2–5.4)
RBC # FLD: 13.9 % — SIGNIFICANT CHANGE UP (ref 11.5–14.5)
SODIUM SERPL-SCNC: 140 MMOL/L — SIGNIFICANT CHANGE UP (ref 135–146)
SPECIMEN SOURCE: SIGNIFICANT CHANGE UP
WBC # BLD: 6.16 K/UL — SIGNIFICANT CHANGE UP (ref 4.8–10.8)
WBC # FLD AUTO: 6.16 K/UL — SIGNIFICANT CHANGE UP (ref 4.8–10.8)

## 2024-12-02 PROCEDURE — 99238 HOSP IP/OBS DSCHRG MGMT 30/<: CPT

## 2024-12-02 RX ORDER — CEFPODOXIME PROXETIL 100 MG/5ML
1 GRANULE, FOR SUSPENSION ORAL
Qty: 14 | Refills: 0
Start: 2024-12-02

## 2024-12-02 RX ORDER — FERROUS SULFATE 325(65) MG
1 TABLET ORAL
Qty: 30 | Refills: 1
Start: 2024-12-02

## 2024-12-02 RX ORDER — SENNOSIDES 8.6 MG
1 TABLET ORAL
Qty: 1 | Refills: 0
Start: 2024-12-02

## 2024-12-02 RX ORDER — SENNOSIDES 8.6 MG
1 TABLET ORAL
Qty: 30 | Refills: 0
Start: 2024-12-02 | End: 2024-12-31

## 2024-12-02 RX ADMIN — ACETAMINOPHEN, DIPHENHYDRAMINE HCL, PHENYLEPHRINE HCL 3 MILLIGRAM(S): 325; 25; 5 TABLET ORAL at 03:08

## 2024-12-02 RX ADMIN — CLONAZEPAM 1 MILLIGRAM(S): 0.12 TABLET, ORALLY DISINTEGRATING ORAL at 18:49

## 2024-12-02 RX ADMIN — PANTOPRAZOLE SODIUM 40 MILLIGRAM(S): 40 TABLET, DELAYED RELEASE ORAL at 05:27

## 2024-12-02 RX ADMIN — Medication 5000 UNIT(S): at 05:26

## 2024-12-02 RX ADMIN — Medication 5000 UNIT(S): at 18:50

## 2024-12-02 RX ADMIN — Medication 81 MILLIGRAM(S): at 12:28

## 2024-12-02 RX ADMIN — CHLORHEXIDINE GLUCONATE 1 APPLICATION(S): 1.2 RINSE ORAL at 05:24

## 2024-12-02 RX ADMIN — CLONAZEPAM 1 MILLIGRAM(S): 0.12 TABLET, ORALLY DISINTEGRATING ORAL at 05:26

## 2024-12-02 RX ADMIN — POLYETHYLENE GLYCOL 3350 17 GRAM(S): 17 POWDER, FOR SOLUTION ORAL at 12:28

## 2024-12-02 NOTE — DISCHARGE NOTE PROVIDER - HOSPITAL COURSE
89-year-old female with history of JENNIFER on 3L NC O2 in the mornings and evenings, GERD, HFpEF (EF 55% in 9/24), aortic stenosis, left carotid stenosis, anemia, anxiety, urinary retention (straight catheterizes herself for urination), breast cancer s/p mastectomy and reconstruction and brain cancer s/p craniotomy presents from [facility] assisted living for eval of AMS and lethargy worsening today. Pts SpO2 was 80's on RA as per EMS and 100% on 3L nc   Per NH patient found to be hypotensive and altered, typically AOx2-3 at baseline.  Denies fever/chills, chest pain, SOB, abdominal pain, nausea/vomiting, diarrhea.     Vitals in ED: /71, HR 88, T 100.4, RR 18, SpO2 100% on 3L NC  EKG: NSR HR 87  Labs: WBC 9, Hgb 10.7, Trop 48-51, electrolytes within normal  VBG: pH 7.4, pCO2 57, pO2 25, HCO3 35, lactate 1.1  UA: small LE, neg nitrite, moderate bacteria, 55 WBC  RVP: negative   Imaging:   - CXR (11.29.24 @ 15:30): No radiographic evidence of acute cardiopulmonary disease.  - CT Head No Cont (11.29.24 @ 16:01): 1.  The patient is again noted to be status post left frontoparietal craniotomy/craniotomy with cranioplasty. Streak artifact from the cranioplasty hardware limiting evaluation. 2.  No gross acute intracranial pathology     In the ED, patient received Ceftriaxone 1000mg and NS 500cc bolus. Was admitted for UTI, started on rocephin, rine & blood cxs came back +ve for Ecoli, to be discharged back top [facility] assisted living after cx sensitivities are back:     Acute  Toxic Metabolic  Encephalopathy  Secondary to UTI   E. coli Bacteremia   Hx  Chronic  Urinary Retention with [facility]IC at [facility]   - CT head negative   - Lama placed in ER   - UA positive   - Blood cultures and urine culture growing: E. coli   - s/p Ceftriaxone 1000mg and NS 500cc bolus in the ED  - given IV Ceftriaxone until sensit. back  -to be dced with chronic lama    Hx Chronic anemia   HARSHA   - iron supplementation     Hx JENNIFER  - On 3L NC O2 in the mornings and evenings    Hx GERD  - PPI    Chronic HFpEF (EF 55% in 9/24)  Hx Aortic stenosis  - TTE (9/2024): EF 50-55%, GIIDD, aortic stenosis    Hx Breast cancer s/p mastectomy and reconstruction  Hx Brain cancer s/p craniotomy    Pt stable to be dced. Dc planning done with Dr Kc.   89-year-old female with history of JENNIFER on 3L NC O2 in the mornings and evenings, GERD, HFpEF (EF 55% in 9/24), aortic stenosis, left carotid stenosis, anemia, anxiety, urinary retention (straight catheterizes herself for urination), breast cancer s/p mastectomy and reconstruction and brain cancer s/p craniotomy presents from [facility] assisted living for eval of AMS and lethargy worsening today. Pts SpO2 was 80's on RA as per EMS and 100% on 3L nc   Per NH patient found to be hypotensive and altered, typically AOx2-3 at baseline.  Denies fever/chills, chest pain, SOB, abdominal pain, nausea/vomiting, diarrhea.     Vitals in ED: /71, HR 88, T 100.4, RR 18, SpO2 100% on 3L NC  EKG: NSR HR 87  Labs: WBC 9, Hgb 10.7, Trop 48-51, electrolytes within normal  VBG: pH 7.4, pCO2 57, pO2 25, HCO3 35, lactate 1.1  UA: small LE, neg nitrite, moderate bacteria, 55 WBC  RVP: negative   Imaging:   - CXR (11.29.24 @ 15:30): No radiographic evidence of acute cardiopulmonary disease.  - CT Head No Cont (11.29.24 @ 16:01): 1.  The patient is again noted to be status post left frontoparietal craniotomy/craniotomy with cranioplasty. Streak artifact from the cranioplasty hardware limiting evaluation. 2.  No gross acute intracranial pathology     In the ED, patient received Ceftriaxone 1000mg and NS 500cc bolus. Was admitted for UTI, started on rocephin, rine & blood cxs came back +ve for Ecoli, to be discharged back top [facility] assisted living after cx sensitivities are back: total of 10 days (7 more days vantin)    Acute  Toxic Metabolic  Encephalopathy  Secondary to UTI   E. coli Bacteremia   Hx  Chronic  Urinary Retention with CIC at [facility]   - CT head negative   - Lama placed in ER   - UA positive   - Blood cultures and urine culture growing: E. coli   - s/p Ceftriaxone 1000mg and NS 500cc bolus in the ED  - given IV Ceftriaxone until sensit. back  -to be dced with chronic lama    Hx Chronic anemia   HARSHA   - iron supplementation     Hx JENNIFER  - On 3L NC O2 in the mornings and evenings    Hx GERD  - PPI    Chronic HFpEF (EF 55% in 9/24)  Hx Aortic stenosis  - TTE (9/2024): EF 50-55%, GIIDD, aortic stenosis    Hx Breast cancer s/p mastectomy and reconstruction  Hx Brain cancer s/p craniotomy    Pt stable to be dced. Dc planning done with Dr Kc.

## 2024-12-02 NOTE — PROGRESS NOTE ADULT - SUBJECTIVE AND OBJECTIVE BOX
Pt seen and examined at bedside. Alert, confused. Denies SOB, CP.         VITAL SIGNS (Last 24 hrs):  T(C): 36.5 (11-30-24 @ 04:17), Max: 38 (11-29-24 @ 15:23)  HR: 85 (11-30-24 @ 04:17) (85 - 94)  BP: 102/64 (11-30-24 @ 04:17) (102/64 - 112/71)  RR: 18 (11-30-24 @ 04:17) (18 - 18)  SpO2: 96% (11-30-24 @ 08:17) (94% - 100%)  Wt(kg): --  Daily Height in cm: 154.94 (29 Nov 2024 14:00)    Daily     I&O's Summary    29 Nov 2024 07:01  -  30 Nov 2024 07:00  --------------------------------------------------------  IN: 0 mL / OUT: 820 mL / NET: -820 mL        PHYSICAL EXAM:  GENERAL: NAD   HEAD:  Atraumatic, Normocephalic  EYES:   conjunctiva and sclera clear  NECK: Supple, No JVD  CHEST/LUNG: Clear to auscultation bilaterally; No wheeze  HEART: Regular rate and rhythm; No murmurs, rubs, or gallops  ABDOMEN: Soft, Nontender, Nondistended; Bowel sounds present  EXTREMITIES:  2+ Peripheral Pulses, No clubbing, cyanosis, or edema  SKIN: No rashes or lesions    Labs Reviewed        CBC Full  -  ( 30 Nov 2024 09:35 )  WBC Count : 7.97 K/uL  Hemoglobin : 10.5 g/dL  Hematocrit : 33.3 %  Platelet Count - Automated : 148 K/uL  Mean Cell Volume : 90.7 fL  Mean Cell Hemoglobin : 28.6 pg  Mean Cell Hemoglobin Concentration : 31.5 g/dL  Auto Neutrophil # : 6.69 K/uL  Auto Lymphocyte # : 0.57 K/uL  Auto Monocyte # : 0.59 K/uL  Auto Eosinophil # : 0.06 K/uL  Auto Basophil # : 0.03 K/uL  Auto Neutrophil % : 83.8 %  Auto Lymphocyte % : 7.2 %  Auto Monocyte % : 7.4 %  Auto Eosinophil % : 0.8 %  Auto Basophil % : 0.4 %    BMP:    11-30 @ 09:35    Blood Urea Nitrogen - 21  Calcium - 9.3  Carbond Dioxide - 28  Chloride - 99  Creatinine - 0.9  Glucose - 189  Potassium - 3.9  Sodium - 138      Hemoglobin A1c -     Urine Culture:  11-29 @ 17:31 Urine culture: --    Culture Results:   Growth in aerobic bottle: Gram Negative Rods  Method Type: PCR  Organism: Blood Culture PCR  Organism Identification: Blood Culture PCR  Specimen Source: .Blood BLOOD            MEDICATIONS  (STANDING):  aspirin enteric coated 81 milliGRAM(s) Oral daily  atorvastatin 10 milliGRAM(s) Oral at bedtime  clonazePAM  Tablet 1 milliGRAM(s) Oral two times a day  heparin   Injectable 5000 Unit(s) SubCutaneous every 12 hours  pantoprazole    Tablet 40 milliGRAM(s) Oral before breakfast  polyethylene glycol 3350 17 Gram(s) Oral daily  traZODone 100 milliGRAM(s) Oral at bedtime    MEDICATIONS  (PRN):  melatonin 3 milliGRAM(s) Oral at bedtime PRN Insomnia  QUEtiapine 25 milliGRAM(s) Oral two times a day PRN agitation  
   Pt seen and examined at bedside. Alert, confused. Eating well.            VITAL SIGNS (Last 24 hrs):  T(C): 37.6 (12-01-24 @ 05:17), Max: 37.6 (12-01-24 @ 05:17)  HR: 91 (12-01-24 @ 05:17) (86 - 109)  BP: 100/59 (12-01-24 @ 05:17) (100/59 - 136/76)  RR: 18 (12-01-24 @ 05:17) (18 - 18)  SpO2: 97% (12-01-24 @ 07:29) (94% - 97%)  Wt(kg): --  Daily     Daily     I&O's Summary      PHYSICAL EXAM:  GENERAL: NAD   HEAD:  Atraumatic, Normocephalic  EYES:  conjunctiva and sclera clear  NECK: Supple, No JVD  CHEST/LUNG: Clear to auscultation bilaterally; No wheeze  HEART: Regular rate and rhythm; No murmurs, rubs, or gallops  ABDOMEN: Soft, Nontender, Nondistended; Bowel sounds present  EXTREMITIES:  2+ Peripheral Pulses, No clubbing, cyanosis, or edema  NEUROLOGY: alert, confused, moving all ext   SKIN: No rashes or lesions    Labs Reviewed     CBC Full  -  ( 01 Dec 2024 08:34 )  WBC Count : 6.30 K/uL  Hemoglobin : 10.3 g/dL  Hematocrit : 32.0 %  Platelet Count - Automated : 161 K/uL  Mean Cell Volume : 88.9 fL  Mean Cell Hemoglobin : 28.6 pg  Mean Cell Hemoglobin Concentration : 32.2 g/dL  Auto Neutrophil # : 4.50 K/uL  Auto Lymphocyte # : 0.75 K/uL  Auto Monocyte # : 0.80 K/uL  Auto Eosinophil # : 0.21 K/uL  Auto Basophil # : 0.02 K/uL  Auto Neutrophil % : 71.5 %  Auto Lymphocyte % : 11.9 %  Auto Monocyte % : 12.7 %  Auto Eosinophil % : 3.3 %  Auto Basophil % : 0.3 %    BMP:    12-01 @ 08:34    Blood Urea Nitrogen - 21  Calcium - 8.9  Carbond Dioxide - 28  Chloride - 101  Creatinine - 0.9  Glucose - 99  Potassium - 4.0  Sodium - 140      Hemoglobin A1c -     Urine Culture:  11-29 @ 17:50 Urine culture: --    Culture Results:   >100,000 CFU/ml Escherichia coli  Method Type: --  Organism: --  Organism Identification: --  Specimen Source: Clean Catch None  11-29 @ 17:31 Urine culture: --    Culture Results:   Growth in aerobic bottle: Escherichia coli  See previous culture [id number]  Method Type: PCR  Organism: Blood Culture PCR  Organism Identification: Blood Culture PCR  Specimen Source: .Blood BLOOD            MEDICATIONS  (STANDING):  aspirin enteric coated 81 milliGRAM(s) Oral daily  atorvastatin 10 milliGRAM(s) Oral at bedtime  cefTRIAXone   IVPB 2000 milliGRAM(s) IV Intermittent every 24 hours  chlorhexidine 2% Cloths 1 Application(s) Topical <User Schedule>  clonazePAM  Tablet 1 milliGRAM(s) Oral two times a day  heparin   Injectable 5000 Unit(s) SubCutaneous every 12 hours  pantoprazole    Tablet 40 milliGRAM(s) Oral before breakfast  polyethylene glycol 3350 17 Gram(s) Oral daily  traZODone 100 milliGRAM(s) Oral at bedtime    MEDICATIONS  (PRN):  melatonin 3 milliGRAM(s) Oral at bedtime PRN Insomnia  QUEtiapine 25 milliGRAM(s) Oral two times a day PRN agitation  
  DONISMARISA  89y, Female    All available historical data reviewed    OVERNIGHT EVENTS:  no fevers  confused  does not follow commands  no pressors    ROS:  unable to obtain history secondary to patient's mental status     VITALS:  T(F): 97.4, Max: 98 (12-01-24 @ 19:30)  HR: 92  BP: 137/75  RR: 18Vital Signs Last 24 Hrs  T(C): 36.3 (02 Dec 2024 06:49), Max: 36.7 (01 Dec 2024 19:30)  T(F): 97.4 (02 Dec 2024 06:49), Max: 98 (01 Dec 2024 19:30)  HR: 92 (02 Dec 2024 06:49) (87 - 99)  BP: 137/75 (02 Dec 2024 06:49) (127/67 - 143/87)  BP(mean): --  RR: 18 (02 Dec 2024 06:49) (18 - 18)  SpO2: 95% (01 Dec 2024 19:30) (94% - 95%)    Parameters below as of 02 Dec 2024 06:49  Patient On (Oxygen Delivery Method): room air        TESTS & MEASUREMENTS:                        10.3   6.30  )-----------( 161      ( 01 Dec 2024 08:34 )             32.0     12-01    140  |  101  |  21[H]  ----------------------------<  99  4.0   |  28  |  0.9    Ca    8.9      01 Dec 2024 08:34  Mg     2.0     11-30    TPro  6.9  /  Alb  3.5  /  TBili  0.4  /  DBili  x   /  AST  19  /  ALT  11  /  AlkPhos  79  11-30    LIVER FUNCTIONS - ( 30 Nov 2024 09:35 )  Alb: 3.5 g/dL / Pro: 6.9 g/dL / ALK PHOS: 79 U/L / ALT: 11 U/L / AST: 19 U/L / GGT: x             Culture - Urine (collected 11-29-24 @ 17:50)  Source: Clean Catch None  Preliminary Report (12-01-24 @ 00:45):    >100,000 CFU/ml Escherichia coli    Urinalysis with Rflx Culture (collected 11-29-24 @ 17:50)    Culture - Blood (collected 11-29-24 @ 17:31)  Source: .Blood BLOOD  Gram Stain (11-30-24 @ 08:34):    Growth in aerobic bottle: Gram Negative Rods  Final Report (12-01-24 @ 18:43):    Growth in aerobic bottle: Escherichia coli    See previous culture [id number]    Culture - Blood (collected 11-29-24 @ 17:31)  Source: .Blood BLOOD  Gram Stain (11-30-24 @ 09:19):    Growth in anaerobic bottle: Gram Negative Rods    Growth in aerobic bottle: Gram Negative Rods  Final Report (12-01-24 @ 18:27):    Growth in aerobic and anaerobic bottles: Escherichia coli    Direct identification is available within approximately 3-5    hours either by Blood Panel Multiplexed PCR or Direct    MALDI-TOF. Details: [email or web address]  Organism: Blood Culture PCR  Escherichia coli (12-01-24 @ 18:27)  Organism: Escherichia coli (12-01-24 @ 18:27)      Method Type: ANGEL      -  Ampicillin: S <=8 These ampicillin results predict results for amoxicillin      -  Ampicillin/Sulbactam: S <=4/2      -  Aztreonam: S <=4      -  Cefazolin: S <=2      -  Cefepime: S <=2      -  Cefoxitin: S <=8      -  Ceftriaxone: S <=1      -  Ciprofloxacin: S <=0.25      -  Ertapenem: S <=0.5      -  Gentamicin: S <=2      -  Imipenem: S <=1      -  Levofloxacin: S <=0.5      -  Meropenem: S <=1      -  Piperacillin/Tazobactam: S <=8      -  Tobramycin: S <=2      -  Trimethoprim/Sulfamethoxazole: S <=0.5/9.5  Organism: Blood Culture PCR (12-01-24 @ 18:27)      Method Type: PCR      -  Escherichia coli: Detec      Urinalysis Basic - ( 01 Dec 2024 08:34 )    Color: x / Appearance: x / SG: x / pH: x  Gluc: 99 mg/dL / Ketone: x  / Bili: x / Urobili: x   Blood: x / Protein: x / Nitrite: x   Leuk Esterase: x / RBC: x / WBC x   Sq Epi: x / Non Sq Epi: x / Bacteria: x          Social History:  Tobacco Use: No  Alcohol Use: No  Drug Use: No    RADIOLOGY & ADDITIONAL TESTS:  Personal review of radiological diagnostics performed  Echo and EKG results noted when applicable.     MEDICATIONS:  aspirin enteric coated 81 milliGRAM(s) Oral daily  atorvastatin 10 milliGRAM(s) Oral at bedtime  cefTRIAXone   IVPB 2000 milliGRAM(s) IV Intermittent every 24 hours  chlorhexidine 2% Cloths 1 Application(s) Topical <User Schedule>  clonazePAM  Tablet 1 milliGRAM(s) Oral two times a day  heparin   Injectable 5000 Unit(s) SubCutaneous every 12 hours  melatonin 3 milliGRAM(s) Oral at bedtime PRN  pantoprazole    Tablet 40 milliGRAM(s) Oral before breakfast  polyethylene glycol 3350 17 Gram(s) Oral daily  QUEtiapine 25 milliGRAM(s) Oral two times a day PRN  traZODone 100 milliGRAM(s) Oral at bedtime      ANTIBIOTICS:  cefTRIAXone   IVPB 2000 milliGRAM(s) IV Intermittent every 24 hours

## 2024-12-02 NOTE — PHYSICAL THERAPY INITIAL EVALUATION ADULT - GENERAL OBSERVATIONS, REHAB EVAL
PT IE [id number]. Chart reviewed. pt encountered semi-reclined in bed. In NAD. + IV lock, + lama, + rock n roll posey

## 2024-12-02 NOTE — DISCHARGE NOTE NURSING/CASE MANAGEMENT/SOCIAL WORK - FINANCIAL ASSISTANCE
[facility] provides services at a reduced cost to those who are determined to be eligible through [facility]’s financial assistance program. Information regarding [facility]’s financial assistance program can be found by going to [email or web address] or by calling [phone].

## 2024-12-02 NOTE — DISCHARGE NOTE PROVIDER - ATTENDING DISCHARGE PHYSICAL EXAMINATION:
PHYSICAL EXAM:  GENERAL: NAD, well-developed  HEAD:  Atraumatic, Normocephalic  EYES: conjunctiva and sclera clear  NECK: Supple, No JVD  CHEST/LUNG: Clear to auscultation bilaterally; No wheeze  HEART: Regular rate and rhythm; No murmurs, rubs, or gallops  ABDOMEN: Soft, Nontender, Nondistended; Bowel sounds present  EXTREMITIES:  2+ Peripheral Pulses, No clubbing, cyanosis, or edema  SKIN: No rashes or lesions

## 2024-12-02 NOTE — DISCHARGE NOTE PROVIDER - NSDCMRMEDTOKEN_GEN_ALL_CORE_FT
aspirin 81 mg oral delayed release tablet: 1 tab(s) orally once a day  atorvastatin 10 mg oral tablet: 1 tab(s) orally once a day (at bedtime)  dexlansoprazole 60 mg oral delayed release capsule: 1 cap(s) orally once a day  furosemide 20 mg oral tablet: 1 tab(s) orally every other day  KlonoPIN 1 mg oral tablet: 1 tab(s) orally 2 times a day  MiraLax oral powder for reconstitution: 17 gram(s) orally once a day  Multiple Vitamins oral tablet: 1 tab(s) orally once a day  traZODone 100 mg oral tablet: orally once a day (at bedtime)   aspirin 81 mg oral delayed release tablet: 1 tab(s) orally once a day  atorvastatin 10 mg oral tablet: 1 tab(s) orally once a day (at bedtime)  cefpodoxime 200 mg oral tablet: 1 tab(s) orally 2 times a day take it for 7 more days then stop  dexlansoprazole 60 mg oral delayed release capsule: 1 cap(s) orally once a day  ferrous sulfate 325 mg (65 mg elemental iron) oral delayed release tablet: 1 tab(s) orally once a day  furosemide 20 mg oral tablet: 1 tab(s) orally every other day  KlonoPIN 1 mg oral tablet: 1 tab(s) orally 2 times a day  MiraLax oral powder for reconstitution: 17 gram(s) orally once a day  Multiple Vitamins oral tablet: 1 tab(s) orally once a day  traZODone 100 mg oral tablet: orally once a day (at bedtime)   aspirin 81 mg oral delayed release tablet: 1 tab(s) orally once a day  atorvastatin 10 mg oral tablet: 1 tab(s) orally once a day (at bedtime)  cefpodoxime 200 mg oral tablet: 1 tab(s) orally 2 times a day take it for 7 more days then stop  dexlansoprazole 60 mg oral delayed release capsule: 1 cap(s) orally once a day  ferrous sulfate 325 mg (65 mg elemental iron) oral delayed release tablet: 1 tab(s) orally once a day  KlonoPIN 1 mg oral tablet: 1 tab(s) orally 2 times a day  MiraLax oral powder for reconstitution: 17 gram(s) orally once a day  Multiple Vitamins oral tablet: 1 tab(s) orally once a day  senna (sennosides) 12 mg oral tablet: 1 tab(s) orally once a day (at bedtime)  traZODone 100 mg oral tablet: orally once a day (at bedtime)

## 2024-12-02 NOTE — PHYSICAL THERAPY INITIAL EVALUATION ADULT - DID THE PATIENT HAVE SURGERY?
Quality 110: Preventive Care And Screening: Influenza Immunization: Influenza Immunization previously received during influenza season Quality 226: Preventive Care And Screening: Tobacco Use: Screening And Cessation Intervention: Patient screened for tobacco and never smoked n/a Quality 111:Pneumonia Vaccination Status For Older Adults: Pneumococcal Vaccination Previously Received Detail Level: Zone Quality 431: Preventive Care And Screening: Unhealthy Alcohol Use - Screening: Patient screened for unhealthy alcohol use using a single question and scores 2 or greater episodes per year and brief intervention did not occur

## 2024-12-02 NOTE — PHYSICAL THERAPY INITIAL EVALUATION ADULT - ASSISTIVE DEVICE, REHAB EVAL
for 10 days    Discussed possible varicocele. Suggested a trial of NSAIDS, US of the scrotum and testicles and a urology consult. Return if symptoms worsen or fail to improve.     Jerry Sweeney MD bed rails

## 2024-12-02 NOTE — DISCHARGE NOTE PROVIDER - CARE PROVIDERS DIRECT ADDRESSES
[email or web address]

## 2024-12-02 NOTE — PHYSICAL THERAPY INITIAL EVALUATION ADULT - PERTINENT HX OF CURRENT PROBLEM, REHAB EVAL
89-year-old female with history of JENNIFER on 3L NC O2 in the mornings and evenings, GERD, HFpEF (EF 55% in 9/24), aortic stenosis, left carotid stenosis, anemia, anxiety, urinary retention

## 2024-12-02 NOTE — DISCHARGE NOTE PROVIDER - NSDCCPCAREPLAN_GEN_ALL_CORE_FT
PRINCIPAL DISCHARGE DIAGNOSIS  Diagnosis: Sepsis  Assessment and Plan of Treatment: You were evaluated in the hospital for your  change in mental status. You were found to have a urinary tract infection with a bacteria called Ecoli. You were also found to have that bacteria in your blood. You were given antibiotic and will be discharged to resume oral antibiotics as instructed  After discharge, you will need to:   - Take all the medications you were discharged with, unless otherwise instructed by your healthcare provider(s).   Please follow up with your providers by calling them to make an appointment so that you can see them in 1-2weeks; bring your paperwork from this hospital stay to that visit. You can access your visit information by signing up for an account for the patient portal at [email or web address]   Seek immediate medical attention if you develop fevers, chills, chest pain, shortness of breath, nausea and vomiting, abdominal pain, passing out, weakness or numbness or tingling on one side of your body, or any other concerning signs or symptoms.

## 2024-12-02 NOTE — DISCHARGE NOTE PROVIDER - CARE PROVIDER_API CALL
Angel Luis Rodriguez  Internal Medicine  [address] [place] [address]  Phone: [phone]  Fax: [phone]  Follow Up Time: 2 weeks

## 2024-12-02 NOTE — PROGRESS NOTE ADULT - NS ATTEST RISK PROBLEM GEN_ALL_CORE FT
-I independently reviewed the completed labs ( CBC, CMP, cultures  along with sensitivities ) and imaging (CT/CXR as available with independent interpretation )  -My assessment required an independent historian  -I discussed my diagnostic/therapeutic recommendations with the primary team housestaff/Attending  -I assisted in the decision regarding continued need for hospitalization / or escalation of care as needed.  -Patient is on drug therapy that requires intense monitoring or toxicity and adjustment of the Antibiotics based on creatinine clearence

## 2024-12-02 NOTE — DISCHARGE NOTE NURSING/CASE MANAGEMENT/SOCIAL WORK - PATIENT PORTAL LINK FT
You can access the FollowMyHealth Patient Portal offered by [facility] by registering at the following website: [email or web address]. By joining [employer]’s FollowMyHealth portal, you will also be able to view your health information using other applications (apps) compatible with our system.

## 2024-12-02 NOTE — PROGRESS NOTE ADULT - ASSESSMENT
Acute  Toxic Metabolic  Encephalopathy  Secondary to UTI   E. coli Bacteremia   Hx  Chronic  Urinary Retention with [facility] at [facility]   - CT head negative   - Rios placed in ER   - UA positive   - Blood cultures and urine culture growing: E. coli   - s/p Ceftriaxone 1000mg and NS 500cc bolus in the ED  - Continue Ceftriaxone   - TOV   - Follow up cultures     Hx Chronic anemia   HARSHA   - iron supplementation     Hx JENNIFER  - On 3L NC O2 in the mornings and evenings    Hx GERD  - PPI    Chronic HFpEF (EF 55% in 9/24)  Hx Aortic stenosis  - TTE (9/2024): EF 50-55%, GIIDD, aortic stenosis    Hx Breast cancer s/p mastectomy and reconstruction  Hx Brain cancer s/p craniotomy    dvt ppx       Pending: f/u cultures sensitivities   Dispo: [place] [facility] 
Acute  Toxic Metabolic  Encephalopathy  Secondary to UTI   E. coli Bacteremia   Hx  Chronic  Urinary Retention with [facility]IC at [facility]   - CT head negative   - Rios placed in ER   - UA positive   - Blood cultures: E. coli   - s/p Ceftriaxone 1000mg and NS 500cc bolus in the ED  - Continue Ceftriaxone   - TOV once stable   - Follow up cultures   - Case discussed with ID     Hx Chronic anemia   HARSHA   - iron supplementation     Hx JENNIFER  - On 3L NC O2 in the mornings and evenings    Hx GERD  - PPI    Chronic HFpEF (EF 55% in 9/24)  Hx Aortic stenosis  - TTE (9/2024): EF 50-55%, GIIDD, aortic stenosis    Hx Breast cancer s/p mastectomy and reconstruction  Hx Brain cancer s/p craniotomy    dvt ppx       Pending: f/u cultures sensitivities   Dispo: [place] [facility] 
ASSESSMENT  This is a 89-year-old female with history of JENNIFER on 3L NC O2 in the mornings and evenings, GERD, HFpEF (EF 55% in 9/24), aortic stenosis, left carotid stenosis, anemia, anxiety, urinary retention (straight catheterizes herself for urination), breast cancer s/p mastectomy and reconstruction and brain cancer s/p craniotomy presents from [facility] for eval of AMS and lethargy.    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  #Acute illness which poses a threat to life or bodily function without treatment     11/29 CXR opacities . ( Independent interpretation of test : no PNA    RECOMMENDATIONS  -Check Renal bladder US.  -IV ceftriaxone 2 gram q 24 hours.   -Off loading to prevent pressure sores and preventive measures to avoid aspiration    Discussion of management/test results/antibiotic regimen  with primary medical team.

## 2024-12-07 ENCOUNTER — EMERGENCY (EMERGENCY)
Facility: HOSPITAL | Age: 88
LOS: 0 days | Discharge: HOME | End: 2024-12-08
Attending: STUDENT IN AN ORGANIZED HEALTH CARE EDUCATION/TRAINING PROGRAM
Payer: MEDICARE

## 2024-12-07 VITALS
RESPIRATION RATE: 19 BRPM | HEIGHT: 61 IN | OXYGEN SATURATION: 99 % | DIASTOLIC BLOOD PRESSURE: 66 MMHG | TEMPERATURE: 98 F | HEART RATE: 87 BPM | SYSTOLIC BLOOD PRESSURE: 100 MMHG

## 2024-12-07 DIAGNOSIS — T83.028A DISPLACEMENT OF OTHER URINARY CATHETER, INITIAL ENCOUNTER: ICD-10-CM

## 2024-12-07 DIAGNOSIS — Z96.0 PRESENCE OF UROGENITAL IMPLANTS: ICD-10-CM

## 2024-12-07 DIAGNOSIS — R33.8 OTHER RETENTION OF URINE: ICD-10-CM

## 2024-12-07 DIAGNOSIS — Z85.3 PERSONAL HISTORY OF MALIGNANT NEOPLASM OF BREAST: ICD-10-CM

## 2024-12-07 DIAGNOSIS — Z90.13 ACQUIRED ABSENCE OF BILATERAL BREASTS AND NIPPLES: Chronic | ICD-10-CM

## 2024-12-07 DIAGNOSIS — Z46.6 ENCOUNTER FOR FITTING AND ADJUSTMENT OF URINARY DEVICE: ICD-10-CM

## 2024-12-07 DIAGNOSIS — Z87.891 PERSONAL HISTORY OF NICOTINE DEPENDENCE: ICD-10-CM

## 2024-12-07 DIAGNOSIS — I50.9 HEART FAILURE, UNSPECIFIED: ICD-10-CM

## 2024-12-07 DIAGNOSIS — K21.9 GASTRO-ESOPHAGEAL REFLUX DISEASE WITHOUT ESOPHAGITIS: ICD-10-CM

## 2024-12-07 DIAGNOSIS — Z98.890 OTHER SPECIFIED POSTPROCEDURAL STATES: Chronic | ICD-10-CM

## 2024-12-07 PROCEDURE — 99283 EMERGENCY DEPT VISIT LOW MDM: CPT | Mod: FS

## 2024-12-07 PROCEDURE — 99283 EMERGENCY DEPT VISIT LOW MDM: CPT | Mod: 25

## 2024-12-07 PROCEDURE — 51702 INSERT TEMP BLADDER CATH: CPT

## 2024-12-07 NOTE — ED PROVIDER NOTE - PHYSICAL EXAMINATION
Constitutional: Well developed, well nourished. NAD  Head: Normocephalic, atraumatic.  Eyes: PERRL, EOMI.  ENT: No nasal discharge. Mucous membranes dry.  Neck: Supple. Painless ROM.  Cardiovascular: Regular rate and rhythm.   Pulmonary:  Lungs clear to auscultation bilaterally.    Abdominal: Soft. Nondistended. No rebound, guarding, rigidity.  Extremities. Pelvis stable. No lower extremity edema, symmetric calves.  Skin: No rashes, cyanosis.  Neuro: AAOx2. No focal neurological deficits.  Psych: Normal mood. Normal affect.

## 2024-12-07 NOTE — ED PROVIDER NOTE - OBJECTIVE STATEMENT
89 yold female to [facility] [place], gerd, CHF, aortic stenosis, urinary retention with indwelling lama catheter, breast cancer s/p mastectomy; pt recently admitted to hospital d/c 12/2; pt unable to provide signif hx - as per staff at [facility] - pt pulled out lama cather tonight and pt was sent in for replacement; pt denies abdominal pain, fever, chill, n/v, back pain;

## 2024-12-07 NOTE — ED PROVIDER NOTE - ATTENDING APP SHARED VISIT CONTRIBUTION OF CARE
89 yo F with hx of anxiety, AS, breast cancer, CHF, GERD, benign brain tumor s/p craniotomy, CHF, GERD, iron deficiency anemia, JENNIFER on 3L home O2 in morning/nights, urinary retention who was BIBEMS from [facility] after she pulled out her lama today. Per chart review, pt was admitted 11/29-12/2 and had lama placed on 11/29. Today she pulled out her lama so staff sent her to ED to have lama replaced as she has chronic urinary retention. Otherwise acting at baseline. No fever, vomiting. Per pt, she does not know why she is in the ED and denies any pain.    PMD Dr. Boone    CONSTITUTIONAL: well developed, nontoxic appearing, in no acute distress, speaking in full sentences  SKIN: warm, dry, no rash, cap refill < 2 seconds  HEENT: normocephalic, atraumatic, no conjunctival erythema, moist mucous membranes, patent airway  NECK: supple  CV:  regular rate, regular rhythm, 2+ radial pulses bilaterally  RESP: no wheezes, no rales, no rhonchi, normal work of breathing  ABD: soft, no tenderness, nondistended, no rebound, no guarding  BACK: no CVA tenderness  MSK: normal ROM, no cyanosis, no edema  NEURO: alert, awake, grossly unremarkable, moves all extremities equally  PSYCH: cooperative, appropriate    A&P:  Pt here for lama replacement after pulling out her lama which was placed on 11/29. Vitals reassuring in ED. Will replace lama and send back to NH.

## 2024-12-07 NOTE — ED PROVIDER NOTE - PATIENT PORTAL LINK FT
You can access the FollowMyHealth Patient Portal offered by [facility] by registering at the following website: [email or web address]. By joining [employer]’s FollowMyHealth portal, you will also be able to view your health information using other applications (apps) compatible with our system.

## 2024-12-07 NOTE — ED PROVIDER NOTE - BIRTH SEX
Physical Therapy Daily Progress Note  Visit: 3    Betty Mcmullen reports: did well with improved symptoms in her R UE after traction for several days.  Would like to try dry needling today.   YOB: 1974  Referring practitioner : Self Referring  Date of Initial: Type: THERAPY  Noted: 6/3/2022  Today's date: 6/15/2022  Patient seen for 3 sessions    Visit Diagnoses:    ICD-10-CM ICD-9-CM   1. Pain, neck  M54.2 723.1   2. Chronic left-sided low back pain with left-sided sciatica  M54.42 724.2    G89.29 724.3     338.29       Subjective       Objective   See Exercise, Manual, and Modality Logs for complete treatment.       Assessment/Plan     Reviewed dry needling procedure and consent signed. Dry needling and cervical traction well tolerated with decreased neck and R UE pain post tx.     Plan:  Continue current                 Un-Timed:  Dry Needling     12     mins self-pay  Traction     15     mins [id number]        Timed Treatment:   0   mins   Total Treatment:     27   mins         Iban Rowell, PT, DPT  Physical Therapist  [place] Lic #[id number]   Female

## 2024-12-07 NOTE — ED PROVIDER NOTE - DIFFERENTIAL DIAGNOSIS
Differential Diagnosis differential dx includes but is not limited to:  pulled out lama. less likely uti or lama obstruction

## 2024-12-07 NOTE — ED PROVIDER NOTE - CLINICAL SUMMARY MEDICAL DECISION MAKING FREE TEXT BOX
Pt here for lama replacement after pulling out her lama which was placed on 11/29. Vitals reassuring in ED. Lama replaced with good UOP. Stable for d/c back to NH.

## 2024-12-07 NOTE — ED PROVIDER NOTE - NSICDXPASTMEDICALHX_GEN_ALL_CORE_FT
PAST MEDICAL HISTORY:  Breast CA     H/O brain tumor benign 1977    Migraine     Urinary retention with incomplete bladder emptying pt self catheterizes  3-4 x day     PAST MEDICAL HISTORY:  Anxiety     Aortic stenosis     Breast CA     CHF (congestive heart failure)     GERD (gastroesophageal reflux disease)     H/O brain tumor benign 1977    Iron deficiency anemia     Migraine     JENNIFER (obstructive sleep apnea)     Urinary retention

## 2024-12-07 NOTE — ED PROVIDER NOTE - NSFOLLOWUPINSTRUCTIONS_ED_ALL_ED_FT
Rios Catheter Placement and Care    WHAT YOU NEED TO KNOW:    A Rios catheter is a sterile tube that is inserted into your bladder to drain urine. It is also called an indwelling urinary catheter. The tip of the catheter has a small balloon filled with solution that holds the catheter inside your bladder.    DISCHARGE INSTRUCTIONS:    Return to the emergency department if:   - Your catheter comes out.   - You suddenly have material that looks like sand in the tubing or drainage bag.  - No urine is draining into the bag and you have checked the system.  - You have pain in your hip, back, pelvis, or lower abdomen.  - You are confused or cannot think clearly.    Call your doctor or urologist if:   - You have a fever.  - You have bladder spasms for more than 1 day after the catheter is placed.  - You see blood in the tubing or drainage bag.  - You have a rash or itching where the catheter tube is secured to your skin.  - Urine leaks from or around the catheter, tubing, or drainage bag.  - The closed drainage system has accidently come open or apart.   - You see a layer of crystals inside the tubing.  - You have questions or concerns about your condition or care.    Care for your Rios catheter:     Clean your genital area 2 times every day.Clean your catheter and the area around where it was inserted. Use soap and water. Clean your anal opening and catheter area after every bowel movement.     Secure the catheter tube so you do not pull or move the catheter. This helps prevent pain and bladder spasms. Healthcare providers will show you how to use medical tape or a strap to secure the catheter tube to your body.     Keep a closed drainage system. Your Rios catheter should always be attached to the drainage bag to form a closed system. Do not disconnect any part of the closed system unless you need to change the bag.    Care for your drainage bag:     Ask if a leg bag is right for you. A leg bag can be worn under your clothes. Ask your healthcare provider for more information about a leg bag.     Keep the drainage bag below the level of your waist. This helps stop urine from moving back up the tubing and into your bladder. Do not loop or kink the tubing. This can cause urine to back up and collect in your bladder. Do not let the drainage bag touch or lie on the floor.    Empty the drainage bag when needed. The weight of a full drainage bag can be painful. Empty the drainage bag every 3 to 6 hours or when it is ? full.     Clean and change the drainage bag as directed. Ask your healthcare provider how often you should change the drainage bag and what cleaning solution to use. Wear disposable gloves when you change the bag. Do not allow the end of the catheter or tubing to touch anything. Clean the ends with an alcohol pad before you reconnect them.    What to do if problems develop:     No urine is draining into the bag:   Check for kinks in the tubing and straighten them out.     Check the tape or strap used to secure the catheter tube to your skin. Make sure it is not blocking the tube.     Make sure you are not sitting or lying on the tubing.    Make sure the urine bag is hanging below the level of your waist.    Urine leaks from or around the catheter, tubing, or drainage bag: Check if the closed drainage system has accidently come open or apart. Clean the catheter and tubing ends with a new alcohol pad and reconnect them.     Prevent an infection:     Wash your hands often. Wash before and after you touch your catheter, tubing, or drainage bag. Use soap and water. Wear clean disposable gloves when you care for your catheter or disconnect the drainage bag. Wash your hands before you prepare or eat food. Handwashing     Drink liquids as directed. Ask your healthcare provider how much liquid to drink each day and which liquids are best for you. Liquids will help flush your kidneys and bladder to help prevent infection.    Follow up with your doctor or urologist as directed: Write down your questions so you remember to ask them during your visits.

## 2024-12-08 VITALS
OXYGEN SATURATION: 98 % | HEART RATE: 83 BPM | SYSTOLIC BLOOD PRESSURE: 110 MMHG | TEMPERATURE: 98 F | DIASTOLIC BLOOD PRESSURE: 68 MMHG | RESPIRATION RATE: 18 BRPM

## 2024-12-08 NOTE — ED ADULT NURSE NOTE - NSICDXPASTMEDICALHX_GEN_ALL_CORE_FT
PAST MEDICAL HISTORY:  Anxiety     Aortic stenosis     Breast CA     CHF (congestive heart failure)     GERD (gastroesophageal reflux disease)     H/O brain tumor benign 1977    Iron deficiency anemia     Migraine     JENNIFER (obstructive sleep apnea)     Urinary retention

## 2024-12-08 NOTE — ED ADULT NURSE NOTE - NSFALLHARMRISKINTERV_ED_ALL_ED
Assistance OOB with selected safe patient handling equipment if applicable/Communicate risk of Fall with Harm to all staff, patient, and family/Provide visual cue: red socks, yellow wristband, yellow gown, etc/Reinforce activity limits and safety measures with patient and family/Toileting schedule using arm’s reach rule for commode and bathroom/Use of alarms - bed, stretcher, chair and/or video monitoring/Bed in lowest position, wheels locked, appropriate side rails in place/Call bell, personal items and telephone in reach/Instruct patient to call for assistance before getting out of bed/chair/stretcher/Non-slip footwear applied when patient is off stretcher/[place] to call system/Physically safe environment - no spills, clutter or unnecessary equipment/Purposeful Proactive Rounding/Room/bathroom lighting operational, light cord in reach

## 2024-12-16 ENCOUNTER — EMERGENCY (EMERGENCY)
Facility: HOSPITAL | Age: 88
LOS: 0 days | Discharge: HOME | End: 2024-12-16
Attending: STUDENT IN AN ORGANIZED HEALTH CARE EDUCATION/TRAINING PROGRAM
Payer: MEDICARE

## 2024-12-16 VITALS
HEART RATE: 72 BPM | DIASTOLIC BLOOD PRESSURE: 64 MMHG | TEMPERATURE: 98 F | RESPIRATION RATE: 19 BRPM | SYSTOLIC BLOOD PRESSURE: 105 MMHG | OXYGEN SATURATION: 99 %

## 2024-12-16 VITALS
TEMPERATURE: 98 F | OXYGEN SATURATION: 99 % | SYSTOLIC BLOOD PRESSURE: 106 MMHG | WEIGHT: 115.74 LBS | DIASTOLIC BLOOD PRESSURE: 66 MMHG | HEART RATE: 77 BPM | RESPIRATION RATE: 18 BRPM | HEIGHT: 61 IN

## 2024-12-16 DIAGNOSIS — Z85.841 PERSONAL HISTORY OF MALIGNANT NEOPLASM OF BRAIN: ICD-10-CM

## 2024-12-16 DIAGNOSIS — I50.30 UNSPECIFIED DIASTOLIC (CONGESTIVE) HEART FAILURE: ICD-10-CM

## 2024-12-16 DIAGNOSIS — I65.22 OCCLUSION AND STENOSIS OF LEFT CAROTID ARTERY: ICD-10-CM

## 2024-12-16 DIAGNOSIS — Z85.3 PERSONAL HISTORY OF MALIGNANT NEOPLASM OF BREAST: ICD-10-CM

## 2024-12-16 DIAGNOSIS — G47.33 OBSTRUCTIVE SLEEP APNEA (ADULT) (PEDIATRIC): ICD-10-CM

## 2024-12-16 DIAGNOSIS — T83.9XXA UNSPECIFIED COMPLICATION OF GENITOURINARY PROSTHETIC DEVICE, IMPLANT AND GRAFT, INITIAL ENCOUNTER: ICD-10-CM

## 2024-12-16 DIAGNOSIS — Z98.890 OTHER SPECIFIED POSTPROCEDURAL STATES: Chronic | ICD-10-CM

## 2024-12-16 DIAGNOSIS — Z90.13 ACQUIRED ABSENCE OF BILATERAL BREASTS AND NIPPLES: Chronic | ICD-10-CM

## 2024-12-16 DIAGNOSIS — K21.9 GASTRO-ESOPHAGEAL REFLUX DISEASE WITHOUT ESOPHAGITIS: ICD-10-CM

## 2024-12-16 PROBLEM — I35.0 NONRHEUMATIC AORTIC (VALVE) STENOSIS: Chronic | Status: ACTIVE | Noted: 2024-12-08

## 2024-12-16 PROBLEM — R33.9 RETENTION OF URINE, UNSPECIFIED: Chronic | Status: ACTIVE | Noted: 2024-12-08

## 2024-12-16 PROBLEM — F41.9 ANXIETY DISORDER, UNSPECIFIED: Chronic | Status: ACTIVE | Noted: 2024-12-08

## 2024-12-16 PROBLEM — I50.9 HEART FAILURE, UNSPECIFIED: Chronic | Status: ACTIVE | Noted: 2024-12-08

## 2024-12-16 PROBLEM — D50.9 IRON DEFICIENCY ANEMIA, UNSPECIFIED: Chronic | Status: ACTIVE | Noted: 2024-12-08

## 2024-12-16 LAB
APPEARANCE UR: CLEAR — SIGNIFICANT CHANGE UP
BILIRUB UR-MCNC: NEGATIVE — SIGNIFICANT CHANGE UP
COLOR SPEC: YELLOW — SIGNIFICANT CHANGE UP
DIFF PNL FLD: NEGATIVE — SIGNIFICANT CHANGE UP
GLUCOSE UR QL: NEGATIVE MG/DL — SIGNIFICANT CHANGE UP
KETONES UR-MCNC: NEGATIVE MG/DL — SIGNIFICANT CHANGE UP
LEUKOCYTE ESTERASE UR-ACNC: ABNORMAL
NITRITE UR-MCNC: NEGATIVE — SIGNIFICANT CHANGE UP
PH UR: 6 — SIGNIFICANT CHANGE UP (ref 5–8)
PROT UR-MCNC: 30 MG/DL
SP GR SPEC: 1.01 — SIGNIFICANT CHANGE UP (ref 1–1.03)
UROBILINOGEN FLD QL: 1 MG/DL — SIGNIFICANT CHANGE UP (ref 0.2–1)

## 2024-12-16 PROCEDURE — 51702 INSERT TEMP BLADDER CATH: CPT

## 2024-12-16 PROCEDURE — 99284 EMERGENCY DEPT VISIT MOD MDM: CPT

## 2024-12-16 PROCEDURE — 81001 URINALYSIS AUTO W/SCOPE: CPT

## 2024-12-16 PROCEDURE — 99283 EMERGENCY DEPT VISIT LOW MDM: CPT | Mod: 25

## 2024-12-16 PROCEDURE — 87186 SC STD MICRODIL/AGAR DIL: CPT

## 2024-12-16 PROCEDURE — 87077 CULTURE AEROBIC IDENTIFY: CPT

## 2024-12-16 PROCEDURE — 87086 URINE CULTURE/COLONY COUNT: CPT

## 2024-12-16 NOTE — ED PROVIDER NOTE - CLINICAL SUMMARY MEDICAL DECISION MAKING FREE TEXT BOX
89-year-old female with history of JENNIFER on 3L NC O2 in the mornings and evenings, GERD, HFpEF (EF 55% in 9/24), aortic stenosis, left carotid stenosis, anemia, anxiety, urinary retention now with indwelling lama, breast cancer s/p mastectomy and reconstruction and brain cancer s/p craniotomy [facility] for removed lama. Per daughter, pt removed the lama (spoke with her over phone). Pt otherwise has been at baseline. Had diarrhea 3-4 days ago which is improving. Hx otherwise limited 2/2 to pt's baseline mental status.    On exam, vitals reviewed. Moist MM, clear orphaynx, clear lungs, heart RRR, abd soft, non tender, palpable bladder. No CVA TTP.     Lama replaced by nursing. No infection on UA, maryanne lfollow culture. Spoke with daughter who is agreeable with plan for dc home. Return precautions discussed, all quesitons answered, family verbalized undertanding of the plan.

## 2024-12-16 NOTE — ED PROVIDER NOTE - PATIENT PORTAL LINK FT
You can access the FollowMyHealth Patient Portal offered by [facility] by registering at the following website: [email or web address]. By joining [employer]’s FollowMyHealth portal, you will also be able to view your health information using other applications (apps) compatible with our system.

## 2024-12-16 NOTE — ED PROVIDER NOTE - NSFOLLOWUPINSTRUCTIONS_ED_ALL_ED_FT
Keep lama in place. Return for vomiting, diarrhea, fever, inability to eat, dehydration, severe abdominal pain, inability to urinate, or any other concerning symptoms.

## 2024-12-16 NOTE — ED PROVIDER NOTE - OBJECTIVE STATEMENT
89-year-old female with history of JENNIFER on 3L NC O2 in the mornings and evenings, GERD, HFpEF (EF 55% in 9/24), aortic stenosis, left carotid stenosis, anemia, anxiety, urinary retention now with indwelling lama, breast cancer s/p mastectomy and reconstruction and brain cancer s/p craniotomy presents from [facility] for removed lama. Per daughter, pt removed the lama (spoke with her over phone). Pt otherwise has been at baseline. Had diarrhea 3-4 days ago which is improving. Hx otherwise limited 2/2 to pt's baseline mental status

## 2024-12-16 NOTE — ED PROVIDER NOTE - PHYSICAL EXAMINATION
CONSTITUTIONAL: Well-appearing; well-nourished; in no apparent distress.   HEAD: Normocephalic; atraumatic.   EYES: PERRL; EOM intact. Conjunctiva normal B/L.   ENT: Normal pharynx with no tonsillar hypertrophy.  NECK: Supple; non-tender  CARDIOVASCULAR: Normal S1, S2; no murmurs, rubs, or gallops.   RESPIRATORY: Normal chest excursion with respiration; breath sounds clear and equal bilaterally; no wheezes, rhonchi, or rales.  GI/: soft, non-distended; non-tender.  BACK: No evidence of trauma or deformity. Non-tender to palpation. No CVA tenderness.   EXT: Normal ROM in all four extremities; non-tender to palpation; distal pulses are normal. No leg edema B/L.   SKIN: Normal for age and race; warm; dry  NEURO: asking to go home

## 2024-12-18 NOTE — CDI QUERY NOTE - NSCDIOTHERTXTBX_GEN_ALL_CORE_HH
Clinical documentation and/or evidence of the patient’s presentation, evaluation, and medical management, as evidenced below, may support a cause and effect link that is not documented in the medical record.  In order to ensure accurate coding and accuracy of the clinical record, the documentation in this patient’s medical record requires additional clarification.      If you think the supporting documentation and/or clinical evidence supports a cause and effect link, please include more specific documentation associated with these findings in your progress note and/or discharge summary.                                 ==================================    Please clarify if there is a relationship between the urinary catheter and UTI, such as:  • Urinary tract infection is secondary to intermittent urinary catheter for urinary retention.  • Urinary tract infection is unrelated to intermittent urinary catheter for urinary retention.  • Other (specify)      Supporting documentation and/or clinical evidence:   ** 11/29 H&P:       History of present illness: ...  history of.........., urinary retention (straight catheterizes herself for urination) .....presents from [facility] for eval of AMS and lethargy worsening today. ....... Per NH patient found to be hypotensive and altered, typically AOx2-3 at baseline.      Attestation:       Acute  Toxic Metabolic  Encephalopathy  Secondary to UTI        - UA: small LE, neg nitrite, moderate bacteria, 55 WBC, RVP: negative        - s/p Ceftriaxone 1000mg and NS 500cc bolus in the ED       - Continue Ceftriaxone        - Pt straight catheterizes herself for urination    ** 12/2 Discharge note:       In the ED, patient received Ceftriaxone 1000mg and NS 500cc bolus. Was admitted for UTI, started on rocephin, rine & blood cxs came back +ve for Ecoli, to be discharged back top [facility] after cx sensitivities are back: total of 10 days (7 more days vantin)    ** Lab:   - Culture - Urine (12.16.24 @ 17:01)        Specimen Source: Catheterized Catheterized       Culture Results: >100,000 CFU/ml Enterococcus   - Culture - Blood (11.29.24 @ 17:31)       Culture Results: Growth in aerobic bottle: Escherichia coli      ** Orders:   - 11/29: Rocephin 1000 mg IV once   - 11/30 - 2/2: Rocephin 2000 mg IV Q24Hr.

## 2024-12-19 ENCOUNTER — EMERGENCY (EMERGENCY)
Facility: HOSPITAL | Age: 88
LOS: 0 days | Discharge: HOME | End: 2024-12-19
Attending: STUDENT IN AN ORGANIZED HEALTH CARE EDUCATION/TRAINING PROGRAM
Payer: MEDICARE

## 2024-12-19 VITALS
HEIGHT: 61 IN | RESPIRATION RATE: 16 BRPM | TEMPERATURE: 97 F | SYSTOLIC BLOOD PRESSURE: 100 MMHG | OXYGEN SATURATION: 99 % | HEART RATE: 110 BPM | DIASTOLIC BLOOD PRESSURE: 65 MMHG

## 2024-12-19 VITALS
RESPIRATION RATE: 18 BRPM | TEMPERATURE: 97 F | OXYGEN SATURATION: 97 % | DIASTOLIC BLOOD PRESSURE: 64 MMHG | SYSTOLIC BLOOD PRESSURE: 110 MMHG | HEART RATE: 87 BPM

## 2024-12-19 DIAGNOSIS — R53.83 OTHER FATIGUE: ICD-10-CM

## 2024-12-19 DIAGNOSIS — Z46.6 ENCOUNTER FOR FITTING AND ADJUSTMENT OF URINARY DEVICE: ICD-10-CM

## 2024-12-19 DIAGNOSIS — Z98.890 OTHER SPECIFIED POSTPROCEDURAL STATES: Chronic | ICD-10-CM

## 2024-12-19 DIAGNOSIS — I50.30 UNSPECIFIED DIASTOLIC (CONGESTIVE) HEART FAILURE: ICD-10-CM

## 2024-12-19 DIAGNOSIS — Z90.13 ACQUIRED ABSENCE OF BILATERAL BREASTS AND NIPPLES: Chronic | ICD-10-CM

## 2024-12-19 DIAGNOSIS — G47.33 OBSTRUCTIVE SLEEP APNEA (ADULT) (PEDIATRIC): ICD-10-CM

## 2024-12-19 DIAGNOSIS — K21.9 GASTRO-ESOPHAGEAL REFLUX DISEASE WITHOUT ESOPHAGITIS: ICD-10-CM

## 2024-12-19 LAB
-  AMPICILLIN: SIGNIFICANT CHANGE UP
-  CIPROFLOXACIN: SIGNIFICANT CHANGE UP
-  DAPTOMYCIN: SIGNIFICANT CHANGE UP
-  LEVOFLOXACIN: SIGNIFICANT CHANGE UP
-  LINEZOLID: SIGNIFICANT CHANGE UP
-  NITROFURANTOIN: SIGNIFICANT CHANGE UP
-  TETRACYCLINE: SIGNIFICANT CHANGE UP
-  VANCOMYCIN: SIGNIFICANT CHANGE UP
CULTURE RESULTS: ABNORMAL
METHOD TYPE: SIGNIFICANT CHANGE UP
ORGANISM # SPEC MICROSCOPIC CNT: ABNORMAL
ORGANISM # SPEC MICROSCOPIC CNT: SIGNIFICANT CHANGE UP
SPECIMEN SOURCE: SIGNIFICANT CHANGE UP

## 2024-12-19 PROCEDURE — 99283 EMERGENCY DEPT VISIT LOW MDM: CPT

## 2024-12-19 PROCEDURE — 51702 INSERT TEMP BLADDER CATH: CPT

## 2024-12-19 PROCEDURE — 99283 EMERGENCY DEPT VISIT LOW MDM: CPT | Mod: 25

## 2024-12-19 RX ORDER — AMOXICILLIN/POTASSIUM CLAV 250-125 MG
875 TABLET ORAL
Qty: 14 | Refills: 0
Start: 2024-12-19 | End: 2024-12-25

## 2024-12-19 NOTE — ED PROVIDER NOTE - CLINICAL SUMMARY MEDICAL DECISION MAKING FREE TEXT BOX
89 yo F presented to ED for eval of lama replacement. Lama replaced.  Patient's records (prior hospital, ED visit, and/or nursing home notes if available) were reviewed.  Additional history was obtained from EMS, family, and/or PCP (where available).  Escalation to admission/observation was considered.  However patient feels much better and is comfortable with discharge.  Appropriate follow-up was arranged. Return precautions discussed in detail.

## 2024-12-19 NOTE — ED PROVIDER NOTE - NSFOLLOWUPINSTRUCTIONS_ED_ALL_ED_FT
TAKING CARE OF THE CATHETER   Wash your hands w soap & water.   Using mild soap & warm water on a clean washcloth:  Clean the area on your body closest to the catheter insertion site using a circular motion, moving away from the catheter. Never wipe toward the catheter because this could sweep bacteria up into the urethra & cause infection.   Remove all traces of soap. Pat the area dry w a clean towel. For males, reposition the foreskin.    Attach the catheter to your leg so there is no tension on the catheter. Use adhesive tape or a leg strap. If you are using adhesive tape, remove any sticky residue left behind by the previous tape you used.   Keep the drainage bag below the level of the bladder, but keep it off the floor.   Check throughout the day to be sure the catheter is working & urine is draining freely. Make sure the tubing does not become kinked.  Do not pull on the catheter or try to remove it. Pulling could damage internal tissues.    TAKING CARE OF THE DRAINAGE BAGS  You will be given two drainage bags to take home. One is a large overnight drainage bag, & the other is a smaller leg bag that fits underneath clothing. You may wear the overnight bag at any time, but you should never wear the smaller leg bag at night. Follow the instructions below for how to empty, change, & clean your drainage bags.    Emptying: You must empty your drainage bag when it is ?–½ full or at least 2–3 times a day.   Wash your hands w soap & water.   Keep the drainage bag below your hips, below the level of your bladder. This stops urine from going back into the tubing & into your bladder.    Hold the dirty bag over the toilet or a clean container.   Open the pour spout at the bottom of the bag & empty the urine into the toilet or container. Do not let the pour spout touch the toilet, container, or any other surface. Doing so can place bacteria on the bag, which can cause an infection.   Clean the pour spout w a gauze pad or cotton ball that has rubbing alcohol on it.   Close the pour spout.   Attach the bag to your leg w adhesive tape or a leg strap.   Wash your hands well.    Flushing:   Flushing is a procedure used to help keep urine flowing if you suspect that your catheter is plugged.  To flush your catheter, you need the following supplies: Saline syringe, Alcohol wipes    Follow these steps to flush your catheter:  Wipe the access port with an alcohol wipe.  Remove the cap from the saline syringe.  Attach the syringe to the access port.  Pinch off the tubing below the access port to stop urine flow.  Slowly push in the saline solution.  Once the syringe is empty, disconnect it from the access port.  Keeping the tubing pinched, repeat steps 2-5 as needed until you have instilled 30-60mL of saline into the bladder.  Release the pinched tubing to allow urine to flow    Changing: Change your drainage bag once a month or sooner if it starts to smell bad or look dirty. Below are steps to follow when changing the drainage bag.   Wash your hands w soap & water.   Pinch off the rubber catheter so that urine does not spill out.   Disconnect the catheter tube from the drainage tube at the connection valve. Do not let the tubes touch any surface.    Clean the end of the catheter tube w an alcohol wipe. Use a different alcohol wipe to clean the end of the drainage tube.   Connect the catheter tube to the drainage tube of the clean drainage bag.   Attach the new bag to the leg w adhesive tape or a leg strap. Avoid attaching the new bag too tightly.    Wash your hands well.    Cleaning:  Wash your hands w soap & water.   Wash the bag in warm, soapy water.   Rinse the bag thoroughly w warm water.   Fill the bag w a solution of white vinegar & water (1 cup vinegar to 1 qt warm water [.2 L vinegar to 1 L warm water]). Close the bag & soak it for 30 minutes in the solution.    Rinse the bag w warm water.    Hang the bag to dry w the pour spout open & hanging downward.   Store the clean bag (once it is dry) in a clean plastic bag.   Wash your hands well.     PREVENTING INFECTION  Wash your hands before & after handling your catheter.  Take showers daily & wash the area where the catheter enters your body. Do not take baths. Replace wet leg straps w dry ones, if this applies.  Do not use powders, sprays, or lotions on the genital area. Only use creams, lotions, or ointments as directed by your caregiver.  For females, wipe from front to back after each bowel movement.   Drink enough fluids to keep your urine clear or pale yellow unless you have a fluid restriction.   Do not let the drainage bag or tubing touch or lie on the floor.   Wear cotton underwear to absorb moisture & to keep your .    SEEK MEDICAL CARE IF:  Your urine is cloudy or smells unusually bad.  Your catheter becomes clogged.  You are not draining urine into the bag or your bladder feels full.  Your catheter starts to leak.    SEEK IMMEDIATE MEDICAL CARE IF:  You have pain, swelling, redness, or pus where the catheter enters the body.  You have pain in the abdomen, legs, lower back, or bladder.  You have a fever.  You see blood fill the catheter, or your urine is pink or red.  You have nausea, vomiting, or chills.  Your catheter gets pulled out.

## 2024-12-19 NOTE — ED PROVIDER NOTE - CARE PROVIDERS DIRECT ADDRESSES
[email or web address]

## 2024-12-19 NOTE — ED PROVIDER NOTE - CARE PLAN
Principal Discharge DX:	Encounter for Rios catheter replacement   1 Principal Discharge DX:	Encounter for Lama catheter replacement  Assessment and plan of treatment:	Plan - lama

## 2024-12-19 NOTE — ED PROVIDER NOTE - PATIENT PORTAL LINK FT
You can access the FollowMyHealth Patient Portal offered by [facility] by registering at the following website: [email or web address]. By joining [employer]’s FollowMyHealth portal, you will also be able to view your health information using other applications (apps) compatible with our system.

## 2024-12-19 NOTE — ED PROVIDER NOTE - PHYSICAL EXAMINATION
VITALS:   T(C): 36.1 (12-19-24 @ 09:24), Max: 36.1 (12-19-24 @ 09:24)  HR: 110 (12-19-24 @ 09:24) (110 - 110)  BP: 100/65 (12-19-24 @ 09:24) (100/65 - 100/65)  RR: 16 (12-19-24 @ 09:24) (16 - 16)  SpO2: 99% (12-19-24 @ 09:24) (99% - 99%)    GENERAL: NAD, lying in bed comfortably  HEART: Regular rate and rhythm, no murmurs, rubs, or gallops  LUNGS: Unlabored respirations  ABDOMEN: Soft, nontender, nondistended  EXTREMITIES: No clubbing, cyanosis, or edema  NERVOUS SYSTEM:  A&Ox1-2, lethargic  SKIN: No rashes or lesions

## 2024-12-19 NOTE — ED PROVIDER NOTE - OBJECTIVE STATEMENT
Case of a 89 y/o F, resident at [facility], PMHx Dementia, JENNIFER (3L NC in AM and evenings), HFpEF (EF 55% 9/24), AoS, L carotid stenosis, anemia, anxiety, urinary retention s/p chronic lama, breast Ca s/p mastectomy and reconstruction, brain ca s/p craniotomy, recent admission for UTI with E coli bacteremia.  Per RN at [facility], pt was sent to ED for lama replacement after pulling off lama bag and ripping external catheter. Pt is A&O1-2, too lethargic to collect proper history. Per RN, that is pt's baseline orientation, has bouts of lethargy, also gave her Klonipin before she was sent to ED.    Of note, RN reports pt tested positive with stool cultures for C diff yesterday and was started on Flagyl.

## 2024-12-19 NOTE — ED ADULT NURSE NOTE - NSFALLHARMRISKINTERV_ED_ALL_ED

## 2024-12-19 NOTE — ED PROVIDER NOTE - CARE PROVIDER_API CALL
Angel Luis Rodriguez  Internal Medicine  [address] [place] [address]  Phone: [phone]  Fax: [phone]  Follow Up Time: 7-10 Days

## 2024-12-19 NOTE — ED PROVIDER NOTE - ATTENDING CONTRIBUTION TO CARE
89-year-old female PMHx GERD, CHF, JENNIFER on 3L NC, aortic stenosis, L carotid stenosis, anemia, anxiety, urinary retention now with indwelling lama, breast cancer s/p mastectomy and reconstruction and brain cancer s/p craniotomy presents to ED for lama replacement. Per RN at [facility] patient pulled out lama catheter. At baseline. No other complaints or concerns.    CONSTITUTIONAL: NAD.   SKIN: warm, dry  HEAD: Normocephalic; atraumatic.  ENT: MMM.   NECK: Supple.  CARD: RRR.   RESP: No wheezes, rales or rhonchi.  ABD: soft ntnd.   EXT: Normal ROM.   NEURO: AAOX1-2. No FND.

## 2024-12-20 ENCOUNTER — EMERGENCY (EMERGENCY)
Facility: HOSPITAL | Age: 88
LOS: 0 days | Discharge: HOME | End: 2024-12-20
Attending: EMERGENCY MEDICINE
Payer: MEDICARE

## 2024-12-20 VITALS
DIASTOLIC BLOOD PRESSURE: 67 MMHG | RESPIRATION RATE: 18 BRPM | SYSTOLIC BLOOD PRESSURE: 117 MMHG | TEMPERATURE: 97 F | HEART RATE: 70 BPM | OXYGEN SATURATION: 98 %

## 2024-12-20 VITALS
RESPIRATION RATE: 16 BRPM | DIASTOLIC BLOOD PRESSURE: 67 MMHG | HEIGHT: 61 IN | SYSTOLIC BLOOD PRESSURE: 94 MMHG | OXYGEN SATURATION: 97 % | HEART RATE: 69 BPM

## 2024-12-20 DIAGNOSIS — Z98.890 OTHER SPECIFIED POSTPROCEDURAL STATES: Chronic | ICD-10-CM

## 2024-12-20 DIAGNOSIS — Z90.13 ACQUIRED ABSENCE OF BILATERAL BREASTS AND NIPPLES: Chronic | ICD-10-CM

## 2024-12-20 LAB
ALBUMIN SERPL ELPH-MCNC: 3.6 G/DL — SIGNIFICANT CHANGE UP (ref 3.5–5.2)
ALP SERPL-CCNC: 62 U/L — SIGNIFICANT CHANGE UP (ref 30–115)
ALT FLD-CCNC: 17 U/L — SIGNIFICANT CHANGE UP (ref 0–41)
ANION GAP SERPL CALC-SCNC: 12 MMOL/L — SIGNIFICANT CHANGE UP (ref 7–14)
APPEARANCE UR: ABNORMAL
AST SERPL-CCNC: 32 U/L — SIGNIFICANT CHANGE UP (ref 0–41)
BACTERIA # UR AUTO: ABNORMAL /HPF
BASOPHILS # BLD AUTO: 0.01 K/UL — SIGNIFICANT CHANGE UP (ref 0–0.2)
BASOPHILS NFR BLD AUTO: 0.2 % — SIGNIFICANT CHANGE UP (ref 0–1)
BILIRUB SERPL-MCNC: 0.4 MG/DL — SIGNIFICANT CHANGE UP (ref 0.2–1.2)
BILIRUB UR-MCNC: NEGATIVE — SIGNIFICANT CHANGE UP
BUN SERPL-MCNC: 20 MG/DL — SIGNIFICANT CHANGE UP (ref 10–20)
CALCIUM SERPL-MCNC: 9.3 MG/DL — SIGNIFICANT CHANGE UP (ref 8.4–10.4)
CAST: 7 /LPF — HIGH (ref 0–4)
CHLORIDE SERPL-SCNC: 97 MMOL/L — LOW (ref 98–110)
CO2 SERPL-SCNC: 31 MMOL/L — SIGNIFICANT CHANGE UP (ref 17–32)
COLOR SPEC: YELLOW — SIGNIFICANT CHANGE UP
CREAT SERPL-MCNC: 1.2 MG/DL — SIGNIFICANT CHANGE UP (ref 0.7–1.5)
DIFF PNL FLD: NEGATIVE — SIGNIFICANT CHANGE UP
EGFR: 42 ML/MIN/1.73M2 — LOW
EOSINOPHIL # BLD AUTO: 0.09 K/UL — SIGNIFICANT CHANGE UP (ref 0–0.7)
EOSINOPHIL NFR BLD AUTO: 1.8 % — SIGNIFICANT CHANGE UP (ref 0–8)
FLUAV AG NPH QL: SIGNIFICANT CHANGE UP
FLUBV AG NPH QL: SIGNIFICANT CHANGE UP
GLUCOSE SERPL-MCNC: 85 MG/DL — SIGNIFICANT CHANGE UP (ref 70–99)
GLUCOSE UR QL: NEGATIVE MG/DL — SIGNIFICANT CHANGE UP
HCT VFR BLD CALC: 34.2 % — LOW (ref 37–47)
HGB BLD-MCNC: 10.7 G/DL — LOW (ref 12–16)
IMM GRANULOCYTES NFR BLD AUTO: 0.4 % — HIGH (ref 0.1–0.3)
KETONES UR-MCNC: NEGATIVE MG/DL — SIGNIFICANT CHANGE UP
LACTATE SERPL-SCNC: 1.2 MMOL/L — SIGNIFICANT CHANGE UP (ref 0.7–2)
LEUKOCYTE ESTERASE UR-ACNC: ABNORMAL
LYMPHOCYTES # BLD AUTO: 0.69 K/UL — LOW (ref 1.2–3.4)
LYMPHOCYTES # BLD AUTO: 13.5 % — LOW (ref 20.5–51.1)
MAGNESIUM SERPL-MCNC: 1.8 MG/DL — SIGNIFICANT CHANGE UP (ref 1.8–2.4)
MCHC RBC-ENTMCNC: 28.2 PG — SIGNIFICANT CHANGE UP (ref 27–31)
MCHC RBC-ENTMCNC: 31.3 G/DL — LOW (ref 32–37)
MCV RBC AUTO: 90.2 FL — SIGNIFICANT CHANGE UP (ref 81–99)
MONOCYTES # BLD AUTO: 0.39 K/UL — SIGNIFICANT CHANGE UP (ref 0.1–0.6)
MONOCYTES NFR BLD AUTO: 7.6 % — SIGNIFICANT CHANGE UP (ref 1.7–9.3)
NEUTROPHILS # BLD AUTO: 3.9 K/UL — SIGNIFICANT CHANGE UP (ref 1.4–6.5)
NEUTROPHILS NFR BLD AUTO: 76.5 % — HIGH (ref 42.2–75.2)
NITRITE UR-MCNC: NEGATIVE — SIGNIFICANT CHANGE UP
NRBC # BLD: 0 /100 WBCS — SIGNIFICANT CHANGE UP (ref 0–0)
PH UR: 6 — SIGNIFICANT CHANGE UP (ref 5–8)
PLATELET # BLD AUTO: 173 K/UL — SIGNIFICANT CHANGE UP (ref 130–400)
PMV BLD: 9.6 FL — SIGNIFICANT CHANGE UP (ref 7.4–10.4)
POTASSIUM SERPL-MCNC: 4.1 MMOL/L — SIGNIFICANT CHANGE UP (ref 3.5–5)
POTASSIUM SERPL-SCNC: 4.1 MMOL/L — SIGNIFICANT CHANGE UP (ref 3.5–5)
PROT SERPL-MCNC: 6.7 G/DL — SIGNIFICANT CHANGE UP (ref 6–8)
PROT UR-MCNC: SIGNIFICANT CHANGE UP MG/DL
RBC # BLD: 3.79 M/UL — LOW (ref 4.2–5.4)
RBC # FLD: 15.1 % — HIGH (ref 11.5–14.5)
RBC CASTS # UR COMP ASSIST: 11 /HPF — HIGH (ref 0–4)
RSV RNA NPH QL NAA+NON-PROBE: SIGNIFICANT CHANGE UP
SARS-COV-2 RNA SPEC QL NAA+PROBE: SIGNIFICANT CHANGE UP
SODIUM SERPL-SCNC: 140 MMOL/L — SIGNIFICANT CHANGE UP (ref 135–146)
SP GR SPEC: 1.02 — SIGNIFICANT CHANGE UP (ref 1–1.03)
SQUAMOUS # UR AUTO: 15 /HPF — HIGH (ref 0–5)
UROBILINOGEN FLD QL: 1 MG/DL — SIGNIFICANT CHANGE UP (ref 0.2–1)
WBC # BLD: 5.1 K/UL — SIGNIFICANT CHANGE UP (ref 4.8–10.8)
WBC # FLD AUTO: 5.1 K/UL — SIGNIFICANT CHANGE UP (ref 4.8–10.8)
WBC UR QL: 17 /HPF — HIGH (ref 0–5)

## 2024-12-20 PROCEDURE — 83735 ASSAY OF MAGNESIUM: CPT

## 2024-12-20 PROCEDURE — 85025 COMPLETE CBC W/AUTO DIFF WBC: CPT

## 2024-12-20 PROCEDURE — 81001 URINALYSIS AUTO W/SCOPE: CPT

## 2024-12-20 PROCEDURE — 36000 PLACE NEEDLE IN VEIN: CPT

## 2024-12-20 PROCEDURE — 51702 INSERT TEMP BLADDER CATH: CPT

## 2024-12-20 PROCEDURE — 99285 EMERGENCY DEPT VISIT HI MDM: CPT | Mod: GC

## 2024-12-20 PROCEDURE — 87086 URINE CULTURE/COLONY COUNT: CPT

## 2024-12-20 PROCEDURE — 71045 X-RAY EXAM CHEST 1 VIEW: CPT | Mod: 26

## 2024-12-20 PROCEDURE — 99284 EMERGENCY DEPT VISIT MOD MDM: CPT | Mod: 25

## 2024-12-20 PROCEDURE — 0241U: CPT

## 2024-12-20 PROCEDURE — 70450 CT HEAD/BRAIN W/O DYE: CPT | Mod: 26,MC

## 2024-12-20 PROCEDURE — 87040 BLOOD CULTURE FOR BACTERIA: CPT | Mod: 59

## 2024-12-20 PROCEDURE — 36415 COLL VENOUS BLD VENIPUNCTURE: CPT

## 2024-12-20 PROCEDURE — 71045 X-RAY EXAM CHEST 1 VIEW: CPT

## 2024-12-20 PROCEDURE — 82962 GLUCOSE BLOOD TEST: CPT

## 2024-12-20 PROCEDURE — 80053 COMPREHEN METABOLIC PANEL: CPT

## 2024-12-20 PROCEDURE — 70450 CT HEAD/BRAIN W/O DYE: CPT | Mod: MC

## 2024-12-20 PROCEDURE — 83605 ASSAY OF LACTIC ACID: CPT

## 2024-12-20 RX ORDER — 0.9 % SODIUM CHLORIDE 0.9 %
500 INTRAVENOUS SOLUTION INTRAVENOUS ONCE
Refills: 0 | Status: COMPLETED | OUTPATIENT
Start: 2024-12-20 | End: 2024-12-20

## 2024-12-20 RX ADMIN — Medication 500 MILLILITER(S): at 13:54

## 2024-12-20 NOTE — ED PROVIDER NOTE - PATIENT PORTAL LINK FT
You can access the FollowMyHealth Patient Portal offered by [facility] by registering at the following website: [email or web address]. By joining [email or web address]’s FollowMyHealth portal, you will also be able to view your health information using other applications (apps) compatible with our system.

## 2024-12-20 NOTE — ED ADULT NURSE REASSESSMENT NOTE - NS ED NURSE REASSESS COMMENT FT1
Pt on continues observation stable calm cooperative Vs stable ,no injury ready to be transport with Ambulance to nursing home assistant leaving ,facility .

## 2024-12-20 NOTE — ED PROVIDER NOTE - ATTENDING CONTRIBUTION TO CARE
89-year-old female with history of dementia sleep apnea heart failure with an EF of 50 5 aortic stenosis carotid stenosis anemia anxiety breast cancer craniotomy who is presenting  Per nursing notes patient removed Rios catheter.  She did get a dose of benzo prior to arrival.  Here on arrival the patient appeared sleepy but awoken to voice.  She moved all extremities.  Her abdomen is soft.  Her lungs had decreased breath sounds at the bases there was an aortic stenotic murmur heard.. given her mental status and temperature will obtain labs and ua, ct head and reassess for improvement

## 2024-12-20 NOTE — ED PROVIDER NOTE - OBJECTIVE STATEMENT
89 female with PMH of dementia, aortic stenosis, carotid stenosis, CHF, anemia, anxiety, breast CA s/p mastectomy, brain CA s/p craniectomy, chronic urinary tension with Rios placement, seen in ED yesterday after she ripped out her Rios presents to ED from nursing home after being on her Iros.  I spoke with HARPREET Cardenas who states patient ripped out her Rios today with balloon intact.  She did not notice any blood in her diaper and was not having any complaints at the time.  States patient was a little angry before leaving on the ambulance and was given her as needed dose of Klonopin.  No fever, cough, vomiting, diarrhea noted.  HPI and ROS are further limited due to patient's baseline dementia. She denies any complaints of pain or discomfort at this time.

## 2024-12-20 NOTE — ED ADULT NURSE NOTE - CHIEF COMPLAINT QUOTE
biba from [facility] assisted living here for ripping out her lama catheter pt currently on treatment for c-diff

## 2024-12-20 NOTE — ED ADULT NURSE REASSESSMENT NOTE - NS ED NURSE REASSESS COMMENT FT1
Pt reassessed remain safety with order D/C to nursing home ,Ambulance is placed for transport waiting transport .

## 2024-12-20 NOTE — ED ADULT NURSE REASSESSMENT NOTE - NS ED NURSE REASSESS COMMENT FT1
Pt reassessed A/O times 2 Vs see flow sheet . stable comfort provide on the bed denies any pain no SOB, no N/V  refusing eat lunch , Rios catheter is placed tolereted well, LR 500mg IV order is given safety precaution on progress ,ongoing nursing observation .

## 2024-12-20 NOTE — ED PROVIDER NOTE - CLINICAL SUMMARY MEDICAL DECISION MAKING FREE TEXT BOX
Evaluated for generalized weakness was likely due to Klonopin prior to arrival Rios catheter was removed prior to ED visit and replaced here in the ED.  Lab work reviewed and normal.  Patient discharged

## 2024-12-20 NOTE — ED PROVIDER NOTE - PROGRESS NOTE DETAILS
CRISTY: Patient lethargic in the ER, but per nurse Theresa at the nursing home patient was awake and alert before leaving though was given a dose of Klonopin.  Rectal temp 95.6 but vital signs otherwise stable.  Fingerstick is within normal limits.  Labs, CT head ordered CRISTY: Rios replaced by nurse. Patient much more awake and alert, interactive and holding full conversations.  CT, labs within normal limits.  UA shows leuk esterase and white cells however patient has chronic Rios symptoms.  Not to treat at this time.  Will discharge home with outpatient follow-up.

## 2024-12-20 NOTE — ED PROVIDER NOTE - PHYSICAL EXAMINATION
VITAL SIGNS: I have reviewed nursing notes and confirm.  CONSTITUTIONAL: lethargic, responds to commands but goes back to sleep  SKIN: Skin exam is warm and dry, no acute rash.  HEAD: Normocephalic; atraumatic.  EYES: PERRL, conjunctiva and sclera clear.  ENT: mmm  CARD: S1, S2 normal; no murmurs, gallops, or rubs. Regular rate and rhythm.  RESP: Normal respiratory effort, no tachypnea or distress. Lungs CTAB, no wheezes, rales or rhonchi.  ABD: soft, NT/ND.  Rios removed, no blood noted in her diaper or around urethral meatus  EXT: moves all extremities without focal deficits  NEURO: tired but arouses to voice, moves all extremities, no focal deficits  PSYCH: intermittently cooperative

## 2024-12-22 LAB
CULTURE RESULTS: SIGNIFICANT CHANGE UP
SPECIMEN SOURCE: SIGNIFICANT CHANGE UP

## 2024-12-23 ENCOUNTER — INPATIENT (INPATIENT)
Facility: HOSPITAL | Age: 88
LOS: 11 days | Discharge: HOSPICE | DRG: 871 | End: 2025-01-04
Attending: STUDENT IN AN ORGANIZED HEALTH CARE EDUCATION/TRAINING PROGRAM | Admitting: STUDENT IN AN ORGANIZED HEALTH CARE EDUCATION/TRAINING PROGRAM
Payer: MEDICARE

## 2024-12-23 VITALS
SYSTOLIC BLOOD PRESSURE: 117 MMHG | RESPIRATION RATE: 14 BRPM | DIASTOLIC BLOOD PRESSURE: 76 MMHG | HEART RATE: 60 BPM | OXYGEN SATURATION: 98 % | HEIGHT: 61 IN

## 2024-12-23 DIAGNOSIS — Z98.890 OTHER SPECIFIED POSTPROCEDURAL STATES: Chronic | ICD-10-CM

## 2024-12-23 DIAGNOSIS — Z90.13 ACQUIRED ABSENCE OF BILATERAL BREASTS AND NIPPLES: Chronic | ICD-10-CM

## 2024-12-23 LAB
ALBUMIN SERPL ELPH-MCNC: 3.8 G/DL — SIGNIFICANT CHANGE UP (ref 3.5–5.2)
ALP SERPL-CCNC: 60 U/L — SIGNIFICANT CHANGE UP (ref 30–115)
ALT FLD-CCNC: 22 U/L — SIGNIFICANT CHANGE UP (ref 0–41)
ANION GAP SERPL CALC-SCNC: 11 MMOL/L — SIGNIFICANT CHANGE UP (ref 7–14)
APTT BLD: 32.7 SEC — SIGNIFICANT CHANGE UP (ref 27–39.2)
AST SERPL-CCNC: 49 U/L — HIGH (ref 0–41)
BASOPHILS # BLD AUTO: 0.01 K/UL — SIGNIFICANT CHANGE UP (ref 0–0.2)
BASOPHILS NFR BLD AUTO: 0.2 % — SIGNIFICANT CHANGE UP (ref 0–1)
BILIRUB SERPL-MCNC: 0.4 MG/DL — SIGNIFICANT CHANGE UP (ref 0.2–1.2)
BUN SERPL-MCNC: 17 MG/DL — SIGNIFICANT CHANGE UP (ref 10–20)
CALCIUM SERPL-MCNC: 9.5 MG/DL — SIGNIFICANT CHANGE UP (ref 8.4–10.4)
CHLORIDE SERPL-SCNC: 97 MMOL/L — LOW (ref 98–110)
CO2 SERPL-SCNC: 30 MMOL/L — SIGNIFICANT CHANGE UP (ref 17–32)
CREAT SERPL-MCNC: 1 MG/DL — SIGNIFICANT CHANGE UP (ref 0.7–1.5)
EGFR: 53 ML/MIN/1.73M2 — LOW
EGFR: 53 ML/MIN/1.73M2 — LOW
EOSINOPHIL # BLD AUTO: 0.1 K/UL — SIGNIFICANT CHANGE UP (ref 0–0.7)
EOSINOPHIL NFR BLD AUTO: 2.3 % — SIGNIFICANT CHANGE UP (ref 0–8)
GLUCOSE SERPL-MCNC: 101 MG/DL — HIGH (ref 70–99)
HCT VFR BLD CALC: 37.8 % — SIGNIFICANT CHANGE UP (ref 37–47)
HGB BLD-MCNC: 11.9 G/DL — LOW (ref 12–16)
IMM GRANULOCYTES NFR BLD AUTO: 0.5 % — HIGH (ref 0.1–0.3)
INR BLD: 1.04 RATIO — SIGNIFICANT CHANGE UP (ref 0.65–1.3)
LACTATE SERPL-SCNC: 1.1 MMOL/L — SIGNIFICANT CHANGE UP (ref 0.7–2)
LIDOCAIN IGE QN: 28 U/L — SIGNIFICANT CHANGE UP (ref 7–60)
LYMPHOCYTES # BLD AUTO: 0.55 K/UL — LOW (ref 1.2–3.4)
LYMPHOCYTES # BLD AUTO: 12.4 % — LOW (ref 20.5–51.1)
MCHC RBC-ENTMCNC: 28.7 PG — SIGNIFICANT CHANGE UP (ref 27–31)
MCHC RBC-ENTMCNC: 31.5 G/DL — LOW (ref 32–37)
MCV RBC AUTO: 91.1 FL — SIGNIFICANT CHANGE UP (ref 81–99)
MONOCYTES # BLD AUTO: 0.5 K/UL — SIGNIFICANT CHANGE UP (ref 0.1–0.6)
MONOCYTES NFR BLD AUTO: 11.3 % — HIGH (ref 1.7–9.3)
NEUTROPHILS # BLD AUTO: 3.25 K/UL — SIGNIFICANT CHANGE UP (ref 1.4–6.5)
NEUTROPHILS NFR BLD AUTO: 73.3 % — SIGNIFICANT CHANGE UP (ref 42.2–75.2)
NRBC # BLD: 0 /100 WBCS — SIGNIFICANT CHANGE UP (ref 0–0)
NRBC BLD-RTO: 0 /100 WBCS — SIGNIFICANT CHANGE UP (ref 0–0)
PLATELET # BLD AUTO: 148 K/UL — SIGNIFICANT CHANGE UP (ref 130–400)
PMV BLD: 10.1 FL — SIGNIFICANT CHANGE UP (ref 7.4–10.4)
POTASSIUM SERPL-MCNC: 4.4 MMOL/L — SIGNIFICANT CHANGE UP (ref 3.5–5)
POTASSIUM SERPL-SCNC: 4.4 MMOL/L — SIGNIFICANT CHANGE UP (ref 3.5–5)
PROT SERPL-MCNC: 7.2 G/DL — SIGNIFICANT CHANGE UP (ref 6–8)
PROTHROM AB SERPL-ACNC: 12.3 SEC — SIGNIFICANT CHANGE UP (ref 9.95–12.87)
RBC # BLD: 4.15 M/UL — LOW (ref 4.2–5.4)
RBC # FLD: 15.5 % — HIGH (ref 11.5–14.5)
SODIUM SERPL-SCNC: 138 MMOL/L — SIGNIFICANT CHANGE UP (ref 135–146)
WBC # BLD: 4.43 K/UL — LOW (ref 4.8–10.8)
WBC # FLD AUTO: 4.43 K/UL — LOW (ref 4.8–10.8)

## 2024-12-23 PROCEDURE — 70450 CT HEAD/BRAIN W/O DYE: CPT | Mod: 26,MC

## 2024-12-23 PROCEDURE — 71260 CT THORAX DX C+: CPT | Mod: 26,MC

## 2024-12-23 PROCEDURE — 99285 EMERGENCY DEPT VISIT HI MDM: CPT

## 2024-12-23 PROCEDURE — 72170 X-RAY EXAM OF PELVIS: CPT | Mod: 26

## 2024-12-23 PROCEDURE — 72125 CT NECK SPINE W/O DYE: CPT | Mod: 26,MC

## 2024-12-23 PROCEDURE — 71045 X-RAY EXAM CHEST 1 VIEW: CPT | Mod: 26

## 2024-12-23 PROCEDURE — 74177 CT ABD & PELVIS W/CONTRAST: CPT | Mod: 26,MC

## 2024-12-23 RX ADMIN — Medication 1000 MILLILITER(S): at 19:13
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
## 2024-12-24 LAB
AMMONIA BLD-MCNC: 27 UMOL/L — SIGNIFICANT CHANGE UP (ref 11–55)
ANION GAP SERPL CALC-SCNC: 10 MMOL/L — SIGNIFICANT CHANGE UP (ref 7–14)
APPEARANCE UR: CLEAR — SIGNIFICANT CHANGE UP
BASOPHILS # BLD AUTO: 0.01 K/UL — SIGNIFICANT CHANGE UP (ref 0–0.2)
BASOPHILS NFR BLD AUTO: 0.3 % — SIGNIFICANT CHANGE UP (ref 0–1)
BILIRUB UR-MCNC: NEGATIVE — SIGNIFICANT CHANGE UP
BUN SERPL-MCNC: 15 MG/DL — SIGNIFICANT CHANGE UP (ref 10–20)
CALCIUM SERPL-MCNC: 8.7 MG/DL — SIGNIFICANT CHANGE UP (ref 8.4–10.5)
CHLORIDE SERPL-SCNC: 101 MMOL/L — SIGNIFICANT CHANGE UP (ref 98–110)
CO2 SERPL-SCNC: 28 MMOL/L — SIGNIFICANT CHANGE UP (ref 17–32)
COLOR SPEC: YELLOW — SIGNIFICANT CHANGE UP
CREAT SERPL-MCNC: 0.9 MG/DL — SIGNIFICANT CHANGE UP (ref 0.7–1.5)
DIFF PNL FLD: NEGATIVE — SIGNIFICANT CHANGE UP
EGFR: 60 ML/MIN/1.73M2 — SIGNIFICANT CHANGE UP
EGFR: 60 ML/MIN/1.73M2 — SIGNIFICANT CHANGE UP
EOSINOPHIL # BLD AUTO: 0.05 K/UL — SIGNIFICANT CHANGE UP (ref 0–0.7)
EOSINOPHIL NFR BLD AUTO: 1.4 % — SIGNIFICANT CHANGE UP (ref 0–8)
FERRITIN SERPL-MCNC: 56 NG/ML — SIGNIFICANT CHANGE UP (ref 13–330)
FOLATE SERPL-MCNC: >20 NG/ML — SIGNIFICANT CHANGE UP
GLUCOSE SERPL-MCNC: 123 MG/DL — HIGH (ref 70–99)
GLUCOSE UR QL: NEGATIVE MG/DL — SIGNIFICANT CHANGE UP
HCT VFR BLD CALC: 30.6 % — LOW (ref 37–47)
HGB BLD-MCNC: 9.7 G/DL — LOW (ref 12–16)
IMM GRANULOCYTES NFR BLD AUTO: 0.3 % — SIGNIFICANT CHANGE UP (ref 0.1–0.3)
IRON SATN MFR SERPL: 31 % — SIGNIFICANT CHANGE UP (ref 15–50)
IRON SATN MFR SERPL: 76 UG/DL — SIGNIFICANT CHANGE UP (ref 35–150)
KETONES UR-MCNC: NEGATIVE MG/DL — SIGNIFICANT CHANGE UP
LEUKOCYTE ESTERASE UR-ACNC: NEGATIVE — SIGNIFICANT CHANGE UP
LYMPHOCYTES # BLD AUTO: 0.35 K/UL — LOW (ref 1.2–3.4)
LYMPHOCYTES # BLD AUTO: 9.8 % — LOW (ref 20.5–51.1)
MAGNESIUM SERPL-MCNC: 1.7 MG/DL — LOW (ref 1.8–2.4)
MCHC RBC-ENTMCNC: 28.6 PG — SIGNIFICANT CHANGE UP (ref 27–31)
MCHC RBC-ENTMCNC: 31.7 G/DL — LOW (ref 32–37)
MCV RBC AUTO: 90.3 FL — SIGNIFICANT CHANGE UP (ref 81–99)
MONOCYTES # BLD AUTO: 0.25 K/UL — SIGNIFICANT CHANGE UP (ref 0.1–0.6)
MONOCYTES NFR BLD AUTO: 7 % — SIGNIFICANT CHANGE UP (ref 1.7–9.3)
NEUTROPHILS # BLD AUTO: 2.91 K/UL — SIGNIFICANT CHANGE UP (ref 1.4–6.5)
NEUTROPHILS NFR BLD AUTO: 81.2 % — HIGH (ref 42.2–75.2)
NITRITE UR-MCNC: NEGATIVE — SIGNIFICANT CHANGE UP
NRBC # BLD: 0 /100 WBCS — SIGNIFICANT CHANGE UP (ref 0–0)
NRBC BLD-RTO: 0 /100 WBCS — SIGNIFICANT CHANGE UP (ref 0–0)
PH UR: 6 — SIGNIFICANT CHANGE UP (ref 5–8)
PLATELET # BLD AUTO: 138 K/UL — SIGNIFICANT CHANGE UP (ref 130–400)
PMV BLD: 9.9 FL — SIGNIFICANT CHANGE UP (ref 7.4–10.4)
POTASSIUM SERPL-MCNC: 3.7 MMOL/L — SIGNIFICANT CHANGE UP (ref 3.5–5)
POTASSIUM SERPL-SCNC: 3.7 MMOL/L — SIGNIFICANT CHANGE UP (ref 3.5–5)
PROCALCITONIN SERPL-MCNC: 0.03 NG/ML — SIGNIFICANT CHANGE UP (ref 0.02–0.1)
PROT UR-MCNC: NEGATIVE MG/DL — SIGNIFICANT CHANGE UP
RBC # BLD: 3.39 M/UL — LOW (ref 4.2–5.4)
RBC # FLD: 15.6 % — HIGH (ref 11.5–14.5)
SODIUM SERPL-SCNC: 139 MMOL/L — SIGNIFICANT CHANGE UP (ref 135–146)
SP GR SPEC: >1.03 — HIGH (ref 1–1.03)
TIBC SERPL-MCNC: 243 UG/DL — SIGNIFICANT CHANGE UP (ref 220–430)
TSH SERPL-MCNC: 3.17 UIU/ML — SIGNIFICANT CHANGE UP (ref 0.27–4.2)
UIBC SERPL-MCNC: 167 UG/DL — SIGNIFICANT CHANGE UP (ref 110–370)
UROBILINOGEN FLD QL: 0.2 MG/DL — SIGNIFICANT CHANGE UP (ref 0.2–1)
VIT B12 SERPL-MCNC: 1181 PG/ML — SIGNIFICANT CHANGE UP (ref 232–1245)
WBC # BLD: 3.58 K/UL — LOW (ref 4.8–10.8)
WBC # FLD AUTO: 3.58 K/UL — LOW (ref 4.8–10.8)

## 2024-12-24 PROCEDURE — 81003 URINALYSIS AUTO W/O SCOPE: CPT

## 2024-12-24 PROCEDURE — 84100 ASSAY OF PHOSPHORUS: CPT

## 2024-12-24 PROCEDURE — 99222 1ST HOSP IP/OBS MODERATE 55: CPT

## 2024-12-24 PROCEDURE — 84436 ASSAY OF TOTAL THYROXINE: CPT

## 2024-12-24 PROCEDURE — 85027 COMPLETE CBC AUTOMATED: CPT

## 2024-12-24 PROCEDURE — 36415 COLL VENOUS BLD VENIPUNCTURE: CPT

## 2024-12-24 PROCEDURE — 84480 ASSAY TRIIODOTHYRONINE (T3): CPT

## 2024-12-24 PROCEDURE — 84449 ASSAY OF TRANSCORTIN: CPT

## 2024-12-24 PROCEDURE — 82962 GLUCOSE BLOOD TEST: CPT

## 2024-12-24 PROCEDURE — 71045 X-RAY EXAM CHEST 1 VIEW: CPT

## 2024-12-24 PROCEDURE — 82746 ASSAY OF FOLIC ACID SERUM: CPT

## 2024-12-24 PROCEDURE — 82607 VITAMIN B-12: CPT

## 2024-12-24 PROCEDURE — 82728 ASSAY OF FERRITIN: CPT

## 2024-12-24 PROCEDURE — 82140 ASSAY OF AMMONIA: CPT

## 2024-12-24 PROCEDURE — 80048 BASIC METABOLIC PNL TOTAL CA: CPT

## 2024-12-24 PROCEDURE — 93010 ELECTROCARDIOGRAM REPORT: CPT

## 2024-12-24 PROCEDURE — 84443 ASSAY THYROID STIM HORMONE: CPT

## 2024-12-24 PROCEDURE — 82533 TOTAL CORTISOL: CPT

## 2024-12-24 PROCEDURE — 93005 ELECTROCARDIOGRAM TRACING: CPT

## 2024-12-24 PROCEDURE — 92610 EVALUATE SWALLOWING FUNCTION: CPT | Mod: GN

## 2024-12-24 PROCEDURE — 71045 X-RAY EXAM CHEST 1 VIEW: CPT | Mod: 26

## 2024-12-24 PROCEDURE — 83550 IRON BINDING TEST: CPT

## 2024-12-24 PROCEDURE — 83605 ASSAY OF LACTIC ACID: CPT

## 2024-12-24 PROCEDURE — 84145 PROCALCITONIN (PCT): CPT

## 2024-12-24 PROCEDURE — 80053 COMPREHEN METABOLIC PANEL: CPT

## 2024-12-24 PROCEDURE — 83735 ASSAY OF MAGNESIUM: CPT

## 2024-12-24 PROCEDURE — 0241U: CPT

## 2024-12-24 PROCEDURE — 85025 COMPLETE CBC W/AUTO DIFF WBC: CPT

## 2024-12-24 PROCEDURE — 84484 ASSAY OF TROPONIN QUANT: CPT

## 2024-12-24 PROCEDURE — 92526 ORAL FUNCTION THERAPY: CPT | Mod: GN

## 2024-12-24 PROCEDURE — 97166 OT EVAL MOD COMPLEX 45 MIN: CPT | Mod: GO

## 2024-12-24 PROCEDURE — 99223 1ST HOSP IP/OBS HIGH 75: CPT

## 2024-12-24 PROCEDURE — 97110 THERAPEUTIC EXERCISES: CPT | Mod: GP

## 2024-12-24 PROCEDURE — 83540 ASSAY OF IRON: CPT

## 2024-12-24 PROCEDURE — 87040 BLOOD CULTURE FOR BACTERIA: CPT

## 2024-12-24 RX ORDER — CEFEPIME 2 G/20ML
1000 INJECTION, POWDER, FOR SOLUTION INTRAVENOUS EVERY 12 HOURS
Refills: 0 | Status: DISCONTINUED | OUTPATIENT
Start: 2024-12-24 | End: 2024-12-25

## 2024-12-24 RX ORDER — SODIUM CHLORIDE 9 G/1000ML
500 INJECTION, SOLUTION INTRAVENOUS ONCE
Refills: 0 | Status: COMPLETED | OUTPATIENT
Start: 2024-12-24 | End: 2024-12-24

## 2024-12-24 RX ORDER — SENNA 187 MG
2 TABLET ORAL AT BEDTIME
Refills: 0 | Status: DISCONTINUED | OUTPATIENT
Start: 2024-12-24 | End: 2025-01-01

## 2024-12-24 RX ORDER — VANCOMYCIN HCL IN 5 % DEXTROSE 1.5G/250ML
500 PLASTIC BAG, INJECTION (ML) INTRAVENOUS EVERY 6 HOURS
Refills: 0 | Status: DISCONTINUED | OUTPATIENT
Start: 2024-12-24 | End: 2024-12-27

## 2024-12-24 RX ORDER — CLONAZEPAM 0.5 MG/1
1 TABLET ORAL
Refills: 0 | Status: DISCONTINUED | OUTPATIENT
Start: 2024-12-24 | End: 2024-12-31

## 2024-12-24 RX ORDER — VANCOMYCIN HCL IN 5 % DEXTROSE 1.5G/250ML
1000 PLASTIC BAG, INJECTION (ML) INTRAVENOUS ONCE
Refills: 0 | Status: COMPLETED | OUTPATIENT
Start: 2024-12-24 | End: 2024-12-24

## 2024-12-24 RX ORDER — HEPARIN SODIUM 1000 [USP'U]/ML
5000 INJECTION INTRAVENOUS; SUBCUTANEOUS EVERY 8 HOURS
Refills: 0 | Status: DISCONTINUED | OUTPATIENT
Start: 2024-12-24 | End: 2024-12-30

## 2024-12-24 RX ORDER — B1/B2/B3/B5/B6/B12/VIT C/FOLIC 500-0.5 MG
1 TABLET ORAL DAILY
Refills: 0 | Status: DISCONTINUED | OUTPATIENT
Start: 2024-12-24 | End: 2025-01-01

## 2024-12-24 RX ORDER — AZITHROMYCIN 250 MG
500 CAPSULE ORAL EVERY 24 HOURS
Refills: 0 | Status: DISCONTINUED | OUTPATIENT
Start: 2024-12-24 | End: 2024-12-25

## 2024-12-24 RX ORDER — CEFEPIME 2 G/20ML
2000 INJECTION, POWDER, FOR SOLUTION INTRAVENOUS ONCE
Refills: 0 | Status: COMPLETED | OUTPATIENT
Start: 2024-12-24 | End: 2024-12-24

## 2024-12-24 RX ORDER — MAGNESIUM SULFATE 500 MG/ML
2 SYRINGE (ML) INJECTION ONCE
Refills: 0 | Status: COMPLETED | OUTPATIENT
Start: 2024-12-24 | End: 2024-12-24

## 2024-12-24 RX ORDER — VANCOMYCIN HCL IN 5 % DEXTROSE 1.5G/250ML
750 PLASTIC BAG, INJECTION (ML) INTRAVENOUS EVERY 12 HOURS
Refills: 0 | Status: DISCONTINUED | OUTPATIENT
Start: 2024-12-24 | End: 2024-12-24

## 2024-12-24 RX ORDER — FERROUS SULFATE 137(45) MG
325 TABLET, EXTENDED RELEASE ORAL DAILY
Refills: 0 | Status: DISCONTINUED | OUTPATIENT
Start: 2024-12-24 | End: 2025-01-01

## 2024-12-24 RX ORDER — SODIUM CHLORIDE 9 G/1000ML
1000 INJECTION, SOLUTION INTRAVENOUS
Refills: 0 | Status: DISCONTINUED | OUTPATIENT
Start: 2024-12-24 | End: 2024-12-25

## 2024-12-24 RX ORDER — AMOXICILLIN AND CLAVULANATE POTASSIUM 500; 125 MG/1; MG/1
1 TABLET, FILM COATED ORAL
Refills: 0 | Status: DISCONTINUED | OUTPATIENT
Start: 2024-12-24 | End: 2024-12-24

## 2024-12-24 RX ORDER — ATORVASTATIN CALCIUM 80 MG/1
10 TABLET, FILM COATED ORAL AT BEDTIME
Refills: 0 | Status: DISCONTINUED | OUTPATIENT
Start: 2024-12-24 | End: 2025-01-01

## 2024-12-24 RX ORDER — POLYETHYLENE GLYCOL 3350 17 G/17G
17 POWDER, FOR SOLUTION ORAL DAILY
Refills: 0 | Status: DISCONTINUED | OUTPATIENT
Start: 2024-12-24 | End: 2025-01-01

## 2024-12-24 RX ORDER — ASPIRIN 325 MG
81 TABLET ORAL DAILY
Refills: 0 | Status: DISCONTINUED | OUTPATIENT
Start: 2024-12-24 | End: 2025-01-01

## 2024-12-24 RX ADMIN — CEFEPIME 100 MILLIGRAM(S): 2 INJECTION, POWDER, FOR SOLUTION INTRAVENOUS at 06:59

## 2024-12-24 RX ADMIN — HEPARIN SODIUM 5000 UNIT(S): 1000 INJECTION INTRAVENOUS; SUBCUTANEOUS at 14:46

## 2024-12-24 RX ADMIN — SODIUM CHLORIDE 100 MILLILITER(S): 9 INJECTION, SOLUTION INTRAVENOUS at 09:04

## 2024-12-24 RX ADMIN — HEPARIN SODIUM 5000 UNIT(S): 1000 INJECTION INTRAVENOUS; SUBCUTANEOUS at 21:19

## 2024-12-24 RX ADMIN — Medication 250 MILLIGRAM(S): at 06:59

## 2024-12-24 RX ADMIN — Medication 250 MILLIGRAM(S): at 00:43

## 2024-12-24 RX ADMIN — Medication 1000 MILLILITER(S): at 00:37

## 2024-12-24 RX ADMIN — Medication 25 GRAM(S): at 14:46

## 2024-12-24 RX ADMIN — Medication 1000 MILLILITER(S): at 01:20

## 2024-12-24 RX ADMIN — CEFEPIME 100 MILLIGRAM(S): 2 INJECTION, POWDER, FOR SOLUTION INTRAVENOUS at 00:37

## 2024-12-24 RX ADMIN — Medication 255 MILLIGRAM(S): at 14:46

## 2024-12-24 RX ADMIN — SODIUM CHLORIDE 500 MILLILITER(S): 9 INJECTION, SOLUTION INTRAVENOUS at 08:09

## 2024-12-24 RX ADMIN — CEFEPIME 100 MILLIGRAM(S): 2 INJECTION, POWDER, FOR SOLUTION INTRAVENOUS at 17:26

## 2024-12-25 LAB
ALBUMIN SERPL ELPH-MCNC: 3.3 G/DL — LOW (ref 3.5–5.2)
ALP SERPL-CCNC: 57 U/L — SIGNIFICANT CHANGE UP (ref 30–115)
ALT FLD-CCNC: 17 U/L — SIGNIFICANT CHANGE UP (ref 0–41)
ANION GAP SERPL CALC-SCNC: 8 MMOL/L — SIGNIFICANT CHANGE UP (ref 7–14)
AST SERPL-CCNC: 37 U/L — SIGNIFICANT CHANGE UP (ref 0–41)
BASOPHILS # BLD AUTO: 0.01 K/UL — SIGNIFICANT CHANGE UP (ref 0–0.2)
BASOPHILS NFR BLD AUTO: 0.2 % — SIGNIFICANT CHANGE UP (ref 0–1)
BILIRUB SERPL-MCNC: 0.3 MG/DL — SIGNIFICANT CHANGE UP (ref 0.2–1.2)
BUN SERPL-MCNC: 11 MG/DL — SIGNIFICANT CHANGE UP (ref 10–20)
CALCIUM SERPL-MCNC: 9.2 MG/DL — SIGNIFICANT CHANGE UP (ref 8.4–10.5)
CHLORIDE SERPL-SCNC: 100 MMOL/L — SIGNIFICANT CHANGE UP (ref 98–110)
CO2 SERPL-SCNC: 31 MMOL/L — SIGNIFICANT CHANGE UP (ref 17–32)
CREAT SERPL-MCNC: 0.9 MG/DL — SIGNIFICANT CHANGE UP (ref 0.7–1.5)
CULTURE RESULTS: SIGNIFICANT CHANGE UP
EGFR: 60 ML/MIN/1.73M2 — SIGNIFICANT CHANGE UP
EGFR: 60 ML/MIN/1.73M2 — SIGNIFICANT CHANGE UP
EOSINOPHIL # BLD AUTO: 0.12 K/UL — SIGNIFICANT CHANGE UP (ref 0–0.7)
EOSINOPHIL NFR BLD AUTO: 1.8 % — SIGNIFICANT CHANGE UP (ref 0–8)
GLUCOSE SERPL-MCNC: 97 MG/DL — SIGNIFICANT CHANGE UP (ref 70–99)
HCT VFR BLD CALC: 35.6 % — LOW (ref 37–47)
HGB BLD-MCNC: 11 G/DL — LOW (ref 12–16)
IMM GRANULOCYTES NFR BLD AUTO: 0.3 % — SIGNIFICANT CHANGE UP (ref 0.1–0.3)
LYMPHOCYTES # BLD AUTO: 0.66 K/UL — LOW (ref 1.2–3.4)
LYMPHOCYTES # BLD AUTO: 9.9 % — LOW (ref 20.5–51.1)
MCHC RBC-ENTMCNC: 28.4 PG — SIGNIFICANT CHANGE UP (ref 27–31)
MCHC RBC-ENTMCNC: 30.9 G/DL — LOW (ref 32–37)
MCV RBC AUTO: 92 FL — SIGNIFICANT CHANGE UP (ref 81–99)
MONOCYTES # BLD AUTO: 0.48 K/UL — SIGNIFICANT CHANGE UP (ref 0.1–0.6)
MONOCYTES NFR BLD AUTO: 7.2 % — SIGNIFICANT CHANGE UP (ref 1.7–9.3)
NEUTROPHILS # BLD AUTO: 5.36 K/UL — SIGNIFICANT CHANGE UP (ref 1.4–6.5)
NEUTROPHILS NFR BLD AUTO: 80.6 % — HIGH (ref 42.2–75.2)
NRBC # BLD: 0 /100 WBCS — SIGNIFICANT CHANGE UP (ref 0–0)
NRBC BLD-RTO: 0 /100 WBCS — SIGNIFICANT CHANGE UP (ref 0–0)
PLATELET # BLD AUTO: 145 K/UL — SIGNIFICANT CHANGE UP (ref 130–400)
PMV BLD: 10.6 FL — HIGH (ref 7.4–10.4)
POTASSIUM SERPL-MCNC: 4 MMOL/L — SIGNIFICANT CHANGE UP (ref 3.5–5)
POTASSIUM SERPL-SCNC: 4 MMOL/L — SIGNIFICANT CHANGE UP (ref 3.5–5)
PROT SERPL-MCNC: 6.1 G/DL — SIGNIFICANT CHANGE UP (ref 6–8)
RBC # BLD: 3.87 M/UL — LOW (ref 4.2–5.4)
RBC # FLD: 16 % — HIGH (ref 11.5–14.5)
SODIUM SERPL-SCNC: 139 MMOL/L — SIGNIFICANT CHANGE UP (ref 135–146)
SPECIMEN SOURCE: SIGNIFICANT CHANGE UP
TSH SERPL-MCNC: 3.82 UIU/ML — SIGNIFICANT CHANGE UP (ref 0.27–4.2)
WBC # BLD: 6.65 K/UL — SIGNIFICANT CHANGE UP (ref 4.8–10.8)
WBC # FLD AUTO: 6.65 K/UL — SIGNIFICANT CHANGE UP (ref 4.8–10.8)

## 2024-12-25 PROCEDURE — 99232 SBSQ HOSP IP/OBS MODERATE 35: CPT

## 2024-12-25 PROCEDURE — 99497 ADVNCD CARE PLAN 30 MIN: CPT

## 2024-12-25 RX ORDER — SODIUM CHLORIDE 9 G/1000ML
1000 INJECTION, SOLUTION INTRAVENOUS
Refills: 0 | Status: DISCONTINUED | OUTPATIENT
Start: 2024-12-25 | End: 2024-12-29

## 2024-12-25 RX ORDER — LORAZEPAM 4 MG/ML
0.5 VIAL (ML) INJECTION
Refills: 0 | Status: DISCONTINUED | OUTPATIENT
Start: 2024-12-25 | End: 2024-12-25

## 2024-12-25 RX ORDER — LORAZEPAM 4 MG/ML
0.2 VIAL (ML) INJECTION
Refills: 0 | Status: DISCONTINUED | OUTPATIENT
Start: 2024-12-25 | End: 2024-12-30

## 2024-12-25 RX ADMIN — HEPARIN SODIUM 5000 UNIT(S): 1000 INJECTION INTRAVENOUS; SUBCUTANEOUS at 21:40

## 2024-12-25 RX ADMIN — HEPARIN SODIUM 5000 UNIT(S): 1000 INJECTION INTRAVENOUS; SUBCUTANEOUS at 05:14

## 2024-12-25 RX ADMIN — Medication 255 MILLIGRAM(S): at 13:34

## 2024-12-25 RX ADMIN — CEFEPIME 100 MILLIGRAM(S): 2 INJECTION, POWDER, FOR SOLUTION INTRAVENOUS at 05:14

## 2024-12-25 RX ADMIN — SODIUM CHLORIDE 75 MILLILITER(S): 9 INJECTION, SOLUTION INTRAVENOUS at 11:19

## 2024-12-25 RX ADMIN — Medication 40 MILLIGRAM(S): at 11:20

## 2024-12-25 RX ADMIN — HEPARIN SODIUM 5000 UNIT(S): 1000 INJECTION INTRAVENOUS; SUBCUTANEOUS at 13:33

## 2024-12-25 RX ADMIN — SODIUM CHLORIDE 100 MILLILITER(S): 9 INJECTION, SOLUTION INTRAVENOUS at 05:14

## 2024-12-26 DIAGNOSIS — Z71.89 OTHER SPECIFIED COUNSELING: ICD-10-CM

## 2024-12-26 DIAGNOSIS — Z51.5 ENCOUNTER FOR PALLIATIVE CARE: ICD-10-CM

## 2024-12-26 DIAGNOSIS — J18.9 PNEUMONIA, UNSPECIFIED ORGANISM: ICD-10-CM

## 2024-12-26 LAB
ALBUMIN SERPL ELPH-MCNC: 3.5 G/DL — SIGNIFICANT CHANGE UP (ref 3.5–5.2)
ALP SERPL-CCNC: 63 U/L — SIGNIFICANT CHANGE UP (ref 30–115)
ALT FLD-CCNC: 15 U/L — SIGNIFICANT CHANGE UP (ref 0–41)
ANION GAP SERPL CALC-SCNC: 10 MMOL/L — SIGNIFICANT CHANGE UP (ref 7–14)
AST SERPL-CCNC: 29 U/L — SIGNIFICANT CHANGE UP (ref 0–41)
BASOPHILS # BLD AUTO: 0.02 K/UL — SIGNIFICANT CHANGE UP (ref 0–0.2)
BASOPHILS NFR BLD AUTO: 0.3 % — SIGNIFICANT CHANGE UP (ref 0–1)
BILIRUB SERPL-MCNC: 0.3 MG/DL — SIGNIFICANT CHANGE UP (ref 0.2–1.2)
BUN SERPL-MCNC: 10 MG/DL — SIGNIFICANT CHANGE UP (ref 10–20)
CALCIUM SERPL-MCNC: 9.4 MG/DL — SIGNIFICANT CHANGE UP (ref 8.4–10.5)
CHLORIDE SERPL-SCNC: 100 MMOL/L — SIGNIFICANT CHANGE UP (ref 98–110)
CO2 SERPL-SCNC: 30 MMOL/L — SIGNIFICANT CHANGE UP (ref 17–32)
CREAT SERPL-MCNC: 0.7 MG/DL — SIGNIFICANT CHANGE UP (ref 0.7–1.5)
EGFR: 81 ML/MIN/1.73M2 — SIGNIFICANT CHANGE UP
EGFR: 81 ML/MIN/1.73M2 — SIGNIFICANT CHANGE UP
EOSINOPHIL # BLD AUTO: 0.04 K/UL — SIGNIFICANT CHANGE UP (ref 0–0.7)
EOSINOPHIL NFR BLD AUTO: 0.5 % — SIGNIFICANT CHANGE UP (ref 0–8)
GLUCOSE SERPL-MCNC: 98 MG/DL — SIGNIFICANT CHANGE UP (ref 70–99)
HCT VFR BLD CALC: 36.9 % — LOW (ref 37–47)
HGB BLD-MCNC: 11.5 G/DL — LOW (ref 12–16)
IMM GRANULOCYTES NFR BLD AUTO: 0.3 % — SIGNIFICANT CHANGE UP (ref 0.1–0.3)
LACTATE SERPL-SCNC: 0.8 MMOL/L — SIGNIFICANT CHANGE UP (ref 0.7–2)
LYMPHOCYTES # BLD AUTO: 0.58 K/UL — LOW (ref 1.2–3.4)
LYMPHOCYTES # BLD AUTO: 7.6 % — LOW (ref 20.5–51.1)
MAGNESIUM SERPL-MCNC: 1.8 MG/DL — SIGNIFICANT CHANGE UP (ref 1.8–2.4)
MCHC RBC-ENTMCNC: 28.5 PG — SIGNIFICANT CHANGE UP (ref 27–31)
MCHC RBC-ENTMCNC: 31.2 G/DL — LOW (ref 32–37)
MCV RBC AUTO: 91.6 FL — SIGNIFICANT CHANGE UP (ref 81–99)
MONOCYTES # BLD AUTO: 0.58 K/UL — SIGNIFICANT CHANGE UP (ref 0.1–0.6)
MONOCYTES NFR BLD AUTO: 7.6 % — SIGNIFICANT CHANGE UP (ref 1.7–9.3)
NEUTROPHILS # BLD AUTO: 6.42 K/UL — SIGNIFICANT CHANGE UP (ref 1.4–6.5)
NEUTROPHILS NFR BLD AUTO: 83.7 % — HIGH (ref 42.2–75.2)
NRBC # BLD: 0 /100 WBCS — SIGNIFICANT CHANGE UP (ref 0–0)
NRBC BLD-RTO: 0 /100 WBCS — SIGNIFICANT CHANGE UP (ref 0–0)
PLATELET # BLD AUTO: 150 K/UL — SIGNIFICANT CHANGE UP (ref 130–400)
PMV BLD: 10.2 FL — SIGNIFICANT CHANGE UP (ref 7.4–10.4)
POTASSIUM SERPL-MCNC: 3.9 MMOL/L — SIGNIFICANT CHANGE UP (ref 3.5–5)
POTASSIUM SERPL-SCNC: 3.9 MMOL/L — SIGNIFICANT CHANGE UP (ref 3.5–5)
PROT SERPL-MCNC: 6.4 G/DL — SIGNIFICANT CHANGE UP (ref 6–8)
RBC # BLD: 4.03 M/UL — LOW (ref 4.2–5.4)
RBC # FLD: 15.8 % — HIGH (ref 11.5–14.5)
SODIUM SERPL-SCNC: 140 MMOL/L — SIGNIFICANT CHANGE UP (ref 135–146)
T3 SERPL-MCNC: 72 NG/DL — LOW (ref 80–200)
T4 AB SER-ACNC: 6.6 UG/DL — SIGNIFICANT CHANGE UP (ref 4.6–12)
TROPONIN T, HIGH SENSITIVITY RESULT: 56 NG/L — CRITICAL HIGH (ref 6–13)
TSH SERPL-MCNC: 3.85 UIU/ML — SIGNIFICANT CHANGE UP (ref 0.27–4.2)
WBC # BLD: 7.66 K/UL — SIGNIFICANT CHANGE UP (ref 4.8–10.8)
WBC # FLD AUTO: 7.66 K/UL — SIGNIFICANT CHANGE UP (ref 4.8–10.8)

## 2024-12-26 PROCEDURE — 99232 SBSQ HOSP IP/OBS MODERATE 35: CPT

## 2024-12-26 PROCEDURE — 99223 1ST HOSP IP/OBS HIGH 75: CPT

## 2024-12-26 PROCEDURE — 71045 X-RAY EXAM CHEST 1 VIEW: CPT | Mod: 26

## 2024-12-26 RX ORDER — AMPICILLIN SODIUM AND SULBACTAM SODIUM 1; .5 G/1; G/1
INJECTION, POWDER, FOR SOLUTION INTRAMUSCULAR; INTRAVENOUS
Refills: 0 | Status: DISCONTINUED | OUTPATIENT
Start: 2024-12-26 | End: 2025-01-01

## 2024-12-26 RX ORDER — AMPICILLIN SODIUM AND SULBACTAM SODIUM 1; .5 G/1; G/1
1.5 INJECTION, POWDER, FOR SOLUTION INTRAMUSCULAR; INTRAVENOUS EVERY 6 HOURS
Refills: 0 | Status: DISCONTINUED | OUTPATIENT
Start: 2024-12-26 | End: 2025-01-01

## 2024-12-26 RX ORDER — AMPICILLIN SODIUM AND SULBACTAM SODIUM 1; .5 G/1; G/1
1.5 INJECTION, POWDER, FOR SOLUTION INTRAMUSCULAR; INTRAVENOUS ONCE
Refills: 0 | Status: COMPLETED | OUTPATIENT
Start: 2024-12-26 | End: 2024-12-26

## 2024-12-26 RX ADMIN — Medication 40 MILLIGRAM(S): at 11:17

## 2024-12-26 RX ADMIN — AMPICILLIN SODIUM AND SULBACTAM SODIUM 100 GRAM(S): 1; .5 INJECTION, POWDER, FOR SOLUTION INTRAMUSCULAR; INTRAVENOUS at 17:21

## 2024-12-26 RX ADMIN — AMPICILLIN SODIUM AND SULBACTAM SODIUM 100 GRAM(S): 1; .5 INJECTION, POWDER, FOR SOLUTION INTRAMUSCULAR; INTRAVENOUS at 23:42

## 2024-12-26 RX ADMIN — HEPARIN SODIUM 5000 UNIT(S): 1000 INJECTION INTRAVENOUS; SUBCUTANEOUS at 13:27

## 2024-12-26 RX ADMIN — Medication 1 TABLET(S): at 11:17

## 2024-12-26 RX ADMIN — Medication 81 MILLIGRAM(S): at 11:17

## 2024-12-26 RX ADMIN — HEPARIN SODIUM 5000 UNIT(S): 1000 INJECTION INTRAVENOUS; SUBCUTANEOUS at 21:34

## 2024-12-26 RX ADMIN — AMPICILLIN SODIUM AND SULBACTAM SODIUM 100 GRAM(S): 1; .5 INJECTION, POWDER, FOR SOLUTION INTRAMUSCULAR; INTRAVENOUS at 11:23

## 2024-12-26 RX ADMIN — Medication 325 MILLIGRAM(S): at 11:17

## 2024-12-26 RX ADMIN — Medication 1 APPLICATION(S): at 13:25

## 2024-12-26 RX ADMIN — SODIUM CHLORIDE 75 MILLILITER(S): 9 INJECTION, SOLUTION INTRAVENOUS at 22:26

## 2024-12-26 RX ADMIN — HEPARIN SODIUM 5000 UNIT(S): 1000 INJECTION INTRAVENOUS; SUBCUTANEOUS at 05:43

## 2024-12-26 RX ADMIN — Medication 500 MILLIGRAM(S): at 11:17

## 2024-12-27 LAB
ALBUMIN SERPL ELPH-MCNC: 3.3 G/DL — LOW (ref 3.5–5.2)
ALP SERPL-CCNC: 59 U/L — SIGNIFICANT CHANGE UP (ref 30–115)
ALT FLD-CCNC: 13 U/L — SIGNIFICANT CHANGE UP (ref 0–41)
ANION GAP SERPL CALC-SCNC: 10 MMOL/L — SIGNIFICANT CHANGE UP (ref 7–14)
AST SERPL-CCNC: 27 U/L — SIGNIFICANT CHANGE UP (ref 0–41)
BASOPHILS # BLD AUTO: 0.02 K/UL — SIGNIFICANT CHANGE UP (ref 0–0.2)
BASOPHILS NFR BLD AUTO: 0.3 % — SIGNIFICANT CHANGE UP (ref 0–1)
BILIRUB SERPL-MCNC: 0.5 MG/DL — SIGNIFICANT CHANGE UP (ref 0.2–1.2)
BUN SERPL-MCNC: 10 MG/DL — SIGNIFICANT CHANGE UP (ref 10–20)
CALCIUM SERPL-MCNC: 9.6 MG/DL — SIGNIFICANT CHANGE UP (ref 8.4–10.5)
CHLORIDE SERPL-SCNC: 98 MMOL/L — SIGNIFICANT CHANGE UP (ref 98–110)
CO2 SERPL-SCNC: 30 MMOL/L — SIGNIFICANT CHANGE UP (ref 17–32)
CREAT SERPL-MCNC: 0.8 MG/DL — SIGNIFICANT CHANGE UP (ref 0.7–1.5)
EGFR: 69 ML/MIN/1.73M2 — SIGNIFICANT CHANGE UP
EGFR: 69 ML/MIN/1.73M2 — SIGNIFICANT CHANGE UP
EOSINOPHIL # BLD AUTO: 0.08 K/UL — SIGNIFICANT CHANGE UP (ref 0–0.7)
EOSINOPHIL NFR BLD AUTO: 1.1 % — SIGNIFICANT CHANGE UP (ref 0–8)
GLUCOSE SERPL-MCNC: 98 MG/DL — SIGNIFICANT CHANGE UP (ref 70–99)
HCT VFR BLD CALC: 34.5 % — LOW (ref 37–47)
HGB BLD-MCNC: 10.6 G/DL — LOW (ref 12–16)
IMM GRANULOCYTES NFR BLD AUTO: 0.4 % — HIGH (ref 0.1–0.3)
LYMPHOCYTES # BLD AUTO: 0.49 K/UL — LOW (ref 1.2–3.4)
LYMPHOCYTES # BLD AUTO: 6.5 % — LOW (ref 20.5–51.1)
MAGNESIUM SERPL-MCNC: 1.8 MG/DL — SIGNIFICANT CHANGE UP (ref 1.8–2.4)
MCHC RBC-ENTMCNC: 28 PG — SIGNIFICANT CHANGE UP (ref 27–31)
MCHC RBC-ENTMCNC: 30.7 G/DL — LOW (ref 32–37)
MCV RBC AUTO: 91 FL — SIGNIFICANT CHANGE UP (ref 81–99)
MONOCYTES # BLD AUTO: 0.64 K/UL — HIGH (ref 0.1–0.6)
MONOCYTES NFR BLD AUTO: 8.5 % — SIGNIFICANT CHANGE UP (ref 1.7–9.3)
NEUTROPHILS # BLD AUTO: 6.23 K/UL — SIGNIFICANT CHANGE UP (ref 1.4–6.5)
NEUTROPHILS NFR BLD AUTO: 83.2 % — HIGH (ref 42.2–75.2)
NRBC # BLD: 0 /100 WBCS — SIGNIFICANT CHANGE UP (ref 0–0)
NRBC BLD-RTO: 0 /100 WBCS — SIGNIFICANT CHANGE UP (ref 0–0)
PHOSPHATE SERPL-MCNC: 2.4 MG/DL — SIGNIFICANT CHANGE UP (ref 2.1–4.9)
PLATELET # BLD AUTO: 138 K/UL — SIGNIFICANT CHANGE UP (ref 130–400)
PMV BLD: 10.2 FL — SIGNIFICANT CHANGE UP (ref 7.4–10.4)
POTASSIUM SERPL-MCNC: 3.6 MMOL/L — SIGNIFICANT CHANGE UP (ref 3.5–5)
POTASSIUM SERPL-SCNC: 3.6 MMOL/L — SIGNIFICANT CHANGE UP (ref 3.5–5)
PROCALCITONIN SERPL-MCNC: 0.06 NG/ML — SIGNIFICANT CHANGE UP (ref 0.02–0.1)
PROT SERPL-MCNC: 6.4 G/DL — SIGNIFICANT CHANGE UP (ref 6–8)
RBC # BLD: 3.79 M/UL — LOW (ref 4.2–5.4)
RBC # FLD: 16 % — HIGH (ref 11.5–14.5)
SODIUM SERPL-SCNC: 138 MMOL/L — SIGNIFICANT CHANGE UP (ref 135–146)
WBC # BLD: 7.49 K/UL — SIGNIFICANT CHANGE UP (ref 4.8–10.8)
WBC # FLD AUTO: 7.49 K/UL — SIGNIFICANT CHANGE UP (ref 4.8–10.8)

## 2024-12-27 PROCEDURE — 99232 SBSQ HOSP IP/OBS MODERATE 35: CPT

## 2024-12-27 PROCEDURE — 71045 X-RAY EXAM CHEST 1 VIEW: CPT | Mod: 26

## 2024-12-27 RX ADMIN — POLYETHYLENE GLYCOL 3350 17 GRAM(S): 17 POWDER, FOR SOLUTION ORAL at 11:30

## 2024-12-27 RX ADMIN — Medication 325 MILLIGRAM(S): at 11:29

## 2024-12-27 RX ADMIN — HEPARIN SODIUM 5000 UNIT(S): 1000 INJECTION INTRAVENOUS; SUBCUTANEOUS at 13:14

## 2024-12-27 RX ADMIN — Medication 40 MILLIGRAM(S): at 11:29

## 2024-12-27 RX ADMIN — Medication 500 MILLIGRAM(S): at 11:29

## 2024-12-27 RX ADMIN — HEPARIN SODIUM 5000 UNIT(S): 1000 INJECTION INTRAVENOUS; SUBCUTANEOUS at 05:23

## 2024-12-27 RX ADMIN — Medication 1 TABLET(S): at 11:30

## 2024-12-27 RX ADMIN — CLONAZEPAM 1 MILLIGRAM(S): 0.5 TABLET ORAL at 17:40

## 2024-12-27 RX ADMIN — AMPICILLIN SODIUM AND SULBACTAM SODIUM 100 GRAM(S): 1; .5 INJECTION, POWDER, FOR SOLUTION INTRAMUSCULAR; INTRAVENOUS at 11:30

## 2024-12-27 RX ADMIN — AMPICILLIN SODIUM AND SULBACTAM SODIUM 100 GRAM(S): 1; .5 INJECTION, POWDER, FOR SOLUTION INTRAMUSCULAR; INTRAVENOUS at 23:15

## 2024-12-27 RX ADMIN — HEPARIN SODIUM 5000 UNIT(S): 1000 INJECTION INTRAVENOUS; SUBCUTANEOUS at 21:41

## 2024-12-27 RX ADMIN — ATORVASTATIN CALCIUM 10 MILLIGRAM(S): 80 TABLET, FILM COATED ORAL at 21:40

## 2024-12-27 RX ADMIN — Medication 1 APPLICATION(S): at 05:25

## 2024-12-27 RX ADMIN — AMPICILLIN SODIUM AND SULBACTAM SODIUM 100 GRAM(S): 1; .5 INJECTION, POWDER, FOR SOLUTION INTRAMUSCULAR; INTRAVENOUS at 05:53

## 2024-12-27 RX ADMIN — Medication 2 TABLET(S): at 21:40

## 2024-12-27 RX ADMIN — AMPICILLIN SODIUM AND SULBACTAM SODIUM 100 GRAM(S): 1; .5 INJECTION, POWDER, FOR SOLUTION INTRAMUSCULAR; INTRAVENOUS at 17:40

## 2024-12-27 RX ADMIN — Medication 81 MILLIGRAM(S): at 11:30

## 2024-12-28 LAB
ANION GAP SERPL CALC-SCNC: 9 MMOL/L — SIGNIFICANT CHANGE UP (ref 7–14)
BUN SERPL-MCNC: 9 MG/DL — LOW (ref 10–20)
CALCIUM SERPL-MCNC: 9 MG/DL — SIGNIFICANT CHANGE UP (ref 8.4–10.5)
CHLORIDE SERPL-SCNC: 104 MMOL/L — SIGNIFICANT CHANGE UP (ref 98–110)
CO2 SERPL-SCNC: 31 MMOL/L — SIGNIFICANT CHANGE UP (ref 17–32)
CREAT SERPL-MCNC: 0.7 MG/DL — SIGNIFICANT CHANGE UP (ref 0.7–1.5)
EGFR: 81 ML/MIN/1.73M2 — SIGNIFICANT CHANGE UP
EGFR: 81 ML/MIN/1.73M2 — SIGNIFICANT CHANGE UP
FLUAV AG NPH QL: SIGNIFICANT CHANGE UP
FLUBV AG NPH QL: SIGNIFICANT CHANGE UP
GLUCOSE SERPL-MCNC: 102 MG/DL — HIGH (ref 70–99)
HCT VFR BLD CALC: 33.2 % — LOW (ref 37–47)
HGB BLD-MCNC: 10.3 G/DL — LOW (ref 12–16)
MAGNESIUM SERPL-MCNC: 1.6 MG/DL — LOW (ref 1.8–2.4)
MCHC RBC-ENTMCNC: 28.3 PG — SIGNIFICANT CHANGE UP (ref 27–31)
MCHC RBC-ENTMCNC: 31 G/DL — LOW (ref 32–37)
MCV RBC AUTO: 91.2 FL — SIGNIFICANT CHANGE UP (ref 81–99)
NRBC # BLD: 0 /100 WBCS — SIGNIFICANT CHANGE UP (ref 0–0)
NRBC BLD-RTO: 0 /100 WBCS — SIGNIFICANT CHANGE UP (ref 0–0)
PLATELET # BLD AUTO: 118 K/UL — LOW (ref 130–400)
PMV BLD: 10.5 FL — HIGH (ref 7.4–10.4)
POTASSIUM SERPL-MCNC: 3.6 MMOL/L — SIGNIFICANT CHANGE UP (ref 3.5–5)
POTASSIUM SERPL-SCNC: 3.6 MMOL/L — SIGNIFICANT CHANGE UP (ref 3.5–5)
RBC # BLD: 3.64 M/UL — LOW (ref 4.2–5.4)
RBC # FLD: 16.1 % — HIGH (ref 11.5–14.5)
RSV RNA NPH QL NAA+NON-PROBE: SIGNIFICANT CHANGE UP
SARS-COV-2 RNA SPEC QL NAA+PROBE: SIGNIFICANT CHANGE UP
SODIUM SERPL-SCNC: 144 MMOL/L — SIGNIFICANT CHANGE UP (ref 135–146)
WBC # BLD: 5.84 K/UL — SIGNIFICANT CHANGE UP (ref 4.8–10.8)
WBC # FLD AUTO: 5.84 K/UL — SIGNIFICANT CHANGE UP (ref 4.8–10.8)

## 2024-12-28 PROCEDURE — 99232 SBSQ HOSP IP/OBS MODERATE 35: CPT

## 2024-12-28 RX ORDER — MAGNESIUM SULFATE 500 MG/ML
2 SYRINGE (ML) INJECTION
Refills: 0 | Status: COMPLETED | OUTPATIENT
Start: 2024-12-28 | End: 2024-12-28

## 2024-12-28 RX ORDER — DEXTROMETHORPHAN HBR, GUAIFENESIN 200 MG/10ML
100 LIQUID ORAL ONCE
Refills: 0 | Status: COMPLETED | OUTPATIENT
Start: 2024-12-28 | End: 2024-12-28

## 2024-12-28 RX ADMIN — HEPARIN SODIUM 5000 UNIT(S): 1000 INJECTION INTRAVENOUS; SUBCUTANEOUS at 14:01

## 2024-12-28 RX ADMIN — AMPICILLIN SODIUM AND SULBACTAM SODIUM 100 GRAM(S): 1; .5 INJECTION, POWDER, FOR SOLUTION INTRAMUSCULAR; INTRAVENOUS at 05:46

## 2024-12-28 RX ADMIN — Medication 81 MILLIGRAM(S): at 11:48

## 2024-12-28 RX ADMIN — Medication 1 TABLET(S): at 11:48

## 2024-12-28 RX ADMIN — ATORVASTATIN CALCIUM 10 MILLIGRAM(S): 80 TABLET, FILM COATED ORAL at 21:21

## 2024-12-28 RX ADMIN — CLONAZEPAM 1 MILLIGRAM(S): 0.5 TABLET ORAL at 05:46

## 2024-12-28 RX ADMIN — Medication 25 GRAM(S): at 14:01

## 2024-12-28 RX ADMIN — POLYETHYLENE GLYCOL 3350 17 GRAM(S): 17 POWDER, FOR SOLUTION ORAL at 11:50

## 2024-12-28 RX ADMIN — Medication 1 APPLICATION(S): at 05:52

## 2024-12-28 RX ADMIN — Medication 2 TABLET(S): at 21:21

## 2024-12-28 RX ADMIN — AMPICILLIN SODIUM AND SULBACTAM SODIUM 100 GRAM(S): 1; .5 INJECTION, POWDER, FOR SOLUTION INTRAMUSCULAR; INTRAVENOUS at 17:51

## 2024-12-28 RX ADMIN — AMPICILLIN SODIUM AND SULBACTAM SODIUM 100 GRAM(S): 1; .5 INJECTION, POWDER, FOR SOLUTION INTRAMUSCULAR; INTRAVENOUS at 11:49

## 2024-12-28 RX ADMIN — HEPARIN SODIUM 5000 UNIT(S): 1000 INJECTION INTRAVENOUS; SUBCUTANEOUS at 21:21

## 2024-12-28 RX ADMIN — Medication 25 GRAM(S): at 15:36

## 2024-12-28 RX ADMIN — Medication 325 MILLIGRAM(S): at 14:00

## 2024-12-28 RX ADMIN — CLONAZEPAM 1 MILLIGRAM(S): 0.5 TABLET ORAL at 17:51

## 2024-12-28 RX ADMIN — DEXTROMETHORPHAN HBR, GUAIFENESIN 100 MILLIGRAM(S): 200 LIQUID ORAL at 06:18

## 2024-12-28 RX ADMIN — HEPARIN SODIUM 5000 UNIT(S): 1000 INJECTION INTRAVENOUS; SUBCUTANEOUS at 05:46

## 2024-12-28 RX ADMIN — Medication 40 MILLIGRAM(S): at 11:48

## 2024-12-29 LAB
ALBUMIN SERPL ELPH-MCNC: 3.2 G/DL — LOW (ref 3.5–5.2)
ALP SERPL-CCNC: 54 U/L — SIGNIFICANT CHANGE UP (ref 30–115)
ALT FLD-CCNC: 10 U/L — SIGNIFICANT CHANGE UP (ref 0–41)
ANION GAP SERPL CALC-SCNC: 7 MMOL/L — SIGNIFICANT CHANGE UP (ref 7–14)
AST SERPL-CCNC: 21 U/L — SIGNIFICANT CHANGE UP (ref 0–41)
BASOPHILS # BLD AUTO: 0.01 K/UL — SIGNIFICANT CHANGE UP (ref 0–0.2)
BASOPHILS NFR BLD AUTO: 0.2 % — SIGNIFICANT CHANGE UP (ref 0–1)
BILIRUB SERPL-MCNC: 0.4 MG/DL — SIGNIFICANT CHANGE UP (ref 0.2–1.2)
BUN SERPL-MCNC: 8 MG/DL — LOW (ref 10–20)
CALCIUM SERPL-MCNC: 9.2 MG/DL — SIGNIFICANT CHANGE UP (ref 8.4–10.5)
CHLORIDE SERPL-SCNC: 104 MMOL/L — SIGNIFICANT CHANGE UP (ref 98–110)
CO2 SERPL-SCNC: 31 MMOL/L — SIGNIFICANT CHANGE UP (ref 17–32)
CREAT SERPL-MCNC: 0.6 MG/DL — LOW (ref 0.7–1.5)
CULTURE RESULTS: SIGNIFICANT CHANGE UP
CULTURE RESULTS: SIGNIFICANT CHANGE UP
EGFR: 84 ML/MIN/1.73M2 — SIGNIFICANT CHANGE UP
EGFR: 84 ML/MIN/1.73M2 — SIGNIFICANT CHANGE UP
EOSINOPHIL # BLD AUTO: 0.06 K/UL — SIGNIFICANT CHANGE UP (ref 0–0.7)
EOSINOPHIL NFR BLD AUTO: 1 % — SIGNIFICANT CHANGE UP (ref 0–8)
GLUCOSE BLDC GLUCOMTR-MCNC: 119 MG/DL — HIGH (ref 70–99)
GLUCOSE BLDC GLUCOMTR-MCNC: 144 MG/DL — HIGH (ref 70–99)
GLUCOSE SERPL-MCNC: 122 MG/DL — HIGH (ref 70–99)
HCT VFR BLD CALC: 33.6 % — LOW (ref 37–47)
HGB BLD-MCNC: 10.1 G/DL — LOW (ref 12–16)
IMM GRANULOCYTES NFR BLD AUTO: 0.5 % — HIGH (ref 0.1–0.3)
LYMPHOCYTES # BLD AUTO: 0.4 K/UL — LOW (ref 1.2–3.4)
LYMPHOCYTES # BLD AUTO: 6.9 % — LOW (ref 20.5–51.1)
MAGNESIUM SERPL-MCNC: 2.2 MG/DL — SIGNIFICANT CHANGE UP (ref 1.8–2.4)
MCHC RBC-ENTMCNC: 28.1 PG — SIGNIFICANT CHANGE UP (ref 27–31)
MCHC RBC-ENTMCNC: 30.1 G/DL — LOW (ref 32–37)
MCV RBC AUTO: 93.3 FL — SIGNIFICANT CHANGE UP (ref 81–99)
MONOCYTES # BLD AUTO: 0.48 K/UL — SIGNIFICANT CHANGE UP (ref 0.1–0.6)
MONOCYTES NFR BLD AUTO: 8.3 % — SIGNIFICANT CHANGE UP (ref 1.7–9.3)
NEUTROPHILS # BLD AUTO: 4.82 K/UL — SIGNIFICANT CHANGE UP (ref 1.4–6.5)
NEUTROPHILS NFR BLD AUTO: 83.1 % — HIGH (ref 42.2–75.2)
NRBC # BLD: 0 /100 WBCS — SIGNIFICANT CHANGE UP (ref 0–0)
NRBC BLD-RTO: 0 /100 WBCS — SIGNIFICANT CHANGE UP (ref 0–0)
PHOSPHATE SERPL-MCNC: 2.9 MG/DL — SIGNIFICANT CHANGE UP (ref 2.1–4.9)
PLATELET # BLD AUTO: 137 K/UL — SIGNIFICANT CHANGE UP (ref 130–400)
PMV BLD: 10.3 FL — SIGNIFICANT CHANGE UP (ref 7.4–10.4)
POTASSIUM SERPL-MCNC: 3.7 MMOL/L — SIGNIFICANT CHANGE UP (ref 3.5–5)
POTASSIUM SERPL-SCNC: 3.7 MMOL/L — SIGNIFICANT CHANGE UP (ref 3.5–5)
PROT SERPL-MCNC: 5.9 G/DL — LOW (ref 6–8)
RBC # BLD: 3.6 M/UL — LOW (ref 4.2–5.4)
RBC # FLD: 16.3 % — HIGH (ref 11.5–14.5)
SODIUM SERPL-SCNC: 142 MMOL/L — SIGNIFICANT CHANGE UP (ref 135–146)
SPECIMEN SOURCE: SIGNIFICANT CHANGE UP
SPECIMEN SOURCE: SIGNIFICANT CHANGE UP
WBC # BLD: 5.8 K/UL — SIGNIFICANT CHANGE UP (ref 4.8–10.8)
WBC # FLD AUTO: 5.8 K/UL — SIGNIFICANT CHANGE UP (ref 4.8–10.8)

## 2024-12-29 PROCEDURE — 71045 X-RAY EXAM CHEST 1 VIEW: CPT | Mod: 26

## 2024-12-29 PROCEDURE — 99232 SBSQ HOSP IP/OBS MODERATE 35: CPT

## 2024-12-29 RX ORDER — SODIUM CHLORIDE 9 G/1000ML
1000 INJECTION, SOLUTION INTRAVENOUS
Refills: 0 | Status: DISCONTINUED | OUTPATIENT
Start: 2024-12-29 | End: 2025-01-01

## 2024-12-29 RX ORDER — DEXTROSE 50 % IN WATER 50 %
12.5 SYRINGE (ML) INTRAVENOUS ONCE
Refills: 0 | Status: DISCONTINUED | OUTPATIENT
Start: 2024-12-29 | End: 2025-01-01

## 2024-12-29 RX ORDER — FUROSEMIDE 10 MG/ML
20 INJECTION INTRAMUSCULAR; INTRAVENOUS ONCE
Refills: 0 | Status: COMPLETED | OUTPATIENT
Start: 2024-12-29 | End: 2024-12-29

## 2024-12-29 RX ORDER — DEXTROSE 50 % IN WATER 50 %
25 SYRINGE (ML) INTRAVENOUS ONCE
Refills: 0 | Status: DISCONTINUED | OUTPATIENT
Start: 2024-12-29 | End: 2025-01-01

## 2024-12-29 RX ORDER — GLUCAGON 3 MG/1
1 POWDER NASAL ONCE
Refills: 0 | Status: DISCONTINUED | OUTPATIENT
Start: 2024-12-29 | End: 2025-01-01

## 2024-12-29 RX ORDER — DEXTROSE 50 % IN WATER 50 %
15 SYRINGE (ML) INTRAVENOUS ONCE
Refills: 0 | Status: DISCONTINUED | OUTPATIENT
Start: 2024-12-29 | End: 2025-01-01

## 2024-12-29 RX ADMIN — HEPARIN SODIUM 5000 UNIT(S): 1000 INJECTION INTRAVENOUS; SUBCUTANEOUS at 05:46

## 2024-12-29 RX ADMIN — Medication 40 MILLIGRAM(S): at 13:03

## 2024-12-29 RX ADMIN — CLONAZEPAM 1 MILLIGRAM(S): 0.5 TABLET ORAL at 05:45

## 2024-12-29 RX ADMIN — AMPICILLIN SODIUM AND SULBACTAM SODIUM 100 GRAM(S): 1; .5 INJECTION, POWDER, FOR SOLUTION INTRAMUSCULAR; INTRAVENOUS at 23:44

## 2024-12-29 RX ADMIN — ATORVASTATIN CALCIUM 10 MILLIGRAM(S): 80 TABLET, FILM COATED ORAL at 21:37

## 2024-12-29 RX ADMIN — AMPICILLIN SODIUM AND SULBACTAM SODIUM 100 GRAM(S): 1; .5 INJECTION, POWDER, FOR SOLUTION INTRAMUSCULAR; INTRAVENOUS at 05:45

## 2024-12-29 RX ADMIN — HEPARIN SODIUM 5000 UNIT(S): 1000 INJECTION INTRAVENOUS; SUBCUTANEOUS at 13:46

## 2024-12-29 RX ADMIN — Medication 2 TABLET(S): at 21:37

## 2024-12-29 RX ADMIN — AMPICILLIN SODIUM AND SULBACTAM SODIUM 100 GRAM(S): 1; .5 INJECTION, POWDER, FOR SOLUTION INTRAMUSCULAR; INTRAVENOUS at 13:04

## 2024-12-29 RX ADMIN — AMPICILLIN SODIUM AND SULBACTAM SODIUM 100 GRAM(S): 1; .5 INJECTION, POWDER, FOR SOLUTION INTRAMUSCULAR; INTRAVENOUS at 00:23

## 2024-12-29 RX ADMIN — CLONAZEPAM 1 MILLIGRAM(S): 0.5 TABLET ORAL at 19:01

## 2024-12-29 RX ADMIN — FUROSEMIDE 20 MILLIGRAM(S): 10 INJECTION INTRAMUSCULAR; INTRAVENOUS at 09:37

## 2024-12-29 RX ADMIN — HEPARIN SODIUM 5000 UNIT(S): 1000 INJECTION INTRAVENOUS; SUBCUTANEOUS at 21:37

## 2024-12-29 RX ADMIN — AMPICILLIN SODIUM AND SULBACTAM SODIUM 100 GRAM(S): 1; .5 INJECTION, POWDER, FOR SOLUTION INTRAMUSCULAR; INTRAVENOUS at 18:00

## 2024-12-29 RX ADMIN — POLYETHYLENE GLYCOL 3350 17 GRAM(S): 17 POWDER, FOR SOLUTION ORAL at 21:38

## 2024-12-29 RX ADMIN — Medication 0.2 MILLIGRAM(S): at 18:01

## 2024-12-29 RX ADMIN — Medication 1 APPLICATION(S): at 05:52

## 2024-12-30 LAB
ALBUMIN SERPL ELPH-MCNC: 3.3 G/DL — LOW (ref 3.5–5.2)
ALP SERPL-CCNC: 56 U/L — SIGNIFICANT CHANGE UP (ref 30–115)
ALT FLD-CCNC: 10 U/L — SIGNIFICANT CHANGE UP (ref 0–41)
ANION GAP SERPL CALC-SCNC: 12 MMOL/L — SIGNIFICANT CHANGE UP (ref 7–14)
ANION GAP SERPL CALC-SCNC: 8 MMOL/L — SIGNIFICANT CHANGE UP (ref 7–14)
AST SERPL-CCNC: 18 U/L — SIGNIFICANT CHANGE UP (ref 0–41)
BASOPHILS # BLD AUTO: 0.01 K/UL — SIGNIFICANT CHANGE UP (ref 0–0.2)
BASOPHILS NFR BLD AUTO: 0.2 % — SIGNIFICANT CHANGE UP (ref 0–1)
BILIRUB SERPL-MCNC: 0.6 MG/DL — SIGNIFICANT CHANGE UP (ref 0.2–1.2)
BUN SERPL-MCNC: 8 MG/DL — LOW (ref 10–20)
BUN SERPL-MCNC: 9 MG/DL — LOW (ref 10–20)
CALCIUM SERPL-MCNC: 9.3 MG/DL — SIGNIFICANT CHANGE UP (ref 8.4–10.4)
CALCIUM SERPL-MCNC: 9.4 MG/DL — SIGNIFICANT CHANGE UP (ref 8.4–10.5)
CHLORIDE SERPL-SCNC: 101 MMOL/L — SIGNIFICANT CHANGE UP (ref 98–110)
CHLORIDE SERPL-SCNC: 101 MMOL/L — SIGNIFICANT CHANGE UP (ref 98–110)
CO2 SERPL-SCNC: 31 MMOL/L — SIGNIFICANT CHANGE UP (ref 17–32)
CO2 SERPL-SCNC: 35 MMOL/L — HIGH (ref 17–32)
CREAT SERPL-MCNC: 0.5 MG/DL — LOW (ref 0.7–1.5)
CREAT SERPL-MCNC: 0.6 MG/DL — LOW (ref 0.7–1.5)
EGFR: 84 ML/MIN/1.73M2 — SIGNIFICANT CHANGE UP
EGFR: 84 ML/MIN/1.73M2 — SIGNIFICANT CHANGE UP
EGFR: 87 ML/MIN/1.73M2 — SIGNIFICANT CHANGE UP
EGFR: 87 ML/MIN/1.73M2 — SIGNIFICANT CHANGE UP
EOSINOPHIL # BLD AUTO: 0.25 K/UL — SIGNIFICANT CHANGE UP (ref 0–0.7)
EOSINOPHIL NFR BLD AUTO: 4.5 % — SIGNIFICANT CHANGE UP (ref 0–8)
GLUCOSE BLDC GLUCOMTR-MCNC: 111 MG/DL — HIGH (ref 70–99)
GLUCOSE BLDC GLUCOMTR-MCNC: 116 MG/DL — HIGH (ref 70–99)
GLUCOSE BLDC GLUCOMTR-MCNC: 131 MG/DL — HIGH (ref 70–99)
GLUCOSE BLDC GLUCOMTR-MCNC: 92 MG/DL — SIGNIFICANT CHANGE UP (ref 70–99)
GLUCOSE SERPL-MCNC: 118 MG/DL — HIGH (ref 70–99)
GLUCOSE SERPL-MCNC: 94 MG/DL — SIGNIFICANT CHANGE UP (ref 70–99)
HCT VFR BLD CALC: 32.9 % — LOW (ref 37–47)
HCT VFR BLD CALC: 34.2 % — LOW (ref 37–47)
HGB BLD-MCNC: 10.1 G/DL — LOW (ref 12–16)
HGB BLD-MCNC: 10.6 G/DL — LOW (ref 12–16)
IMM GRANULOCYTES NFR BLD AUTO: 0.4 % — HIGH (ref 0.1–0.3)
LACTATE SERPL-SCNC: 1 MMOL/L — SIGNIFICANT CHANGE UP (ref 0.7–2)
LYMPHOCYTES # BLD AUTO: 0.66 K/UL — LOW (ref 1.2–3.4)
LYMPHOCYTES # BLD AUTO: 11.9 % — LOW (ref 20.5–51.1)
MAGNESIUM SERPL-MCNC: 1.8 MG/DL — SIGNIFICANT CHANGE UP (ref 1.8–2.4)
MCHC RBC-ENTMCNC: 28.5 PG — SIGNIFICANT CHANGE UP (ref 27–31)
MCHC RBC-ENTMCNC: 28.6 PG — SIGNIFICANT CHANGE UP (ref 27–31)
MCHC RBC-ENTMCNC: 30.7 G/DL — LOW (ref 32–37)
MCHC RBC-ENTMCNC: 31 G/DL — LOW (ref 32–37)
MCV RBC AUTO: 92.2 FL — SIGNIFICANT CHANGE UP (ref 81–99)
MCV RBC AUTO: 92.7 FL — SIGNIFICANT CHANGE UP (ref 81–99)
MONOCYTES # BLD AUTO: 0.63 K/UL — HIGH (ref 0.1–0.6)
MONOCYTES NFR BLD AUTO: 11.4 % — HIGH (ref 1.7–9.3)
NEUTROPHILS # BLD AUTO: 3.97 K/UL — SIGNIFICANT CHANGE UP (ref 1.4–6.5)
NEUTROPHILS NFR BLD AUTO: 71.6 % — SIGNIFICANT CHANGE UP (ref 42.2–75.2)
NRBC # BLD: 0 /100 WBCS — SIGNIFICANT CHANGE UP (ref 0–0)
NRBC # BLD: 0 /100 WBCS — SIGNIFICANT CHANGE UP (ref 0–0)
NRBC BLD-RTO: 0 /100 WBCS — SIGNIFICANT CHANGE UP (ref 0–0)
NRBC BLD-RTO: 0 /100 WBCS — SIGNIFICANT CHANGE UP (ref 0–0)
PHOSPHATE SERPL-MCNC: 2.4 MG/DL — SIGNIFICANT CHANGE UP (ref 2.1–4.9)
PLATELET # BLD AUTO: 138 K/UL — SIGNIFICANT CHANGE UP (ref 130–400)
PLATELET # BLD AUTO: 144 K/UL — SIGNIFICANT CHANGE UP (ref 130–400)
PMV BLD: 10.1 FL — SIGNIFICANT CHANGE UP (ref 7.4–10.4)
PMV BLD: 10.4 FL — SIGNIFICANT CHANGE UP (ref 7.4–10.4)
POTASSIUM SERPL-MCNC: 3.5 MMOL/L — SIGNIFICANT CHANGE UP (ref 3.5–5)
POTASSIUM SERPL-MCNC: 3.5 MMOL/L — SIGNIFICANT CHANGE UP (ref 3.5–5)
POTASSIUM SERPL-SCNC: 3.5 MMOL/L — SIGNIFICANT CHANGE UP (ref 3.5–5)
POTASSIUM SERPL-SCNC: 3.5 MMOL/L — SIGNIFICANT CHANGE UP (ref 3.5–5)
PROT SERPL-MCNC: 6.1 G/DL — SIGNIFICANT CHANGE UP (ref 6–8)
RBC # BLD: 3.55 M/UL — LOW (ref 4.2–5.4)
RBC # BLD: 3.71 M/UL — LOW (ref 4.2–5.4)
RBC # FLD: 16.1 % — HIGH (ref 11.5–14.5)
RBC # FLD: 16.3 % — HIGH (ref 11.5–14.5)
SODIUM SERPL-SCNC: 144 MMOL/L — SIGNIFICANT CHANGE UP (ref 135–146)
SODIUM SERPL-SCNC: 144 MMOL/L — SIGNIFICANT CHANGE UP (ref 135–146)
WBC # BLD: 4.16 K/UL — LOW (ref 4.8–10.8)
WBC # BLD: 5.54 K/UL — SIGNIFICANT CHANGE UP (ref 4.8–10.8)
WBC # FLD AUTO: 4.16 K/UL — LOW (ref 4.8–10.8)
WBC # FLD AUTO: 5.54 K/UL — SIGNIFICANT CHANGE UP (ref 4.8–10.8)

## 2024-12-30 PROCEDURE — 71045 X-RAY EXAM CHEST 1 VIEW: CPT | Mod: 26

## 2024-12-30 PROCEDURE — 99232 SBSQ HOSP IP/OBS MODERATE 35: CPT

## 2024-12-30 PROCEDURE — 93010 ELECTROCARDIOGRAM REPORT: CPT

## 2024-12-30 PROCEDURE — 99233 SBSQ HOSP IP/OBS HIGH 50: CPT

## 2024-12-30 RX ORDER — HEPARIN SODIUM 1000 [USP'U]/ML
5000 INJECTION INTRAVENOUS; SUBCUTANEOUS EVERY 12 HOURS
Refills: 0 | Status: DISCONTINUED | OUTPATIENT
Start: 2024-12-30 | End: 2025-01-01

## 2024-12-30 RX ORDER — LORAZEPAM 4 MG/ML
0.2 VIAL (ML) INJECTION EVERY 12 HOURS
Refills: 0 | Status: DISCONTINUED | OUTPATIENT
Start: 2024-12-30 | End: 2025-01-01

## 2024-12-30 RX ADMIN — Medication 325 MILLIGRAM(S): at 11:13

## 2024-12-30 RX ADMIN — AMPICILLIN SODIUM AND SULBACTAM SODIUM 100 GRAM(S): 1; .5 INJECTION, POWDER, FOR SOLUTION INTRAMUSCULAR; INTRAVENOUS at 11:13

## 2024-12-30 RX ADMIN — AMPICILLIN SODIUM AND SULBACTAM SODIUM 100 GRAM(S): 1; .5 INJECTION, POWDER, FOR SOLUTION INTRAMUSCULAR; INTRAVENOUS at 05:41

## 2024-12-30 RX ADMIN — Medication 40 MILLIGRAM(S): at 11:14

## 2024-12-30 RX ADMIN — AMPICILLIN SODIUM AND SULBACTAM SODIUM 100 GRAM(S): 1; .5 INJECTION, POWDER, FOR SOLUTION INTRAMUSCULAR; INTRAVENOUS at 17:18

## 2024-12-30 RX ADMIN — HEPARIN SODIUM 5000 UNIT(S): 1000 INJECTION INTRAVENOUS; SUBCUTANEOUS at 13:15

## 2024-12-30 RX ADMIN — Medication 81 MILLIGRAM(S): at 11:13

## 2024-12-30 RX ADMIN — Medication 1 TABLET(S): at 11:13

## 2024-12-30 RX ADMIN — Medication 1 APPLICATION(S): at 06:30

## 2024-12-30 RX ADMIN — HEPARIN SODIUM 5000 UNIT(S): 1000 INJECTION INTRAVENOUS; SUBCUTANEOUS at 05:37

## 2024-12-30 RX ADMIN — CLONAZEPAM 1 MILLIGRAM(S): 0.5 TABLET ORAL at 05:37

## 2024-12-30 RX ADMIN — Medication 0.2 MILLIGRAM(S): at 16:40

## 2024-12-31 LAB
ALBUMIN SERPL ELPH-MCNC: 3.1 G/DL — LOW (ref 3.5–5.2)
ALP SERPL-CCNC: 53 U/L — SIGNIFICANT CHANGE UP (ref 30–115)
ALT FLD-CCNC: 9 U/L — SIGNIFICANT CHANGE UP (ref 0–41)
ANION GAP SERPL CALC-SCNC: 11 MMOL/L — SIGNIFICANT CHANGE UP (ref 7–14)
AST SERPL-CCNC: 18 U/L — SIGNIFICANT CHANGE UP (ref 0–41)
BASOPHILS # BLD AUTO: 0.01 K/UL — SIGNIFICANT CHANGE UP (ref 0–0.2)
BASOPHILS NFR BLD AUTO: 0.3 % — SIGNIFICANT CHANGE UP (ref 0–1)
BILIRUB SERPL-MCNC: 0.5 MG/DL — SIGNIFICANT CHANGE UP (ref 0.2–1.2)
BUN SERPL-MCNC: 9 MG/DL — LOW (ref 10–20)
CALCIUM SERPL-MCNC: 9.3 MG/DL — SIGNIFICANT CHANGE UP (ref 8.4–10.4)
CHLORIDE SERPL-SCNC: 102 MMOL/L — SIGNIFICANT CHANGE UP (ref 98–110)
CO2 SERPL-SCNC: 32 MMOL/L — SIGNIFICANT CHANGE UP (ref 17–32)
CREAT SERPL-MCNC: 0.6 MG/DL — LOW (ref 0.7–1.5)
CULTURE RESULTS: SIGNIFICANT CHANGE UP
EGFR: 84 ML/MIN/1.73M2 — SIGNIFICANT CHANGE UP
EGFR: 84 ML/MIN/1.73M2 — SIGNIFICANT CHANGE UP
EOSINOPHIL # BLD AUTO: 0.18 K/UL — SIGNIFICANT CHANGE UP (ref 0–0.7)
EOSINOPHIL NFR BLD AUTO: 4.5 % — SIGNIFICANT CHANGE UP (ref 0–8)
GLUCOSE BLDC GLUCOMTR-MCNC: 114 MG/DL — HIGH (ref 70–99)
GLUCOSE BLDC GLUCOMTR-MCNC: 119 MG/DL — HIGH (ref 70–99)
GLUCOSE SERPL-MCNC: 89 MG/DL — SIGNIFICANT CHANGE UP (ref 70–99)
HCT VFR BLD CALC: 31.9 % — LOW (ref 37–47)
HGB BLD-MCNC: 9.6 G/DL — LOW (ref 12–16)
IMM GRANULOCYTES NFR BLD AUTO: 0.5 % — HIGH (ref 0.1–0.3)
LYMPHOCYTES # BLD AUTO: 0.52 K/UL — LOW (ref 1.2–3.4)
LYMPHOCYTES # BLD AUTO: 13.1 % — LOW (ref 20.5–51.1)
MAGNESIUM SERPL-MCNC: 1.8 MG/DL — SIGNIFICANT CHANGE UP (ref 1.8–2.4)
MCHC RBC-ENTMCNC: 27.9 PG — SIGNIFICANT CHANGE UP (ref 27–31)
MCHC RBC-ENTMCNC: 30.1 G/DL — LOW (ref 32–37)
MCV RBC AUTO: 92.7 FL — SIGNIFICANT CHANGE UP (ref 81–99)
MONOCYTES # BLD AUTO: 0.46 K/UL — SIGNIFICANT CHANGE UP (ref 0.1–0.6)
MONOCYTES NFR BLD AUTO: 11.6 % — HIGH (ref 1.7–9.3)
NEUTROPHILS # BLD AUTO: 2.77 K/UL — SIGNIFICANT CHANGE UP (ref 1.4–6.5)
NEUTROPHILS NFR BLD AUTO: 70 % — SIGNIFICANT CHANGE UP (ref 42.2–75.2)
NRBC # BLD: 0 /100 WBCS — SIGNIFICANT CHANGE UP (ref 0–0)
NRBC BLD-RTO: 0 /100 WBCS — SIGNIFICANT CHANGE UP (ref 0–0)
PHOSPHATE SERPL-MCNC: 2.7 MG/DL — SIGNIFICANT CHANGE UP (ref 2.1–4.9)
PLATELET # BLD AUTO: 135 K/UL — SIGNIFICANT CHANGE UP (ref 130–400)
PMV BLD: 10 FL — SIGNIFICANT CHANGE UP (ref 7.4–10.4)
POTASSIUM SERPL-MCNC: 3.6 MMOL/L — SIGNIFICANT CHANGE UP (ref 3.5–5)
POTASSIUM SERPL-SCNC: 3.6 MMOL/L — SIGNIFICANT CHANGE UP (ref 3.5–5)
PROT SERPL-MCNC: 6.1 G/DL — SIGNIFICANT CHANGE UP (ref 6–8)
RBC # BLD: 3.44 M/UL — LOW (ref 4.2–5.4)
RBC # FLD: 16 % — HIGH (ref 11.5–14.5)
SODIUM SERPL-SCNC: 145 MMOL/L — SIGNIFICANT CHANGE UP (ref 135–146)
SPECIMEN SOURCE: SIGNIFICANT CHANGE UP
WBC # BLD: 3.96 K/UL — LOW (ref 4.8–10.8)
WBC # FLD AUTO: 3.96 K/UL — LOW (ref 4.8–10.8)

## 2024-12-31 PROCEDURE — 71045 X-RAY EXAM CHEST 1 VIEW: CPT | Mod: 26

## 2024-12-31 PROCEDURE — 99232 SBSQ HOSP IP/OBS MODERATE 35: CPT

## 2024-12-31 RX ADMIN — Medication 81 MILLIGRAM(S): at 11:21

## 2024-12-31 RX ADMIN — Medication 325 MILLIGRAM(S): at 11:21

## 2024-12-31 RX ADMIN — AMPICILLIN SODIUM AND SULBACTAM SODIUM 100 GRAM(S): 1; .5 INJECTION, POWDER, FOR SOLUTION INTRAMUSCULAR; INTRAVENOUS at 06:00

## 2024-12-31 RX ADMIN — AMPICILLIN SODIUM AND SULBACTAM SODIUM 100 GRAM(S): 1; .5 INJECTION, POWDER, FOR SOLUTION INTRAMUSCULAR; INTRAVENOUS at 17:21

## 2024-12-31 RX ADMIN — Medication 1 APPLICATION(S): at 06:07

## 2024-12-31 RX ADMIN — CLONAZEPAM 1 MILLIGRAM(S): 0.5 TABLET ORAL at 17:21

## 2024-12-31 RX ADMIN — Medication 2 TABLET(S): at 21:19

## 2024-12-31 RX ADMIN — CLONAZEPAM 1 MILLIGRAM(S): 0.5 TABLET ORAL at 06:00

## 2024-12-31 RX ADMIN — AMPICILLIN SODIUM AND SULBACTAM SODIUM 100 GRAM(S): 1; .5 INJECTION, POWDER, FOR SOLUTION INTRAMUSCULAR; INTRAVENOUS at 00:00

## 2024-12-31 RX ADMIN — POLYETHYLENE GLYCOL 3350 17 GRAM(S): 17 POWDER, FOR SOLUTION ORAL at 11:21

## 2024-12-31 RX ADMIN — HEPARIN SODIUM 5000 UNIT(S): 1000 INJECTION INTRAVENOUS; SUBCUTANEOUS at 17:24

## 2024-12-31 RX ADMIN — AMPICILLIN SODIUM AND SULBACTAM SODIUM 100 GRAM(S): 1; .5 INJECTION, POWDER, FOR SOLUTION INTRAMUSCULAR; INTRAVENOUS at 11:20

## 2024-12-31 RX ADMIN — ATORVASTATIN CALCIUM 10 MILLIGRAM(S): 80 TABLET, FILM COATED ORAL at 21:19

## 2024-12-31 RX ADMIN — HEPARIN SODIUM 5000 UNIT(S): 1000 INJECTION INTRAVENOUS; SUBCUTANEOUS at 06:00

## 2024-12-31 RX ADMIN — Medication 40 MILLIGRAM(S): at 11:21

## 2024-12-31 RX ADMIN — Medication 1 TABLET(S): at 11:21

## 2024-12-31 RX ADMIN — Medication 25 MILLILITER(S): at 08:30

## 2025-01-01 VITALS
DIASTOLIC BLOOD PRESSURE: 78 MMHG | SYSTOLIC BLOOD PRESSURE: 123 MMHG | HEART RATE: 74 BPM | TEMPERATURE: 98 F | RESPIRATION RATE: 18 BRPM

## 2025-01-01 LAB
ALBUMIN SERPL ELPH-MCNC: 3.4 G/DL — LOW (ref 3.5–5.2)
ALP SERPL-CCNC: 59 U/L — SIGNIFICANT CHANGE UP (ref 30–115)
ALT FLD-CCNC: 10 U/L — SIGNIFICANT CHANGE UP (ref 0–41)
ANION GAP SERPL CALC-SCNC: 15 MMOL/L — HIGH (ref 7–14)
AST SERPL-CCNC: 22 U/L — SIGNIFICANT CHANGE UP (ref 0–41)
BASOPHILS # BLD AUTO: 0.02 K/UL — SIGNIFICANT CHANGE UP (ref 0–0.2)
BASOPHILS NFR BLD AUTO: 0.4 % — SIGNIFICANT CHANGE UP (ref 0–1)
BILIRUB SERPL-MCNC: 0.5 MG/DL — SIGNIFICANT CHANGE UP (ref 0.2–1.2)
BUN SERPL-MCNC: 10 MG/DL — SIGNIFICANT CHANGE UP (ref 10–20)
CALCIUM SERPL-MCNC: 9.4 MG/DL — SIGNIFICANT CHANGE UP (ref 8.4–10.4)
CHLORIDE SERPL-SCNC: 102 MMOL/L — SIGNIFICANT CHANGE UP (ref 98–110)
CO2 SERPL-SCNC: 31 MMOL/L — SIGNIFICANT CHANGE UP (ref 17–32)
CREAT SERPL-MCNC: 0.6 MG/DL — LOW (ref 0.7–1.5)
EGFR: 84 ML/MIN/1.73M2 — SIGNIFICANT CHANGE UP
EGFR: 84 ML/MIN/1.73M2 — SIGNIFICANT CHANGE UP
EOSINOPHIL # BLD AUTO: 0.22 K/UL — SIGNIFICANT CHANGE UP (ref 0–0.7)
EOSINOPHIL NFR BLD AUTO: 4.3 % — SIGNIFICANT CHANGE UP (ref 0–8)
GLUCOSE BLDC GLUCOMTR-MCNC: 114 MG/DL — HIGH (ref 70–99)
GLUCOSE BLDC GLUCOMTR-MCNC: 119 MG/DL — HIGH (ref 70–99)
GLUCOSE BLDC GLUCOMTR-MCNC: 143 MG/DL — HIGH (ref 70–99)
GLUCOSE SERPL-MCNC: 111 MG/DL — HIGH (ref 70–99)
HCT VFR BLD CALC: 35 % — LOW (ref 37–47)
HGB BLD-MCNC: 10.4 G/DL — LOW (ref 12–16)
IMM GRANULOCYTES NFR BLD AUTO: 0.4 % — HIGH (ref 0.1–0.3)
LYMPHOCYTES # BLD AUTO: 0.79 K/UL — LOW (ref 1.2–3.4)
LYMPHOCYTES # BLD AUTO: 15.3 % — LOW (ref 20.5–51.1)
MAGNESIUM SERPL-MCNC: 1.8 MG/DL — SIGNIFICANT CHANGE UP (ref 1.8–2.4)
MCHC RBC-ENTMCNC: 28.1 PG — SIGNIFICANT CHANGE UP (ref 27–31)
MCHC RBC-ENTMCNC: 29.7 G/DL — LOW (ref 32–37)
MCV RBC AUTO: 94.6 FL — SIGNIFICANT CHANGE UP (ref 81–99)
MONOCYTES # BLD AUTO: 0.5 K/UL — SIGNIFICANT CHANGE UP (ref 0.1–0.6)
MONOCYTES NFR BLD AUTO: 9.7 % — HIGH (ref 1.7–9.3)
NEUTROPHILS # BLD AUTO: 3.62 K/UL — SIGNIFICANT CHANGE UP (ref 1.4–6.5)
NEUTROPHILS NFR BLD AUTO: 69.9 % — SIGNIFICANT CHANGE UP (ref 42.2–75.2)
NRBC # BLD: 0 /100 WBCS — SIGNIFICANT CHANGE UP (ref 0–0)
NRBC BLD-RTO: 0 /100 WBCS — SIGNIFICANT CHANGE UP (ref 0–0)
PHOSPHATE SERPL-MCNC: 2.7 MG/DL — SIGNIFICANT CHANGE UP (ref 2.1–4.9)
PLATELET # BLD AUTO: 148 K/UL — SIGNIFICANT CHANGE UP (ref 130–400)
PMV BLD: 10.2 FL — SIGNIFICANT CHANGE UP (ref 7.4–10.4)
POTASSIUM SERPL-MCNC: 3.7 MMOL/L — SIGNIFICANT CHANGE UP (ref 3.5–5)
POTASSIUM SERPL-SCNC: 3.7 MMOL/L — SIGNIFICANT CHANGE UP (ref 3.5–5)
PROT SERPL-MCNC: 6.6 G/DL — SIGNIFICANT CHANGE UP (ref 6–8)
RBC # BLD: 3.7 M/UL — LOW (ref 4.2–5.4)
RBC # FLD: 16.1 % — HIGH (ref 11.5–14.5)
SODIUM SERPL-SCNC: 148 MMOL/L — HIGH (ref 135–146)
WBC # BLD: 5.17 K/UL — SIGNIFICANT CHANGE UP (ref 4.8–10.8)
WBC # FLD AUTO: 5.17 K/UL — SIGNIFICANT CHANGE UP (ref 4.8–10.8)

## 2025-01-01 PROCEDURE — 99232 SBSQ HOSP IP/OBS MODERATE 35: CPT

## 2025-01-01 RX ORDER — GLYCOPYRROLATE 0.2 MG/ML
0.2 INJECTION INTRAMUSCULAR; INTRAVENOUS
Refills: 0 | Status: DISCONTINUED | OUTPATIENT
Start: 2025-01-01 | End: 2025-01-02

## 2025-01-01 RX ORDER — LORAZEPAM 4 MG/ML
0.2 VIAL (ML) INJECTION
Refills: 0 | Status: DISCONTINUED | OUTPATIENT
Start: 2025-01-01 | End: 2025-01-01

## 2025-01-01 RX ORDER — LORAZEPAM 4 MG/ML
0.5 VIAL (ML) INJECTION
Refills: 0 | Status: DISCONTINUED | OUTPATIENT
Start: 2025-01-01 | End: 2025-01-03

## 2025-01-01 RX ADMIN — AMPICILLIN SODIUM AND SULBACTAM SODIUM 100 GRAM(S): 1; .5 INJECTION, POWDER, FOR SOLUTION INTRAMUSCULAR; INTRAVENOUS at 06:31

## 2025-01-01 RX ADMIN — Medication 1 TABLET(S): at 12:44

## 2025-01-01 RX ADMIN — Medication 325 MILLIGRAM(S): at 12:44

## 2025-01-01 RX ADMIN — AMPICILLIN SODIUM AND SULBACTAM SODIUM 100 GRAM(S): 1; .5 INJECTION, POWDER, FOR SOLUTION INTRAMUSCULAR; INTRAVENOUS at 12:43

## 2025-01-01 RX ADMIN — HEPARIN SODIUM 5000 UNIT(S): 1000 INJECTION INTRAVENOUS; SUBCUTANEOUS at 17:31

## 2025-01-01 RX ADMIN — Medication 1 APPLICATION(S): at 07:01

## 2025-01-01 RX ADMIN — Medication 40 MILLIGRAM(S): at 12:44

## 2025-01-01 RX ADMIN — Medication 81 MILLIGRAM(S): at 12:44

## 2025-01-01 RX ADMIN — POLYETHYLENE GLYCOL 3350 17 GRAM(S): 17 POWDER, FOR SOLUTION ORAL at 12:44

## 2025-01-01 RX ADMIN — AMPICILLIN SODIUM AND SULBACTAM SODIUM 100 GRAM(S): 1; .5 INJECTION, POWDER, FOR SOLUTION INTRAMUSCULAR; INTRAVENOUS at 17:30

## 2025-01-01 RX ADMIN — AMPICILLIN SODIUM AND SULBACTAM SODIUM 100 GRAM(S): 1; .5 INJECTION, POWDER, FOR SOLUTION INTRAMUSCULAR; INTRAVENOUS at 00:18

## 2025-01-01 RX ADMIN — HEPARIN SODIUM 5000 UNIT(S): 1000 INJECTION INTRAVENOUS; SUBCUTANEOUS at 06:32

## 2025-01-01 RX ADMIN — Medication 1 MILLIGRAM(S): at 18:50

## 2025-01-02 PROCEDURE — 99233 SBSQ HOSP IP/OBS HIGH 50: CPT

## 2025-01-02 PROCEDURE — 99232 SBSQ HOSP IP/OBS MODERATE 35: CPT

## 2025-01-02 RX ORDER — SCOPOLAMINE 1 MG/3D
1 PATCH, EXTENDED RELEASE TRANSDERMAL
Refills: 0 | Status: DISCONTINUED | OUTPATIENT
Start: 2025-01-02 | End: 2025-01-03

## 2025-01-02 RX ORDER — LORAZEPAM 4 MG/ML
0.5 VIAL (ML) INJECTION EVERY 6 HOURS
Refills: 0 | Status: DISCONTINUED | OUTPATIENT
Start: 2025-01-02 | End: 2025-01-04

## 2025-01-02 RX ADMIN — SCOPOLAMINE 1 PATCH: 1 PATCH, EXTENDED RELEASE TRANSDERMAL at 12:05

## 2025-01-02 RX ADMIN — Medication 0.5 MILLIGRAM(S): at 18:45

## 2025-01-02 RX ADMIN — Medication 0.5 MILLIGRAM(S): at 12:40

## 2025-01-02 RX ADMIN — Medication 1 MILLIGRAM(S): at 05:12

## 2025-01-02 RX ADMIN — Medication 0.5 MILLIGRAM(S): at 23:50

## 2025-01-03 ENCOUNTER — TRANSCRIPTION ENCOUNTER (OUTPATIENT)
Age: 89
End: 2025-01-03

## 2025-01-03 LAB
ALBUMIN SERPL ELPH-MCNC: 3.5 G/DL — SIGNIFICANT CHANGE UP (ref 3.5–5.2)
ALP SERPL-CCNC: 51 U/L — SIGNIFICANT CHANGE UP (ref 30–115)
ALT FLD-CCNC: 9 U/L — SIGNIFICANT CHANGE UP (ref 0–41)
ANION GAP SERPL CALC-SCNC: 9 MMOL/L — SIGNIFICANT CHANGE UP (ref 7–14)
AST SERPL-CCNC: 20 U/L — SIGNIFICANT CHANGE UP (ref 0–41)
BASOPHILS # BLD AUTO: 0.02 K/UL — SIGNIFICANT CHANGE UP (ref 0–0.2)
BASOPHILS NFR BLD AUTO: 0.4 % — SIGNIFICANT CHANGE UP (ref 0–1)
BILIRUB SERPL-MCNC: 0.5 MG/DL — SIGNIFICANT CHANGE UP (ref 0.2–1.2)
BUN SERPL-MCNC: 17 MG/DL — SIGNIFICANT CHANGE UP (ref 10–20)
CALCIUM SERPL-MCNC: 9.4 MG/DL — SIGNIFICANT CHANGE UP (ref 8.4–10.5)
CHLORIDE SERPL-SCNC: 105 MMOL/L — SIGNIFICANT CHANGE UP (ref 98–110)
CO2 SERPL-SCNC: 35 MMOL/L — HIGH (ref 17–32)
CREAT SERPL-MCNC: 0.6 MG/DL — LOW (ref 0.7–1.5)
EGFR: 84 ML/MIN/1.73M2 — SIGNIFICANT CHANGE UP
EGFR: 84 ML/MIN/1.73M2 — SIGNIFICANT CHANGE UP
EOSINOPHIL # BLD AUTO: 0.09 K/UL — SIGNIFICANT CHANGE UP (ref 0–0.7)
EOSINOPHIL NFR BLD AUTO: 1.7 % — SIGNIFICANT CHANGE UP (ref 0–8)
GLUCOSE SERPL-MCNC: 110 MG/DL — HIGH (ref 70–99)
HCT VFR BLD CALC: 32.8 % — LOW (ref 37–47)
HGB BLD-MCNC: 9.8 G/DL — LOW (ref 12–16)
IMM GRANULOCYTES NFR BLD AUTO: 1 % — HIGH (ref 0.1–0.3)
LYMPHOCYTES # BLD AUTO: 0.41 K/UL — LOW (ref 1.2–3.4)
LYMPHOCYTES # BLD AUTO: 7.9 % — LOW (ref 20.5–51.1)
MAGNESIUM SERPL-MCNC: 1.8 MG/DL — SIGNIFICANT CHANGE UP (ref 1.8–2.4)
MCHC RBC-ENTMCNC: 28.7 PG — SIGNIFICANT CHANGE UP (ref 27–31)
MCHC RBC-ENTMCNC: 29.9 G/DL — LOW (ref 32–37)
MCV RBC AUTO: 96.2 FL — SIGNIFICANT CHANGE UP (ref 81–99)
MONOCYTES # BLD AUTO: 0.45 K/UL — SIGNIFICANT CHANGE UP (ref 0.1–0.6)
MONOCYTES NFR BLD AUTO: 8.7 % — SIGNIFICANT CHANGE UP (ref 1.7–9.3)
NEUTROPHILS # BLD AUTO: 4.14 K/UL — SIGNIFICANT CHANGE UP (ref 1.4–6.5)
NEUTROPHILS NFR BLD AUTO: 80.3 % — HIGH (ref 42.2–75.2)
NRBC # BLD: 0 /100 WBCS — SIGNIFICANT CHANGE UP (ref 0–0)
NRBC BLD-RTO: 0 /100 WBCS — SIGNIFICANT CHANGE UP (ref 0–0)
PHOSPHATE SERPL-MCNC: 2.2 MG/DL — SIGNIFICANT CHANGE UP (ref 2.1–4.9)
PLATELET # BLD AUTO: 154 K/UL — SIGNIFICANT CHANGE UP (ref 130–400)
PMV BLD: 10.4 FL — SIGNIFICANT CHANGE UP (ref 7.4–10.4)
POTASSIUM SERPL-MCNC: 3.8 MMOL/L — SIGNIFICANT CHANGE UP (ref 3.5–5)
POTASSIUM SERPL-SCNC: 3.8 MMOL/L — SIGNIFICANT CHANGE UP (ref 3.5–5)
PROT SERPL-MCNC: 6.1 G/DL — SIGNIFICANT CHANGE UP (ref 6–8)
RBC # BLD: 3.41 M/UL — LOW (ref 4.2–5.4)
RBC # FLD: 16.5 % — HIGH (ref 11.5–14.5)
SODIUM SERPL-SCNC: 149 MMOL/L — HIGH (ref 135–146)
WBC # BLD: 5.16 K/UL — SIGNIFICANT CHANGE UP (ref 4.8–10.8)
WBC # FLD AUTO: 5.16 K/UL — SIGNIFICANT CHANGE UP (ref 4.8–10.8)

## 2025-01-03 PROCEDURE — 99233 SBSQ HOSP IP/OBS HIGH 50: CPT

## 2025-01-03 PROCEDURE — 99232 SBSQ HOSP IP/OBS MODERATE 35: CPT

## 2025-01-03 RX ORDER — LORAZEPAM 4 MG/ML
0.25 VIAL (ML) INJECTION
Qty: 3 | Refills: 0
Start: 2025-01-03 | End: 2025-01-05

## 2025-01-03 RX ORDER — SCOPOLAMINE 1 MG/3D
1 PATCH, EXTENDED RELEASE TRANSDERMAL
Qty: 1 | Refills: 2
Start: 2025-01-03 | End: 2025-02-13

## 2025-01-03 RX ORDER — LORAZEPAM 4 MG/ML
0.5 VIAL (ML) INJECTION
Qty: 3 | Refills: 0
Start: 2025-01-03 | End: 2025-01-05

## 2025-01-03 RX ORDER — METRONIDAZOLE 250 MG
1 TABLET ORAL
Refills: 0 | DISCHARGE

## 2025-01-03 RX ORDER — LOPERAMIDE HCL 1 MG/7.5ML
1 SOLUTION ORAL
Refills: 0 | DISCHARGE

## 2025-01-03 RX ORDER — LORAZEPAM 4 MG/ML
1 VIAL (ML) INJECTION
Qty: 12 | Refills: 0
Start: 2025-01-03 | End: 2025-01-05

## 2025-01-03 RX ORDER — GLYCOPYRROLATE 0.2 MG/ML
0.2 INJECTION INTRAMUSCULAR; INTRAVENOUS EVERY 6 HOURS
Refills: 0 | Status: DISCONTINUED | OUTPATIENT
Start: 2025-01-03 | End: 2025-01-04

## 2025-01-03 RX ORDER — ATORVASTATIN CALCIUM 80 MG/1
1 TABLET, FILM COATED ORAL
Refills: 0 | DISCHARGE

## 2025-01-03 RX ORDER — GLYCOPYRROLATE 0.2 MG/ML
0.2 INJECTION INTRAMUSCULAR; INTRAVENOUS
Qty: 10 | Refills: 0
Start: 2025-01-03 | End: 2025-04-02

## 2025-01-03 RX ORDER — FUROSEMIDE 10 MG/ML
1 INJECTION INTRAMUSCULAR; INTRAVENOUS
Refills: 0 | DISCHARGE

## 2025-01-03 RX ORDER — SCOPOLAMINE 1 MG/3D
1 PATCH, EXTENDED RELEASE TRANSDERMAL
Refills: 0 | Status: DISCONTINUED | OUTPATIENT
Start: 2025-01-03 | End: 2025-01-04

## 2025-01-03 RX ADMIN — Medication 0.5 MILLIGRAM(S): at 08:21

## 2025-01-03 RX ADMIN — SCOPOLAMINE 1 PATCH: 1 PATCH, EXTENDED RELEASE TRANSDERMAL at 07:18

## 2025-01-03 RX ADMIN — SCOPOLAMINE 1 PATCH: 1 PATCH, EXTENDED RELEASE TRANSDERMAL at 11:22

## 2025-01-03 RX ADMIN — Medication 0.5 MILLIGRAM(S): at 11:23

## 2025-01-03 RX ADMIN — GLYCOPYRROLATE 0.2 MILLIGRAM(S): 0.2 INJECTION INTRAMUSCULAR; INTRAVENOUS at 11:23

## 2025-01-03 RX ADMIN — Medication 0.5 MILLIGRAM(S): at 18:09

## 2025-01-03 RX ADMIN — Medication 0.5 MILLIGRAM(S): at 05:29

## 2025-01-04 ENCOUNTER — TRANSCRIPTION ENCOUNTER (OUTPATIENT)
Age: 89
End: 2025-01-04

## 2025-01-04 PROCEDURE — 99232 SBSQ HOSP IP/OBS MODERATE 35: CPT

## 2025-01-04 RX ADMIN — SCOPOLAMINE 1 PATCH: 1 PATCH, EXTENDED RELEASE TRANSDERMAL at 07:56

## 2025-01-04 RX ADMIN — Medication 0.5 MILLIGRAM(S): at 18:25

## 2025-01-04 RX ADMIN — Medication 5 MILLIGRAM(S): at 18:55

## 2025-01-04 RX ADMIN — Medication 5 MILLIGRAM(S): at 01:51

## 2025-01-04 RX ADMIN — Medication 5 MILLIGRAM(S): at 18:26

## 2025-01-04 RX ADMIN — Medication 1 APPLICATION(S): at 07:45

## 2025-01-04 RX ADMIN — Medication 0.5 MILLIGRAM(S): at 14:09

## 2025-01-04 RX ADMIN — Medication 0.5 MILLIGRAM(S): at 06:01

## 2025-01-04 RX ADMIN — Medication 5 MILLIGRAM(S): at 01:23

## 2025-01-05 ENCOUNTER — EMERGENCY (EMERGENCY)
Facility: HOSPITAL | Age: 89
LOS: 0 days | Discharge: HOME | End: 2025-01-05
Attending: EMERGENCY MEDICINE
Payer: MEDICARE

## 2025-01-05 VITALS
TEMPERATURE: 98 F | DIASTOLIC BLOOD PRESSURE: 74 MMHG | SYSTOLIC BLOOD PRESSURE: 126 MMHG | HEART RATE: 71 BPM | RESPIRATION RATE: 18 BRPM | OXYGEN SATURATION: 99 %

## 2025-01-05 VITALS
TEMPERATURE: 98 F | SYSTOLIC BLOOD PRESSURE: 160 MMHG | RESPIRATION RATE: 20 BRPM | WEIGHT: 89.95 LBS | HEIGHT: 61 IN | HEART RATE: 99 BPM | DIASTOLIC BLOOD PRESSURE: 92 MMHG | OXYGEN SATURATION: 99 %

## 2025-01-05 DIAGNOSIS — Z46.6 ENCOUNTER FOR FITTING AND ADJUSTMENT OF URINARY DEVICE: ICD-10-CM

## 2025-01-05 DIAGNOSIS — Z86.2 PERSONAL HISTORY OF DISEASES OF THE BLOOD AND BLOOD-FORMING ORGANS AND CERTAIN DISORDERS INVOLVING THE IMMUNE MECHANISM: ICD-10-CM

## 2025-01-05 DIAGNOSIS — F41.9 ANXIETY DISORDER, UNSPECIFIED: ICD-10-CM

## 2025-01-05 DIAGNOSIS — I50.9 HEART FAILURE, UNSPECIFIED: ICD-10-CM

## 2025-01-05 DIAGNOSIS — Z85.3 PERSONAL HISTORY OF MALIGNANT NEOPLASM OF BREAST: ICD-10-CM

## 2025-01-05 DIAGNOSIS — Z90.13 ACQUIRED ABSENCE OF BILATERAL BREASTS AND NIPPLES: Chronic | ICD-10-CM

## 2025-01-05 DIAGNOSIS — Z98.890 OTHER SPECIFIED POSTPROCEDURAL STATES: Chronic | ICD-10-CM

## 2025-01-05 PROCEDURE — 99283 EMERGENCY DEPT VISIT LOW MDM: CPT | Mod: 25

## 2025-01-05 PROCEDURE — 99283 EMERGENCY DEPT VISIT LOW MDM: CPT | Mod: GC

## 2025-01-05 PROCEDURE — 51702 INSERT TEMP BLADDER CATH: CPT

## 2025-01-05 NOTE — ED PROVIDER NOTE - CLINICAL SUMMARY MEDICAL DECISION MAKING FREE TEXT BOX
89-year-old female past medical history of dementia aortic stenosis carotid stenosis CHF anemia anxiety breast cancer status post mastectomy, brain cancer status post craniectomy, currently on hospice, nonverbal at baseline on home O2 nasal cannula, sent from [facility], for Rios reinsertion.  Per triage note, patient here due to pulled Rios catheter out this morning.  Patient unable to provide history.    On exam, AFVSS, Well appearing, No acute distress, NCAT, EOMI, PERRLA, MMM, Neck supple, LCTAB, RRR nl s1s2 No mrg, Abdomen Soft NTND, alert, able to mumble some words and moving hands but not answering or following commands, No Focal Deficits, No LE edema or calf TTP,    A/P; Rios replaced, DC back to nursing home with transport

## 2025-01-05 NOTE — ED PROVIDER NOTE - PATIENT PORTAL LINK FT
You can access the FollowMyHealth Patient Portal offered by [facility] by registering at the following website: [email or web address]. By joining [employer]’s FollowMyHealth portal, you will also be able to view your health information using other applications (apps) compatible with our system.

## 2025-01-05 NOTE — ED ADULT NURSE NOTE - CHIEF COMPLAINT QUOTE
Patient bibems from [facility] in [facility], as per EMS staff reported pt is at baseline mental status here due to pulled lama catheter out this am, was recently admitted and treated for pneumonia

## 2025-01-05 NOTE — ED ADULT NURSE NOTE - NSFALLLASTSIX_ED_ALL_ED
ED HPI GENERAL MEDICAL PROBLEM





- General


Chief Complaint: General


Stated Complaint: nervous, anxious


Time Seen by Provider: 18 09:30


Source of Information: Reports: Patient


History Limitations: Reports: No Limitations





- History of Present Illness


INITIAL COMMENTS - FREE TEXT/NARRATIVE: 





Patient is a 76-year-old with known history of severe depression recently. 

Taking the Celexa since that time has had increase family stressors and was 

seen in the clinic secondary to anxiety and placed on BuSpar patient states 

that the BuSpar caused palpitation and discontinued she was seen in the clinic 

again and started on the Celexa 10 mg and again noted the palpitations and 

chest pressure with increased anxiety unable to sleep. Now seen in the 

emergency room with chest pressure and increased anxiety and palpitations.


Onset: Gradual


Duration: Day(s):


Location: Reports: Generalized


Severity: Moderate


Improves with: Reports: None


Worsens with: Reports: Medication


Associated Symptoms: Reports: No Other Symptoms





- Related Data


 Allergies











Allergy/AdvReac Type Severity Reaction Status Date / Time


 


aripiprazole [From Abilify] Allergy  Hallucinati Verified 18 08:54





   ons  


 


atorvastatin calcium Allergy  Muscle Verified 18 08:54





[From Lipitor]   Weakness  


 


ceftriaxone sodium Allergy  Hives Verified 18 08:54





[From Rocephin]     


 


fluticasone propionate Allergy  Cannot Verified 18 08:54





[From Advair Diskus]   Remember  


 


lansoprazole [From Prevacid] Allergy  Nausea Verified 18 08:54


 


loratadine Allergy  Headache,na Verified 18 08:54





[From Claritin-D 12 Hour]   usea  


 


metoprolol Allergy  Hallucinati Verified 18 08:54





   ons,hyperac  





   tivity  


 


nebivolol [From Bystolic] Allergy  Hypotension Verified 18 08:54


 


omeprazole [From Prilosec] Allergy  Nausea, Verified 18 08:54





   stomach  





   upset  


 


omeprazole magnesium Allergy  Nausea, Verified 18 08:54





[From Prilosec]   stomach  





   upset  


 


paroxetine HCl [From Paxil] Allergy  Stomach Verified 18 08:54





   Upset,  





   nausea,  





   weight gain  


 


pseudoephedrine sulfate Allergy  headache, Verified 18 08:54





[From Claritin-D 12 Hour]   nausea  


 


salmeterol xinafoate Allergy  Cannot Verified 18 08:54





[From Advair Diskus]   Remember  


 


sertraline HCl [From Zoloft] Allergy  suicidial Verified 18 08:54





   thoughts  


 


venlafaxine HCl Allergy  Cannot Verified 18 08:54





[From Effexor]   Remember  











Home Meds: 


 Home Meds





Cholecalciferol (Vitamin D3) [D3-2000] 2,000 unit PO DAILY@1000 14 [

History]


Albuterol [Ventolin HFA] 2 puff INH Q4H PRN 14 [History]


Beclomethasone Dipropionate [Qvar 40 Mcg] 2 puff INH DAILY 14 [History]


Ipratropium/Albuterol Sulfate [Duoneb 0.5 MG-3 MG/3 ML] 3 ml INH QID PRN  [History]


Warfarin [Coumadin] 7.5 mg PO DAILY 14 [History]


Vitamin B Complex with C [Super B With Vitamin C] 50 mg PO DAILY 10/12/14 [

History]


Brimonidine [Alphagan P 0.1% Ophth Soln] 1 drop EYEBOTH BID 07/17/15 [History]


Ezetimibe [Zetia] 10 mg PO BEDTIME 11/14/15 [History]


L.acidoph,Paracasei, B.lactis [Probiotic] 1 cap PO DAILY 11/14/15 [History]


Furosemide [Lasix] 20 mg PO DAILY #10 tablet 16 [Rx]


Potassium Chloride [Klor-Con M20] 10 meq PO DAILY 16 [History]


Isosorbide Mononitrate [Imdur] 15 mg PO DAILY 17 [History]


Nitroglycerin [Nitrostat] 0.4 mg SL ASDIRECTED PRN 17 [History]


Meclizine [Antivert] 25 mg PO TID PRN #20 tablet 17 [Rx]











Past Medical History


HEENT History: Reports: Allergic Rhinitis, Cataract, Glaucoma, Impaired Vision, 

Other (See Below)


Other HEENT History: Wears glasses, beginning cataract of the left eye


Cardiovascular History: Reports: Afib, Arrhythmia, Blood Clots/VTE/DVT, 

Cardiomyopathy, Heart Failure, Heart Murmur, High Cholesterol, Hypertension, 

Syncope, Other (See Below)


Other Cardiovascular History: PACs, PVCs Holter monitor study, mild diffuse 

valvular disease by echocardiogram as below including aortic valve stenosis and 

mitral valve insufficiency by clinical exam, near syncopal episodes likely 

secondary to bradycardia with previous episode on 16, dyslipidemia with 

history of fatty liver, bilateral PEs as below, grade 1 diastolic dysfunction 

by echocardiogram


Respiratory History: Reports: Asthma, Bronchitis, Recurrent, COPD, Intubation, 

Previous, PE, Pulmonary Fibrosis, Other (See Below)


Other Respiratory History: Bilateral PEs on 2014, mediastinal 

lymphadenopathy by CT scan with benign right-sided pulmonary granuloma biopsy 

and serial CT scans


Gastrointestinal History: Reports: Chronic Constipation, Colon Polyp, 

Diverticulosis, Gastritis, GERD, Helicobacter Pylori, Hiatal Hernia, 

Inflammatory Bowel Disease, Irritable Bowel Syndrome, PUD, Other (See Below)


Other Gastrointestinal History: Previously treated recurrent H. pylori, large 

fixed hiatal hernia with surgery as below, recurrent colonic polyps with large 

villous adenomatous polyp removed on 3/7/11


Genitourinary History: Reports: Chronic Renal Insuffiency, Urinary Incontinence


Other Genitourinary History: Additional borderline mild renal insufficiency


OB/GYN History: Reports: Pregnancy, Spontaneous 


Other OB/BYN History: SABs between 5 and 7 months gestation in  requiring D&

C, Full term  without complications during pregnancies or deliveries, 

menopause at about age 54


Musculoskeletal History: Reports: Arthritis, Back Pain, Chronic, Fracture, Neck 

Pain, Chronic, Osteoarthritis, Osteoporosis, Other (See Below)


Other Musculoskeletal History: Multiple vertebral body compression fractures, 

right wrist fracture requiring surgery as below


Neurological History: Reports: Concussion, CVA, Head Trauma, Other (See Below)


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 

hemiparesis and dysarthria with resolved symptoms


Psychiatric History: Reports: Anxiety, Panic Attack


Endocrine/Metabolic History: Reports: Osteoporosis, Other (See Below)


Other Endocrine/Metabolic History: Hyperglycemia which is diet controlled


Hematologic History: Reports: None


Immunologic History: Reports: None


Oncologic (Cancer) History: Reports: None


Dermatologic History: Reports: None


Other Dermatologic History: shingles at Left flank area.





- Infectious Disease History


Infectious Disease History: Reports: Chicken Pox, Helicobacter Pylori, Influenza

, Measles, MRSA, Rubella, Scarlet Fever, Shingles, Other (See Below)


Other Infectious Disease History: Shingles in the left CVA region in 2015, 

MRSA of the left leg in , recurrent H. pylori infections with 2 previous 

treatments





- Past Surgical History


Head Surgeries/Procedures: Reports: None


HEENT Surgical History: Reports: Adenoidectomy, Eye Surgery, Laser Surgery, 

Oral Surgery, Tonsillectomy, Other (See Below)


Respiratory Surgical History: Reports: Lung Biopsies, Other (See Below)


GI Surgical History: Reports: Colonoscopy, EGD, Nissen Fundoplication, 

Polypectomy, Other (See Below)


Female  Surgical History: Reports: D&C, Dilitation & Evacuation, Tubal 

Ligation, Other (See Below)


Musculoskeletal Surgical History: Reports: ORIF, Other (See Below)


Oncologic Surgical History: Reports: None


Dermatological Surgical History: Reports: Skin Biopsy





- Past Imaging History


Past Imaging History: Reports: Cardiac Echo, CAT Scan, DEXA Scan, HIDA Scan, 

Holter Monitor, Mammogram, Stress Testing, Ultrasound, Venous Doppler





Social & Family History





- Family History


HEENT: Reports: Cataract, Glaucoma, Macular Degeneration, Other (See Below)


Other HEENT Family History: Father with cataracts and glaucoma, mother with 

macular degeneration


Cardiac: Reports: Afib, CAD, Heart Murmur, High Cholesterol, Hypertension, MI, 

Pacemaker, Syncope, Other (See Below)


Other Cardiac Family History: Paternal grandfather with fatal MI in his 80s, 

mother with syncope/bradycardia requiring pacemaker and unknown valvular 

disease secondary to rheumatic fever as a child however no vascular surgery, 

hyperlipidemia in father, father with hypertension, mother with severe varicose 

veins


Respiratory: Reports: Asthma, COPD, Sleep Apnea, Other (See Below)


Other Respiratory Family Hisory: Paternal aunt with fatal asthma at about 20 

months of age, son and daughter with asthma, paternal aunt with fatal COPD at 

age 91, brother with COPD, sons x2 with sleep apnea


GI: Reports: Cholelithiasis, GERD, Pancreatitis, PUD, Other (See Below)


Other GI Family History: Paternal grandmother with fatal gallbladder surgery at 

age 55, father with GERD and peptic ulcer disease with fatal pancreatitis of 

unknown etiology at age 62


: Reports: Dialysis, Renal Disease/Insufficiency, Other (See Below)


Other  Family History: Father with dialysis secondary to renal failure from 

his pancreatitis as above


OBGYN: Reports: Recurrent Spontaneous , Other (See Below)


Other OBGYN Family History: mother with history of SAB x3


Musculoskeletal: Reports: RA, SLE, Other (See Below)


Other Musculoskeletal Family History: Sister with rheumatoid arthritis, 

daughter with SLE


Neurological: Reports: Alzheimers Disease, CVA, Dementia, Seizure, Other (See 

Below)


Other Neurological Family History: Grandson with head injury secondary to 

hockey with secondary seizures at about age 6, several CVAs initially at age 59 

in father, paternal uncle with CVA in his 70s, paternal uncle with Alzheimer's 

disease in his 80s


Psychiatric: Reports: ADD, ADHD, Anxiety, Bipolar, Depression, Schizophrenia, 

Other (See Below)


Other Psychiatric Family History: Grandson with ADHD, anxiety depression 

disorder in father, daughter and brother with brother having fatal alcohol 

abuse at age 39, bipolar disorder in nephew, paternal uncle with schizophrenia


Endocrine/Metabolic: Reports: Diabetes, Type I, Diabetes, type II, IDDM, Other (

See Below)


Other Endocrine/Metabolic Family History: AODM and paternal uncle, daughter 

with type I IDDM, nieces with type I IDDM,


Hematologic: Reports: SLE, Other (See Below)


Other Hematologic Family History: Daughter with SLE


Immunologic: Reports: AIDS, HIV, Immunosuppression, SLE, Other (See Below)


Other Immunologic Family History: Daughter with SLE, brother with HIV as below


Dermatologic: Reports: None


Oncologic: Reports: Breast, Colon, Hodgkin's Lymphoma, Other (See Below)


Other Oncologic Family History: Paternal aunt with fatal breast cancer in her 

60s, another paternal aunt with breast cancer at age 83, cousin with breast 

cancer in her 30s fatal in her 40s, paternal aunt with colon cancer in her 50s, 

brother with fatal Hodgkin's disease at age 32 with history of homosexuality 

and HIV, cousin with fatal unknown type of cancer at age 42, brother with fatal 

abdominal cancer/?  Stomach cancer in his 30s





- Tobacco Use


Smoking Status *Q: Never Smoker


Used Tobacco, but Quit: No


Second Hand Smoke Exposure: No





- Caffeine Use


Caffeine Use: Reports: Coffee


Other Caffeine Use: Occassional Diet cola


Caffeine Use Comment: no more than 3 cups a day





- Alcohol Use


Days Per Week of Alcohol Use: 0


Number of Drinks Per Day: 1


Total Drinks Per Week: 0





- Recreational Drug Use


Recreational Drug Use: No


Drug Use in Last 12 Months: No





- Living Situation & Occupation


Living situation: Reports: , with Family


Occupation: Retired





ED ROS GENERAL





- Review of Systems


Review Of Systems: See Below


Constitutional: Reports: No Symptoms


HEENT: Reports: No Symptoms


Respiratory: Reports: No Symptoms


Cardiovascular: Reports: No Symptoms, Palpitations


Endocrine: Reports: No Symptoms


GI/Abdominal: Reports: No Symptoms


: Reports: No Symptoms


Musculoskeletal: Reports: No Symptoms


Skin: Reports: No Symptoms


Neurological: Reports: No Symptoms


Psychiatric: Reports: Agitation, Anxiety


Hematologic/Lymphatic: Reports: No Symptoms


Immunologic: Reports: No Symptoms





ED EXAM, GENERAL





- Physical Exam


Exam: See Below


Exam Limited By: No Limitations


General Appearance: Alert, WD/WN, No Apparent Distress


Eye Exam: Bilateral Eye: EOMI, PERRL


Nose: Normal Inspection, Normal Mucosa, No Blood


Throat/Mouth: Normal Inspection, Normal Lips, Normal Teeth, Normal Gums, Normal 

Oropharynx, Normal Voice, No Airway Compromise


Head: Atraumatic, Normocephalic


Neck: Normal Inspection, Supple, Non-Tender, Full Range of Motion


Respiratory/Chest: No Respiratory Distress, Lungs Clear, Normal Breath Sounds, 

No Accessory Muscle Use, Chest Non-Tender


Cardiovascular: Normal Peripheral Pulses, Regular Rate, Rhythm, No Edema, No 

Gallop, No JVD, No Murmur, No Rub, Other (EKG within normal)


GI/Abdominal: Normal Bowel Sounds, Soft, Non-Tender, No Organomegaly, No 

Distention, No Abnormal Bruit, No Mass


 (Female) Exam: Deferred


Rectal (Female) Exam: Deferred


Back Exam: Normal Inspection


Extremities: Normal Inspection, Normal Range of Motion, Non-Tender, Normal 

Capillary Refill, No Pedal Edema


Neurological: Alert, Oriented, CN II-XII Intact, Normal Cognition, Normal Gait, 

Normal Reflexes, No Motor/Sensory Deficits


Psychiatric: Anxious, Depressed Mood


Skin Exam: Warm, Dry, Intact, Normal Color, No Rash


Lymphatic: No Adenopathy





Course





- Vital Signs


Last Recorded V/S: 


 Last Vital Signs











Temp  98.2 F   18 09:04


 


Pulse  98   18 09:30


 


Resp  20   18 09:30


 


BP  112/67   18 09:30


 


Pulse Ox  97   18 09:30














- Orders/Labs/Meds


Orders: 


 Active Orders 24 hr











 Category Date Time Status


 


 EKG Documentation Completion [RC] ASDIRECTED Care  18 09:35 Active


 


 EKG 12 Lead [EK] Routine Ther  18 09:34 Ordered











Labs: 


 Laboratory Tests











  18 Range/Units





  09:08 09:08 09:08 


 


WBC  Cancelled   7.1  


 


Corrected WBC  Cancelled    


 


RBC  Cancelled   4.22  


 


Hgb  Cancelled   13.6  


 


Hct  Cancelled   40.4  


 


MCV  Cancelled   95.7  


 


MCH  Cancelled   32.2  


 


MCHC  Cancelled   33.7  


 


RDW  Cancelled   14.8 H  


 


RDW Std Deviation  Cancelled    


 


RDW Coeff of [place]  Cancelled    


 


Plt Count  Cancelled   248  


 


MPV  Cancelled    


 


Neut % (Auto)    62.1  (45.0-80.0)  %


 


Lymph % (Auto)    26.0  (10.0-50.0)  %


 


Mono % (Auto)    10.4  (2.0-14.0)  %


 


Eos % (Auto)    1.1  (0.0-5.0)  %


 


Baso % (Auto)    0.4  (0.0-2.0)  %


 


Neut # (Auto)    4.42  (1.40-7.00)  K/uL


 


Lymph # (Auto)    1.85  (0.50-3.50)  K/uL


 


Mono # (Auto)    0.74  (0.00-1.00)  K/uL


 


Eos # (Auto)    0.08  (0.00-0.50)  K/uL


 


Baso # (Auto)    0.03  (0.00-0.20)  K/uL


 


Neutrophils % (Manual)  Cancelled    


 


Band Neutrophils %  Cancelled    


 


Lymphocytes % (Manual)  Cancelled    


 


Atypical Lymphs %  Cancelled    


 


Immat Monocytes % (Man)  Cancelled    


 


Monocytes % (Manual)  Cancelled    


 


Eosinophils % (Manual)  Cancelled    


 


Basophils % (Manual)  Cancelled    


 


Metamyelocytes %  Cancelled    


 


Myelocytes %  Cancelled    


 


Promyelocytes %  Cancelled    


 


Blast Cells %  Cancelled    


 


Plasma Cell % (Manual)  Cancelled    


 


Nucleated RBC %  Cancelled    


 


Immature Gran #  Cancelled    


 


Absolute Neutrophils  Cancelled    


 


Absolute Seg Neuts  Cancelled    


 


Band Neutrophils #  Cancelled    


 


Lymphocytes # (Manual)  Cancelled    


 


Monocytes # (Manual)  Cancelled    


 


Eosinophils # (Manual)  Cancelled    


 


Basophils # (Manual)  Cancelled    


 


Absolute Metamyelocyte  Cancelled    


 


Absolute Myelocytes  Cancelled    


 


Absolute Promyelocytes  Cancelled    


 


Absolute Plasma Cells  Cancelled    


 


Nucleated RBCs  Cancelled    


 


Differential Comment  Cancelled    


 


Bilobed Neuts  Cancelled    


 


Hypersegmented Neuts  Cancelled    


 


Variant Lymphocytes  Cancelled    


 


Atypical Lymphocytes  Cancelled    


 


Abnormal Lymphocytes  Cancelled    


 


Reactive Lymphocytes  Cancelled    


 


Vacuolated Monocytes  Cancelled    


 


Absolute Blast Cells  Cancelled    


 


Toxic Granulation  Cancelled    


 


WBC Morphology Comment  Cancelled    


 


Immature Plt Fraction  Cancelled    


 


Plt Morphology Comment  Cancelled    


 


Polychromasia  Cancelled    


 


Hypochromasia  Cancelled    


 


Poikilocytosis  Cancelled    


 


Basophilic Stippling  Cancelled    


 


Anisocytosis  Cancelled    


 


Microcytosis  Cancelled    


 


Macrocytosis  Cancelled    


 


Spherocytes  Cancelled    


 


Micro Spherocytes  Cancelled    


 


Siderocytes  Cancelled    


 


Sickle Cells  Cancelled    


 


Target Cells  Cancelled    


 


Tear Drop Cells  Cancelled    


 


Ovalocytes  Cancelled    


 


Stomatocytes  Cancelled    


 


Celso Cells  Cancelled    


 


Elliptocytes  Cancelled    


 


Acanthocytes (Spur)  Cancelled    


 


Rouleaux  Cancelled    


 


Schistocytes  Cancelled    


 


RBC Morph Comment  Cancelled    


 


Smear Path Review  Cancelled    


 


PT     (9.8-11.7)  SEC


 


INR     


 


Sodium   141   (136-145)  mmol/L


 


Potassium   4.2   (3.5-5.1)  mmol/L


 


Chloride   106   ()  mmol/L


 


Carbon Dioxide   28.0   (21.0-32.0)  mmol/L


 


BUN   13   (7-18)  mg/dL


 


Creatinine   0.83   (0.51-1.17)  mg/dL


 


Est Cr Clr Drug Dosing   56.07   mL/min


 


Estimated GFR (MDRD)   > 60   mL/min


 


Glucose   100   ()  mg/dL


 


Calcium   9.3   (8.5-10.1)  mg/dL


 


Total Bilirubin   0.3   (0.2-1.0)  mg/dL


 


AST   25   (15-37)  U/L


 


ALT   37   (12-78)  U/L


 


Alkaline Phosphatase   96   ()  IU/L


 


Troponin I     (0.000-0.056)  ng/mL


 


Total Protein   7.8   (6.4-8.2)  g/dL


 


Albumin   3.8   (3.4-5.0)  g/dL


 


Slides for Path Review  Cancelled    














  18 Range/Units





  09:08 09:08 


 


WBC    


 


Corrected WBC    


 


RBC    


 


Hgb    


 


Hct    


 


MCV    


 


MCH    


 


MCHC    


 


RDW    


 


RDW Std Deviation    


 


RDW Coeff of [place]    


 


Plt Count    


 


MPV    


 


Neut % (Auto)    (45.0-80.0)  %


 


Lymph % (Auto)    (10.0-50.0)  %


 


Mono % (Auto)    (2.0-14.0)  %


 


Eos % (Auto)    (0.0-5.0)  %


 


Baso % (Auto)    (0.0-2.0)  %


 


Neut # (Auto)    (1.40-7.00)  K/uL


 


Lymph # (Auto)    (0.50-3.50)  K/uL


 


Mono # (Auto)    (0.00-1.00)  K/uL


 


Eos # (Auto)    (0.00-0.50)  K/uL


 


Baso # (Auto)    (0.00-0.20)  K/uL


 


Neutrophils % (Manual)    


 


Band Neutrophils %    


 


Lymphocytes % (Manual)    


 


Atypical Lymphs %    


 


Immat Monocytes % (Man)    


 


Monocytes % (Manual)    


 


Eosinophils % (Manual)    


 


Basophils % (Manual)    


 


Metamyelocytes %    


 


Myelocytes %    


 


Promyelocytes %    


 


Blast Cells %    


 


Plasma Cell % (Manual)    


 


Nucleated RBC %    


 


Immature Gran #    


 


Absolute Neutrophils    


 


Absolute Seg Neuts    


 


Band Neutrophils #    


 


Lymphocytes # (Manual)    


 


Monocytes # (Manual)    


 


Eosinophils # (Manual)    


 


Basophils # (Manual)    


 


Absolute Metamyelocyte    


 


Absolute Myelocytes    


 


Absolute Promyelocytes    


 


Absolute Plasma Cells    


 


Nucleated RBCs    


 


Differential Comment    


 


Bilobed Neuts    


 


Hypersegmented Neuts    


 


Variant Lymphocytes    


 


Atypical Lymphocytes    


 


Abnormal Lymphocytes    


 


Reactive Lymphocytes    


 


Vacuolated Monocytes    


 


Absolute Blast Cells    


 


Toxic Granulation    


 


WBC Morphology Comment    


 


Immature Plt Fraction    


 


Plt Morphology Comment    


 


Polychromasia    


 


Hypochromasia    


 


Poikilocytosis    


 


Basophilic Stippling    


 


Anisocytosis    


 


Microcytosis    


 


Macrocytosis    


 


Spherocytes    


 


Micro Spherocytes    


 


Siderocytes    


 


Sickle Cells    


 


Target Cells    


 


Tear Drop Cells    


 


Ovalocytes    


 


Stomatocytes    


 


Celso Cells    


 


Elliptocytes    


 


Acanthocytes (Spur)    


 


Rouleaux    


 


Schistocytes    


 


RBC Morph Comment    


 


Smear Path Review    


 


PT   24.6 H  (9.8-11.7)  SEC


 


INR   2.2  


 


Sodium    (136-145)  mmol/L


 


Potassium    (3.5-5.1)  mmol/L


 


Chloride    ()  mmol/L


 


Carbon Dioxide    (21.0-32.0)  mmol/L


 


BUN    (7-18)  mg/dL


 


Creatinine    (0.51-1.17)  mg/dL


 


Est Cr Clr Drug Dosing    mL/min


 


Estimated GFR (MDRD)    mL/min


 


Glucose    ()  mg/dL


 


Calcium    (8.5-10.1)  mg/dL


 


Total Bilirubin    (0.2-1.0)  mg/dL


 


AST    (15-37)  U/L


 


ALT    (12-78)  U/L


 


Alkaline Phosphatase    ()  IU/L


 


Troponin I  0.000   (0.000-0.056)  ng/mL


 


Total Protein    (6.4-8.2)  g/dL


 


Albumin    (3.4-5.0)  g/dL


 


Slides for Path Review    











Meds: 


Medications














Discontinued Medications














Generic Name Dose Route Start Last Admin





  Trade Name Frank  PRN Reason Stop Dose Admin


 


Lorazepam  1 mg  18 09:45  18 09:53





  Ativan  PO  18 09:46  1 mg





  ONETIME ONE   Administration





     





     





     





     














Departure





- Departure


Time of Disposition: 10:20


Disposition: Home, Self-Care 01


Clinical Impression: 


 Anxiety and depression








- Discharge Information


Referrals: 


Gerald Arias NP [Primary Care Provider] - 


Forms:  ED Department Discharge





- My Orders


Last 24 Hours: 


My Active Orders





18 09:34


EKG 12 Lead [EK] Routine 





18 09:35


EKG Documentation Completion [RC] ASDIRECTED 














- Assessment/Plan


Last 24 Hours: 


My Active Orders





18 09:34


EKG 12 Lead [EK] Routine 





18 09:35


EKG Documentation Completion [RC] ASDIRECTED
No.

## 2025-01-05 NOTE — ED PROVIDER NOTE - PHYSICAL EXAMINATION
CONSTITUTIONAL: well nourished, NAD  HEAD: NCAT  EYES: EOMI, no scleral icterus  NECK: non tender. Full ROM. No cervical LAD  CARD: RRR  RESP: clear to ausculation b/l  ABD: soft, non-tender  NEURO: normal motor. normal sensory.   PSYCH: nonverbal, not alert, not oriented, mumbling in response to name

## 2025-01-05 NOTE — ED ADULT TRIAGE NOTE - MEANS OF ARRIVAL
Quality 402: Tobacco Use And Help With Quitting Among Adolescents: Patient screened for tobacco and never smoked
stretcher
Detail Level: Detailed
Quality 431: Preventive Care And Screening: Unhealthy Alcohol Use - Screening: Patient screened for unhealthy alcohol use using a single question and scores less than 2 times per year

## 2025-01-05 NOTE — ED PROVIDER NOTE - OBJECTIVE STATEMENT
89-year-old female dementia, currently being treated for C. difficile, aortic stenosis, carotid stenosis, CHF, anemia, anxiety, breast cancer status postmastectomy, brain cancer status post craniectomy, presenting from [facility] for having pulled out her Rios catheter.  Patient is hospice care and will be evaluated tomorrow, but per documentation, request is made to replace Rios catheter.  Patient is nonverbal and not oriented.

## 2025-01-05 NOTE — ED PROVIDER NOTE - ATTENDING CONTRIBUTION TO CARE
89-year-old female past medical history of dementia aortic stenosis carotid stenosis CHF anemia anxiety breast cancer status post mastectomy, brain cancer status post craniectomy, currently on hospice, nonverbal at baseline on home O2 nasal cannula, sent from [facility], for Rios reinsertion.  Per triage note, patient here due to pulled Rios catheter out this morning.  Patient unable to provide history.    On exam, AFVSS, Well appearing, No acute distress, NCAT, EOMI, PERRLA, MMM, Neck supple, LCTAB, RRR nl s1s2 No mrg, Abdomen Soft NTND, alert, able to mumble some words and moving hands but not answering or following commands, No Focal Deficits, No LE edema or calf TTP,    A/P; Rios replaced, DC back to nursing home with transport

## 2025-01-05 NOTE — ED ADULT TRIAGE NOTE - PATIENT'S PREFERRED PRONOUN
In an effort to ensure that our patients [employer], a clinical Team Member has reviewed your chart and identified an opportunity to provide the best care possible. An Outreach Attempt was made to discuss or schedule overdue Preventative or Disease Management screening.     The Outcome of the Outreach was Contact was not made, appointment already scheduled. Care Gaps include Immunizations and Wellness Visits.  SARAHI appt on 3/17/2023 2:00 pm.     
Her/She

## 2025-01-05 NOTE — ED ADULT TRIAGE NOTE - CHIEF COMPLAINT QUOTE
Patient bibems from [facility] in [facility], as per EMS staff reported pt is at baseline mental status here due to pulled lama catheter out this am, was recently admitted and treated for pneumonia

## 2025-01-09 LAB
CORTICOSTEROID BINDING GLOBULIN RESULT: 1.7 MG/DL — SIGNIFICANT CHANGE UP
CORTIS F/TOTAL MFR SERPL: 41 % — SIGNIFICANT CHANGE UP
CORTIS SERPL-MCNC: 19 UG/DL — SIGNIFICANT CHANGE UP
CORTISOL, FREE RESULT: 7.8 UG/DL — HIGH

## 2025-01-10 DIAGNOSIS — A41.9 SEPSIS, UNSPECIFIED ORGANISM: ICD-10-CM

## 2025-01-10 DIAGNOSIS — G47.33 OBSTRUCTIVE SLEEP APNEA (ADULT) (PEDIATRIC): ICD-10-CM

## 2025-01-10 DIAGNOSIS — F03.94 UNSPECIFIED DEMENTIA, UNSPECIFIED SEVERITY, WITH ANXIETY: ICD-10-CM

## 2025-01-10 DIAGNOSIS — Z85.841 PERSONAL HISTORY OF MALIGNANT NEOPLASM OF BRAIN: ICD-10-CM

## 2025-01-10 DIAGNOSIS — Z51.5 ENCOUNTER FOR PALLIATIVE CARE: ICD-10-CM

## 2025-01-10 DIAGNOSIS — D72.819 DECREASED WHITE BLOOD CELL COUNT, UNSPECIFIED: ICD-10-CM
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
## 2025-01-10 DIAGNOSIS — R68.0 HYPOTHERMIA, NOT ASSOCIATED WITH LOW ENVIRONMENTAL TEMPERATURE: ICD-10-CM

## 2025-01-10 DIAGNOSIS — D64.9 ANEMIA, UNSPECIFIED: ICD-10-CM

## 2025-01-10 DIAGNOSIS — R13.10 DYSPHAGIA, UNSPECIFIED: ICD-10-CM

## 2025-01-10 DIAGNOSIS — G43.909 MIGRAINE, UNSPECIFIED, NOT INTRACTABLE, WITHOUT STATUS MIGRAINOSUS: ICD-10-CM

## 2025-01-10 DIAGNOSIS — J69.0 PNEUMONITIS DUE TO INHALATION OF FOOD AND VOMIT: ICD-10-CM

## 2025-01-10 DIAGNOSIS — I95.9 HYPOTENSION, UNSPECIFIED: ICD-10-CM
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
## 2025-01-10 DIAGNOSIS — Z11.52 ENCOUNTER FOR SCREENING FOR COVID-19: ICD-10-CM

## 2025-01-10 DIAGNOSIS — Z90.13 ACQUIRED ABSENCE OF BILATERAL BREASTS AND NIPPLES: ICD-10-CM

## 2025-01-10 DIAGNOSIS — I35.0 NONRHEUMATIC AORTIC (VALVE) STENOSIS: ICD-10-CM

## 2025-01-10 DIAGNOSIS — A04.72 ENTEROCOLITIS DUE TO CLOSTRIDIUM DIFFICILE, NOT SPECIFIED AS RECURRENT: ICD-10-CM

## 2025-01-10 DIAGNOSIS — R33.9 RETENTION OF URINE, UNSPECIFIED: ICD-10-CM

## 2025-01-10 DIAGNOSIS — Z66 DO NOT RESUSCITATE: ICD-10-CM

## 2025-01-10 DIAGNOSIS — K21.9 GASTRO-ESOPHAGEAL REFLUX DISEASE WITHOUT ESOPHAGITIS: ICD-10-CM

## 2025-01-10 DIAGNOSIS — Z85.3 PERSONAL HISTORY OF MALIGNANT NEOPLASM OF BREAST: ICD-10-CM

## 2025-01-10 DIAGNOSIS — I50.32 CHRONIC DIASTOLIC (CONGESTIVE) HEART FAILURE: ICD-10-CM

## 2025-01-10 DIAGNOSIS — I11.0 HYPERTENSIVE HEART DISEASE WITH HEART FAILURE: ICD-10-CM

## 2025-01-14 ENCOUNTER — TRANSCRIPTION ENCOUNTER (OUTPATIENT)
Age: 89
End: 2025-01-14

## 2025-02-07 NOTE — ED ADULT TRIAGE NOTE - BEFAST ARM NUMBNESS
No

## 2025-02-08 NOTE — ED PROVIDER NOTE - CHIEF COMPLAINT
[facility] EMERGENCY DEPARTMENT     Emergency Department     Faculty Attestation        I performed a history and physical examination of the patient and discussed management with the resident. I reviewed the resident’s note and agree with the documented findings and plan of care. Any areas of disagreement are noted on the chart. I was personally present for the key portions of any procedures. I have documented in the chart those procedures where I was not present during the key portions. I have reviewed the emergency nurses triage note. I agree with the chief complaint, past medical history, past surgical history, allergies, medications, social and family history as documented unless otherwise noted below.  For Physician Assistant/ Nurse Practitioner cases/documentation I have personally evaluated this patient and have completed at least one if not all key elements of the E/M (history, physical exam, and MDM). Additional findings are as noted.      Vital Signs: BP: (!) 155/105  Pulse: 98  Respirations: 19  Temp: 98.4 °F (36.9 °C) SpO2: 98 %  PCP:  No primary care provider on file.  Note Started: 2/8/25, 8:39 AM EST    Pertinent Comments:     Patient is a 40-year-old male who is asymptomatic but is here with complaint that his partner stated she tested positive for trichomonas and wishes treatment.        Critical Care  None      (Please note that portions of this note were completed with a voice recognition program. Efforts were made to edit the dictations but occasionally words are mis-transcribed. Whenever words are used in this note in any gender, they shall be construed as though they were used in the gender appropriate to the circumstances; and whenever words are used in this note in the singular or plural form, they shall be construed as though they were used in the form appropriate to the circumstances.)    Keon Guido MD [facility]  Attending Emergency  The patient is a 89y Female complaining of shortness of breath.

## 2025-02-20 NOTE — PHYSICAL THERAPY INITIAL EVALUATION ADULT - WEIGHT-BEARING RESTRICTIONS: STAND/SIT, REHAB EVAL
REVIEW OF CARDIOVASCULAR SYSTEMS:  Cardiovascular problem(s): Shortness of Breath on Exertion and Swelling in Legs, Ankles, Abdomen    Patient does not smoke.    Patient does not take aspirin.  Reason patient does not take aspirin:     weight-bearing as tolerated

## 2025-04-22 NOTE — ED PROVIDER NOTE - CADM POA CENTRAL LINE
Flexible bronchoscopy, EBUS with biopsy of mediastinal lymph nodes, BAL/brushing right upper lobe mass  See MD full operative report 
No

## 2025-04-28 NOTE — ED CDU PROVIDER DISPOSITION NOTE - CARE PROVIDER_API CALL
74
Angel Luis Rodriguez  Internal Medicine  [address] [facility] [place] [address]  Phone: [phone]  Fax: [phone]  Follow Up Time: 1-3 Days

## 2025-05-12 NOTE — ED ADULT NURSE NOTE - NSSEPSISSUSPECTED_ED_A_ED
Department of Emergency Medicine  FIRST PROVIDER TRIAGE NOTE             Independent MLP           5/12/25  12:38 PM EDT    Date of Encounter: 5/12/25   MRN: [id number]      HPI: Alanna Yang is a 66 y.o. female who presents to the ED for Shortness of Breath (Oxygen tank at home broke on Saturday. Unable to more oxygen through the company. )       Patient arrived with her son-in-law complains of shortness of breath because she has been out of her oxygen at home and states that her machine broke and she normally wears 3 L of oxygen for COPD and states the company [employer] will not come out and give her new equipment because she needs a walking test.  She denies any chest pain, palpitations, leg pain, leg swelling or any fever or chills.  Patient reports that she got over a viral illness a few weeks ago.    There were no vitals filed for this visit.      ROS: Negative for cp, back pain, fever, cough, vomiting, diarrhea, urinary complaints, rash, headache, dizziness, Suicidal ideation, or Homicidal Ideation.    PE: Gen Appearance/Constitutional: alert  Pulm: Patient tachypneic.  Inspiratory wheeze with scattered rhonchi in bases.  Musculoskeletal: moves all extremities x 4     Initial Plan of Care:  Plan to order/Initiate the following while awaiting opening in ED: EKG.   Instructed patient and/or family member with patient if any worsening condition to notify staff.    Provider-Patient relationship only established for Provider In Triage (PIT) secondary to high volume, low staffing, and/or boarding- patient to await bed availability..  Full assessment, HPI and examination not performed.  Discussed with patient that the provider in triage evaluation is not a comprehensive medical screening exam and this is not yet possible at the end of the provider in triage encounter, to state whether or not an emergency medical condition exist  This ends my PIT-Patient relationship.    Electronically signed by SHAQ Rodriguez -  1.676 m (5' 6\")   Wt 47.6 kg (105 lb)   SpO2 99%   BMI 16.95 kg/m²     Alanna Yang is a 66 y.o. female who presents to the ED for oxygen stating she stopped working in 2 days ago.  She uses 3 L nasal cannula at home lungs increasing shortness of breath history of COPD and CHF no chest pain no pleuritic pain no fever cough    Physical exam findings:  Lungs with decreased breath sounds in bilateral bases no rales scattered wheezes bilaterally no tachypnea no accessory muscles no retractions able speak in full sentences.  No rhonchi not in respiratory distress; no hypoxia 99% on baseline 3 L nasal cannula    Differential diagnosis (includes but not limited to):  COPD exacerbation CHF PE pneumonia failed oxygen tank.      Chronic conditions:  has a past medical history of Anxiety and depression.          ED Course Summary:(labs and imaging reviewed, interventions, reassessment, consults,shared decision making with patient, disposition)    The patient was placed on the cardiac monitor for continuous monitoring of rhythm and vitals. EKG ordered to evaluate patient's current cardiac rate, rhythm, and QT interval. CBC was ordered as part of my assessment for possible infection, anemia or thrombocytopenia. CMP to assess electrolytes, kidney function, liver function or any metabolic derangements. Urinalysis to evaluate for a UTI. Troponin as a marker for myocardial ischemia or heart strain. D-dimer used to rule out PE given low pre-test probability. Chest x-ray for any possible signs of, but without limitation to, pneumonia, pleural effusions, cardiomegaly, pneumothorax, atelectasis, rib or sternal abnormalities including fractures.        EKG shows normal sinus of 84 beats minute no signs of ST changes no STEMI.  Patient was given 3 DuoNeb treatments and 125 mg of IV Solu-Medrol.  Patient ambulated on her baseline 3 L and became hypoxic better at rest.  Patient stable at rest on her baseline 3 L nasal cannula.  Patient  No